# Patient Record
Sex: MALE | Race: OTHER | Employment: UNEMPLOYED
[De-identification: names, ages, dates, MRNs, and addresses within clinical notes are randomized per-mention and may not be internally consistent; named-entity substitution may affect disease eponyms.]

---

## 2017-08-29 ENCOUNTER — TELEPHONE (OUTPATIENT)
Dept: OTHER | Facility: CLINIC | Age: 47
End: 2017-08-29

## 2018-03-13 ENCOUNTER — OFFICE VISIT (OUTPATIENT)
Dept: FAMILY MEDICINE CLINIC | Age: 48
End: 2018-03-13
Payer: COMMERCIAL

## 2018-03-13 VITALS
WEIGHT: 212 LBS | HEIGHT: 74 IN | DIASTOLIC BLOOD PRESSURE: 98 MMHG | BODY MASS INDEX: 27.21 KG/M2 | TEMPERATURE: 97.5 F | SYSTOLIC BLOOD PRESSURE: 150 MMHG | HEART RATE: 60 BPM | OXYGEN SATURATION: 95 %

## 2018-03-13 DIAGNOSIS — E78.2 MIXED HYPERLIPIDEMIA: ICD-10-CM

## 2018-03-13 DIAGNOSIS — I10 ESSENTIAL HYPERTENSION: ICD-10-CM

## 2018-03-13 DIAGNOSIS — Z23 NEED FOR VACCINATION: ICD-10-CM

## 2018-03-13 DIAGNOSIS — Z11.4 ENCOUNTER FOR SCREENING FOR HIV: ICD-10-CM

## 2018-03-13 DIAGNOSIS — E11.8 TYPE 2 DIABETES MELLITUS WITH COMPLICATION, WITHOUT LONG-TERM CURRENT USE OF INSULIN (HCC): Primary | ICD-10-CM

## 2018-03-13 PROCEDURE — 90732 PPSV23 VACC 2 YRS+ SUBQ/IM: CPT | Performed by: FAMILY MEDICINE

## 2018-03-13 PROCEDURE — G8484 FLU IMMUNIZE NO ADMIN: HCPCS | Performed by: FAMILY MEDICINE

## 2018-03-13 PROCEDURE — G8419 CALC BMI OUT NRM PARAM NOF/U: HCPCS | Performed by: FAMILY MEDICINE

## 2018-03-13 PROCEDURE — G8427 DOCREV CUR MEDS BY ELIG CLIN: HCPCS | Performed by: FAMILY MEDICINE

## 2018-03-13 PROCEDURE — 99214 OFFICE O/P EST MOD 30 MIN: CPT | Performed by: FAMILY MEDICINE

## 2018-03-13 PROCEDURE — 1036F TOBACCO NON-USER: CPT | Performed by: FAMILY MEDICINE

## 2018-03-13 PROCEDURE — 3046F HEMOGLOBIN A1C LEVEL >9.0%: CPT | Performed by: FAMILY MEDICINE

## 2018-03-13 PROCEDURE — 90471 IMMUNIZATION ADMIN: CPT | Performed by: FAMILY MEDICINE

## 2018-03-13 RX ORDER — ATORVASTATIN CALCIUM 10 MG/1
TABLET, FILM COATED ORAL
Qty: 30 TABLET | Refills: 5 | Status: SHIPPED | OUTPATIENT
Start: 2018-03-13 | End: 2019-02-22 | Stop reason: SDUPTHER

## 2018-03-13 RX ORDER — LISINOPRIL 5 MG/1
5 TABLET ORAL DAILY
Qty: 30 TABLET | Refills: 2 | Status: SHIPPED | OUTPATIENT
Start: 2018-03-13 | End: 2019-02-22 | Stop reason: SDUPTHER

## 2018-03-13 RX ORDER — ASPIRIN 81 MG/1
81 TABLET ORAL DAILY
Qty: 30 TABLET | Refills: 3 | Status: SHIPPED | OUTPATIENT
Start: 2018-03-13 | End: 2019-03-14 | Stop reason: SDUPTHER

## 2018-03-13 RX ORDER — BENZOCAINE/MENTHOL 6 MG-10 MG
LOZENGE MUCOUS MEMBRANE
Qty: 1 EACH | Refills: 0 | Status: SHIPPED | OUTPATIENT
Start: 2018-03-13

## 2018-03-13 ASSESSMENT — ENCOUNTER SYMPTOMS
BLOOD IN STOOL: 0
NAUSEA: 0
CONSTIPATION: 0
BACK PAIN: 0
COUGH: 0
RHINORRHEA: 0
SORE THROAT: 0
SINUS PRESSURE: 0
WHEEZING: 0
PHOTOPHOBIA: 0
ABDOMINAL PAIN: 0
EYE REDNESS: 0
SHORTNESS OF BREATH: 0

## 2018-03-13 NOTE — PROGRESS NOTES
Chief Complaint   Patient presents with    Diabetes         Pedro Ramirez  here today for follow up on chronic medical problems, go over labs and/or diagnostic studies, and medication refills. Diabetes      HPI:Patient is here for follow-up on diabetes and hypertension. Patient has not been seen in the office since last 1 year. Patient reports he ran out of medications. Patient is taking his metformin and reports he has lost weight. Last A1c was one year before it was 8.6. He does not check his sugars    Hypertension uncontrolled does not take his medications. He denies any chest pain, shortness of breath. He is due for all his blood work    Hyperlipidemia on statins needs repeat blood work. BP (!) 138/91   Pulse 60   Temp 97.5 °F (36.4 °C) (Tympanic)   Ht 6' 2\" (1.88 m)   Wt 212 lb (96.2 kg)   SpO2 95% Comment: rest @ RA  BMI 27.22 kg/m²   Body mass index is 27.22 kg/m². Wt Readings from Last 3 Encounters:   03/13/18 212 lb (96.2 kg)   10/04/16 219 lb (99.3 kg)   08/05/16 220 lb (99.8 kg)        [x]Negative depression screening. PHQ Scores 11/13/2013   PHQ2 Score 4   PHQ9 Score 15      []1-4 = Minimal depression   []5-9 = Mild depression   []10-14 = Moderate depression   []15-19 = Moderately severe depression   []20-27 = Severe depression    Discussed testing with the patient and all questions fully answered.     Office Visit on 10/04/2016   Component Date Value Ref Range Status    Hemoglobin A1C 10/04/2016 8.6  % Final         Most recent labs reviewed:     Lab Results   Component Value Date    WBC 12.4 (H) 11/22/2015    HGB 15.5 11/22/2015    HCT 44.1 11/22/2015    MCV 90.6 11/22/2015     11/22/2015       Lab Results   Component Value Date     11/22/2015    K 3.5 11/22/2015     11/22/2015    CO2 24 11/22/2015    BUN 14 11/22/2015    CREATININE 0.72 11/22/2015    GLUCOSE 149 11/22/2015    CALCIUM 8.9 11/22/2015        Lab Results   Component Value Date    ALT 42 (H)

## 2019-01-15 ENCOUNTER — HOSPITAL ENCOUNTER (INPATIENT)
Age: 49
LOS: 2 days | Discharge: HOME | DRG: 383 | End: 2019-01-18
Attending: EMERGENCY MEDICINE | Admitting: INTERNAL MEDICINE
Payer: COMMERCIAL

## 2019-01-15 DIAGNOSIS — L03.211 CELLULITIS OF FACE: Primary | ICD-10-CM

## 2019-01-15 DIAGNOSIS — R73.9 HYPERGLYCEMIA: ICD-10-CM

## 2019-01-15 PROBLEM — L03.90 CELLULITIS: Status: ACTIVE | Noted: 2019-01-15

## 2019-01-15 LAB
-: NORMAL
ABSOLUTE EOS #: 0.1 K/UL (ref 0–0.4)
ABSOLUTE IMMATURE GRANULOCYTE: ABNORMAL K/UL (ref 0–0.3)
ABSOLUTE LYMPH #: 1.5 K/UL (ref 1–4.8)
ABSOLUTE MONO #: 0.9 K/UL (ref 0.1–1.3)
ALBUMIN SERPL-MCNC: 3.9 G/DL (ref 3.5–5.2)
ALBUMIN/GLOBULIN RATIO: ABNORMAL (ref 1–2.5)
ALLEN TEST: ABNORMAL
ALP BLD-CCNC: 139 U/L (ref 40–129)
ALT SERPL-CCNC: 17 U/L (ref 5–41)
AMORPHOUS: NORMAL
ANION GAP SERPL CALCULATED.3IONS-SCNC: 13 MMOL/L (ref 9–17)
AST SERPL-CCNC: 13 U/L
BACTERIA: NORMAL
BASOPHILS # BLD: 1 % (ref 0–2)
BASOPHILS ABSOLUTE: 0.1 K/UL (ref 0–0.2)
BETA-HYDROXYBUTYRATE: 0.59 MMOL/L (ref 0.02–0.27)
BILIRUB SERPL-MCNC: 0.87 MG/DL (ref 0.3–1.2)
BILIRUBIN URINE: NEGATIVE
BUN BLDV-MCNC: 15 MG/DL (ref 6–20)
BUN/CREAT BLD: ABNORMAL (ref 9–20)
C-REACTIVE PROTEIN: 77.6 MG/L (ref 0–5)
CALCIUM SERPL-MCNC: 9.3 MG/DL (ref 8.6–10.4)
CARBOXYHEMOGLOBIN: 1.6 % (ref 0–5)
CASTS UA: NORMAL /LPF
CHLORIDE BLD-SCNC: 91 MMOL/L (ref 98–107)
CHP ED QC CHECK: YES
CO2: 26 MMOL/L (ref 20–31)
COLOR: YELLOW
COMMENT UA: ABNORMAL
CREAT SERPL-MCNC: 0.8 MG/DL (ref 0.7–1.2)
CRYSTALS, UA: NORMAL /HPF
DIFFERENTIAL TYPE: ABNORMAL
EOSINOPHILS RELATIVE PERCENT: 0 % (ref 0–4)
EPITHELIAL CELLS UA: NORMAL /HPF
FIO2: ABNORMAL
GFR AFRICAN AMERICAN: >60 ML/MIN
GFR NON-AFRICAN AMERICAN: >60 ML/MIN
GFR SERPL CREATININE-BSD FRML MDRD: ABNORMAL ML/MIN/{1.73_M2}
GFR SERPL CREATININE-BSD FRML MDRD: ABNORMAL ML/MIN/{1.73_M2}
GLUCOSE BLD-MCNC: 362 MG/DL
GLUCOSE BLD-MCNC: 362 MG/DL (ref 75–110)
GLUCOSE BLD-MCNC: 530 MG/DL (ref 70–99)
GLUCOSE BLD-MCNC: 542 MG/DL
GLUCOSE BLD-MCNC: 542 MG/DL (ref 75–110)
GLUCOSE URINE: ABNORMAL
HCO3 VENOUS: 27.5 MMOL/L (ref 24–30)
HCT VFR BLD CALC: 44.3 % (ref 41–53)
HEMOGLOBIN: 15.2 G/DL (ref 13.5–17.5)
IMMATURE GRANULOCYTES: ABNORMAL %
KETONES, URINE: ABNORMAL
LEUKOCYTE ESTERASE, URINE: NEGATIVE
LYMPHOCYTES # BLD: 12 % (ref 24–44)
MCH RBC QN AUTO: 32.1 PG (ref 26–34)
MCHC RBC AUTO-ENTMCNC: 34.3 G/DL (ref 31–37)
MCV RBC AUTO: 93.6 FL (ref 80–100)
METHEMOGLOBIN: 0.4 % (ref 0–1.9)
MODE: ABNORMAL
MONOCYTES # BLD: 7 % (ref 1–7)
MUCUS: NORMAL
NEGATIVE BASE EXCESS, VEN: ABNORMAL MMOL/L (ref 0–2)
NITRITE, URINE: NEGATIVE
NOTIFICATION TIME: ABNORMAL
NOTIFICATION: ABNORMAL
NRBC AUTOMATED: ABNORMAL PER 100 WBC
O2 DEVICE/FLOW/%: ABNORMAL
O2 SAT, VEN: 84.3 % (ref 60–85)
OTHER OBSERVATIONS UA: NORMAL
OXYHEMOGLOBIN: ABNORMAL % (ref 95–98)
PATIENT TEMP: 37
PCO2, VEN, TEMP ADJ: ABNORMAL MMHG (ref 39–55)
PCO2, VEN: 45.4 (ref 39–55)
PDW BLD-RTO: 13 % (ref 11.5–14.9)
PEEP/CPAP: ABNORMAL
PH UA: 6 (ref 5–8)
PH VENOUS: 7.39 (ref 7.32–7.42)
PH, VEN, TEMP ADJ: ABNORMAL (ref 7.32–7.42)
PLATELET # BLD: 227 K/UL (ref 150–450)
PLATELET ESTIMATE: ABNORMAL
PMV BLD AUTO: 9 FL (ref 6–12)
PO2, VEN, TEMP ADJ: ABNORMAL MMHG (ref 30–50)
PO2, VEN: 53.2 (ref 30–50)
POSITIVE BASE EXCESS, VEN: 2.6 MMOL/L (ref 0–2)
POTASSIUM SERPL-SCNC: 4.6 MMOL/L (ref 3.7–5.3)
PROTEIN UA: NEGATIVE
PSV: ABNORMAL
PT. POSITION: ABNORMAL
RBC # BLD: 4.74 M/UL (ref 4.5–5.9)
RBC # BLD: ABNORMAL 10*6/UL
RBC UA: NORMAL /HPF
RENAL EPITHELIAL, UA: NORMAL /HPF
RESPIRATORY RATE: ABNORMAL
SAMPLE SITE: ABNORMAL
SEG NEUTROPHILS: 80 % (ref 36–66)
SEGMENTED NEUTROPHILS ABSOLUTE COUNT: 9.8 K/UL (ref 1.3–9.1)
SERUM OSMOLALITY: 312 MOSM/KG (ref 275–295)
SET RATE: ABNORMAL
SODIUM BLD-SCNC: 130 MMOL/L (ref 135–144)
SPECIFIC GRAVITY UA: 1.04 (ref 1–1.03)
TEXT FOR RESPIRATORY: ABNORMAL
TOTAL HB: ABNORMAL G/DL (ref 12–16)
TOTAL PROTEIN: 8.2 G/DL (ref 6.4–8.3)
TOTAL RATE: ABNORMAL
TRICHOMONAS: NORMAL
TURBIDITY: CLEAR
URINE HGB: NEGATIVE
UROBILINOGEN, URINE: ABNORMAL
VT: ABNORMAL
WBC # BLD: 12.3 K/UL (ref 3.5–11)
WBC # BLD: ABNORMAL 10*3/UL
WBC UA: NORMAL /HPF
YEAST: NORMAL

## 2019-01-15 PROCEDURE — 82805 BLOOD GASES W/O2 SATURATION: CPT

## 2019-01-15 PROCEDURE — 36415 COLL VENOUS BLD VENIPUNCTURE: CPT

## 2019-01-15 PROCEDURE — 82010 KETONE BODYS QUAN: CPT

## 2019-01-15 PROCEDURE — 6370000000 HC RX 637 (ALT 250 FOR IP): Performed by: EMERGENCY MEDICINE

## 2019-01-15 PROCEDURE — 99222 1ST HOSP IP/OBS MODERATE 55: CPT | Performed by: INTERNAL MEDICINE

## 2019-01-15 PROCEDURE — 81001 URINALYSIS AUTO W/SCOPE: CPT

## 2019-01-15 PROCEDURE — 80053 COMPREHEN METABOLIC PANEL: CPT

## 2019-01-15 PROCEDURE — 96365 THER/PROPH/DIAG IV INF INIT: CPT

## 2019-01-15 PROCEDURE — 2500000003 HC RX 250 WO HCPCS: Performed by: EMERGENCY MEDICINE

## 2019-01-15 PROCEDURE — 99285 EMERGENCY DEPT VISIT HI MDM: CPT

## 2019-01-15 PROCEDURE — 85025 COMPLETE CBC W/AUTO DIFF WBC: CPT

## 2019-01-15 PROCEDURE — G0378 HOSPITAL OBSERVATION PER HR: HCPCS

## 2019-01-15 PROCEDURE — 82947 ASSAY GLUCOSE BLOOD QUANT: CPT

## 2019-01-15 PROCEDURE — 86140 C-REACTIVE PROTEIN: CPT

## 2019-01-15 PROCEDURE — 82800 BLOOD PH: CPT

## 2019-01-15 PROCEDURE — 96372 THER/PROPH/DIAG INJ SC/IM: CPT

## 2019-01-15 PROCEDURE — 83930 ASSAY OF BLOOD OSMOLALITY: CPT

## 2019-01-15 PROCEDURE — 6370000000 HC RX 637 (ALT 250 FOR IP): Performed by: INTERNAL MEDICINE

## 2019-01-15 PROCEDURE — 2580000003 HC RX 258: Performed by: EMERGENCY MEDICINE

## 2019-01-15 PROCEDURE — 96361 HYDRATE IV INFUSION ADD-ON: CPT

## 2019-01-15 RX ORDER — LACTOBACILLUS RHAMNOSUS GG 10B CELL
2 CAPSULE ORAL
Status: DISCONTINUED | OUTPATIENT
Start: 2019-01-16 | End: 2019-01-18 | Stop reason: HOSPADM

## 2019-01-15 RX ORDER — BISACODYL 10 MG
10 SUPPOSITORY, RECTAL RECTAL DAILY PRN
Status: DISCONTINUED | OUTPATIENT
Start: 2019-01-15 | End: 2019-01-18 | Stop reason: HOSPADM

## 2019-01-15 RX ORDER — LISINOPRIL 5 MG/1
5 TABLET ORAL DAILY
Status: DISCONTINUED | OUTPATIENT
Start: 2019-01-16 | End: 2019-01-18 | Stop reason: HOSPADM

## 2019-01-15 RX ORDER — POTASSIUM CHLORIDE 20MEQ/15ML
40 LIQUID (ML) ORAL PRN
Status: DISCONTINUED | OUTPATIENT
Start: 2019-01-15 | End: 2019-01-18 | Stop reason: HOSPADM

## 2019-01-15 RX ORDER — NICOTINE 21 MG/24HR
1 PATCH, TRANSDERMAL 24 HOURS TRANSDERMAL DAILY PRN
Status: DISCONTINUED | OUTPATIENT
Start: 2019-01-15 | End: 2019-01-18 | Stop reason: HOSPADM

## 2019-01-15 RX ORDER — IBUPROFEN 600 MG/1
600 TABLET ORAL ONCE
Status: COMPLETED | OUTPATIENT
Start: 2019-01-15 | End: 2019-01-15

## 2019-01-15 RX ORDER — ATORVASTATIN CALCIUM 10 MG/1
10 TABLET, FILM COATED ORAL DAILY
Status: DISCONTINUED | OUTPATIENT
Start: 2019-01-16 | End: 2019-01-18 | Stop reason: HOSPADM

## 2019-01-15 RX ORDER — SODIUM CHLORIDE 0.9 % (FLUSH) 0.9 %
10 SYRINGE (ML) INJECTION PRN
Status: DISCONTINUED | OUTPATIENT
Start: 2019-01-15 | End: 2019-01-18 | Stop reason: HOSPADM

## 2019-01-15 RX ORDER — MAGNESIUM SULFATE 1 G/100ML
1 INJECTION INTRAVENOUS PRN
Status: DISCONTINUED | OUTPATIENT
Start: 2019-01-15 | End: 2019-01-18 | Stop reason: HOSPADM

## 2019-01-15 RX ORDER — ASPIRIN 81 MG/1
81 TABLET ORAL DAILY
Status: DISCONTINUED | OUTPATIENT
Start: 2019-01-16 | End: 2019-01-18 | Stop reason: HOSPADM

## 2019-01-15 RX ORDER — NICOTINE POLACRILEX 4 MG
15 LOZENGE BUCCAL PRN
Status: DISCONTINUED | OUTPATIENT
Start: 2019-01-15 | End: 2019-01-18 | Stop reason: HOSPADM

## 2019-01-15 RX ORDER — POTASSIUM CHLORIDE 7.45 MG/ML
10 INJECTION INTRAVENOUS PRN
Status: DISCONTINUED | OUTPATIENT
Start: 2019-01-15 | End: 2019-01-18 | Stop reason: HOSPADM

## 2019-01-15 RX ORDER — SODIUM CHLORIDE 0.9 % (FLUSH) 0.9 %
10 SYRINGE (ML) INJECTION EVERY 12 HOURS SCHEDULED
Status: DISCONTINUED | OUTPATIENT
Start: 2019-01-15 | End: 2019-01-18 | Stop reason: HOSPADM

## 2019-01-15 RX ORDER — SODIUM CHLORIDE 9 MG/ML
INJECTION, SOLUTION INTRAVENOUS CONTINUOUS
Status: DISCONTINUED | OUTPATIENT
Start: 2019-01-15 | End: 2019-01-18 | Stop reason: HOSPADM

## 2019-01-15 RX ORDER — ONDANSETRON 2 MG/ML
4 INJECTION INTRAMUSCULAR; INTRAVENOUS EVERY 8 HOURS PRN
Status: DISCONTINUED | OUTPATIENT
Start: 2019-01-15 | End: 2019-01-18 | Stop reason: HOSPADM

## 2019-01-15 RX ORDER — DEXTROSE MONOHYDRATE 50 MG/ML
100 INJECTION, SOLUTION INTRAVENOUS PRN
Status: DISCONTINUED | OUTPATIENT
Start: 2019-01-15 | End: 2019-01-18 | Stop reason: HOSPADM

## 2019-01-15 RX ORDER — ACETAMINOPHEN 325 MG/1
650 TABLET ORAL EVERY 4 HOURS PRN
Status: DISCONTINUED | OUTPATIENT
Start: 2019-01-15 | End: 2019-01-18 | Stop reason: HOSPADM

## 2019-01-15 RX ORDER — 0.9 % SODIUM CHLORIDE 0.9 %
1000 INTRAVENOUS SOLUTION INTRAVENOUS ONCE
Status: COMPLETED | OUTPATIENT
Start: 2019-01-15 | End: 2019-01-15

## 2019-01-15 RX ORDER — DEXTROSE MONOHYDRATE 25 G/50ML
12.5 INJECTION, SOLUTION INTRAVENOUS PRN
Status: DISCONTINUED | OUTPATIENT
Start: 2019-01-15 | End: 2019-01-18 | Stop reason: HOSPADM

## 2019-01-15 RX ORDER — POTASSIUM CHLORIDE 20 MEQ/1
40 TABLET, EXTENDED RELEASE ORAL PRN
Status: DISCONTINUED | OUTPATIENT
Start: 2019-01-15 | End: 2019-01-18 | Stop reason: HOSPADM

## 2019-01-15 RX ADMIN — IBUPROFEN 600 MG: 600 TABLET ORAL at 18:40

## 2019-01-15 RX ADMIN — INSULIN LISPRO 6 UNITS: 100 INJECTION, SOLUTION INTRAVENOUS; SUBCUTANEOUS at 19:25

## 2019-01-15 RX ADMIN — ACETAMINOPHEN 650 MG: 325 TABLET, FILM COATED ORAL at 22:57

## 2019-01-15 RX ADMIN — DEXTROSE MONOHYDRATE 600 MG: 50 INJECTION, SOLUTION INTRAVENOUS at 20:35

## 2019-01-15 RX ADMIN — SODIUM CHLORIDE 1000 ML: 9 INJECTION, SOLUTION INTRAVENOUS at 18:39

## 2019-01-15 ASSESSMENT — PAIN DESCRIPTION - PROGRESSION: CLINICAL_PROGRESSION: GRADUALLY WORSENING

## 2019-01-15 ASSESSMENT — ENCOUNTER SYMPTOMS
ABDOMINAL PAIN: 0
COUGH: 0
BACK PAIN: 0
COLOR CHANGE: 1
FACIAL SWELLING: 1
NAUSEA: 0
SHORTNESS OF BREATH: 0
VOMITING: 0
SORE THROAT: 0

## 2019-01-15 ASSESSMENT — PAIN SCALES - GENERAL
PAINLEVEL_OUTOF10: 6
PAINLEVEL_OUTOF10: 5
PAINLEVEL_OUTOF10: 7
PAINLEVEL_OUTOF10: 9

## 2019-01-15 ASSESSMENT — PAIN DESCRIPTION - ORIENTATION
ORIENTATION: LEFT

## 2019-01-15 ASSESSMENT — PAIN DESCRIPTION - LOCATION
LOCATION: FACE;MOUTH
LOCATION: FACE

## 2019-01-15 ASSESSMENT — PAIN DESCRIPTION - ONSET: ONSET: ON-GOING

## 2019-01-15 ASSESSMENT — PAIN DESCRIPTION - DESCRIPTORS
DESCRIPTORS: BURNING;THROBBING
DESCRIPTORS: ACHING;SORE;TENDER;THROBBING

## 2019-01-15 ASSESSMENT — PAIN DESCRIPTION - FREQUENCY
FREQUENCY: CONTINUOUS

## 2019-01-15 ASSESSMENT — PAIN DESCRIPTION - PAIN TYPE
TYPE: ACUTE PAIN
TYPE: ACUTE PAIN

## 2019-01-16 PROBLEM — R73.9 HYPERGLYCEMIA: Status: ACTIVE | Noted: 2019-01-16

## 2019-01-16 LAB
ANION GAP SERPL CALCULATED.3IONS-SCNC: 11 MMOL/L (ref 9–17)
BUN BLDV-MCNC: 16 MG/DL (ref 6–20)
BUN/CREAT BLD: ABNORMAL (ref 9–20)
CALCIUM SERPL-MCNC: 8.8 MG/DL (ref 8.6–10.4)
CHLORIDE BLD-SCNC: 99 MMOL/L (ref 98–107)
CO2: 25 MMOL/L (ref 20–31)
CREAT SERPL-MCNC: 0.57 MG/DL (ref 0.7–1.2)
ESTIMATED AVERAGE GLUCOSE: 258 MG/DL
GFR AFRICAN AMERICAN: >60 ML/MIN
GFR NON-AFRICAN AMERICAN: >60 ML/MIN
GFR SERPL CREATININE-BSD FRML MDRD: ABNORMAL ML/MIN/{1.73_M2}
GFR SERPL CREATININE-BSD FRML MDRD: ABNORMAL ML/MIN/{1.73_M2}
GLUCOSE BLD-MCNC: 231 MG/DL (ref 75–110)
GLUCOSE BLD-MCNC: 264 MG/DL (ref 70–99)
GLUCOSE BLD-MCNC: 279 MG/DL (ref 75–110)
GLUCOSE BLD-MCNC: 295 MG/DL (ref 75–110)
GLUCOSE BLD-MCNC: 319 MG/DL (ref 75–110)
HBA1C MFR BLD: 10.6 % (ref 4–6)
HCT VFR BLD CALC: 40.7 % (ref 41–53)
HEMOGLOBIN: 14.3 G/DL (ref 13.5–17.5)
MCH RBC QN AUTO: 33 PG (ref 26–34)
MCHC RBC AUTO-ENTMCNC: 35.2 G/DL (ref 31–37)
MCV RBC AUTO: 93.6 FL (ref 80–100)
NRBC AUTOMATED: ABNORMAL PER 100 WBC
PDW BLD-RTO: 12.7 % (ref 11.5–14.9)
PLATELET # BLD: 212 K/UL (ref 150–450)
PMV BLD AUTO: 8 FL (ref 6–12)
POTASSIUM SERPL-SCNC: 4.5 MMOL/L (ref 3.7–5.3)
RBC # BLD: 4.34 M/UL (ref 4.5–5.9)
SODIUM BLD-SCNC: 135 MMOL/L (ref 135–144)
WBC # BLD: 10.9 K/UL (ref 3.5–11)

## 2019-01-16 PROCEDURE — 96376 TX/PRO/DX INJ SAME DRUG ADON: CPT

## 2019-01-16 PROCEDURE — G0378 HOSPITAL OBSERVATION PER HR: HCPCS

## 2019-01-16 PROCEDURE — 80048 BASIC METABOLIC PNL TOTAL CA: CPT

## 2019-01-16 PROCEDURE — 1200000000 HC SEMI PRIVATE

## 2019-01-16 PROCEDURE — 2580000003 HC RX 258: Performed by: INTERNAL MEDICINE

## 2019-01-16 PROCEDURE — 2500000003 HC RX 250 WO HCPCS: Performed by: NURSE PRACTITIONER

## 2019-01-16 PROCEDURE — 36415 COLL VENOUS BLD VENIPUNCTURE: CPT

## 2019-01-16 PROCEDURE — 2580000003 HC RX 258: Performed by: NURSE PRACTITIONER

## 2019-01-16 PROCEDURE — 6370000000 HC RX 637 (ALT 250 FOR IP): Performed by: NURSE PRACTITIONER

## 2019-01-16 PROCEDURE — 6360000002 HC RX W HCPCS: Performed by: INTERNAL MEDICINE

## 2019-01-16 PROCEDURE — 96366 THER/PROPH/DIAG IV INF ADDON: CPT

## 2019-01-16 PROCEDURE — 6370000000 HC RX 637 (ALT 250 FOR IP): Performed by: INTERNAL MEDICINE

## 2019-01-16 PROCEDURE — 96367 TX/PROPH/DG ADDL SEQ IV INF: CPT

## 2019-01-16 PROCEDURE — 82947 ASSAY GLUCOSE BLOOD QUANT: CPT

## 2019-01-16 PROCEDURE — 85027 COMPLETE CBC AUTOMATED: CPT

## 2019-01-16 PROCEDURE — 83036 HEMOGLOBIN GLYCOSYLATED A1C: CPT

## 2019-01-16 PROCEDURE — 96361 HYDRATE IV INFUSION ADD-ON: CPT

## 2019-01-16 RX ORDER — GLIMEPIRIDE 4 MG/1
4 TABLET ORAL
Status: DISCONTINUED | OUTPATIENT
Start: 2019-01-16 | End: 2019-01-18 | Stop reason: HOSPADM

## 2019-01-16 RX ORDER — HYDROCODONE BITARTRATE AND ACETAMINOPHEN 5; 325 MG/1; MG/1
1 TABLET ORAL ONCE
Status: COMPLETED | OUTPATIENT
Start: 2019-01-16 | End: 2019-01-16

## 2019-01-16 RX ORDER — IBUPROFEN 800 MG/1
800 TABLET ORAL EVERY 6 HOURS PRN
Status: DISCONTINUED | OUTPATIENT
Start: 2019-01-16 | End: 2019-01-18 | Stop reason: HOSPADM

## 2019-01-16 RX ORDER — HYDROCODONE BITARTRATE AND ACETAMINOPHEN 5; 325 MG/1; MG/1
2 TABLET ORAL EVERY 4 HOURS PRN
Status: DISCONTINUED | OUTPATIENT
Start: 2019-01-16 | End: 2019-01-18 | Stop reason: HOSPADM

## 2019-01-16 RX ORDER — HYDROCODONE BITARTRATE AND ACETAMINOPHEN 5; 325 MG/1; MG/1
1 TABLET ORAL EVERY 4 HOURS PRN
Status: DISCONTINUED | OUTPATIENT
Start: 2019-01-16 | End: 2019-01-18 | Stop reason: HOSPADM

## 2019-01-16 RX ADMIN — Medication 2 CAPSULE: at 09:24

## 2019-01-16 RX ADMIN — HYDROCODONE BITARTRATE AND ACETAMINOPHEN 1 TABLET: 5; 325 TABLET ORAL at 04:02

## 2019-01-16 RX ADMIN — GLIMEPIRIDE 4 MG: 4 TABLET ORAL at 17:09

## 2019-01-16 RX ADMIN — HYDROCODONE BITARTRATE AND ACETAMINOPHEN 1 TABLET: 5; 325 TABLET ORAL at 09:19

## 2019-01-16 RX ADMIN — METFORMIN HYDROCHLORIDE 850 MG: 850 TABLET ORAL at 17:09

## 2019-01-16 RX ADMIN — INSULIN LISPRO 6 UNITS: 100 INJECTION, SOLUTION INTRAVENOUS; SUBCUTANEOUS at 08:04

## 2019-01-16 RX ADMIN — DEXTROSE MONOHYDRATE 600 MG: 50 INJECTION, SOLUTION INTRAVENOUS at 02:46

## 2019-01-16 RX ADMIN — DEXTROSE MONOHYDRATE 600 MG: 50 INJECTION, SOLUTION INTRAVENOUS at 14:37

## 2019-01-16 RX ADMIN — IBUPROFEN 800 MG: 800 TABLET ORAL at 17:14

## 2019-01-16 RX ADMIN — INSULIN LISPRO 4 UNITS: 100 INJECTION, SOLUTION INTRAVENOUS; SUBCUTANEOUS at 12:02

## 2019-01-16 RX ADMIN — LISINOPRIL 5 MG: 5 TABLET ORAL at 09:19

## 2019-01-16 RX ADMIN — Medication 2 CAPSULE: at 17:09

## 2019-01-16 RX ADMIN — SODIUM CHLORIDE: 9 INJECTION, SOLUTION INTRAVENOUS at 07:44

## 2019-01-16 RX ADMIN — DEXTROSE MONOHYDRATE 600 MG: 50 INJECTION, SOLUTION INTRAVENOUS at 09:24

## 2019-01-16 RX ADMIN — DEXTROSE MONOHYDRATE 600 MG: 50 INJECTION, SOLUTION INTRAVENOUS at 21:06

## 2019-01-16 RX ADMIN — INSULIN LISPRO 4 UNITS: 100 INJECTION, SOLUTION INTRAVENOUS; SUBCUTANEOUS at 01:07

## 2019-01-16 RX ADMIN — INSULIN LISPRO 3 UNITS: 100 INJECTION, SOLUTION INTRAVENOUS; SUBCUTANEOUS at 21:02

## 2019-01-16 RX ADMIN — METFORMIN HYDROCHLORIDE 850 MG: 850 TABLET ORAL at 12:02

## 2019-01-16 RX ADMIN — SODIUM CHLORIDE: 9 INJECTION, SOLUTION INTRAVENOUS at 17:09

## 2019-01-16 RX ADMIN — SODIUM CHLORIDE: 9 INJECTION, SOLUTION INTRAVENOUS at 01:08

## 2019-01-16 RX ADMIN — HYDROCODONE BITARTRATE AND ACETAMINOPHEN 2 TABLET: 5; 325 TABLET ORAL at 14:37

## 2019-01-16 RX ADMIN — VANCOMYCIN HYDROCHLORIDE 1500 MG: 5 INJECTION, POWDER, LYOPHILIZED, FOR SOLUTION INTRAVENOUS at 17:15

## 2019-01-16 RX ADMIN — ATORVASTATIN CALCIUM 10 MG: 10 TABLET, FILM COATED ORAL at 09:19

## 2019-01-16 RX ADMIN — Medication 2 CAPSULE: at 12:02

## 2019-01-16 RX ADMIN — INSULIN LISPRO 6 UNITS: 100 INJECTION, SOLUTION INTRAVENOUS; SUBCUTANEOUS at 17:09

## 2019-01-16 ASSESSMENT — PAIN DESCRIPTION - DESCRIPTORS: DESCRIPTORS: BURNING;THROBBING

## 2019-01-16 ASSESSMENT — ENCOUNTER SYMPTOMS
SHORTNESS OF BREATH: 0
FACIAL SWELLING: 1
VOMITING: 0
WHEEZING: 0
CONSTIPATION: 0
NAUSEA: 0
BACK PAIN: 0
DIARRHEA: 0
VOICE CHANGE: 0
ABDOMINAL PAIN: 0
TROUBLE SWALLOWING: 0
SORE THROAT: 0
COUGH: 0

## 2019-01-16 ASSESSMENT — PAIN DESCRIPTION - LOCATION: LOCATION: MOUTH;NECK

## 2019-01-16 ASSESSMENT — PAIN SCALES - GENERAL
PAINLEVEL_OUTOF10: 10
PAINLEVEL_OUTOF10: 0
PAINLEVEL_OUTOF10: 9
PAINLEVEL_OUTOF10: 8
PAINLEVEL_OUTOF10: 10

## 2019-01-16 ASSESSMENT — PAIN DESCRIPTION - ORIENTATION: ORIENTATION: LEFT

## 2019-01-16 ASSESSMENT — PAIN DESCRIPTION - PAIN TYPE: TYPE: ACUTE PAIN

## 2019-01-17 LAB
ANION GAP SERPL CALCULATED.3IONS-SCNC: 11 MMOL/L (ref 9–17)
BUN BLDV-MCNC: 15 MG/DL (ref 6–20)
BUN/CREAT BLD: ABNORMAL (ref 9–20)
CALCIUM SERPL-MCNC: 8.9 MG/DL (ref 8.6–10.4)
CHLORIDE BLD-SCNC: 97 MMOL/L (ref 98–107)
CO2: 26 MMOL/L (ref 20–31)
CREAT SERPL-MCNC: 0.56 MG/DL (ref 0.7–1.2)
CULTURE: NORMAL
CULTURE: NORMAL
DIRECT EXAM: NORMAL
GFR AFRICAN AMERICAN: >60 ML/MIN
GFR NON-AFRICAN AMERICAN: >60 ML/MIN
GFR SERPL CREATININE-BSD FRML MDRD: ABNORMAL ML/MIN/{1.73_M2}
GFR SERPL CREATININE-BSD FRML MDRD: ABNORMAL ML/MIN/{1.73_M2}
GLUCOSE BLD-MCNC: 223 MG/DL (ref 75–110)
GLUCOSE BLD-MCNC: 231 MG/DL (ref 75–110)
GLUCOSE BLD-MCNC: 264 MG/DL (ref 75–110)
GLUCOSE BLD-MCNC: 285 MG/DL (ref 75–110)
GLUCOSE BLD-MCNC: 302 MG/DL (ref 70–99)
HCT VFR BLD CALC: 42.6 % (ref 41–53)
HEMOGLOBIN: 14.3 G/DL (ref 13.5–17.5)
Lab: NORMAL
MCH RBC QN AUTO: 31.7 PG (ref 26–34)
MCHC RBC AUTO-ENTMCNC: 33.5 G/DL (ref 31–37)
MCV RBC AUTO: 94.4 FL (ref 80–100)
NRBC AUTOMATED: NORMAL PER 100 WBC
PDW BLD-RTO: 12.9 % (ref 11.5–14.9)
PLATELET # BLD: 239 K/UL (ref 150–450)
PMV BLD AUTO: 8.1 FL (ref 6–12)
POTASSIUM SERPL-SCNC: 3.7 MMOL/L (ref 3.7–5.3)
RBC # BLD: 4.51 M/UL (ref 4.5–5.9)
SODIUM BLD-SCNC: 134 MMOL/L (ref 135–144)
SPECIMEN DESCRIPTION: NORMAL
STATUS: NORMAL
WBC # BLD: 7.7 K/UL (ref 3.5–11)

## 2019-01-17 PROCEDURE — 2580000003 HC RX 258: Performed by: INTERNAL MEDICINE

## 2019-01-17 PROCEDURE — 87075 CULTR BACTERIA EXCEPT BLOOD: CPT

## 2019-01-17 PROCEDURE — 87070 CULTURE OTHR SPECIMN AEROBIC: CPT

## 2019-01-17 PROCEDURE — 6370000000 HC RX 637 (ALT 250 FOR IP): Performed by: INTERNAL MEDICINE

## 2019-01-17 PROCEDURE — 80048 BASIC METABOLIC PNL TOTAL CA: CPT

## 2019-01-17 PROCEDURE — 85027 COMPLETE CBC AUTOMATED: CPT

## 2019-01-17 PROCEDURE — 2500000003 HC RX 250 WO HCPCS: Performed by: NURSE PRACTITIONER

## 2019-01-17 PROCEDURE — 99232 SBSQ HOSP IP/OBS MODERATE 35: CPT | Performed by: INTERNAL MEDICINE

## 2019-01-17 PROCEDURE — G0378 HOSPITAL OBSERVATION PER HR: HCPCS

## 2019-01-17 PROCEDURE — 96361 HYDRATE IV INFUSION ADD-ON: CPT

## 2019-01-17 PROCEDURE — 6360000002 HC RX W HCPCS: Performed by: INTERNAL MEDICINE

## 2019-01-17 PROCEDURE — 1200000000 HC SEMI PRIVATE

## 2019-01-17 PROCEDURE — 86403 PARTICLE AGGLUT ANTBDY SCRN: CPT

## 2019-01-17 PROCEDURE — 36415 COLL VENOUS BLD VENIPUNCTURE: CPT

## 2019-01-17 PROCEDURE — 6370000000 HC RX 637 (ALT 250 FOR IP): Performed by: SURGERY

## 2019-01-17 PROCEDURE — 87186 SC STD MICRODIL/AGAR DIL: CPT

## 2019-01-17 PROCEDURE — 96366 THER/PROPH/DIAG IV INF ADDON: CPT

## 2019-01-17 PROCEDURE — 82947 ASSAY GLUCOSE BLOOD QUANT: CPT

## 2019-01-17 PROCEDURE — 2580000003 HC RX 258: Performed by: NURSE PRACTITIONER

## 2019-01-17 PROCEDURE — 87205 SMEAR GRAM STAIN: CPT

## 2019-01-17 PROCEDURE — 6370000000 HC RX 637 (ALT 250 FOR IP): Performed by: NURSE PRACTITIONER

## 2019-01-17 RX ADMIN — ATORVASTATIN CALCIUM 10 MG: 10 TABLET, FILM COATED ORAL at 07:41

## 2019-01-17 RX ADMIN — DEXTROSE MONOHYDRATE 600 MG: 50 INJECTION, SOLUTION INTRAVENOUS at 08:17

## 2019-01-17 RX ADMIN — HYDROCODONE BITARTRATE AND ACETAMINOPHEN 2 TABLET: 5; 325 TABLET ORAL at 07:41

## 2019-01-17 RX ADMIN — METFORMIN HYDROCHLORIDE 850 MG: 850 TABLET ORAL at 07:41

## 2019-01-17 RX ADMIN — MAGNESIUM SULFATE 1 G: 1 CRYSTAL ORAL; TOPICAL at 21:37

## 2019-01-17 RX ADMIN — INSULIN LISPRO 6 UNITS: 100 INJECTION, SOLUTION INTRAVENOUS; SUBCUTANEOUS at 11:28

## 2019-01-17 RX ADMIN — ASPIRIN 81 MG: 81 TABLET, COATED ORAL at 07:41

## 2019-01-17 RX ADMIN — INSULIN LISPRO 4 UNITS: 100 INJECTION, SOLUTION INTRAVENOUS; SUBCUTANEOUS at 16:52

## 2019-01-17 RX ADMIN — DEXTROSE MONOHYDRATE 600 MG: 50 INJECTION, SOLUTION INTRAVENOUS at 13:39

## 2019-01-17 RX ADMIN — LISINOPRIL 5 MG: 5 TABLET ORAL at 07:41

## 2019-01-17 RX ADMIN — SODIUM CHLORIDE: 9 INJECTION, SOLUTION INTRAVENOUS at 13:38

## 2019-01-17 RX ADMIN — VANCOMYCIN HYDROCHLORIDE 1500 MG: 5 INJECTION, POWDER, LYOPHILIZED, FOR SOLUTION INTRAVENOUS at 16:52

## 2019-01-17 RX ADMIN — MAGNESIUM SULFATE 1 G: 1 CRYSTAL ORAL; TOPICAL at 20:15

## 2019-01-17 RX ADMIN — SODIUM CHLORIDE: 9 INJECTION, SOLUTION INTRAVENOUS at 02:15

## 2019-01-17 RX ADMIN — DEXTROSE MONOHYDRATE 600 MG: 50 INJECTION, SOLUTION INTRAVENOUS at 02:13

## 2019-01-17 RX ADMIN — INSULIN LISPRO 3 UNITS: 100 INJECTION, SOLUTION INTRAVENOUS; SUBCUTANEOUS at 20:31

## 2019-01-17 RX ADMIN — VANCOMYCIN HYDROCHLORIDE 1500 MG: 5 INJECTION, POWDER, LYOPHILIZED, FOR SOLUTION INTRAVENOUS at 04:13

## 2019-01-17 RX ADMIN — GLIMEPIRIDE 4 MG: 4 TABLET ORAL at 07:41

## 2019-01-17 RX ADMIN — HYDROCODONE BITARTRATE AND ACETAMINOPHEN 2 TABLET: 5; 325 TABLET ORAL at 13:39

## 2019-01-17 RX ADMIN — MAGNESIUM SULFATE 1 G: 1 CRYSTAL ORAL; TOPICAL at 19:00

## 2019-01-17 RX ADMIN — METFORMIN HYDROCHLORIDE 1000 MG: 1000 TABLET, FILM COATED ORAL at 16:52

## 2019-01-17 RX ADMIN — DEXTROSE MONOHYDRATE 600 MG: 50 INJECTION, SOLUTION INTRAVENOUS at 20:18

## 2019-01-17 RX ADMIN — INSULIN LISPRO 4 UNITS: 100 INJECTION, SOLUTION INTRAVENOUS; SUBCUTANEOUS at 07:44

## 2019-01-17 ASSESSMENT — PAIN DESCRIPTION - LOCATION: LOCATION: FACE

## 2019-01-17 ASSESSMENT — PAIN SCALES - GENERAL
PAINLEVEL_OUTOF10: 4
PAINLEVEL_OUTOF10: 8
PAINLEVEL_OUTOF10: 8
PAINLEVEL_OUTOF10: 0

## 2019-01-17 ASSESSMENT — PAIN DESCRIPTION - PAIN TYPE: TYPE: ACUTE PAIN

## 2019-01-17 ASSESSMENT — PAIN DESCRIPTION - ORIENTATION: ORIENTATION: LEFT

## 2019-01-17 ASSESSMENT — PAIN DESCRIPTION - FREQUENCY: FREQUENCY: CONTINUOUS

## 2019-01-17 ASSESSMENT — PAIN DESCRIPTION - DESCRIPTORS: DESCRIPTORS: ACHING;SORE

## 2019-01-18 VITALS
RESPIRATION RATE: 16 BRPM | HEIGHT: 74 IN | DIASTOLIC BLOOD PRESSURE: 85 MMHG | HEART RATE: 67 BPM | WEIGHT: 196.65 LBS | TEMPERATURE: 98.8 F | SYSTOLIC BLOOD PRESSURE: 140 MMHG | OXYGEN SATURATION: 97 % | BODY MASS INDEX: 25.24 KG/M2

## 2019-01-18 LAB
ANION GAP SERPL CALCULATED.3IONS-SCNC: 10 MMOL/L (ref 9–17)
BUN BLDV-MCNC: 14 MG/DL (ref 6–20)
BUN/CREAT BLD: ABNORMAL (ref 9–20)
CALCIUM SERPL-MCNC: 8.3 MG/DL (ref 8.6–10.4)
CHLORIDE BLD-SCNC: 99 MMOL/L (ref 98–107)
CO2: 23 MMOL/L (ref 20–31)
CREAT SERPL-MCNC: 0.5 MG/DL (ref 0.7–1.2)
GFR AFRICAN AMERICAN: >60 ML/MIN
GFR NON-AFRICAN AMERICAN: >60 ML/MIN
GFR SERPL CREATININE-BSD FRML MDRD: ABNORMAL ML/MIN/{1.73_M2}
GFR SERPL CREATININE-BSD FRML MDRD: ABNORMAL ML/MIN/{1.73_M2}
GLUCOSE BLD-MCNC: 217 MG/DL (ref 75–110)
GLUCOSE BLD-MCNC: 234 MG/DL (ref 75–110)
GLUCOSE BLD-MCNC: 273 MG/DL (ref 70–99)
HCT VFR BLD CALC: 36.6 % (ref 41–53)
HEMOGLOBIN: 12.6 G/DL (ref 13.5–17.5)
MCH RBC QN AUTO: 32 PG (ref 26–34)
MCHC RBC AUTO-ENTMCNC: 34.4 G/DL (ref 31–37)
MCV RBC AUTO: 93.1 FL (ref 80–100)
NRBC AUTOMATED: ABNORMAL PER 100 WBC
PDW BLD-RTO: 13.2 % (ref 11.5–14.9)
PLATELET # BLD: 233 K/UL (ref 150–450)
PMV BLD AUTO: 8.1 FL (ref 6–12)
POTASSIUM SERPL-SCNC: 3.7 MMOL/L (ref 3.7–5.3)
RBC # BLD: 3.93 M/UL (ref 4.5–5.9)
SODIUM BLD-SCNC: 132 MMOL/L (ref 135–144)
VANCOMYCIN TROUGH DATE LAST DOSE: ABNORMAL
VANCOMYCIN TROUGH DOSE AMOUNT: ABNORMAL
VANCOMYCIN TROUGH TIME LAST DOSE: 1650
VANCOMYCIN TROUGH: 6.9 UG/ML (ref 10–20)
WBC # BLD: 6 K/UL (ref 3.5–11)

## 2019-01-18 PROCEDURE — 99239 HOSP IP/OBS DSCHRG MGMT >30: CPT | Performed by: INTERNAL MEDICINE

## 2019-01-18 PROCEDURE — 80202 ASSAY OF VANCOMYCIN: CPT

## 2019-01-18 PROCEDURE — 2580000003 HC RX 258: Performed by: INTERNAL MEDICINE

## 2019-01-18 PROCEDURE — 6370000000 HC RX 637 (ALT 250 FOR IP): Performed by: SURGERY

## 2019-01-18 PROCEDURE — G0378 HOSPITAL OBSERVATION PER HR: HCPCS

## 2019-01-18 PROCEDURE — 2500000003 HC RX 250 WO HCPCS: Performed by: NURSE PRACTITIONER

## 2019-01-18 PROCEDURE — 80048 BASIC METABOLIC PNL TOTAL CA: CPT

## 2019-01-18 PROCEDURE — 96366 THER/PROPH/DIAG IV INF ADDON: CPT

## 2019-01-18 PROCEDURE — 6370000000 HC RX 637 (ALT 250 FOR IP): Performed by: INTERNAL MEDICINE

## 2019-01-18 PROCEDURE — 6370000000 HC RX 637 (ALT 250 FOR IP): Performed by: NURSE PRACTITIONER

## 2019-01-18 PROCEDURE — 2580000003 HC RX 258: Performed by: NURSE PRACTITIONER

## 2019-01-18 PROCEDURE — 85027 COMPLETE CBC AUTOMATED: CPT

## 2019-01-18 PROCEDURE — 36415 COLL VENOUS BLD VENIPUNCTURE: CPT

## 2019-01-18 PROCEDURE — 6360000002 HC RX W HCPCS: Performed by: INTERNAL MEDICINE

## 2019-01-18 PROCEDURE — 82947 ASSAY GLUCOSE BLOOD QUANT: CPT

## 2019-01-18 RX ORDER — GLUCOSAMINE HCL/CHONDROITIN SU 500-400 MG
CAPSULE ORAL
Qty: 100 STRIP | Refills: 3 | Status: SHIPPED | OUTPATIENT
Start: 2019-01-18 | End: 2020-03-23

## 2019-01-18 RX ORDER — DOXYCYCLINE HYCLATE 100 MG/1
100 CAPSULE ORAL 2 TIMES DAILY
Qty: 20 CAPSULE | Refills: 0 | Status: SHIPPED | OUTPATIENT
Start: 2019-01-18 | End: 2019-01-28

## 2019-01-18 RX ORDER — GLIMEPIRIDE 4 MG/1
4 TABLET ORAL
Qty: 30 TABLET | Refills: 3 | Status: SHIPPED | OUTPATIENT
Start: 2019-01-19 | End: 2019-01-18

## 2019-01-18 RX ORDER — CLINDAMYCIN HYDROCHLORIDE 300 MG/1
300 CAPSULE ORAL 3 TIMES DAILY
Qty: 30 CAPSULE | Refills: 0 | Status: SHIPPED | OUTPATIENT
Start: 2019-01-18 | End: 2019-01-28

## 2019-01-18 RX ORDER — GLUCOSAMINE HCL/CHONDROITIN SU 500-400 MG
CAPSULE ORAL
Qty: 100 STRIP | Refills: 3
Start: 2019-01-18 | End: 2020-03-23

## 2019-01-18 RX ORDER — GLIMEPIRIDE 4 MG/1
4 TABLET ORAL 2 TIMES DAILY WITH MEALS
Qty: 60 TABLET | Refills: 3 | Status: SHIPPED | OUTPATIENT
Start: 2019-01-18 | End: 2020-02-27 | Stop reason: SINTOL

## 2019-01-18 RX ADMIN — ATORVASTATIN CALCIUM 10 MG: 10 TABLET, FILM COATED ORAL at 08:30

## 2019-01-18 RX ADMIN — MAGNESIUM SULFATE 1 G: 1 CRYSTAL ORAL; TOPICAL at 07:34

## 2019-01-18 RX ADMIN — ASPIRIN 81 MG: 81 TABLET, COATED ORAL at 08:30

## 2019-01-18 RX ADMIN — VANCOMYCIN HYDROCHLORIDE 1000 MG: 1 INJECTION, POWDER, LYOPHILIZED, FOR SOLUTION INTRAVENOUS at 04:07

## 2019-01-18 RX ADMIN — INSULIN LISPRO 4 UNITS: 100 INJECTION, SOLUTION INTRAVENOUS; SUBCUTANEOUS at 11:25

## 2019-01-18 RX ADMIN — MAGNESIUM SULFATE 1 G: 1 CRYSTAL ORAL; TOPICAL at 12:12

## 2019-01-18 RX ADMIN — METFORMIN HYDROCHLORIDE 1000 MG: 1000 TABLET, FILM COATED ORAL at 08:30

## 2019-01-18 RX ADMIN — INSULIN LISPRO 4 UNITS: 100 INJECTION, SOLUTION INTRAVENOUS; SUBCUTANEOUS at 08:31

## 2019-01-18 RX ADMIN — SODIUM CHLORIDE: 9 INJECTION, SOLUTION INTRAVENOUS at 00:38

## 2019-01-18 RX ADMIN — VANCOMYCIN HYDROCHLORIDE 1000 MG: 1 INJECTION, POWDER, LYOPHILIZED, FOR SOLUTION INTRAVENOUS at 12:12

## 2019-01-18 RX ADMIN — MAGNESIUM SULFATE 1 G: 1 CRYSTAL ORAL; TOPICAL at 10:00

## 2019-01-18 RX ADMIN — MAGNESIUM SULFATE 1 G: 1 CRYSTAL ORAL; TOPICAL at 13:57

## 2019-01-18 RX ADMIN — MAGNESIUM SULFATE 1 G: 1 CRYSTAL ORAL; TOPICAL at 05:45

## 2019-01-18 RX ADMIN — LISINOPRIL 5 MG: 5 TABLET ORAL at 08:30

## 2019-01-18 RX ADMIN — MAGNESIUM SULFATE 1 G: 1 CRYSTAL ORAL; TOPICAL at 07:10

## 2019-01-18 RX ADMIN — GLIMEPIRIDE 4 MG: 4 TABLET ORAL at 08:30

## 2019-01-18 RX ADMIN — MAGNESIUM SULFATE 1 G: 1 CRYSTAL ORAL; TOPICAL at 10:58

## 2019-01-18 RX ADMIN — DEXTROSE MONOHYDRATE 600 MG: 50 INJECTION, SOLUTION INTRAVENOUS at 08:30

## 2019-01-18 RX ADMIN — DEXTROSE MONOHYDRATE 600 MG: 50 INJECTION, SOLUTION INTRAVENOUS at 02:10

## 2019-01-19 LAB
CULTURE: ABNORMAL
DIRECT EXAM: ABNORMAL
DIRECT EXAM: ABNORMAL
Lab: ABNORMAL
ORGANISM: ABNORMAL
SPECIMEN DESCRIPTION: ABNORMAL
STATUS: ABNORMAL

## 2019-02-22 DIAGNOSIS — I10 ESSENTIAL HYPERTENSION: ICD-10-CM

## 2019-02-22 DIAGNOSIS — E11.8 TYPE 2 DIABETES MELLITUS WITH COMPLICATION, WITHOUT LONG-TERM CURRENT USE OF INSULIN (HCC): ICD-10-CM

## 2019-02-22 RX ORDER — ATORVASTATIN CALCIUM 10 MG/1
TABLET, FILM COATED ORAL
Qty: 30 TABLET | Refills: 1 | Status: SHIPPED | OUTPATIENT
Start: 2019-02-22 | End: 2019-06-21 | Stop reason: SDUPTHER

## 2019-02-22 RX ORDER — LISINOPRIL 5 MG/1
TABLET ORAL
Qty: 30 TABLET | Refills: 1 | Status: SHIPPED | OUTPATIENT
Start: 2019-02-22 | End: 2019-05-10 | Stop reason: SDUPTHER

## 2019-03-14 DIAGNOSIS — I10 ESSENTIAL HYPERTENSION: ICD-10-CM

## 2019-03-14 DIAGNOSIS — E11.8 TYPE 2 DIABETES MELLITUS WITH COMPLICATION, WITHOUT LONG-TERM CURRENT USE OF INSULIN (HCC): ICD-10-CM

## 2019-03-14 RX ORDER — ASPIRIN 81 MG/1
TABLET, DELAYED RELEASE ORAL
Qty: 30 TABLET | Refills: 2 | Status: SHIPPED | OUTPATIENT
Start: 2019-03-14 | End: 2020-02-13

## 2019-05-10 DIAGNOSIS — I10 ESSENTIAL HYPERTENSION: ICD-10-CM

## 2019-05-10 DIAGNOSIS — E11.8 TYPE 2 DIABETES MELLITUS WITH COMPLICATION, WITHOUT LONG-TERM CURRENT USE OF INSULIN (HCC): ICD-10-CM

## 2019-05-10 RX ORDER — LISINOPRIL 5 MG/1
TABLET ORAL
Qty: 30 TABLET | Refills: 0 | Status: SHIPPED | OUTPATIENT
Start: 2019-05-10 | End: 2019-12-11 | Stop reason: SDUPTHER

## 2019-05-10 NOTE — TELEPHONE ENCOUNTER
Please Approve or Refuse.   Send to Pharmacy per Pt's Request:      Next Visit Date:  5/22/2019   Last Visit Date: 3/13/2018    Hemoglobin A1C (%)   Date Value   01/16/2019 10.6 (H)   10/04/2016 8.6   11/15/2015 5.9             ( goal A1C is < 7)   BP Readings from Last 3 Encounters:   01/18/19 (!) 140/85   03/13/18 (!) 150/98   10/04/16 138/88          (goal 120/80)  BUN   Date Value Ref Range Status   01/18/2019 14 6 - 20 mg/dL Final     CREATININE   Date Value Ref Range Status   01/18/2019 0.50 (L) 0.70 - 1.20 mg/dL Final     Potassium   Date Value Ref Range Status   01/18/2019 3.7 3.7 - 5.3 mmol/L Final

## 2019-05-28 ENCOUNTER — HOSPITAL ENCOUNTER (EMERGENCY)
Age: 49
Discharge: HOME | End: 2019-05-28
Attending: EMERGENCY MEDICINE
Payer: COMMERCIAL

## 2019-05-28 VITALS
SYSTOLIC BLOOD PRESSURE: 144 MMHG | RESPIRATION RATE: 14 BRPM | TEMPERATURE: 98 F | DIASTOLIC BLOOD PRESSURE: 93 MMHG | HEART RATE: 62 BPM | BODY MASS INDEX: 27.59 KG/M2 | WEIGHT: 215 LBS | HEIGHT: 74 IN | OXYGEN SATURATION: 98 %

## 2019-05-28 DIAGNOSIS — L03.012 CELLULITIS OF FINGER OF LEFT HAND: Primary | ICD-10-CM

## 2019-05-28 PROCEDURE — 99281 EMR DPT VST MAYX REQ PHY/QHP: CPT

## 2019-05-28 PROCEDURE — 6370000000 HC RX 637 (ALT 250 FOR IP): Performed by: NURSE PRACTITIONER

## 2019-05-28 RX ORDER — CEPHALEXIN 250 MG/1
500 CAPSULE ORAL ONCE
Status: COMPLETED | OUTPATIENT
Start: 2019-05-28 | End: 2019-05-28

## 2019-05-28 RX ORDER — IBUPROFEN 800 MG/1
800 TABLET ORAL EVERY 8 HOURS PRN
Qty: 20 TABLET | Refills: 0 | Status: SHIPPED | OUTPATIENT
Start: 2019-05-28 | End: 2020-05-29 | Stop reason: ALTCHOICE

## 2019-05-28 RX ORDER — CEPHALEXIN 500 MG/1
500 CAPSULE ORAL 4 TIMES DAILY
Qty: 40 CAPSULE | Refills: 0 | Status: SHIPPED | OUTPATIENT
Start: 2019-05-28 | End: 2019-06-07

## 2019-05-28 RX ORDER — SULFAMETHOXAZOLE AND TRIMETHOPRIM 800; 160 MG/1; MG/1
1 TABLET ORAL ONCE
Status: COMPLETED | OUTPATIENT
Start: 2019-05-28 | End: 2019-05-28

## 2019-05-28 RX ORDER — IBUPROFEN 800 MG/1
800 TABLET ORAL ONCE
Status: COMPLETED | OUTPATIENT
Start: 2019-05-28 | End: 2019-05-28

## 2019-05-28 RX ORDER — SULFAMETHOXAZOLE AND TRIMETHOPRIM 800; 160 MG/1; MG/1
1 TABLET ORAL 2 TIMES DAILY
Qty: 20 TABLET | Refills: 0 | Status: SHIPPED | OUTPATIENT
Start: 2019-05-28 | End: 2019-06-07

## 2019-05-28 RX ADMIN — SULFAMETHOXAZOLE AND TRIMETHOPRIM 1 TABLET: 800; 160 TABLET ORAL at 13:10

## 2019-05-28 RX ADMIN — CEPHALEXIN 500 MG: 250 CAPSULE ORAL at 13:10

## 2019-05-28 RX ADMIN — IBUPROFEN 800 MG: 800 TABLET ORAL at 13:11

## 2019-05-28 ASSESSMENT — ENCOUNTER SYMPTOMS
TROUBLE SWALLOWING: 0
COUGH: 0
NAUSEA: 0
COLOR CHANGE: 1
VOMITING: 0
SHORTNESS OF BREATH: 0

## 2019-05-28 ASSESSMENT — PAIN SCALES - GENERAL: PAINLEVEL_OUTOF10: 8

## 2019-05-28 ASSESSMENT — PAIN DESCRIPTION - DESCRIPTORS: DESCRIPTORS: THROBBING

## 2019-05-28 ASSESSMENT — PAIN DESCRIPTION - LOCATION: LOCATION: FINGER (COMMENT WHICH ONE)

## 2019-05-28 ASSESSMENT — PAIN DESCRIPTION - ORIENTATION: ORIENTATION: LEFT

## 2019-05-28 ASSESSMENT — PAIN DESCRIPTION - FREQUENCY: FREQUENCY: CONTINUOUS

## 2019-05-28 ASSESSMENT — PAIN DESCRIPTION - PAIN TYPE: TYPE: ACUTE PAIN

## 2019-05-28 NOTE — ED PROVIDER NOTES
[address]  eMERGENCYdEPARTMENT eNCOUnter      Pt Name: Halina Jackson  MRN: [id number]  [place] 1970  Date of evaluation: 5/28/2019  Provider:NORMA WALKER CNP    CHIEF COMPLAINT       Chief Complaint   Patient presents with   Justin Loja 82     left index finger         HISTORY OF PRESENT ILLNESS  (Location/Symptom, Timing/Onset, Context/Setting, Quality, Duration, Modifying Factors, Severity.)   Halina Jackson is a 47 y.o. male who presents to the emergency department with complaints of left index finger pain and swelling. Patient states that something bit him on dorsal aspect of left index finger a few days ago. Patient states that initially it looked like a \"small pimple\" which she tried to pick. States that symptoms are present for the past 5 days. States that symptoms are not improving so he decided to come in for evaluation. He is right-hand dominant. States that tetanus shot is up-to-date. Nursing Notes were reviewed and I agree. REVIEW OF SYSTEMS    (2-9 systems for level 4,10 or more for level 5)      Review of Systems   Constitutional: Negative for chills and fever. HENT: Negative for trouble swallowing. Respiratory: Negative for cough and shortness of breath. Cardiovascular: Negative for chest pain and palpitations. Gastrointestinal: Negative for nausea and vomiting. Skin: Positive for color change and wound. Except as noted above the remainder of the review of systems was reviewed andnegative. PAST MEDICAL HISTORY         Diagnosis Date    Anxiety and depression     Chronic back pain     HLD (hyperlipidemia) 11/15/2015    Hypertension     Marijuana abuse 11/15/2015    Nephrolithiasis 11/18/2015    Type II or unspecified type diabetes mellitus without mention of complication, not stated as uncontrolled      Reviewed. SURGICAL HISTORY     History reviewed. No pertinent surgical history. Reviewed.   CURRENT MEDICATIONS       Previous Medications ATORVASTATIN (LIPITOR) 10 MG TABLET    TAKE 1 TABLET BY MOUTH ONE TIME A DAY     BLOOD GLUCOSE MONITOR STRIPS    Test 1 times a day & as needed for symptoms of irregular blood glucose. Pt. Needs Accucheck Compact Plus Strips    BLOOD GLUCOSE MONITOR STRIPS    Test 1 times a day & as needed for symptoms of irregular blood glucose. Pt needs Accucheck Compact Plus Test Strips. BLOOD PRESSURE MONITOR MISC    Use daily to check BP    GLIMEPIRIDE (AMARYL) 4 MG TABLET    Take 1 tablet by mouth 2 times daily (with meals)    LISINOPRIL (PRINIVIL;ZESTRIL) 5 MG TABLET    TAKE 1 TABLET BY MOUTH ONE TIME A DAY     METFORMIN (GLUCOPHAGE) 1000 MG TABLET    Take 1 tablet by mouth 2 times daily (with meals)    METFORMIN (GLUCOPHAGE) 850 MG TABLET    TAKE 1 TABLET BY MOUTH TWO TIMES A DAY WITH MEALS    SM ASPIRIN ADULT LOW STRENGTH 81 MG EC TABLET    TAKE 1 TABLET BY MOUTH ONE TIME A DAY        ALLERGIES     Patient has no known allergies. FAMILY HISTORY           Problem Relation Age of Onset    Diabetes Father      No family status information on file. Reviewed and not relevant. SOCIAL HISTORY      reports that he has never smoked. He has never used smokeless tobacco. He reports that he drinks alcohol. He reports that he has current or past drug history. Reviewed. PHYSICAL EXAM    (up to 7 for level 4, 8 or more for level 5)     ED Triage Vitals [05/28/19 1216]   BP Temp Temp src Pulse Resp SpO2 Height Weight   (!) 144/93 98 °F (36.7 °C) -- 62 14 98 % 6' 2\" (1.88 m) 215 lb (97.5 kg)       Physical Exam   Constitutional: He is oriented to person, place, and time. He appears well-developed and well-nourished. No distress. HENT:   Head: Normocephalic and atraumatic. Right Ear: External ear normal.   Left Ear: External ear normal.   Nose: Nose normal.   Eyes: Right eye exhibits no discharge. Left eye exhibits no discharge. No scleral icterus. Neck: Normal range of motion. No tracheal deviation present. Pulmonary/Chest: Effort normal. No stridor. No respiratory distress. Musculoskeletal: Normal range of motion. He exhibits no edema. Hands:  Neurological: He is alert and oriented to person, place, and time. Coordination normal.   Skin: Skin is warm and dry. He is not diaphoretic. Psychiatric: He has a normal mood and affect. His behavior is normal.       DIAGNOSTIC RESULTS     RADIOLOGY:   none      LABS:  Labs Reviewed - No data to display    All other labs were within normal range or not returned asof this dictation. EMERGENCYDEPARTMENT COURSE and DIFFERENTIAL DIAGNOSIS/MDM:   Patient presents to ED with complaints of index finger swelling and redness. Has area of erythema/swelling, consistent with cellulitis. Postural unroofed with 18-gauge needle. We'll start on oral antibiotics and have patient do warm compresses. She and advised to return in 48 hours for recheck or sooner if symptoms  get worse. Okay to discharge home. Follow-upwith family doctor or clinic of choice in 1 or 2 days for a recheck. Return to ED if any worsening or new symptoms. Vitals:    Vitals:    05/28/19 1216   BP: (!) 144/93   Pulse: 62   Resp: 14   Temp: 98 °F (36.7 °C)   SpO2: 98%   Weight: 215 lb (97.5 kg)   Height: 6' 2\" (1.88 m)         Vitals reviewed. PROCEDURES:  Area to left index finger cleansed with alcohol wipe. Small pustule unroofed with 18-gauge needle. Minimal amount of purulence returned. Rinsed with normal saline. FINAL IMPRESSION      1.  Cellulitis of finger of left hand          DISPOSITION/PLAN   DISPOSITION Decision To Discharge 05/28/2019 12:52:23 PM      PATIENT REFERRED TO:  Lisseth Stallings MD  [facility] [address] [facility]  [address] [place] [address]  [phone]    Schedule an appointment as soon as possible for a visit in 1 day  Follow up visit    [facility] ED   [place] [phone] [address] [place] in 2 days  For wound re-check      DISCHARGE MEDICATIONS:  New

## 2019-05-29 ENCOUNTER — TELEPHONE (OUTPATIENT)
Dept: FAMILY MEDICINE CLINIC | Age: 49
End: 2019-05-29

## 2019-06-21 DIAGNOSIS — E11.8 TYPE 2 DIABETES MELLITUS WITH COMPLICATION, WITHOUT LONG-TERM CURRENT USE OF INSULIN (HCC): ICD-10-CM

## 2019-06-21 RX ORDER — ATORVASTATIN CALCIUM 10 MG/1
TABLET, FILM COATED ORAL
Qty: 30 TABLET | Refills: 0 | Status: SHIPPED | OUTPATIENT
Start: 2019-06-21 | End: 2019-12-11 | Stop reason: SDUPTHER

## 2019-06-21 NOTE — TELEPHONE ENCOUNTER
Please Approve or Refuse.   Send to Pharmacy per Pt's Request:      Next Visit Date:  Visit date not found   Last Visit Date: 3/13/2018    Hemoglobin A1C (%)   Date Value   01/16/2019 10.6 (H)   10/04/2016 8.6   11/15/2015 5.9             ( goal A1C is < 7)   BP Readings from Last 3 Encounters:   05/28/19 (!) 144/93   01/18/19 (!) 140/85   03/13/18 (!) 150/98          (goal 120/80)  BUN   Date Value Ref Range Status   01/18/2019 14 6 - 20 mg/dL Final     CREATININE   Date Value Ref Range Status   01/18/2019 0.50 (L) 0.70 - 1.20 mg/dL Final     Potassium   Date Value Ref Range Status   01/18/2019 3.7 3.7 - 5.3 mmol/L Final

## 2019-08-13 DIAGNOSIS — E11.8 TYPE 2 DIABETES MELLITUS WITH COMPLICATION, WITHOUT LONG-TERM CURRENT USE OF INSULIN (HCC): ICD-10-CM

## 2019-08-13 DIAGNOSIS — I10 ESSENTIAL HYPERTENSION: ICD-10-CM

## 2019-08-13 RX ORDER — LISINOPRIL 5 MG/1
TABLET ORAL
Qty: 30 TABLET | Refills: 0 | OUTPATIENT
Start: 2019-08-13

## 2019-09-16 ENCOUNTER — TELEPHONE (OUTPATIENT)
Dept: FAMILY MEDICINE CLINIC | Age: 49
End: 2019-09-16

## 2019-12-11 ENCOUNTER — OFFICE VISIT (OUTPATIENT)
Dept: FAMILY MEDICINE CLINIC | Age: 49
End: 2019-12-11
Payer: COMMERCIAL

## 2019-12-11 VITALS
HEART RATE: 73 BPM | SYSTOLIC BLOOD PRESSURE: 138 MMHG | BODY MASS INDEX: 26.05 KG/M2 | WEIGHT: 203 LBS | HEIGHT: 74 IN | DIASTOLIC BLOOD PRESSURE: 86 MMHG | OXYGEN SATURATION: 98 %

## 2019-12-11 DIAGNOSIS — Z23 NEED FOR SHINGLES VACCINE: ICD-10-CM

## 2019-12-11 DIAGNOSIS — Z12.11 SCREEN FOR COLON CANCER: ICD-10-CM

## 2019-12-11 DIAGNOSIS — I10 ESSENTIAL HYPERTENSION: ICD-10-CM

## 2019-12-11 DIAGNOSIS — E11.8 TYPE 2 DIABETES MELLITUS WITH COMPLICATION, WITHOUT LONG-TERM CURRENT USE OF INSULIN (HCC): Primary | ICD-10-CM

## 2019-12-11 DIAGNOSIS — Z11.59 NEED FOR HEPATITIS B SCREENING TEST: ICD-10-CM

## 2019-12-11 DIAGNOSIS — E11.69 ERECTILE DYSFUNCTION ASSOCIATED WITH TYPE 2 DIABETES MELLITUS (HCC): ICD-10-CM

## 2019-12-11 DIAGNOSIS — N52.1 ERECTILE DYSFUNCTION ASSOCIATED WITH TYPE 2 DIABETES MELLITUS (HCC): ICD-10-CM

## 2019-12-11 DIAGNOSIS — E11.9 TYPE 2 DIABETES MELLITUS WITHOUT COMPLICATION, WITHOUT LONG-TERM CURRENT USE OF INSULIN (HCC): ICD-10-CM

## 2019-12-11 DIAGNOSIS — F32.4 MAJOR DEPRESSIVE DISORDER WITH SINGLE EPISODE, IN PARTIAL REMISSION (HCC): ICD-10-CM

## 2019-12-11 DIAGNOSIS — E78.2 MIXED HYPERLIPIDEMIA: ICD-10-CM

## 2019-12-11 PROBLEM — L03.90 CELLULITIS: Status: RESOLVED | Noted: 2019-01-15 | Resolved: 2019-12-11

## 2019-12-11 PROBLEM — R73.9 HYPERGLYCEMIA: Status: RESOLVED | Noted: 2019-01-16 | Resolved: 2019-12-11

## 2019-12-11 LAB — HBA1C MFR BLD: 9.5 %

## 2019-12-11 PROCEDURE — G8427 DOCREV CUR MEDS BY ELIG CLIN: HCPCS | Performed by: FAMILY MEDICINE

## 2019-12-11 PROCEDURE — G8419 CALC BMI OUT NRM PARAM NOF/U: HCPCS | Performed by: FAMILY MEDICINE

## 2019-12-11 PROCEDURE — 1036F TOBACCO NON-USER: CPT | Performed by: FAMILY MEDICINE

## 2019-12-11 PROCEDURE — 2022F DILAT RTA XM EVC RTNOPTHY: CPT | Performed by: FAMILY MEDICINE

## 2019-12-11 PROCEDURE — 3017F COLORECTAL CA SCREEN DOC REV: CPT | Performed by: FAMILY MEDICINE

## 2019-12-11 PROCEDURE — G8484 FLU IMMUNIZE NO ADMIN: HCPCS | Performed by: FAMILY MEDICINE

## 2019-12-11 PROCEDURE — 3046F HEMOGLOBIN A1C LEVEL >9.0%: CPT | Performed by: FAMILY MEDICINE

## 2019-12-11 PROCEDURE — 99215 OFFICE O/P EST HI 40 MIN: CPT | Performed by: FAMILY MEDICINE

## 2019-12-11 PROCEDURE — 83036 HEMOGLOBIN GLYCOSYLATED A1C: CPT | Performed by: FAMILY MEDICINE

## 2019-12-11 RX ORDER — LISINOPRIL 5 MG/1
TABLET ORAL
Qty: 30 TABLET | Refills: 0 | Status: SHIPPED | OUTPATIENT
Start: 2019-12-11 | End: 2020-02-13

## 2019-12-11 RX ORDER — LANCETS 30 GAUGE
EACH MISCELLANEOUS
Qty: 100 EACH | Refills: 3 | Status: SHIPPED | OUTPATIENT
Start: 2019-12-11

## 2019-12-11 RX ORDER — ATORVASTATIN CALCIUM 10 MG/1
TABLET, FILM COATED ORAL
Qty: 30 TABLET | Refills: 3 | Status: SHIPPED | OUTPATIENT
Start: 2019-12-11 | End: 2020-02-27 | Stop reason: SDUPTHER

## 2019-12-11 RX ORDER — GLYBURIDE 5 MG/1
5 TABLET ORAL
Qty: 30 TABLET | Refills: 3 | Status: SHIPPED | OUTPATIENT
Start: 2019-12-11 | End: 2020-02-27 | Stop reason: SINTOL

## 2019-12-11 RX ORDER — GLUCOSAMINE HCL/CHONDROITIN SU 500-400 MG
CAPSULE ORAL
Qty: 100 STRIP | Refills: 3 | Status: SHIPPED | OUTPATIENT
Start: 2019-12-11

## 2019-12-11 RX ORDER — GLIMEPIRIDE 4 MG/1
4 TABLET ORAL 2 TIMES DAILY WITH MEALS
Qty: 60 TABLET | Refills: 3 | Status: CANCELLED | OUTPATIENT
Start: 2019-12-11

## 2019-12-11 ASSESSMENT — ENCOUNTER SYMPTOMS
SINUS PRESSURE: 0
BACK PAIN: 0
DIARRHEA: 0
CONSTIPATION: 0
COUGH: 0
RHINORRHEA: 0
ABDOMINAL DISTENTION: 0
COLOR CHANGE: 0
PHOTOPHOBIA: 0
WHEEZING: 0
VOMITING: 0
SHORTNESS OF BREATH: 0
ABDOMINAL PAIN: 0

## 2019-12-11 ASSESSMENT — PATIENT HEALTH QUESTIONNAIRE - PHQ9
SUM OF ALL RESPONSES TO PHQ9 QUESTIONS 1 & 2: 2
1. LITTLE INTEREST OR PLEASURE IN DOING THINGS: 1
2. FEELING DOWN, DEPRESSED OR HOPELESS: 1
SUM OF ALL RESPONSES TO PHQ QUESTIONS 1-9: 2
SUM OF ALL RESPONSES TO PHQ QUESTIONS 1-9: 2

## 2020-01-16 ENCOUNTER — CARE COORDINATION (OUTPATIENT)
Dept: CARE COORDINATION | Age: 50
End: 2020-01-16

## 2020-01-16 NOTE — LETTER
1/16/2020    [address] [place] [address]      Dear Mr. Arnaldo Spivey,    My name is Sai Felix. I am a registered nurse who partners with Justin Oglesby MD to improve patients' health. Dr. Tressa Cole believes you would benefit from working with me. As a member of your health care team, I work with Dr. Tressa Cole and other providers involved in your care, offer education for your specific health conditions, and connect you with additional resources as needed. The additional support I provide is no additional cost to you. My primary focus is to support you in following your treatment plan, help you achieve specific goals and improve your health. We are committed to walk with you on this journey and look forward to working with you. Please call me to further discuss your healthcare needs. I am available by phone or for appointments at the office. You can reach me at [phone].     In good health,         Sai Felix RN  Ambulatory Care Manager  [address] [facility] [address]

## 2020-01-24 ENCOUNTER — CARE COORDINATION (OUTPATIENT)
Dept: CARE COORDINATION | Age: 50
End: 2020-01-24

## 2020-01-27 ENCOUNTER — CARE COORDINATION (OUTPATIENT)
Dept: CARE COORDINATION | Age: 50
End: 2020-01-27

## 2020-02-13 RX ORDER — ASPIRIN 81 MG/1
TABLET ORAL
Qty: 90 TABLET | Refills: 2 | Status: SHIPPED | OUTPATIENT
Start: 2020-02-13 | End: 2022-03-29 | Stop reason: SDUPTHER

## 2020-02-13 RX ORDER — LISINOPRIL 5 MG/1
TABLET ORAL
Qty: 90 TABLET | Refills: 0 | Status: SHIPPED | OUTPATIENT
Start: 2020-02-13 | End: 2020-02-27

## 2020-02-13 RX ORDER — ASPIRIN 81 MG/1
TABLET, DELAYED RELEASE ORAL
Qty: 30 TABLET | Refills: 0 | Status: SHIPPED | OUTPATIENT
Start: 2020-02-13 | End: 2020-03-21

## 2020-02-13 RX ORDER — LISINOPRIL 5 MG/1
TABLET ORAL
Qty: 30 TABLET | Refills: 0 | Status: SHIPPED | OUTPATIENT
Start: 2020-02-13 | End: 2020-02-27

## 2020-02-18 ENCOUNTER — HOSPITAL ENCOUNTER (EMERGENCY)
Age: 50
Discharge: HOME | End: 2020-02-18
Attending: EMERGENCY MEDICINE
Payer: COMMERCIAL

## 2020-02-18 ENCOUNTER — TELEPHONE (OUTPATIENT)
Dept: FAMILY MEDICINE CLINIC | Age: 50
End: 2020-02-18

## 2020-02-18 VITALS
BODY MASS INDEX: 25.67 KG/M2 | SYSTOLIC BLOOD PRESSURE: 117 MMHG | HEART RATE: 75 BPM | OXYGEN SATURATION: 97 % | HEIGHT: 74 IN | TEMPERATURE: 97.7 F | DIASTOLIC BLOOD PRESSURE: 80 MMHG | RESPIRATION RATE: 16 BRPM | WEIGHT: 200 LBS

## 2020-02-18 LAB
ABSOLUTE EOS #: 0.2 K/UL (ref 0–0.4)
ABSOLUTE IMMATURE GRANULOCYTE: ABNORMAL K/UL (ref 0–0.3)
ABSOLUTE LYMPH #: 1.2 K/UL (ref 1–4.8)
ABSOLUTE MONO #: 0.4 K/UL (ref 0.1–1.3)
ALBUMIN SERPL-MCNC: 4.4 G/DL (ref 3.5–5.2)
ALBUMIN/GLOBULIN RATIO: ABNORMAL (ref 1–2.5)
ALP BLD-CCNC: 102 U/L (ref 40–129)
ALT SERPL-CCNC: 32 U/L (ref 5–41)
ANION GAP SERPL CALCULATED.3IONS-SCNC: 13 MMOL/L (ref 9–17)
AST SERPL-CCNC: 23 U/L
BASOPHILS # BLD: 1 % (ref 0–2)
BASOPHILS ABSOLUTE: 0.1 K/UL (ref 0–0.2)
BETA-HYDROXYBUTYRATE: 0.28 MMOL/L (ref 0.02–0.27)
BILIRUB SERPL-MCNC: 1.08 MG/DL (ref 0.3–1.2)
BUN BLDV-MCNC: 24 MG/DL (ref 6–20)
BUN/CREAT BLD: ABNORMAL (ref 9–20)
CALCIUM SERPL-MCNC: 9.7 MG/DL (ref 8.6–10.4)
CHLORIDE BLD-SCNC: 91 MMOL/L (ref 98–107)
CO2: 26 MMOL/L (ref 20–31)
CREAT SERPL-MCNC: 0.97 MG/DL (ref 0.7–1.2)
DIFFERENTIAL TYPE: ABNORMAL
EOSINOPHILS RELATIVE PERCENT: 3 % (ref 0–4)
GFR AFRICAN AMERICAN: >60 ML/MIN
GFR NON-AFRICAN AMERICAN: >60 ML/MIN
GFR SERPL CREATININE-BSD FRML MDRD: ABNORMAL ML/MIN/{1.73_M2}
GFR SERPL CREATININE-BSD FRML MDRD: ABNORMAL ML/MIN/{1.73_M2}
GLUCOSE BLD-MCNC: 326 MG/DL (ref 75–110)
GLUCOSE BLD-MCNC: 492 MG/DL (ref 75–110)
GLUCOSE BLD-MCNC: 523 MG/DL (ref 75–110)
GLUCOSE BLD-MCNC: 611 MG/DL (ref 70–99)
HCT VFR BLD CALC: 45.5 % (ref 41–53)
HEMOGLOBIN: 15.6 G/DL (ref 13.5–17.5)
IMMATURE GRANULOCYTES: ABNORMAL %
LYMPHOCYTES # BLD: 18 % (ref 24–44)
MCH RBC QN AUTO: 31.5 PG (ref 26–34)
MCHC RBC AUTO-ENTMCNC: 34.2 G/DL (ref 31–37)
MCV RBC AUTO: 92.2 FL (ref 80–100)
MONOCYTES # BLD: 6 % (ref 1–7)
NRBC AUTOMATED: ABNORMAL PER 100 WBC
PDW BLD-RTO: 12.9 % (ref 11.5–14.9)
PLATELET # BLD: 209 K/UL (ref 150–450)
PLATELET ESTIMATE: ABNORMAL
PMV BLD AUTO: 9 FL (ref 6–12)
POTASSIUM SERPL-SCNC: 5.3 MMOL/L (ref 3.7–5.3)
RBC # BLD: 4.94 M/UL (ref 4.5–5.9)
RBC # BLD: ABNORMAL 10*6/UL
SEG NEUTROPHILS: 72 % (ref 36–66)
SEGMENTED NEUTROPHILS ABSOLUTE COUNT: 5.1 K/UL (ref 1.3–9.1)
SODIUM BLD-SCNC: 130 MMOL/L (ref 135–144)
TOTAL PROTEIN: 7.5 G/DL (ref 6.4–8.3)
WBC # BLD: 7 K/UL (ref 3.5–11)
WBC # BLD: ABNORMAL 10*3/UL

## 2020-02-18 PROCEDURE — 85025 COMPLETE CBC W/AUTO DIFF WBC: CPT

## 2020-02-18 PROCEDURE — 2580000003 HC RX 258: Performed by: EMERGENCY MEDICINE

## 2020-02-18 PROCEDURE — 82947 ASSAY GLUCOSE BLOOD QUANT: CPT

## 2020-02-18 PROCEDURE — 36415 COLL VENOUS BLD VENIPUNCTURE: CPT

## 2020-02-18 PROCEDURE — 80053 COMPREHEN METABOLIC PANEL: CPT

## 2020-02-18 PROCEDURE — 82010 KETONE BODYS QUAN: CPT

## 2020-02-18 PROCEDURE — 99284 EMERGENCY DEPT VISIT MOD MDM: CPT

## 2020-02-18 PROCEDURE — 6370000000 HC RX 637 (ALT 250 FOR IP): Performed by: EMERGENCY MEDICINE

## 2020-02-18 PROCEDURE — 96372 THER/PROPH/DIAG INJ SC/IM: CPT

## 2020-02-18 RX ORDER — 0.9 % SODIUM CHLORIDE 0.9 %
1000 INTRAVENOUS SOLUTION INTRAVENOUS ONCE
Status: COMPLETED | OUTPATIENT
Start: 2020-02-18 | End: 2020-02-18

## 2020-02-18 RX ADMIN — INSULIN HUMAN 10 UNITS: 100 INJECTION, SOLUTION PARENTERAL at 11:48

## 2020-02-18 RX ADMIN — SODIUM CHLORIDE 1000 ML: 9 INJECTION, SOLUTION INTRAVENOUS at 11:48

## 2020-02-18 ASSESSMENT — ENCOUNTER SYMPTOMS
RHINORRHEA: 0
SHORTNESS OF BREATH: 0
TROUBLE SWALLOWING: 0
CONSTIPATION: 0
CHEST TIGHTNESS: 0
SINUS PRESSURE: 0
SORE THROAT: 0
WHEEZING: 0
EYE REDNESS: 0
EYE PAIN: 0
DIARRHEA: 0
COLOR CHANGE: 0
BLOOD IN STOOL: 0
COUGH: 0
NAUSEA: 0
BACK PAIN: 0
VOMITING: 0
EYE DISCHARGE: 0
ABDOMINAL PAIN: 0
FACIAL SWELLING: 0

## 2020-02-18 NOTE — ED NOTES
Fanny pharmacist in to give insulin education      Suzanne Caicedo, [facility]  02/18/20 0971

## 2020-02-19 ENCOUNTER — HOSPITAL ENCOUNTER (EMERGENCY)
Age: 50
Discharge: HOME | End: 2020-02-19
Attending: EMERGENCY MEDICINE
Payer: COMMERCIAL

## 2020-02-19 VITALS
OXYGEN SATURATION: 99 % | DIASTOLIC BLOOD PRESSURE: 83 MMHG | HEART RATE: 67 BPM | TEMPERATURE: 97.6 F | RESPIRATION RATE: 16 BRPM | HEIGHT: 74 IN | SYSTOLIC BLOOD PRESSURE: 127 MMHG | BODY MASS INDEX: 25.67 KG/M2 | WEIGHT: 200 LBS

## 2020-02-19 PROCEDURE — 99283 EMERGENCY DEPT VISIT LOW MDM: CPT

## 2020-02-19 PROCEDURE — 6370000000 HC RX 637 (ALT 250 FOR IP): Performed by: PHYSICIAN ASSISTANT

## 2020-02-19 RX ORDER — PREDNISONE 20 MG/1
20 TABLET ORAL DAILY
Qty: 5 TABLET | Refills: 0 | Status: SHIPPED | OUTPATIENT
Start: 2020-02-19 | End: 2020-02-24

## 2020-02-19 RX ORDER — DIPHENHYDRAMINE HCL 25 MG
25 CAPSULE ORAL EVERY 4 HOURS PRN
Qty: 20 CAPSULE | Refills: 0 | Status: SHIPPED | OUTPATIENT
Start: 2020-02-19 | End: 2020-02-27 | Stop reason: SDUPTHER

## 2020-02-19 RX ORDER — PREDNISONE 20 MG/1
40 TABLET ORAL ONCE
Status: COMPLETED | OUTPATIENT
Start: 2020-02-19 | End: 2020-02-19

## 2020-02-19 RX ORDER — DIPHENHYDRAMINE HCL 25 MG
25 TABLET ORAL ONCE
Status: COMPLETED | OUTPATIENT
Start: 2020-02-19 | End: 2020-02-19

## 2020-02-19 RX ADMIN — DIPHENHYDRAMINE HCL 25 MG: 25 TABLET ORAL at 13:00

## 2020-02-19 RX ADMIN — PREDNISONE 40 MG: 20 TABLET ORAL at 13:00

## 2020-02-19 ASSESSMENT — ENCOUNTER SYMPTOMS
FACIAL SWELLING: 1
TRISMUS: 0

## 2020-02-19 NOTE — ED PROVIDER NOTES
[address] ED  eMERGENCY dEPARTMENT eNCOUnter      Pt Name: Alva Danielson  MRN: [id number]  [place] 1970  Date of evaluation: 2/19/20      CHIEF COMPLAINT       Chief Complaint   Patient presents with    Facial Swelling         HISTORY OF PRESENT ILLNESS    Alva Danielson is a 47 y.o. male who presents complaining of left side facial swelling  The history is provided by the patient. Dental Pain   Toothache location: left upper lip swelling started this am. thinks it may be related to the lisinopril. Severity:  No pain  Onset quality:  Sudden  Duration:  6 hours  Progression:  Partially resolved  Chronicity:  Recurrent  Associated symptoms: facial swelling    Associated symptoms: no congestion, no difficulty swallowing, no drooling, no headaches, no neck swelling and no trismus        REVIEW OF SYSTEMS       Review of Systems   HENT: Positive for facial swelling. Negative for congestion and drooling. Neurological: Negative for headaches. All other systems reviewed and are negative. PAST MEDICAL HISTORY     Past Medical History:   Diagnosis Date    Anxiety and depression     Chronic back pain     HLD (hyperlipidemia) 11/15/2015    Hypertension     Marijuana abuse 11/15/2015    Nephrolithiasis 11/18/2015    Type II or unspecified type diabetes mellitus without mention of complication, not stated as uncontrolled        SURGICAL HISTORY     History reviewed. No pertinent surgical history. CURRENT MEDICATIONS       Previous Medications    ASPIRIN (SM ASPIRIN ADULT LOW STRENGTH) 81 MG EC TABLET    TAKE 1 TABLET BY MOUTH ONE TIME A DAY    ATORVASTATIN (LIPITOR) 10 MG TABLET    TAKE 1 TABLET BY MOUTH ONE TIME A DAY    BLOOD GLUCOSE MONITOR KIT AND SUPPLIES    Test one time a day & as needed for symptoms of irregular blood glucose. BLOOD GLUCOSE MONITOR STRIPS    Test 1 times a day & as needed for symptoms of irregular blood glucose.     Pt. Needs Accucheck Compact Plus Strips    BLOOD GLUCOSE Physical Exam  Vitals signs and nursing note reviewed. Constitutional:       Appearance: He is well-developed. HENT:      Head: Normocephalic and atraumatic. Nose: Nose normal.      Mouth/Throat:      Mouth: Mucous membranes are moist. Angioedema present. Comments: There is some mild swelling noted to the left upper lip there is no swelling of the tongue or oropharynx. Cardiovascular:      Rate and Rhythm: Normal rate and regular rhythm. Pulses: Normal pulses. Heart sounds: Normal heart sounds. Pulmonary:      Effort: Pulmonary effort is normal.      Breath sounds: Normal breath sounds. Skin:     Capillary Refill: Capillary refill takes less than 2 seconds. Neurological:      Mental Status: He is alert and oriented to person, place, and time. MEDICAL DECISION MAKING:     Left lip swelling in the emergency department he was given prednisone and Benadryl. Slight improvement noted. Recommend to hold lisinopril follow-up with primary care medications as directed return if worse or other concerns. DIAGNOSTIC RESULTS     EKG: All EKG's are interpreted by the Emergency Department Physician who either signs or Co-signs this chart in the absence of acardiologist.        RADIOLOGY:Allplain film, CT, MRI, and formal ultrasound images (except ED bedside ultrasound) are read by the radiologist and the images and interpretations are directly viewed by the emergency physician. LABS:All lab results were reviewed by myself, and all abnormals are listed below.   Labs Reviewed - No data to display      EMERGENCY DEPARTMENT COURSE:   Vitals:    Vitals:    02/19/20 1233   BP: 127/83   Pulse: 67   Resp: 16   Temp: 97.6 °F (36.4 °C)   SpO2: 99%   Weight: 200 lb (90.7 kg)   Height: 6' 2\" (1.88 m)       The patient was given the following medications while in the emergency department:  Orders Placed This Encounter   Medications    predniSONE (DELTASONE) tablet 40 mg    diphenhydrAMINE (BENADRYL) tablet 25 mg    predniSONE (DELTASONE) 20 MG tablet     Sig: Take 1 tablet by mouth daily for 5 days     Dispense:  5 tablet     Refill:  0    diphenhydrAMINE (BENADRYL) 25 MG capsule     Sig: Take 1 capsule by mouth every 4 hours as needed for Itching     Dispense:  20 capsule     Refill:  0       -------------------------      CRITICAL CARE:    CONSULTS:  None    PROCEDURES:  Procedures    FINAL IMPRESSION      1.  Left facial swelling          DISPOSITION/PLAN   DISPOSITION Decision To Discharge 02/19/2020 01:34:13 PM      PATIENT REFERREDTO:  Kerwin Whitfield MD  [address] [facility] [address]  [address] [place] [id number] [phone]    Schedule an appointment as soon as possible for a visit in 1 day      [facility] ED  [facility] [address]  [phone]    If symptoms worsen      DISCHARGEMEDICATIONS:  New Prescriptions    DIPHENHYDRAMINE (BENADRYL) 25 MG CAPSULE    Take 1 capsule by mouth every 4 hours as needed for Itching    PREDNISONE (DELTASONE) 20 MG TABLET    Take 1 tablet by mouth daily for 5 days       (Please note that portions of this note were completed with a voice recognition program.  Efforts were made to edit thedictations but occasionally words are mis-transcribed.)    APPLE Lai PA-C  02/19/20 0441

## 2020-02-20 ENCOUNTER — TELEPHONE (OUTPATIENT)
Dept: FAMILY MEDICINE CLINIC | Age: 50
End: 2020-02-20

## 2020-02-20 NOTE — TELEPHONE ENCOUNTER
[facility] Emergency Department ([facility]) ED Follow up Call    Reason for ED visit:  Facial swelling             FU appts/Provider:    Future Appointments   Date Time Provider Zhao Melnedez   2/27/2020 11:00 AM MD aaron Brooks sc [facility]   3/18/2020  4:30 PM [facility]       VOICEMAIL DOCUMENTATION - ERASE IF NOT USED  Hi, this message is for  Samir Lopez  This is Quentin Sellers from [employer] office. Just calling to see how you are doing after your recent visit to the Emergency Room. [employer] wants to make sure you were able to fill any prescriptions and that you understand your discharge instructions. Please return our call if you need to make a follow up appointment with your provider or have any further needs. Our phone number is [phone]. Have a great day.

## 2020-02-27 ENCOUNTER — OFFICE VISIT (OUTPATIENT)
Dept: FAMILY MEDICINE CLINIC | Age: 50
End: 2020-02-27
Payer: COMMERCIAL

## 2020-02-27 ENCOUNTER — CARE COORDINATION (OUTPATIENT)
Dept: CARE COORDINATION | Age: 50
End: 2020-02-27

## 2020-02-27 VITALS
HEART RATE: 61 BPM | SYSTOLIC BLOOD PRESSURE: 120 MMHG | OXYGEN SATURATION: 98 % | HEIGHT: 74 IN | WEIGHT: 199 LBS | BODY MASS INDEX: 25.54 KG/M2 | DIASTOLIC BLOOD PRESSURE: 80 MMHG

## 2020-02-27 PROBLEM — F32.4 MAJOR DEPRESSIVE DISORDER WITH SINGLE EPISODE, IN PARTIAL REMISSION (HCC): Status: ACTIVE | Noted: 2020-02-27

## 2020-02-27 PROCEDURE — 3046F HEMOGLOBIN A1C LEVEL >9.0%: CPT | Performed by: FAMILY MEDICINE

## 2020-02-27 PROCEDURE — G8419 CALC BMI OUT NRM PARAM NOF/U: HCPCS | Performed by: FAMILY MEDICINE

## 2020-02-27 PROCEDURE — G8484 FLU IMMUNIZE NO ADMIN: HCPCS | Performed by: FAMILY MEDICINE

## 2020-02-27 PROCEDURE — 2022F DILAT RTA XM EVC RTNOPTHY: CPT | Performed by: FAMILY MEDICINE

## 2020-02-27 PROCEDURE — 1036F TOBACCO NON-USER: CPT | Performed by: FAMILY MEDICINE

## 2020-02-27 PROCEDURE — 99214 OFFICE O/P EST MOD 30 MIN: CPT | Performed by: FAMILY MEDICINE

## 2020-02-27 PROCEDURE — 3017F COLORECTAL CA SCREEN DOC REV: CPT | Performed by: FAMILY MEDICINE

## 2020-02-27 PROCEDURE — G8427 DOCREV CUR MEDS BY ELIG CLIN: HCPCS | Performed by: FAMILY MEDICINE

## 2020-02-27 RX ORDER — ATORVASTATIN CALCIUM 20 MG/1
TABLET, FILM COATED ORAL
Qty: 90 TABLET | Refills: 1 | Status: SHIPPED | OUTPATIENT
Start: 2020-02-27 | End: 2022-03-29 | Stop reason: SDUPTHER

## 2020-02-27 RX ORDER — DIPHENHYDRAMINE HCL 25 MG
25 CAPSULE ORAL EVERY 4 HOURS PRN
Qty: 20 CAPSULE | Refills: 0 | Status: SHIPPED | OUTPATIENT
Start: 2020-02-27 | End: 2020-03-08

## 2020-02-27 ASSESSMENT — PATIENT HEALTH QUESTIONNAIRE - PHQ9
SUM OF ALL RESPONSES TO PHQ9 QUESTIONS 1 & 2: 0
1. LITTLE INTEREST OR PLEASURE IN DOING THINGS: 0
2. FEELING DOWN, DEPRESSED OR HOPELESS: 0
SUM OF ALL RESPONSES TO PHQ QUESTIONS 1-9: 0
SUM OF ALL RESPONSES TO PHQ QUESTIONS 1-9: 0

## 2020-02-27 ASSESSMENT — ENCOUNTER SYMPTOMS
CONSTIPATION: 0
APNEA: 0
SHORTNESS OF BREATH: 0
PHOTOPHOBIA: 0
BLOOD IN STOOL: 0
COUGH: 0
WHEEZING: 0
ABDOMINAL PAIN: 0
ABDOMINAL DISTENTION: 0
FACIAL SWELLING: 0
VOMITING: 0
BACK PAIN: 0
RHINORRHEA: 0

## 2020-02-27 NOTE — PROGRESS NOTES
depression   []20-27 = Severe depression    Discussed testing with the patient and all questions fully answered.     Admission on 02/18/2020, Discharged on 02/18/2020   Component Date Value Ref Range Status    POC Glucose 02/18/2020 523* 75 - 110 mg/dL Final    Critical Noted    WBC 02/18/2020 7.0  3.5 - 11.0 k/uL Final    RBC 02/18/2020 4.94  4.5 - 5.9 m/uL Final    Hemoglobin 02/18/2020 15.6  13.5 - 17.5 g/dL Final    Hematocrit 02/18/2020 45.5  41 - 53 % Final    MCV 02/18/2020 92.2  80 - 100 fL Final    MCH 02/18/2020 31.5  26 - 34 pg Final    MCHC 02/18/2020 34.2  31 - 37 g/dL Final    RDW 02/18/2020 12.9  11.5 - 14.9 % Final    Platelets 46/59/6070 209  150 - 450 k/uL Final    MPV 02/18/2020 9.0  6.0 - 12.0 fL Final    NRBC Automated 02/18/2020 NOT REPORTED  per 100 WBC Final    Differential Type 02/18/2020 NOT REPORTED   Final    Seg Neutrophils 02/18/2020 72* 36 - 66 % Final    Lymphocytes 02/18/2020 18* 24 - 44 % Final    Monocytes 02/18/2020 6  1 - 7 % Final    Eosinophils % 02/18/2020 3  0 - 4 % Final    Basophils 02/18/2020 1  0 - 2 % Final    Immature Granulocytes 02/18/2020 NOT REPORTED  0 % Final    Segs Absolute 02/18/2020 5.10  1.3 - 9.1 k/uL Final    Absolute Lymph # 02/18/2020 1.20  1.0 - 4.8 k/uL Final    Absolute Mono # 02/18/2020 0.40  0.1 - 1.3 k/uL Final    Absolute Eos # 02/18/2020 0.20  0.0 - 0.4 k/uL Final    Basophils Absolute 02/18/2020 0.10  0.0 - 0.2 k/uL Final    Absolute Immature Granulocyte 02/18/2020 NOT REPORTED  0.00 - 0.30 k/uL Final    WBC Morphology 02/18/2020 NOT REPORTED   Final    RBC Morphology 02/18/2020 NOT REPORTED   Final    Platelet Estimate 74/81/3060 NOT REPORTED   Final    Glucose 02/18/2020 611* 70 - 99 mg/dL Final    BUN 02/18/2020 24* 6 - 20 mg/dL Final    CREATININE 02/18/2020 0.97  0.70 - 1.20 mg/dL Final    Bun/Cre Ratio 02/18/2020 NOT REPORTED  9 - 20 Final    Calcium 02/18/2020 9.7  8.6 - 10.4 mg/dL Final    Sodium MISC Testing once a day. Please dispense according to patients insurance. 100 each 3    blood glucose monitor strips Testing once a day. Please dispense according to patients insurance. 100 strip 3    blood glucose monitor kit and supplies Test one time a day & as needed for symptoms of irregular blood glucose. 1 kit 0    ibuprofen (ADVIL;MOTRIN) 800 MG tablet Take 1 tablet by mouth every 8 hours as needed for Pain 20 tablet 0    blood glucose monitor strips Test 1 times a day & as needed for symptoms of irregular blood glucose. Pt. Needs Accucheck Compact Plus Strips 100 strip 3    blood glucose monitor strips Test 1 times a day & as needed for symptoms of irregular blood glucose. Pt needs Accucheck Compact Plus Test Strips. 100 strip 3    Blood Pressure Monitor MISC Use daily to check BP 1 each 0     No current facility-administered medications for this visit.               Social History     Socioeconomic History    Marital status:      Spouse name: Not on file    Number of children: Not on file    Years of education: Not on file    Highest education level: Not on file   Occupational History    Not on file   Social Needs    Financial resource strain: Not on file    Food insecurity:     Worry: Not on file     Inability: Not on file    Transportation needs:     Medical: Not on file     Non-medical: Not on file   Tobacco Use    Smoking status: Never Smoker    Smokeless tobacco: Never Used   Substance and Sexual Activity    Alcohol use: Yes     Comment: 2 times a week    Drug use: Yes     Types: Marijuana     Comment: marajunia 2 times aweek    Sexual activity: Yes   Lifestyle    Physical activity:     Days per week: Not on file     Minutes per session: Not on file    Stress: Not on file   Relationships    Social connections:     Talks on phone: Not on file     Gets together: Not on file     Attends Faith service: Not on file     Active member of club or organization: Not on this encounter. Return in about 6 weeks (around 4/9/2020) for dm ,htn, hld, A1C,. Patient wasseen with total face to face time of 25 minutes. More than 50% of this visit was counseling and education. Future Appointments   Date Time Provider Zhao Melendez   4/8/2020  4:15 PM Marcos Arzola MD fp [facility] [place]     This note was completed by using the assistance of a speech-recognition program. However, inadvertent computerized transcription errors may be present. Althoughevery effort was made to ensure accuracy, no guarantees can be provided that every mistake has been identified and corrected by editing.   Electronically signed by Marcos Arzola MD on 2/27/2020  1:17 PM

## 2020-02-27 NOTE — CARE COORDINATION
Ambulatory Care Coordination Note  2/27/2020  CM Risk Score: 9  Charlson 10 Year Mortality Risk Score: 10%     ACC: Shena Nunez RN    Summary:  · ED F/U today  · Treated & released for elevated glucose (611 mg/dl)  · No recent reoccurence  · Not regularly checking glucose       CC Plan:    1.) Lantus insulin increased to 25 units beginning 28 February 2020  2.) Patient to increase insulin 2 units every third day until morning fasting is less than 150 mg/dl  3.) Monitor & record blood glucose as instructed and PRN. - record results and bring with you to appointments  4.) Daily feet exam and care as per PCP's instructions  5.) Low carb, low fat diet, increase fruits and vegetables   6.) Try to exercise 30 minutes daily  5.) Take medications as prescribed  6.) Follow up with Diabetes Education  7.) Zone Management, Diabetes - RED Zone  8.) Follow up with PCP as scheduled      Prior to Admission medications    Medication Sig Start Date End Date Taking?  Authorizing Provider   zoster recombinant adjuvanted vaccine [facility]) 50 MCG/0.5ML SUSR injection Inject 0.5 mLs into the muscle See Admin Instructions 1 dose now and repeat in 2-6 months 2/27/20   Jody Mills MD   SITagliptin (JANUVIA) 50 MG tablet Take 1 tablet by mouth daily 2/27/20   Jody Mills MD   insulin glargine (LANTUS) 100 UNIT/ML injection pen Inject 25 Units into the skin every morning (before breakfast) 2/27/20   Jody Mills MD   Insulin Pen Needle 29G X 12.7MM MISC 1 each by Does not apply route daily To use with insulin 2/27/20   Jody Mills MD   diphenhydrAMINE (BENADRYL) 25 MG capsule Take 1 capsule by mouth every 4 hours as needed for Itching 2/27/20 3/8/20  Jody Mills MD   aspirin (SM ASPIRIN ADULT LOW STRENGTH) 81 MG EC tablet TAKE 1 TABLET BY MOUTH ONE TIME A DAY 2/13/20   Jody Mills MD   SM ASPIRIN ADULT LOW STRENGTH 81 MG EC tablet TAKE 1 TABLET BY MOUTH ONE TIME A DAY  2/13/20   Jody Mills MD   zoster

## 2020-03-16 ENCOUNTER — APPOINTMENT (OUTPATIENT)
Dept: GENERAL RADIOLOGY | Age: 50
End: 2020-03-16
Payer: COMMERCIAL

## 2020-03-16 ENCOUNTER — HOSPITAL ENCOUNTER (OUTPATIENT)
Age: 50
Setting detail: OBSERVATION
Discharge: HOME | End: 2020-03-16
Attending: EMERGENCY MEDICINE | Admitting: EMERGENCY MEDICINE
Payer: COMMERCIAL

## 2020-03-16 ENCOUNTER — APPOINTMENT (OUTPATIENT)
Dept: NUCLEAR MEDICINE | Age: 50
End: 2020-03-16
Payer: COMMERCIAL

## 2020-03-16 VITALS
HEART RATE: 62 BPM | WEIGHT: 205 LBS | OXYGEN SATURATION: 97 % | SYSTOLIC BLOOD PRESSURE: 127 MMHG | RESPIRATION RATE: 18 BRPM | DIASTOLIC BLOOD PRESSURE: 79 MMHG | BODY MASS INDEX: 26.31 KG/M2 | TEMPERATURE: 97.3 F | HEIGHT: 74 IN

## 2020-03-16 PROBLEM — R07.9 CHEST PAIN: Status: ACTIVE | Noted: 2020-03-16

## 2020-03-16 LAB
ABSOLUTE EOS #: 0.36 K/UL (ref 0–0.44)
ABSOLUTE IMMATURE GRANULOCYTE: 0.03 K/UL (ref 0–0.3)
ABSOLUTE LYMPH #: 3.45 K/UL (ref 1.1–3.7)
ABSOLUTE MONO #: 0.78 K/UL (ref 0.1–1.2)
ANION GAP SERPL CALCULATED.3IONS-SCNC: 14 MMOL/L (ref 9–17)
BASOPHILS # BLD: 1 % (ref 0–2)
BASOPHILS ABSOLUTE: 0.06 K/UL (ref 0–0.2)
BUN BLDV-MCNC: 24 MG/DL (ref 6–20)
BUN/CREAT BLD: ABNORMAL (ref 9–20)
CALCIUM SERPL-MCNC: 9.4 MG/DL (ref 8.6–10.4)
CHLORIDE BLD-SCNC: 99 MMOL/L (ref 98–107)
CO2: 25 MMOL/L (ref 20–31)
CREAT SERPL-MCNC: 0.72 MG/DL (ref 0.7–1.2)
DIFFERENTIAL TYPE: NORMAL
EKG ATRIAL RATE: 64 BPM
EKG ATRIAL RATE: 77 BPM
EKG P AXIS: 32 DEGREES
EKG P AXIS: 36 DEGREES
EKG P-R INTERVAL: 136 MS
EKG P-R INTERVAL: 136 MS
EKG Q-T INTERVAL: 380 MS
EKG Q-T INTERVAL: 388 MS
EKG QRS DURATION: 92 MS
EKG QRS DURATION: 98 MS
EKG QTC CALCULATION (BAZETT): 400 MS
EKG QTC CALCULATION (BAZETT): 430 MS
EKG R AXIS: 12 DEGREES
EKG R AXIS: 54 DEGREES
EKG T AXIS: 25 DEGREES
EKG T AXIS: 52 DEGREES
EKG VENTRICULAR RATE: 64 BPM
EKG VENTRICULAR RATE: 77 BPM
EOSINOPHILS RELATIVE PERCENT: 4 % (ref 1–4)
GFR AFRICAN AMERICAN: >60 ML/MIN
GFR NON-AFRICAN AMERICAN: >60 ML/MIN
GFR SERPL CREATININE-BSD FRML MDRD: ABNORMAL ML/MIN/{1.73_M2}
GFR SERPL CREATININE-BSD FRML MDRD: ABNORMAL ML/MIN/{1.73_M2}
GLUCOSE BLD-MCNC: 207 MG/DL (ref 75–110)
GLUCOSE BLD-MCNC: 233 MG/DL (ref 70–99)
GLUCOSE BLD-MCNC: 263 MG/DL (ref 75–110)
HCT VFR BLD CALC: 44.5 % (ref 40.7–50.3)
HEMOGLOBIN: 15.2 G/DL (ref 13–17)
IMMATURE GRANULOCYTES: 0 %
LV EF: 51 %
LVEF MODALITY: NORMAL
LYMPHOCYTES # BLD: 36 % (ref 24–43)
MCH RBC QN AUTO: 31.9 PG (ref 25.2–33.5)
MCHC RBC AUTO-ENTMCNC: 34.2 G/DL (ref 28.4–34.8)
MCV RBC AUTO: 93.5 FL (ref 82.6–102.9)
MONOCYTES # BLD: 8 % (ref 3–12)
NRBC AUTOMATED: 0 PER 100 WBC
PDW BLD-RTO: 12.6 % (ref 11.8–14.4)
PLATELET # BLD: 198 K/UL (ref 138–453)
PLATELET ESTIMATE: NORMAL
PMV BLD AUTO: 10.7 FL (ref 8.1–13.5)
POTASSIUM SERPL-SCNC: 3.7 MMOL/L (ref 3.7–5.3)
RBC # BLD: 4.76 M/UL (ref 4.21–5.77)
RBC # BLD: NORMAL 10*6/UL
SEG NEUTROPHILS: 51 % (ref 36–65)
SEGMENTED NEUTROPHILS ABSOLUTE COUNT: 5.01 K/UL (ref 1.5–8.1)
SODIUM BLD-SCNC: 138 MMOL/L (ref 135–144)
TROPONIN INTERP: NORMAL
TROPONIN T: NORMAL NG/ML
TROPONIN, HIGH SENSITIVITY: 7 NG/L (ref 0–22)
TROPONIN, HIGH SENSITIVITY: 7 NG/L (ref 0–22)
TROPONIN, HIGH SENSITIVITY: 8 NG/L (ref 0–22)
WBC # BLD: 9.7 K/UL (ref 3.5–11.3)
WBC # BLD: NORMAL 10*3/UL

## 2020-03-16 PROCEDURE — G0378 HOSPITAL OBSERVATION PER HR: HCPCS

## 2020-03-16 PROCEDURE — 71046 X-RAY EXAM CHEST 2 VIEWS: CPT

## 2020-03-16 PROCEDURE — 78452 HT MUSCLE IMAGE SPECT MULT: CPT

## 2020-03-16 PROCEDURE — 84484 ASSAY OF TROPONIN QUANT: CPT

## 2020-03-16 PROCEDURE — 3430000000 HC RX DIAGNOSTIC RADIOPHARMACEUTICAL: Performed by: INTERNAL MEDICINE

## 2020-03-16 PROCEDURE — 82947 ASSAY GLUCOSE BLOOD QUANT: CPT

## 2020-03-16 PROCEDURE — 99285 EMERGENCY DEPT VISIT HI MDM: CPT

## 2020-03-16 PROCEDURE — 6370000000 HC RX 637 (ALT 250 FOR IP): Performed by: STUDENT IN AN ORGANIZED HEALTH CARE EDUCATION/TRAINING PROGRAM

## 2020-03-16 PROCEDURE — 93005 ELECTROCARDIOGRAM TRACING: CPT | Performed by: STUDENT IN AN ORGANIZED HEALTH CARE EDUCATION/TRAINING PROGRAM

## 2020-03-16 PROCEDURE — 36415 COLL VENOUS BLD VENIPUNCTURE: CPT

## 2020-03-16 PROCEDURE — 85025 COMPLETE CBC W/AUTO DIFF WBC: CPT

## 2020-03-16 PROCEDURE — 2580000003 HC RX 258: Performed by: INTERNAL MEDICINE

## 2020-03-16 PROCEDURE — A9500 TC99M SESTAMIBI: HCPCS | Performed by: INTERNAL MEDICINE

## 2020-03-16 PROCEDURE — 80048 BASIC METABOLIC PNL TOTAL CA: CPT

## 2020-03-16 PROCEDURE — 93010 ELECTROCARDIOGRAM REPORT: CPT | Performed by: INTERNAL MEDICINE

## 2020-03-16 PROCEDURE — 93017 CV STRESS TEST TRACING ONLY: CPT

## 2020-03-16 RX ORDER — DEXTROSE MONOHYDRATE 25 G/50ML
12.5 INJECTION, SOLUTION INTRAVENOUS PRN
Status: DISCONTINUED | OUTPATIENT
Start: 2020-03-16 | End: 2020-03-16 | Stop reason: HOSPADM

## 2020-03-16 RX ORDER — SODIUM CHLORIDE 0.9 % (FLUSH) 0.9 %
10 SYRINGE (ML) INJECTION PRN
Status: DISCONTINUED | OUTPATIENT
Start: 2020-03-16 | End: 2020-03-16 | Stop reason: HOSPADM

## 2020-03-16 RX ORDER — NICOTINE POLACRILEX 4 MG
15 LOZENGE BUCCAL PRN
Status: DISCONTINUED | OUTPATIENT
Start: 2020-03-16 | End: 2020-03-16 | Stop reason: HOSPADM

## 2020-03-16 RX ORDER — ACETAMINOPHEN 325 MG/1
650 TABLET ORAL EVERY 4 HOURS PRN
Status: DISCONTINUED | OUTPATIENT
Start: 2020-03-16 | End: 2020-03-16 | Stop reason: HOSPADM

## 2020-03-16 RX ORDER — SODIUM CHLORIDE 9 MG/ML
500 INJECTION, SOLUTION INTRAVENOUS CONTINUOUS PRN
Status: DISCONTINUED | OUTPATIENT
Start: 2020-03-16 | End: 2020-03-16 | Stop reason: ALTCHOICE

## 2020-03-16 RX ORDER — ALBUTEROL SULFATE 90 UG/1
2 AEROSOL, METERED RESPIRATORY (INHALATION) PRN
Status: DISCONTINUED | OUTPATIENT
Start: 2020-03-16 | End: 2020-03-16 | Stop reason: ALTCHOICE

## 2020-03-16 RX ORDER — ASPIRIN 81 MG/1
324 TABLET, CHEWABLE ORAL ONCE
Status: COMPLETED | OUTPATIENT
Start: 2020-03-16 | End: 2020-03-16

## 2020-03-16 RX ORDER — METOPROLOL TARTRATE 5 MG/5ML
5 INJECTION INTRAVENOUS EVERY 5 MIN PRN
Status: DISCONTINUED | OUTPATIENT
Start: 2020-03-16 | End: 2020-03-16 | Stop reason: ALTCHOICE

## 2020-03-16 RX ORDER — SODIUM CHLORIDE 0.9 % (FLUSH) 0.9 %
10 SYRINGE (ML) INJECTION EVERY 12 HOURS SCHEDULED
Status: DISCONTINUED | OUTPATIENT
Start: 2020-03-16 | End: 2020-03-16 | Stop reason: HOSPADM

## 2020-03-16 RX ORDER — ATROPINE SULFATE 0.1 MG/ML
0.5 INJECTION INTRAVENOUS EVERY 5 MIN PRN
Status: DISCONTINUED | OUTPATIENT
Start: 2020-03-16 | End: 2020-03-16 | Stop reason: ALTCHOICE

## 2020-03-16 RX ORDER — ALOGLIPTIN 12.5 MG/1
12.5 TABLET, FILM COATED ORAL DAILY
Status: DISCONTINUED | OUTPATIENT
Start: 2020-03-16 | End: 2020-03-16 | Stop reason: HOSPADM

## 2020-03-16 RX ORDER — NITROGLYCERIN 0.4 MG/1
0.4 TABLET SUBLINGUAL EVERY 5 MIN PRN
Status: DISCONTINUED | OUTPATIENT
Start: 2020-03-16 | End: 2020-03-16 | Stop reason: ALTCHOICE

## 2020-03-16 RX ORDER — ATORVASTATIN CALCIUM 20 MG/1
20 TABLET, FILM COATED ORAL DAILY
Status: DISCONTINUED | OUTPATIENT
Start: 2020-03-16 | End: 2020-03-16 | Stop reason: HOSPADM

## 2020-03-16 RX ORDER — NITROGLYCERIN 0.4 MG/1
TABLET SUBLINGUAL
Qty: 25 TABLET | Refills: 0 | Status: SHIPPED | OUTPATIENT
Start: 2020-03-16 | End: 2022-03-29 | Stop reason: ALTCHOICE

## 2020-03-16 RX ORDER — INSULIN GLARGINE 100 [IU]/ML
25 INJECTION, SOLUTION SUBCUTANEOUS
Status: DISCONTINUED | OUTPATIENT
Start: 2020-03-16 | End: 2020-03-16 | Stop reason: HOSPADM

## 2020-03-16 RX ORDER — ASPIRIN 81 MG/1
81 TABLET ORAL DAILY
Status: DISCONTINUED | OUTPATIENT
Start: 2020-03-16 | End: 2020-03-16 | Stop reason: HOSPADM

## 2020-03-16 RX ORDER — SODIUM CHLORIDE 0.9 % (FLUSH) 0.9 %
10 SYRINGE (ML) INJECTION PRN
Status: DISCONTINUED | OUTPATIENT
Start: 2020-03-16 | End: 2020-03-16 | Stop reason: ALTCHOICE

## 2020-03-16 RX ORDER — DEXTROSE MONOHYDRATE 50 MG/ML
100 INJECTION, SOLUTION INTRAVENOUS PRN
Status: DISCONTINUED | OUTPATIENT
Start: 2020-03-16 | End: 2020-03-16 | Stop reason: HOSPADM

## 2020-03-16 RX ORDER — ASPIRIN 81 MG/1
81 TABLET ORAL ONCE
Status: DISCONTINUED | OUTPATIENT
Start: 2020-03-16 | End: 2020-03-16 | Stop reason: SDUPTHER

## 2020-03-16 RX ADMIN — Medication 10 ML: at 11:58

## 2020-03-16 RX ADMIN — SODIUM CHLORIDE, PRESERVATIVE FREE 10 ML: 5 INJECTION INTRAVENOUS at 12:09

## 2020-03-16 RX ADMIN — TETRAKIS(2-METHOXYISOBUTYLISOCYANIDE)COPPER(I) TETRAFLUOROBORATE 13.7 MILLICURIE: 1 INJECTION, POWDER, LYOPHILIZED, FOR SOLUTION INTRAVENOUS at 12:09

## 2020-03-16 RX ADMIN — ASPIRIN 324 MG: 81 TABLET ORAL at 05:00

## 2020-03-16 RX ADMIN — TETRAKIS(2-METHOXYISOBUTYLISOCYANIDE)COPPER(I) TETRAFLUOROBORATE 45 MILLICURIE: 1 INJECTION, POWDER, LYOPHILIZED, FOR SOLUTION INTRAVENOUS at 13:53

## 2020-03-16 RX ADMIN — SODIUM CHLORIDE, PRESERVATIVE FREE 10 ML: 5 INJECTION INTRAVENOUS at 13:53

## 2020-03-16 RX ADMIN — SODIUM CHLORIDE 250 ML: 9 INJECTION, SOLUTION INTRAVENOUS at 12:00

## 2020-03-16 ASSESSMENT — PAIN DESCRIPTION - PAIN TYPE: TYPE: ACUTE PAIN

## 2020-03-16 ASSESSMENT — PAIN SCALES - GENERAL: PAINLEVEL_OUTOF10: 6

## 2020-03-16 ASSESSMENT — PAIN DESCRIPTION - LOCATION: LOCATION: CHEST

## 2020-03-16 NOTE — PROGRESS NOTES
[facility]  Documentation Note                Admission Dx: Chest pain [R07.9]    Past Medical History:   has a past medical history of Anxiety and depression, Chronic back pain, HLD (hyperlipidemia), Hypertension, Marijuana abuse, Nephrolithiasis, and Type II or unspecified type diabetes mellitus without mention of complication, not stated as uncontrolled. Previous Testing:     ECHO 11/18/15: EF 55%, mild LVH, DD, mild TR, RVSP is 29 mmHg. Previous office/hospital visit:   Dr. Nimisha Sosa 11/15/15:   1. Bradycardia now improved, likely due to increased vagal stimulation with pain/nausea    2. Nephrolithiasis    3. Diabetes mellitus     Plan   1. Encourage po fluids     2. No indication for pacing at present. Home Medications:      Prior to Admission medications    Medication Sig Start Date End Date Taking?  Authorizing Provider   zoster recombinant adjuvanted vaccine [facility]) 50 MCG/0.5ML SUSR injection Inject 0.5 mLs into the muscle See Admin Instructions 1 dose now and repeat in 2-6 months 2/27/20   Tara Rangel MD   SITagliptin (JANUVIA) 50 MG tablet Take 1 tablet by mouth daily 2/27/20   Tara Rangel MD   insulin glargine (LANTUS) 100 UNIT/ML injection pen Inject 25 Units into the skin every morning (before breakfast) 2/27/20   Tara Rangel MD   Insulin Pen Needle 29G X 12.7MM MISC 1 each by Does not apply route daily To use with insulin 2/27/20   Tara Rangel MD   atorvastatin (LIPITOR) 20 MG tablet TAKE 1 TABLET BY MOUTH ONE TIME A DAY 2/27/20   Tara Rangel MD   aspirin (SM ASPIRIN ADULT LOW STRENGTH) 81 MG EC tablet TAKE 1 TABLET BY MOUTH ONE TIME A DAY 2/13/20   Tara Rangel MD   SM ASPIRIN ADULT LOW STRENGTH 81 MG EC tablet TAKE 1 TABLET BY MOUTH ONE TIME A DAY  2/13/20   Tara Rangel MD   zoster recombinant adjuvanted vaccine [facility]) 50 MCG/0.5ML SUSR injection Inject 0.5 mLs into the muscle See Admin Instructions 1 dose now and repeat in 2-6 months 12/11/19   Kathy Regan MD   Lancets MISC Testing once a day. Please dispense according to patients insurance. 12/11/19   Kathy Regan MD   blood glucose monitor strips Testing once a day. Please dispense according to patients insurance. 12/11/19   Kathy Regan MD   blood glucose monitor kit and supplies Test one time a day & as needed for symptoms of irregular blood glucose. 12/11/19   Kathy Regan MD   ibuprofen (ADVIL;MOTRIN) 800 MG tablet Take 1 tablet by mouth every 8 hours as needed for Pain 5/28/19   NORMA Brice - CNP   blood glucose monitor strips Test 1 times a day & as needed for symptoms of irregular blood glucose. Pt. Needs Accucheck Compact Plus Strips 1/18/19   Jose Restrepo MD   blood glucose monitor strips Test 1 times a day & as needed for symptoms of irregular blood glucose. Pt needs Accucheck Compact Plus Test Strips.  1/18/19   Jose Restrepo MD   Blood Pressure Monitor MISC Use daily to check BP 3/13/18   Kathy Regan MD      Current Facility-Administered Medications: aspirin EC tablet 81 mg, 81 mg, Oral, Daily  atorvastatin (LIPITOR) tablet 20 mg, 20 mg, Oral, Daily  insulin glargine (LANTUS) injection vial 25 Units, 25 Units, Subcutaneous, QAM AC  sodium chloride flush 0.9 % injection 10 mL, 10 mL, Intravenous, 2 times per day  sodium chloride flush 0.9 % injection 10 mL, 10 mL, Intravenous, PRN  acetaminophen (TYLENOL) tablet 650 mg, 650 mg, Oral, Q4H PRN  enoxaparin (LOVENOX) injection 40 mg, 40 mg, Subcutaneous, Daily  alogliptin (NESINA) tablet 12.5 mg, 12.5 mg, Oral, Daily    Labs:     CBC:   Recent Labs     03/16/20 0313   WBC 9.7   HGB 15.2   HCT 44.5        BMP:   Recent Labs     03/16/20 0313      K 3.7   CO2 25   BUN 24*   CREATININE 0.72   LABGLOM >60   GLUCOSE 233*     CARDIAC ENZYMES:  Recent Labs     03/16/20 0313 03/16/20  0457   TROPHS 7 8     FASTING LIPID PANEL:  Lab Results   Component Value Date    HDL 32 11/16/2015    TRIG 87

## 2020-03-16 NOTE — CONSULTS
[facility] [facility]   Consultation Note               Today's Date: 3/16/2020  Patient Name: Leela Woo  Date of admission: 3/16/2020  2:40 AM  Patient's age: 47 y.o., 1970  Admission Dx: Chest pain [R07.9]    Reason for Consult:  Chest pain    Requesting Physician: Sylvia Delatorre MD    CHIEF COMPLAINT:    Chief Complaint   Patient presents with    Tingling     in arms and legs     Chest Pain       History Obtained From:  patient, electronic medical record    HISTORY OF PRESENT ILLNESS:      The patient is a 47 y.o. male who was admitted to the hospital for Chest pain [R07.9]. He has a history of uncontrolled DM-2. He was sitting at home watching TV when he started having 6/10, left-sided chest pain that was associated with lower extremity and hand tingling. He has a history of marijuana abuse and also used cocaine prior to this event. He also drinks alcohol frequently. His symptoms resolved with any further treatment. In the ED, his BP was found to be 178/103 with HR 73. His HS trop was 7-->8 and his EKG was normal sinus rhythm. Normal EKG. Cardiology was consulted for further recommendations. Past Medical History:   has a past medical history of Anxiety and depression, Chronic back pain, HLD (hyperlipidemia), Hypertension, Marijuana abuse, Nephrolithiasis, and Type II or unspecified type diabetes mellitus without mention of complication, not stated as uncontrolled. Past Surgical History:   has no past surgical history on file. Home Medications:    Prior to Admission medications    Medication Sig Start Date End Date Taking?  Authorizing Provider   zoster recombinant adjuvanted vaccine [facility]) 50 MCG/0.5ML SUSR injection Inject 0.5 mLs into the muscle See Admin Instructions 1 dose now and repeat in 2-6 months 2/27/20   Hitesh Olmos MD   SITagliptin (JANUVIA) 50 MG tablet Take 1 tablet by mouth daily 2/27/20   Hitesh Olmos MD   insulin glargine (LANTUS) 100 UNIT/ML injection pen Inject 25 Units into the skin every morning (before breakfast) 2/27/20   Moriah Vega MD   Insulin Pen Needle 29G X 12.7MM MISC 1 each by Does not apply route daily To use with insulin 2/27/20   Moriah Vega MD   atorvastatin (LIPITOR) 20 MG tablet TAKE 1 TABLET BY MOUTH ONE TIME A DAY 2/27/20   Moriah Vega MD   aspirin (SM ASPIRIN ADULT LOW STRENGTH) 81 MG EC tablet TAKE 1 TABLET BY MOUTH ONE TIME A DAY 2/13/20   Moriah Vega MD   SM ASPIRIN ADULT LOW STRENGTH 81 MG EC tablet TAKE 1 TABLET BY MOUTH ONE TIME A DAY  2/13/20   Moriah Vega MD   zoster recombinant adjuvanted vaccine [facility]) 50 MCG/0.5ML SUSR injection Inject 0.5 mLs into the muscle See Admin Instructions 1 dose now and repeat in 2-6 months 12/11/19   Moriah Vega MD   Lancets MISC Testing once a day. Please dispense according to patients insurance. 12/11/19   Moriah Vega MD   blood glucose monitor strips Testing once a day. Please dispense according to patients insurance. 12/11/19   Moriah Vega MD   blood glucose monitor kit and supplies Test one time a day & as needed for symptoms of irregular blood glucose. 12/11/19   Moriah Vega MD   ibuprofen (ADVIL;MOTRIN) 800 MG tablet Take 1 tablet by mouth every 8 hours as needed for Pain 5/28/19   NORMA Brice CNP   blood glucose monitor strips Test 1 times a day & as needed for symptoms of irregular blood glucose. Pt. Needs Accucheck Compact Plus Strips 1/18/19   Rivka Mcmillan MD   blood glucose monitor strips Test 1 times a day & as needed for symptoms of irregular blood glucose. Pt needs Accucheck Compact Plus Test Strips.  1/18/19   Rivka Mcmillan MD   Blood Pressure Monitor MISC Use daily to check BP 3/13/18   Moriah Vega MD      Current Facility-Administered Medications: aspirin EC tablet 81 mg, 81 mg, Oral, Daily  atorvastatin (LIPITOR) tablet 20 mg, 20 mg, Oral, Daily  insulin glargine (LANTUS) injection vial 25 Units, 25 Units, Subcutaneous, QAM AC  sodium chloride flush 0.9 % injection 10 mL, 10 mL, Intravenous, 2 times per day  sodium chloride flush 0.9 % injection 10 mL, 10 mL, Intravenous, PRN  acetaminophen (TYLENOL) tablet 650 mg, 650 mg, Oral, Q4H PRN  enoxaparin (LOVENOX) injection 40 mg, 40 mg, Subcutaneous, Daily  alogliptin (NESINA) tablet 12.5 mg, 12.5 mg, Oral, Daily      Allergies:  Patient has no known allergies. Social History:   reports that he has never smoked. He has never used smokeless tobacco. He reports current alcohol use. He reports current drug use. Drug: Marijuana. Family History: family history includes Diabetes in his father. No h/o sudden cardiac death. No for premature CAD      REVIEW OF SYSTEMS:    · Constitutional: there has been no unanticipated weight loss. There's been No change in energy level, No change in activity level. · Eyes: No visual changes or diplopia. No scleral icterus. · ENT: No Headaches  · Cardiovascular: As noted  · Respiratory: No previous pulmonary problems, No cough  · Gastrointestinal: No abdominal pain. No change in bowel or bladder habits. · Genitourinary: No dysuria, trouble voiding, or hematuria. · Musculoskeletal:  No gait disturbance, No weakness or joint complaints. · Integumentary: No rash or pruritis. · Neurological: No headache, diplopia, change in muscle strength, numbness or tingling. No change in gait, balance, coordination, mood, affect, memory, mentation, behavior. · Psychiatric: No anxiety, or depression. · Endocrine: No temperature intolerance. No excessive thirst, fluid intake, or urination. No tremor. · Hematologic/Lymphatic: No abnormal bruising or bleeding, blood clots or swollen lymph nodes. · Allergic/Immunologic: No nasal congestion or hives.       PHYSICAL EXAM:      /79   Pulse 62   Temp 97.3 °F (36.3 °C)   Resp 18   Ht 6' 2\" (1.88 m)   Wt 205 lb (93 kg)   SpO2 97%   BMI 26.32 kg/m²    Constitutional and General Appearance: Alert, MR #         [id number]      Sonographer                   Karen Urias      Accession #  [id number]   Interpreting Physician        Rachna Hyatt      Fellow                   Referring Nurse Practitioner      Interpreting             Referring Physician           Benson Salazar     Type of Study      TTE procedure:2D Echocardiogram, M-Mode, Doppler, Color Doppler. Procedure Date  Date: 11/18/2015 Start: 11:06 AM    Study Location: [facility]  Technical Quality: Adequate visualization    Indications:Bradycardia. History / Tech. Comments:  DM HLD HTN    Patient Status: Inpatient    Height: 74.02 inches Weight: 222.68 pounds BSA: 2.27 m^2 BMI: 28.58 kg/m^2    Rhythm: Sinus bradycardia HR: 57 bpm BP: 130/74 mmHg    CONCLUSIONS    Summary  Normal left ventricle size and function with an estimated EF > 55%. Mild left ventricular hypertrophy. Evidence of diastolic dysfunction. Mild tricuspid regurgitation. Estimated right ventricular systolic pressure  is 29 mmHg. Signature  ----------------------------------------------------------------------------   Electronically signed by Karen Urias(Sonographer) on 11/18/2015 11:28   AM  ----------------------------------------------------------------------------    ----------------------------------------------------------------------------   Electronically signed by Rachna Hyatt(Interpreting physician) on   11/18/2015 05:41 PM  ----------------------------------------------------------------------------  FINDINGS  Left Atrium  Left atrium is normal in size. Left Ventricle  Normal left ventricle size and function with an estimated EF > 55%. Mild left ventricular hypertrophy. Evidence of diastolic dysfunction. Right Atrium  Right atrium is normal in size. Right Ventricle  Normal right ventricular size and function. Mitral Valve  Normal mitral valve structure and function. Mild mitral regurgitation.   Aortic Valve  Normal aortic valve CARDIAC ENZYMES:  Recent Labs     03/16/20  0313 03/16/20  0457   TROPHS 7 8       Recent Labs     03/16/20  0313 03/16/20  0457   TROPONINT NOT REPORTED NOT REPORTED     FASTING LIPID PANEL:  Lab Results   Component Value Date    HDL 32 11/16/2015    TRIG 87 11/16/2015       IMPRESSION:    1. Atypical chest pain  2. Polysubstance abuse, including cocaine, marijuana, and alcohol  3. Uncontrolled DM-2  4. HTN  5. Hyperlipidemia    Patient Active Problem List   Diagnosis    Diabetes mellitus ([address]    Depression    Erectile dysfunction associated with type 2 diabetes mellitus ([address]    Back pain    Anxiety and depression    Nephrolithiasis    Essential hypertension    Mixed hyperlipidemia    Major depressive disorder with single episode, in partial remission ([address]    Chest pain       RECOMMENDATIONS:  1. Agree with ASA 81, Lipitor 20  2. Check Exercise cardiolite stress test given risk factors  3. If stress test normal, no further cardiac testing and can follow-up as outpatient. Discussed with patient and nurse. Thank you for allowing us to participate in [address] [place] [address]. Electronically signed on 03/16/20 at 8:41 AM by:    Eliseo Villasenor MD   Fellow, [facility]    Attending Physician Statement  I have discussed the case of Chandler Samaniego including pertinent history and exam findings with the resident. I have seen and examined the patient and the key elements of the encounter have been performed by me. I agree with the assessment, plan and orders as documented by the resident With changes made to the note.      Electronically signed by Shannon Matta MD on 3/17/2020 at 1:02 PM.    [facility]      [phone]

## 2020-03-16 NOTE — ED PROVIDER NOTES
Nohematuria  Musculoskeletal ROS - No back pain, No neck pain  Dermatological ROS - No wound, No rash  PHYSICAL EXAM   (up to 7 for level 4, 8 or more forlevel 5)      INITIAL VITALS:   ED Triage Vitals   BP Temp Temp Source Pulse Resp SpO2 Height Weight   03/16/20 0249 03/16/20 0252 03/16/20 0252 03/16/20 0249 03/16/20 0249 03/16/20 0249 -- --   (!) 178/103 97.6 °F (36.4 °C) Oral 73 15 98 %         Physical Exam  HENT:      Head: Normocephalic and atraumatic. Eyes:      Pupils: Pupils are equal, round, and reactive to light. Neck:      Musculoskeletal: Normal range of motion and neck supple. Cardiovascular:      Rate and Rhythm: Normal rate and regular rhythm. Pulmonary:      Effort: Pulmonary effort is normal. No respiratory distress. Breath sounds: Normal breath sounds. No stridor. Abdominal:      General: Bowel sounds are normal. There is no distension. Palpations: Abdomen is soft. Tenderness: There is no abdominal tenderness. Musculoskeletal: Normal range of motion. General: No deformity. Skin:     General: Skin is warm and dry. Capillary Refill: Capillary refill takes less than 2 seconds. Neurological:      Mental Status: He is alert and oriented to person, place, and time.    Psychiatric:         Behavior: Behavior normal.         DIFFERENTIAL  DIAGNOSIS     PLAN (LABS / IMAGING / EKG):  Orders Placed This Encounter   Procedures    XR CHEST STANDARD (2 VW)    CBC Auto Differential    BASIC METABOLIC PANEL    Troponin    Troponin    EKG 12 Lead    PATIENT STATUS (FROM ED OR OR/PROCEDURAL) Observation       MEDICATIONS ORDERED:  Orders Placed This Encounter   Medications    aspirin chewable tablet 324 mg       DDX: ACS, Prinzmetal angina or, coronary vasospasm, pneumonia, pneumothorax, dysrhythmia, cardiomyopathy, PE    Initial MDM/Plan: 47 y.o. male who presents with concern for left-sided chest pain that began tonight when patient was sitting still watching

## 2020-03-16 NOTE — ED NOTES
Pt c/o sudden onset of left sided chest pain with tingling to bilat hands and feet. Pt states he has never felt like this before. Denies history of cardiac problems. States he does use marijuana daily which he states he grows himself. Pt appears anxious. Denies other complaints at this time. Dr. Cota Contes to bedside to eval pt.       Vanesa Leary RN  03/16/20 [id number]

## 2020-03-16 NOTE — ED PROVIDER NOTES
[address] [facility]     Emergency Department     Faculty Attestation    I performed a history and physical examination of the patient and discussed management with the resident. I have reviewed and agree with the residents findings including all diagnostic interpretations, and treatment plans as written. Any areas of disagreement are noted on the chart. I was personally present for the key portions of any procedures. I have documented in the chart those procedures where I was not present during the key portions. I have reviewed the emergency nurses triage note. I agree with the chief complaint, past medical history, past surgical history, allergies, medications, social and family history as documented unless otherwise noted below. Documentation of the HPI, Physical Exam and Medical Decision Making performed by ankit is based on my personal performance of the HPI, PE and MDM. For Physician Assistant/ Nurse Practitioner cases/documentation I have personally evaluated this patient and have completed at least one if not all key elements of the E/M (history, physical exam, and MDM). Additional findings are as noted. Sided chest wall pain, associated with shortness of breath nausea and tingling. Now resolved. Patient does smoke marijuana daily, also states he did a line of cocaine yesterday. Which he typically does not do history of diabetes just started on insulin recently. No history of CAD. Patient says symptoms started at rest.    Patient well-appearing nontoxic speaking in full sentences in no distress no calf tenderness to palpation bilaterally, no edema to the lower extremities bilaterally  Cardiovascular: Regular rate and rhythm no murmurs gallops or rubs  Respiratory lungs are clear to auscultation bilaterally without any rales, wheezes or rhonchi    Chest pain, likely cocaine induced.   We will plan on cardiac work-up, IV fluids, Ativan if pain continues will avoid beta-blockers. Troponin, telemetry EKG.     EKG Interpretation    Interpreted by me    EKG shows normal sinus rhythm with normal axis no ST elevations or depressions noted, ventricular rate of 77 FL interval of 136 QRS of 98 and a QTC of 430        Anamaria Flynn D.O, M.P.H  Attending Emergency Medicine Physician         Anamaria Flynn,   03/16/20 [id number]

## 2020-03-16 NOTE — H&P
[facility]  CDU / [facility] NOTE     Pt Name: Estefania Miguel  MRN: [id number]  [place] 1970  Date of evaluation: 3/16/20  Patient's PCP is :  Johny Finney MD    [address] [place] [address]       Chief Complaint   Patient presents with    Tingling     in arms and legs     Chest Pain         HISTORY OF PRESENT ILLNESS    Estefania Miguel is a 47 y.o. male who presents acute left-sided chest pain with associated tingling of bilateral hands. Patient states that he was sitting at home watching TV when he suddenly began to feel left-sided chest pain and an overwhelming sense of anxiety. Patient reports associated tingling of his bilateral hands. Patient states he has never felt anything like this before and denies any prior cardiac issues. Patient has a history of diabetes but states that it is normally well controlled and he denies any other health problems. Patient admits to using marijuana on a daily basis and states that he also used cocaine yesterday.   Denies any associated headache, vision changes, numbness, weakness, ripping or tearing back pain, leg swelling, recent travel, history of clotting disorders, shortness of breath, cough, fever or chills    Location/Symptom: Left-sided chest pain with associated tingling of bilateral hands  Timing/Onset: Acute  Provocation: Unknown  Quality: Squeezing  Radiation: None  Severity: Severe  Timing/Duration: Waxing and waning, began 1 day ago    Modifying Factors: No specific aggravating or alleviating factors    REVIEW OF SYSTEMS       General ROS - No fevers, No malaise  Psychological ROS - No Headache, No suicidal thoughts  Ophthalmic ROS - No discharge, No changes in vision  ENT ROS -  No sore throat, No rhinorrhea,   Respiratory ROS - no cough, No shortness of breath, or wheezing  Cardiovascular ROS - No chest pain or dyspnea on exertion  Gastrointestinal ROS - No abdominal pain, change in bowel habits, or black or bloody Acuity: Unknown Type of Exam: Unknown FINDINGS: No focal consolidations. No pleural effusion or pneumothorax. Heart size is within normal limits. No acute process. LABS:  Labs Reviewed   BASIC METABOLIC PANEL - Abnormal; Notable for the following components:       Result Value    Glucose 233 (*)     BUN 24 (*)     All other components within normal limits   CBC WITH AUTO DIFFERENTIAL   TROPONIN   TROPONIN   TROPONIN       I have reviewed and interpreted all available lab results. SCREENING TOOLS:    HEART Risk Score for Chest Pain Patients   History and Physical Exam Suspicion Level  (Nausea, Vomiting, Diaphoresis, Radiation, Exertion)   Slightly Suspicious (0 pts)   Moderately Suspicious (1 pt)   Highly Suspicious (2 pts)   EKG Interpretation   Normal (0 pts)   Non-Specific Repolarization Disturbance (1 pt)   Significant ST-Depression (2 pts)   Age of Patient (in years)   = 39 (0 pts)   46-64 (1 pt)   = 65 (2 pts)   Risk Factors   No Risk Factors (0 pts)   1-2 Risk Factors (1 pt)   = 3 Risk Factors (2 pts)   Risk Factors Include:   Hypercholesterolemia   Hypertension   Diabetes Mellitus   Cigarette smoking   Positive family history   Obesity   CAD   (SLE, CKDz, HIV, Cocaine abuse)   Troponin Levels   = Normal Limit (0 pts)   1-3 Times Normal Limit (1 pt)   > 3 Times Normal Limit (2 pts)  TOTAL: 4    Percent Risk for Major Adverse Cardiac Event (MACE)  0-3 pts indicates low risk for MACE   2.5% (DISCHARGE)   4-7 pts indicates moderate risk for MACE  20.3% (OBS)  8-10 pts indicates high risk for MACE  72.7% (EARLY INVASIVE TX)    CDU IMPRESSION / PLAN      Benjamin Sparks is a 47 y.o. male who presents with:    1) acute left-sided chest pain with associated bilateral hand tingling, unclear etiology, initial encounter   1. Evaluated by cardiology this morning. Plan for Lexiscan stress test, results pending   2. Symptoms improved as of my exam this morning. Continue p.o. Advil, p.o.  Lipitor, sublingual nitroglycerin    · IP CONSULT TO CARDIOLOGY  · Further workup and evaluation   · Follow up recommendations     · Continue current home meds, pain control   · Monitor vitals, labs, imaging     DISPO: pending consults and clinical improvement     CONSULTS:    [address]:        PATIENT REFERRED TO:    Renetta Howe MD  [address] [facility] [address] [phone]            --  Mireya Morse D.O. PGY1  Emergency Medicine Resident Physician

## 2020-03-18 ENCOUNTER — TELEPHONE (OUTPATIENT)
Dept: FAMILY MEDICINE CLINIC | Age: 50
End: 2020-03-18

## 2020-03-18 NOTE — TELEPHONE ENCOUNTER
[facility] Initial Follow Up Call    Outreach made within 2 business days of discharge: Yes    Patient: Ximena Daily Patient : 1970   MRN: [id number]  Reason for Admission: There are no discharge diagnoses documented for the most recent discharge. Discharge Date: 3/16/20       Spoke with:     Discharge department/facility: [facility] Interactive Patient Contact:  Was patient able to fill all prescriptions:   Was patient instructed to bring all medications to the follow-up visit:   Is patient taking all medications as directed in the discharge summary?    Does patient understand their discharge instructions:   Does patient have questions or concerns that need addressed prior to 7-14 day follow up office visit:     Scheduled appointment with PCP within 7-14 days    Follow Up  Future Appointments   Date Time Provider Zhao Melendez   2020  4:15 PM MD aaron Lom[address], [place]

## 2020-03-21 NOTE — DISCHARGE SUMMARY
according to patients insurance. Blood Pressure Monitor Misc  Use daily to check BP     ibuprofen 800 MG tablet  Commonly known as:  ADVIL;MOTRIN  Take 1 tablet by mouth every 8 hours as needed for Pain     insulin glargine 100 UNIT/ML injection pen  Commonly known as:  LANTUS;BASAGLAR  Inject 25 Units into the skin every morning (before breakfast)     Insulin Pen Needle 29G X 12.7MM Misc  1 each by Does not apply route daily To use with insulin     Lancets Misc  Testing once a day. Please dispense according to patients insurance. SITagliptin 50 MG tablet  Commonly known as:  JANUVIA  Take 1 tablet by mouth daily     * zoster recombinant adjuvanted vaccine 50 MCG/0.5ML Susr injection  Commonly known as:  SHINGRIX  Inject 0.5 mLs into the muscle See Admin Instructions 1 dose now and repeat in 2-6 months     * zoster recombinant adjuvanted vaccine 50 MCG/0.5ML Susr injection  Commonly known as:  SHINGRIX  Inject 0.5 mLs into the muscle See Admin Instructions 1 dose now and repeat in 2-6 months         * This list has 7 medication(s) that are the same as other medications prescribed for you. Read the directions carefully, and ask your doctor or other care provider to review them with you.                Where to Get Your Medications      You can get these medications from any pharmacy    Bring a paper prescription for each of these medications  · nitroGLYCERIN 0.4 MG SL tablet         Diet:  No diet orders on file, advance as tolerated     Activity:  As tolerated    Consultants: IP CONSULT TO CARDIOLOGY    Procedures:  Not indicated    Diagnostic Test:   Results for orders placed or performed during the hospital encounter of 03/16/20   CBC Auto Differential   Result Value Ref Range    WBC 9.7 3.5 - 11.3 k/uL    RBC 4.76 4.21 - 5.77 m/uL    Hemoglobin 15.2 13.0 - 17.0 g/dL    Hematocrit 44.5 40.7 - 50.3 %    MCV 93.5 82.6 - 102.9 fL    MCH 31.9 25.2 - 33.5 pg    MCHC 34.2 28.4 - 34.8 g/dL    RDW 12.6 11.8 - 14.4 QTc Calculation (Bazett) 400 ms    P Axis 32 degrees    R Axis 12 degrees    T Axis 25 degrees   EKG 12 Lead   Result Value Ref Range    Ventricular Rate 77 BPM    Atrial Rate 77 BPM    P-R Interval 136 ms    QRS Duration 98 ms    Q-T Interval 380 ms    QTc Calculation (Bazett) 430 ms    P Axis 36 degrees    R Axis 54 degrees    T Axis 52 degrees     Xr Chest Standard (2 Vw)    Result Date: 3/16/2020  EXAMINATION: TWO XRAY VIEWS OF THE CHEST 3/16/2020 3:22 am COMPARISON: 08/05/2016 HISTORY: ORDERING SYSTEM PROVIDED HISTORY: chest  wiliam TECHNOLOGIST PROVIDED HISTORY: chest  wiliam Reason for Exam: sob Acuity: Unknown Type of Exam: Unknown FINDINGS: No focal consolidations. No pleural effusion or pneumothorax. Heart size is within normal limits. No acute process. Nm Cardiac Stress Test Nuclear Imaging    Result Date: 3/16/2020  EXAMINATION: MYOCARDIAL PERFUSION IMAGING 3/16/2020 TECHNIQUE: Rest dose:  45 mCi Tc-99m sestamibi intravenously Stress dose:  13.7 mCi Tc-99m sestamibi intravenously Under cardiology supervision, treadmill exercise testing was performed. Peak heart rate of 141 bpm is 82% of the age predicted maximum heart rate. SPECT imaging was acquired following injection of the sestamibi. ECG gating was obtained following the stress acquisition. COMPARISON: None Available. HISTORY: ORDERING SYSTEM PROVIDED HISTORY: Chest pain TECHNOLOGIST PROVIDED HISTORY: Reason for Exam: Chest pain Procedure Type->Exercise chest pain Reason for Exam: chest pain, HTN, DM, marijuana abuse and also used cocaine prior to this event, hand tingling FINDINGS: Perfusion: Image quality is good with no significant attenuation artifact. There are no fixed or reversible perfusion defects.  Summed stress score:  0 Summed rest score:  0 Summed reversibility score:  0 Scores are visually adjusted to account for potential artifact TID score:  0.96 (threshold value of 1.19 is used for exercise stress with Tc-99m) Function: End diastolic volume:  808FI Left ventricular ejection fraction:  51% Wall motion abnormalities:  None. Perfusion:  Normal exam Function:  Normal exam Risk stratification:  Low Note on Risk Stratification: The above risk stratification is based on myocardial perfusion findings. Final risk assessment may be adjusted based on other clinical and noninvasive test findings. Risk stratification criteria are adapted from \"Noninvasive Risk Stratification\" criteria from [facility]. al, ACC/AATS/AHA/ASE/ASNC/SCAI/SCCT/STS 2017 Appropriate Use Criteria For Coronary Revascularization in Patients With Stable Ischemic Heart Disease [facility] Volume 69, Issue 17, May 2017 High risk (>3% annual death or MI) 1. Severe resting LV dysfunction (LVEF >35%) not readily explained by non coronary causes 2. Resting perfusion abnormalities greater than 10% of the myocardium in patients without prior history or evidence of MI 3. Stress-induced perfusion abnormalities encumbering greater than or equal to 10% myocardium or stress segmental scores indicating multiple vascular territories with abnormalities 4. Stress-induced LV dilatation (TID ratio greater than 1.19 for exercise and greater than 1.39 for regadenoson) Intermediate risk (1% to 3% annual death or MI) 1. Mild/moderate resting LV dysfunction (LVEF 35% to 49%) not readily explained by non coronary causes. 2. Resting perfusion abnormalities in 5%-9.9% of the myocardium in patients without a history or prior evidence of MI 3. Stress-induced perfusion abnormality encumbering 5%-9.9% of the myocardium or stress segmental scores indicating 1 vascular territory with abnormalities but without LV dilation 4. Small wall motion abnormality involving 1-2 segments and only 1 coronary bed. Low Risk (Less than 1% annual death or MI) 1. Normal or small myocardial perfusion defect at rest or with stress encumbering less than 5% of the myocardium.            Physical Exam:    General appearance - NAD, AOx 3  Lungs -CTAB, no R/R/R  Heart - RRR, no M/R/G  Abdomen - Soft, NT/ND  Neurological:  MAEx4, No focal motor deficit, sensory loss  Extremities - Cap refil <2 sec in all ext., no edema  Skin -warm, dry      Hospital Course:  Clinical course has improved, labs and imaging reviewed. Floridalma Montejo originally presented to the hospital on 3/16/2020  2:40 AM with left-sided chest pain. At that time it was determined that He required further observation and testing and consultation. Was evaluated by cardiology. Stress test was ordered which was interpreted as low risk. No need for further cardiac work-up or intervention during this admission. labs and imaging were followed daily. Imaging results as above. He is medically stable to be discharged. Disposition: Home    Patient stated that they will not drive themselves home from the hospital if they have gotten pain killers/ narcotics earlier that day and that they will arrange for transportation on their own or work with the  for a ride. Patient counseled NOT to drive while under the influence of narcotics/ pain killers. Condition: Good    Patient stable and ready for discharge home. I have discussed plan of care with patient and they are in understanding. They were instructed to read discharge paperwork. All of their questions and concerns were addressed. Time Spent: 0 day      --  Tessa Grady DO  Emergency Medicine Resident Physician    This dictation was generated by voice recognition computer software. Although all attempts are made to edit the dictation for accuracy, there may be errors in the transcription that are not intended.

## 2020-03-23 ENCOUNTER — OFFICE VISIT (OUTPATIENT)
Dept: FAMILY MEDICINE CLINIC | Age: 50
End: 2020-03-23
Payer: COMMERCIAL

## 2020-03-23 VITALS
WEIGHT: 203.2 LBS | TEMPERATURE: 98.4 F | DIASTOLIC BLOOD PRESSURE: 88 MMHG | SYSTOLIC BLOOD PRESSURE: 130 MMHG | BODY MASS INDEX: 26.08 KG/M2 | OXYGEN SATURATION: 96 % | HEIGHT: 74 IN | HEART RATE: 66 BPM

## 2020-03-23 PROBLEM — L01.00 IMPETIGO: Status: ACTIVE | Noted: 2020-03-23

## 2020-03-23 LAB — HBA1C MFR BLD: 8.6 %

## 2020-03-23 PROCEDURE — 99214 OFFICE O/P EST MOD 30 MIN: CPT | Performed by: FAMILY MEDICINE

## 2020-03-23 PROCEDURE — G8431 POS CLIN DEPRES SCRN F/U DOC: HCPCS | Performed by: FAMILY MEDICINE

## 2020-03-23 PROCEDURE — 3017F COLORECTAL CA SCREEN DOC REV: CPT | Performed by: FAMILY MEDICINE

## 2020-03-23 PROCEDURE — 3052F HG A1C>EQUAL 8.0%<EQUAL 9.0%: CPT | Performed by: FAMILY MEDICINE

## 2020-03-23 PROCEDURE — 96160 PT-FOCUSED HLTH RISK ASSMT: CPT | Performed by: FAMILY MEDICINE

## 2020-03-23 PROCEDURE — G8484 FLU IMMUNIZE NO ADMIN: HCPCS | Performed by: FAMILY MEDICINE

## 2020-03-23 PROCEDURE — 1036F TOBACCO NON-USER: CPT | Performed by: FAMILY MEDICINE

## 2020-03-23 PROCEDURE — 83036 HEMOGLOBIN GLYCOSYLATED A1C: CPT | Performed by: FAMILY MEDICINE

## 2020-03-23 PROCEDURE — 2022F DILAT RTA XM EVC RTNOPTHY: CPT | Performed by: FAMILY MEDICINE

## 2020-03-23 PROCEDURE — G8427 DOCREV CUR MEDS BY ELIG CLIN: HCPCS | Performed by: FAMILY MEDICINE

## 2020-03-23 PROCEDURE — G8419 CALC BMI OUT NRM PARAM NOF/U: HCPCS | Performed by: FAMILY MEDICINE

## 2020-03-23 RX ORDER — SERTRALINE HYDROCHLORIDE 25 MG/1
TABLET, FILM COATED ORAL
Qty: 90 TABLET | Refills: 3 | Status: SHIPPED | OUTPATIENT
Start: 2020-03-23 | End: 2022-03-29 | Stop reason: SDUPTHER

## 2020-03-23 RX ORDER — LANCETS 30 GAUGE
1 EACH MISCELLANEOUS 2 TIMES DAILY
Qty: 300 EACH | Refills: 1 | Status: SHIPPED | OUTPATIENT
Start: 2020-03-23 | End: 2020-09-10 | Stop reason: SDUPTHER

## 2020-03-23 RX ORDER — BUSPIRONE HYDROCHLORIDE 10 MG/1
10 TABLET ORAL 2 TIMES DAILY
Qty: 90 TABLET | Refills: 1 | Status: SHIPPED | OUTPATIENT
Start: 2020-03-23 | End: 2020-04-22

## 2020-03-23 RX ORDER — GLUCOSAMINE HCL/CHONDROITIN SU 500-400 MG
CAPSULE ORAL
Qty: 100 STRIP | Refills: 3 | Status: SHIPPED | OUTPATIENT
Start: 2020-03-23 | End: 2020-09-10

## 2020-03-23 SDOH — ECONOMIC STABILITY: FOOD INSECURITY: WITHIN THE PAST 12 MONTHS, THE FOOD YOU BOUGHT JUST DIDN'T LAST AND YOU DIDN'T HAVE MONEY TO GET MORE.: NEVER TRUE

## 2020-03-23 SDOH — ECONOMIC STABILITY: TRANSPORTATION INSECURITY
IN THE PAST 12 MONTHS, HAS LACK OF TRANSPORTATION KEPT YOU FROM MEETINGS, WORK, OR FROM GETTING THINGS NEEDED FOR DAILY LIVING?: NO

## 2020-03-23 SDOH — ECONOMIC STABILITY: INCOME INSECURITY: HOW HARD IS IT FOR YOU TO PAY FOR THE VERY BASICS LIKE FOOD, HOUSING, MEDICAL CARE, AND HEATING?: SOMEWHAT HARD

## 2020-03-23 SDOH — ECONOMIC STABILITY: TRANSPORTATION INSECURITY
IN THE PAST 12 MONTHS, HAS THE LACK OF TRANSPORTATION KEPT YOU FROM MEDICAL APPOINTMENTS OR FROM GETTING MEDICATIONS?: NO

## 2020-03-23 SDOH — ECONOMIC STABILITY: FOOD INSECURITY: WITHIN THE PAST 12 MONTHS, YOU WORRIED THAT YOUR FOOD WOULD RUN OUT BEFORE YOU GOT MONEY TO BUY MORE.: SOMETIMES TRUE

## 2020-03-23 ASSESSMENT — PATIENT HEALTH QUESTIONNAIRE - PHQ9
8. MOVING OR SPEAKING SO SLOWLY THAT OTHER PEOPLE COULD HAVE NOTICED. OR THE OPPOSITE, BEING SO FIGETY OR RESTLESS THAT YOU HAVE BEEN MOVING AROUND A LOT MORE THAN USUAL: 0
3. TROUBLE FALLING OR STAYING ASLEEP: 1
9. THOUGHTS THAT YOU WOULD BE BETTER OFF DEAD, OR OF HURTING YOURSELF: 0
6. FEELING BAD ABOUT YOURSELF - OR THAT YOU ARE A FAILURE OR HAVE LET YOURSELF OR YOUR FAMILY DOWN: 0
5. POOR APPETITE OR OVEREATING: 0
1. LITTLE INTEREST OR PLEASURE IN DOING THINGS: 3
SUM OF ALL RESPONSES TO PHQ9 QUESTIONS 1 & 2: 6
SUM OF ALL RESPONSES TO PHQ QUESTIONS 1-9: 13
2. FEELING DOWN, DEPRESSED OR HOPELESS: 3
7. TROUBLE CONCENTRATING ON THINGS, SUCH AS READING THE NEWSPAPER OR WATCHING TELEVISION: 3
4. FEELING TIRED OR HAVING LITTLE ENERGY: 3
SUM OF ALL RESPONSES TO PHQ QUESTIONS 1-9: 13
10. IF YOU CHECKED OFF ANY PROBLEMS, HOW DIFFICULT HAVE THESE PROBLEMS MADE IT FOR YOU TO DO YOUR WORK, TAKE CARE OF THINGS AT HOME, OR GET ALONG WITH OTHER PEOPLE: 0

## 2020-03-23 ASSESSMENT — ENCOUNTER SYMPTOMS
NAUSEA: 0
ABDOMINAL DISTENTION: 0
ABDOMINAL PAIN: 0
COLOR CHANGE: 1
PHOTOPHOBIA: 0
CHEST TIGHTNESS: 0
DIARRHEA: 0
RHINORRHEA: 0
SINUS PRESSURE: 0
CONSTIPATION: 0
COUGH: 0
SHORTNESS OF BREATH: 1
WHEEZING: 0
VOMITING: 0
BACK PAIN: 0
BLOOD IN STOOL: 0
EYE REDNESS: 0

## 2020-03-23 NOTE — PROGRESS NOTES
kg)   02/27/20 199 lb (90.3 kg)        []Negative depression screening. PHQ Scores 3/23/2020 2/27/2020 12/11/2019 11/13/2013   PHQ2 Score 6 0 2 4   PHQ9 Score 13 0 2 15      []1-4 = Minimal depression   []5-9 = Milddepression   [x]10-14 = Moderate depression   []15-19 = Moderately severe depression   []20-27 = Severe depression    Discussed testing with the patient and all questions fully answered.     Admission on 03/16/2020, Discharged on 03/16/2020   Component Date Value Ref Range Status    WBC 03/16/2020 9.7  3.5 - 11.3 k/uL Final    RBC 03/16/2020 4.76  4.21 - 5.77 m/uL Final    Hemoglobin 03/16/2020 15.2  13.0 - 17.0 g/dL Final    Hematocrit 03/16/2020 44.5  40.7 - 50.3 % Final    MCV 03/16/2020 93.5  82.6 - 102.9 fL Final    MCH 03/16/2020 31.9  25.2 - 33.5 pg Final    MCHC 03/16/2020 34.2  28.4 - 34.8 g/dL Final    RDW 03/16/2020 12.6  11.8 - 14.4 % Final    Platelets 65/37/2634 198  138 - 453 k/uL Final    MPV 03/16/2020 10.7  8.1 - 13.5 fL Final    NRBC Automated 03/16/2020 0.0  0.0 per 100 WBC Final    Differential Type 03/16/2020 NOT REPORTED   Final    Seg Neutrophils 03/16/2020 51  36 - 65 % Final    Lymphocytes 03/16/2020 36  24 - 43 % Final    Monocytes 03/16/2020 8  3 - 12 % Final    Eosinophils % 03/16/2020 4  1 - 4 % Final    Basophils 03/16/2020 1  0 - 2 % Final    Immature Granulocytes 03/16/2020 0  0 % Final    Segs Absolute 03/16/2020 5.01  1.50 - 8.10 k/uL Final    Absolute Lymph # 03/16/2020 3.45  1.10 - 3.70 k/uL Final    Absolute Mono # 03/16/2020 0.78  0.10 - 1.20 k/uL Final    Absolute Eos # 03/16/2020 0.36  0.00 - 0.44 k/uL Final    Basophils Absolute 03/16/2020 0.06  0.00 - 0.20 k/uL Final    Absolute Immature Granulocyte 03/16/2020 0.03  0.00 - 0.30 k/uL Final    WBC Morphology 03/16/2020 NOT REPORTED   Final    RBC Morphology 03/16/2020 NOT REPORTED   Final    Platelet Estimate 53/37/8068 NOT REPORTED   Final    Glucose 03/16/2020 233* 70 - 99 mg/dL Final  BUN 03/16/2020 24* 6 - 20 mg/dL Final    CREATININE 03/16/2020 0.72  0.70 - 1.20 mg/dL Final    Bun/Cre Ratio 03/16/2020 NOT REPORTED  9 - 20 Final    Calcium 03/16/2020 9.4  8.6 - 10.4 mg/dL Final    Sodium 03/16/2020 138  135 - 144 mmol/L Final    Potassium 03/16/2020 3.7  3.7 - 5.3 mmol/L Final    Chloride 03/16/2020 99  98 - 107 mmol/L Final    CO2 03/16/2020 25  20 - 31 mmol/L Final    Anion Gap 03/16/2020 14  9 - 17 mmol/L Final    GFR Non- 03/16/2020 >60  >60 mL/min Final    GFR  03/16/2020 >60  >60 mL/min Final    GFR Comment 03/16/2020        Final    Comment: Average GFR for 52-63 years old:   80 mL/min/1.73sq m  Chronic Kidney Disease:   <60 mL/min/1.73sq m  Kidney failure:   <15 mL/min/1.73sq m              eGFR calculated using average adult body mass. Additional eGFR calculator available at:        [email or web address]            GFR Staging 03/16/2020 NOT REPORTED   Final    Ventricular Rate 03/16/2020 64  BPM Final    Atrial Rate 03/16/2020 64  BPM Final    P-R Interval 03/16/2020 136  ms Final    QRS Duration 03/16/2020 92  ms Final    Q-T Interval 03/16/2020 388  ms Final    QTc Calculation (Bazett) 03/16/2020 400  ms Final    P Axis 03/16/2020 32  degrees Final    R Axis 03/16/2020 12  degrees Final    T Axis 03/16/2020 25  degrees Final    Troponin, High Sensitivity 03/16/2020 8  0 - 22 ng/L Final    Comment:       High Sensitivity Troponin values cannot be compared with other Troponin methodologies. Patients with high levels of Biotin oral intake (i.e >5mg/day) may have falsely decreased   Troponin levels. Samples collected within 8 hours of biotin intake may require additional   information for diagnosis.       Troponin T 03/16/2020 NOT REPORTED  <0.03 ng/mL Final    Troponin Interp 03/16/2020 NOT REPORTED   Final    Troponin, High Sensitivity 03/16/2020 7  0 - 22 ng/L Final    Comment:       High problems, decreased concentration, dysphoric mood and sleep disturbance. Negative for confusion, self-injury and suicidal ideas. The patient is nervous/anxious. The patient is not hyperactive. Physical Exam  Vitals signs and nursing note reviewed. Constitutional:       Appearance: Normal appearance. HENT:      Head: Normocephalic and atraumatic. Hair is normal.      Jaw: No trismus. Right Ear: Hearing and tympanic membrane normal.      Left Ear: Hearing and tympanic membrane normal.      Nose: Nasal tenderness present. No nasal deformity. Right Turbinates: Not enlarged or swollen. Right Sinus: No maxillary sinus tenderness or frontal sinus tenderness. Comments: There is erythematous lesion with mild swelling seen on upper lip. Mouth/Throat:      Mouth: Mucous membranes are moist.   Eyes:      General: Lids are normal.         Right eye: No foreign body or discharge. Neck:      Musculoskeletal: Full passive range of motion without pain and neck supple. Thyroid: No thyroid mass or thyromegaly. Cardiovascular:      Rate and Rhythm: Normal rate and regular rhythm. Pulses: Normal pulses. Heart sounds: Normal heart sounds, S1 normal and S2 normal.   Pulmonary:      Effort: Pulmonary effort is normal.      Breath sounds: Normal breath sounds and air entry. No decreased breath sounds, wheezing, rhonchi or rales. Abdominal:      General: Abdomen is flat. Bowel sounds are increased. Palpations: Abdomen is soft. Tenderness: There is no abdominal tenderness. Musculoskeletal: Normal range of motion. Lymphadenopathy:      Cervical: No cervical adenopathy. Skin:     General: Skin is warm. Findings: Rash present. No abrasion. Rash is crusting, nodular and vesicular. Rash is not papular, pustular or scaling. Comments: Small erythematous swelling on upper lip with mild oozing .     Neurological:      Mental Status: He is alert and oriented to person, place, and time. Cranial Nerves: Cranial nerves are intact. Psychiatric:         Attention and Perception: Attention and perception normal.         Mood and Affect: Mood is anxious and depressed. Affect is flat. Speech: Speech is rapid and pressured. Behavior: Behavior is slowed. Behavior is cooperative. Thought Content: Thought content normal.         Cognition and Memory: Cognition and memory normal.         Judgment: Judgment normal.             ASSESSMENT AND PLAN      1. Precordial pain  Chest pain has resolved. Stress test is negative. Discussed with patient her symptoms are likely due to anxiety and depression, started on Zoloft    2. Anxiety and depression  Started on Zoloft and BuSpar, patient refused behavioral counseling at this time. 3. Type 2 diabetes mellitus without complication, unspecified whether long term insulin use (HCC)  A1c has improved, continue Januvia and increase insulin to 35 units keep your morning sugars under 150, check sugars 2-3 times per day. - SITagliptin (JANUVIA) 50 MG tablet; Take 1 tablet by mouth daily  Dispense: 120 tablet; Refill: 1  - insulin glargine (LANTUS;BASAGLAR) 100 UNIT/ML injection pen; Inject 35 Units into the skin every morning (before breakfast)  Dispense: 5 pen; Refill: 3  - blood glucose monitor kit and supplies; Test one time a day & as needed for symptoms of irregular blood glucose. Dispense: 1 kit; Refill: 0  - blood glucose monitor strips; Testing once a day. Please dispense according to patients insurance. Dispense: 100 strip; Refill: 3  - Lancets MISC; 1 each by Does not apply route 2 times daily  Dispense: 300 each; Refill: 1  - POCT glycosylated hemoglobin (Hb A1C)    4. Positive depression screening  -Start on Zoloft  - Positive Screen for Clinical Depression with a Documented Follow-up Plan   - sertraline (ZOLOFT) 25 MG tablet;  Take 1 tab daily and increase it to 50mg daily  Dispense: 90 tablet; sertraline (ZOLOFT) 25 MG tablet 90 tablet 3     Sig: Take 1 tab daily and increase it to 50mg daily    busPIRone (BUSPAR) 10 MG tablet 90 tablet 1     Sig: Take 1 tablet by mouth 2 times daily    mupirocin (BACTROBAN) 2 % ointment 1 Tube 1     Sig: Apply 3 times daily.  blood glucose monitor kit and supplies 1 kit 0     Sig: Test one time a day & as needed for symptoms of irregular blood glucose.  blood glucose monitor strips 100 strip 3     Sig: Testing once a day. Please dispense according to patients insurance.  Lancets MISC 300 each 1     Si each by Does not apply route 2 times daily       All patient questions answered. Patient voiced understanding. Quality Measures    Body mass index is 26.09 kg/m². Elevated. Weight control planned discussed Healthy diet and regular exercise. BP: 130/88 Blood pressure is normal. Treatment plan consists of No treatment change needed. Lab Results   Component Value Date    LDLCHOLESTEROL 76 2015    (goal LDL reduction with dx if diabetes is 50% LDL reduction)      PHQ Scores 3/23/2020 2020 2019 2013   PHQ2 Score 6 0 2 4   PHQ9 Score 13 0 2 15     Interpretation of Total Score Depression Severity: 1-4 = Minimal depression, 5-9 = Mild depression, 10-14 = Moderate depression, 15-19 = Moderately severe depression, 20-27 = Severe depression    The patient'spast medical, surgical, social, and family history as well as his   current medications and allergies were reviewed as documented in today's encounter. Medications, labs, diagnostic studies, consultations andfollow-up as documented in this encounter. Return in about 6 years (around 3/23/2026) for depression, anxiety , . Patient wasseen with total face to face time of 25 minutes. More than 50% of this visit was counseling and education.        Future Appointments   Date Time Provider Zhao Melendez   2020  4:00 PM Giacomo Raymundo MD [facility]     This note was completed by using the assistance of a speech-recognition program. However, inadvertent computerized transcription errors may be present. Althoughevery effort was made to ensure accuracy, no guarantees can be provided that every mistake has been identified and corrected by editing. Electronically signed by Lisa Tsang MD on 3/23/2020  12:14 PM            On the basis of positive PHQ-9 screening (PHQ-9 Total Score: 13), the following plan was implemented: medication prescribed: Zoloft- 50- patient will call for any significant medication side effects or worsening symptoms of depression. Patient will follow-up in 2 month(s) with PCP.

## 2020-03-23 NOTE — PROGRESS NOTES
Visit Information    Have you changed or started any medications since your last visit including any over-the-counter medicines, vitamins, or herbal medicines? no   Are you having any side effects from any of your medications? -  no  Have you stopped taking any of your medications? Is so, why? -  no    Have you seen any other physician or provider since your last visit? No  Have you had any other diagnostic tests since your last visit? Yes - Records Obtained  Have you been seen in the emergency room and/or had an admission to a hospital since we last saw you? Yes - Records Obtained  Have you had your routine dental cleaning in the past 6 months? no    Have you activated your [employer] account? If not, what are your barriers?  Yes     Patient Care Team:  Cliff Miller MD as PCP - General (Family Medicine)  Cliff Miller MD as PCP - [facility]  Sandi Blankenship RN as Ambulatory Care Manager    Medical History Review  Past Medical, Family, and Social History reviewed and does contribute to the patient presenting condition    Health Maintenance   Topic Date Due    Diabetic foot exam  04/27/1979    Diabetic retinal exam  04/27/1979    HIV screen  04/27/1984    Diabetic microalbuminuria test  04/27/1987    Hepatitis B vaccine (1 of 3 - Risk 3-dose series) 04/27/1988    Lipid screen  11/16/2016    Shingles Vaccine (1 of 2) 04/27/2019    Colon cancer screen colonoscopy  04/27/2019    A1C test (Diabetic or Prediabetic)  03/11/2020    Flu vaccine (1) 06/30/2020 (Originally 9/1/2019)    DTaP/Tdap/Td vaccine (2 - Td) 03/23/2021 (Originally 3/13/2020)    Potassium monitoring  03/16/2021    Creatinine monitoring  03/16/2021    Pneumococcal 0-64 years Vaccine  Completed    Hepatitis A vaccine  Aged Out    Hib vaccine  Aged Out    Meningococcal (ACWY) vaccine  Aged Out

## 2020-03-31 ENCOUNTER — TELEPHONE (OUTPATIENT)
Dept: FAMILY MEDICINE CLINIC | Age: 50
End: 2020-03-31

## 2020-05-05 ENCOUNTER — TELEPHONE (OUTPATIENT)
Dept: FAMILY MEDICINE CLINIC | Age: 50
End: 2020-05-05

## 2020-05-29 ENCOUNTER — APPOINTMENT (OUTPATIENT)
Dept: GENERAL RADIOLOGY | Age: 50
End: 2020-05-29
Payer: COMMERCIAL

## 2020-05-29 ENCOUNTER — HOSPITAL ENCOUNTER (EMERGENCY)
Age: 50
Discharge: HOME | End: 2020-05-29
Attending: EMERGENCY MEDICINE
Payer: COMMERCIAL

## 2020-05-29 VITALS
SYSTOLIC BLOOD PRESSURE: 146 MMHG | TEMPERATURE: 98.8 F | WEIGHT: 203 LBS | OXYGEN SATURATION: 97 % | RESPIRATION RATE: 22 BRPM | HEIGHT: 74 IN | BODY MASS INDEX: 26.05 KG/M2 | DIASTOLIC BLOOD PRESSURE: 91 MMHG | HEART RATE: 65 BPM

## 2020-05-29 LAB
ABSOLUTE EOS #: 0.2 K/UL (ref 0–0.44)
ABSOLUTE IMMATURE GRANULOCYTE: 0.04 K/UL (ref 0–0.3)
ABSOLUTE LYMPH #: 2.33 K/UL (ref 1.1–3.7)
ABSOLUTE MONO #: 0.45 K/UL (ref 0.1–1.2)
ALLEN TEST: NORMAL
ANION GAP SERPL CALCULATED.3IONS-SCNC: 14 MMOL/L (ref 9–17)
ANION GAP: 10 MMOL/L (ref 7–16)
BASOPHILS # BLD: 1 % (ref 0–2)
BASOPHILS ABSOLUTE: 0.05 K/UL (ref 0–0.2)
BUN BLDV-MCNC: 15 MG/DL (ref 6–20)
BUN/CREAT BLD: ABNORMAL (ref 9–20)
CALCIUM SERPL-MCNC: 9.3 MG/DL (ref 8.6–10.4)
CHLORIDE BLD-SCNC: 98 MMOL/L (ref 98–107)
CO2: 25 MMOL/L (ref 20–31)
CREAT SERPL-MCNC: 0.6 MG/DL (ref 0.7–1.2)
DIFFERENTIAL TYPE: ABNORMAL
EOSINOPHILS RELATIVE PERCENT: 3 % (ref 1–4)
FIO2: NORMAL
GFR AFRICAN AMERICAN: >60 ML/MIN
GFR NON-AFRICAN AMERICAN: >60 ML/MIN
GFR NON-AFRICAN AMERICAN: >60 ML/MIN
GFR SERPL CREATININE-BSD FRML MDRD: >60 ML/MIN
GFR SERPL CREATININE-BSD FRML MDRD: ABNORMAL ML/MIN/{1.73_M2}
GFR SERPL CREATININE-BSD FRML MDRD: ABNORMAL ML/MIN/{1.73_M2}
GFR SERPL CREATININE-BSD FRML MDRD: NORMAL ML/MIN/{1.73_M2}
GLUCOSE BLD-MCNC: 214 MG/DL (ref 70–99)
GLUCOSE BLD-MCNC: 226 MG/DL (ref 74–100)
HCO3 VENOUS: 27.4 MMOL/L (ref 22–29)
HCT VFR BLD CALC: 45.4 % (ref 40.7–50.3)
HEMOGLOBIN: 15.9 G/DL (ref 13–17)
IMMATURE GRANULOCYTES: 1 %
LYMPHOCYTES # BLD: 36 % (ref 24–43)
MAGNESIUM: 1.9 MG/DL (ref 1.6–2.6)
MCH RBC QN AUTO: 31.2 PG (ref 25.2–33.5)
MCHC RBC AUTO-ENTMCNC: 35 G/DL (ref 28.4–34.8)
MCV RBC AUTO: 89 FL (ref 82.6–102.9)
MODE: NORMAL
MONOCYTES # BLD: 7 % (ref 3–12)
NEGATIVE BASE EXCESS, VEN: NORMAL (ref 0–2)
NRBC AUTOMATED: 0 PER 100 WBC
O2 DEVICE/FLOW/%: NORMAL
O2 SAT, VEN: 62 % (ref 60–85)
PATIENT TEMP: NORMAL
PCO2, VEN: 44.1 MM HG (ref 41–51)
PDW BLD-RTO: 11.9 % (ref 11.8–14.4)
PH VENOUS: 7.4 (ref 7.32–7.43)
PLATELET # BLD: 212 K/UL (ref 138–453)
PLATELET ESTIMATE: ABNORMAL
PMV BLD AUTO: 10.4 FL (ref 8.1–13.5)
PO2, VEN: 32.6 MM HG (ref 30–50)
POC CHLORIDE: 103 MMOL/L (ref 98–107)
POC CREATININE: 0.74 MG/DL (ref 0.51–1.19)
POC HEMATOCRIT: 47 % (ref 41–53)
POC HEMOGLOBIN: 16.1 G/DL (ref 13.5–17.5)
POC IONIZED CALCIUM: 1.13 MMOL/L (ref 1.15–1.33)
POC LACTIC ACID: 1.71 MMOL/L (ref 0.56–1.39)
POC PCO2 TEMP: NORMAL MM HG
POC PH TEMP: NORMAL
POC PO2 TEMP: NORMAL MM HG
POC POTASSIUM: 4.2 MMOL/L (ref 3.5–4.5)
POC SODIUM: 140 MMOL/L (ref 138–146)
POSITIVE BASE EXCESS, VEN: 2 (ref 0–3)
POTASSIUM SERPL-SCNC: 4.4 MMOL/L (ref 3.7–5.3)
RBC # BLD: 5.1 M/UL (ref 4.21–5.77)
RBC # BLD: ABNORMAL 10*6/UL
SAMPLE SITE: NORMAL
SEG NEUTROPHILS: 52 % (ref 36–65)
SEGMENTED NEUTROPHILS ABSOLUTE COUNT: 3.5 K/UL (ref 1.5–8.1)
SODIUM BLD-SCNC: 137 MMOL/L (ref 135–144)
TOTAL CO2, VENOUS: 29 MMOL/L (ref 23–30)
TROPONIN INTERP: NORMAL
TROPONIN INTERP: NORMAL
TROPONIN T: NORMAL NG/ML
TROPONIN T: NORMAL NG/ML
TROPONIN, HIGH SENSITIVITY: 8 NG/L (ref 0–22)
TROPONIN, HIGH SENSITIVITY: 8 NG/L (ref 0–22)
TSH SERPL DL<=0.05 MIU/L-ACNC: 1.44 MIU/L (ref 0.3–5)
WBC # BLD: 6.6 K/UL (ref 3.5–11.3)
WBC # BLD: ABNORMAL 10*3/UL

## 2020-05-29 PROCEDURE — 84295 ASSAY OF SERUM SODIUM: CPT

## 2020-05-29 PROCEDURE — 84443 ASSAY THYROID STIM HORMONE: CPT

## 2020-05-29 PROCEDURE — 84484 ASSAY OF TROPONIN QUANT: CPT

## 2020-05-29 PROCEDURE — 6370000000 HC RX 637 (ALT 250 FOR IP): Performed by: STUDENT IN AN ORGANIZED HEALTH CARE EDUCATION/TRAINING PROGRAM

## 2020-05-29 PROCEDURE — 85014 HEMATOCRIT: CPT

## 2020-05-29 PROCEDURE — 82435 ASSAY OF BLOOD CHLORIDE: CPT

## 2020-05-29 PROCEDURE — 85025 COMPLETE CBC W/AUTO DIFF WBC: CPT

## 2020-05-29 PROCEDURE — 82565 ASSAY OF CREATININE: CPT

## 2020-05-29 PROCEDURE — 82947 ASSAY GLUCOSE BLOOD QUANT: CPT

## 2020-05-29 PROCEDURE — 80048 BASIC METABOLIC PNL TOTAL CA: CPT

## 2020-05-29 PROCEDURE — 83605 ASSAY OF LACTIC ACID: CPT

## 2020-05-29 PROCEDURE — 93005 ELECTROCARDIOGRAM TRACING: CPT | Performed by: STUDENT IN AN ORGANIZED HEALTH CARE EDUCATION/TRAINING PROGRAM

## 2020-05-29 PROCEDURE — 71045 X-RAY EXAM CHEST 1 VIEW: CPT

## 2020-05-29 PROCEDURE — 82803 BLOOD GASES ANY COMBINATION: CPT

## 2020-05-29 PROCEDURE — 83735 ASSAY OF MAGNESIUM: CPT

## 2020-05-29 PROCEDURE — 84132 ASSAY OF SERUM POTASSIUM: CPT

## 2020-05-29 PROCEDURE — 82330 ASSAY OF CALCIUM: CPT

## 2020-05-29 PROCEDURE — 99285 EMERGENCY DEPT VISIT HI MDM: CPT

## 2020-05-29 RX ORDER — HYDROXYZINE PAMOATE 25 MG/1
25-50 CAPSULE ORAL 3 TIMES DAILY PRN
Qty: 20 CAPSULE | Refills: 0 | Status: SHIPPED | OUTPATIENT
Start: 2020-05-29 | End: 2020-06-05

## 2020-05-29 RX ORDER — ACETAMINOPHEN 500 MG
1000 TABLET ORAL EVERY 6 HOURS PRN
Qty: 30 TABLET | Refills: 0 | Status: SHIPPED | OUTPATIENT
Start: 2020-05-29 | End: 2021-08-17 | Stop reason: ALTCHOICE

## 2020-05-29 RX ORDER — IBUPROFEN 600 MG/1
600 TABLET ORAL EVERY 6 HOURS PRN
Qty: 15 TABLET | Refills: 0 | Status: SHIPPED | OUTPATIENT
Start: 2020-05-29 | End: 2021-08-17 | Stop reason: ALTCHOICE

## 2020-05-29 RX ORDER — LORAZEPAM 0.5 MG/1
1 TABLET ORAL ONCE
Status: COMPLETED | OUTPATIENT
Start: 2020-05-29 | End: 2020-05-29

## 2020-05-29 RX ADMIN — LORAZEPAM 1 MG: 0.5 TABLET ORAL at 14:55

## 2020-05-29 ASSESSMENT — ENCOUNTER SYMPTOMS
SHORTNESS OF BREATH: 0
CHEST TIGHTNESS: 0
ABDOMINAL PAIN: 0
EYE DISCHARGE: 0
COUGH: 0
WHEEZING: 0
EYE REDNESS: 0

## 2020-05-29 NOTE — ED PROVIDER NOTES
Inability: Never true    Transportation needs     Medical: No     Non-medical: No   Tobacco Use    Smoking status: Never Smoker    Smokeless tobacco: Never Used   Substance and Sexual Activity    Alcohol use: Yes     Comment: 2 times a week    Drug use: Yes     Types: Marijuana     Comment: marajunia 2 times aweek    Sexual activity: Yes   Lifestyle    Physical activity     Days per week: Not on file     Minutes per session: Not on file    Stress: Not on file   Relationships    Social connections     Talks on phone: Not on file     Gets together: Not on file     Attends [employer] service: Not on file     Active member of club or organization: Not on file     Attends meetings of clubs or organizations: Not on file     Relationship status: Not on file    Intimate partner violence     Fear of current or ex partner: Not on file     Emotionally abused: Not on file     Physically abused: Not on file     Forced sexual activity: Not on file   Other Topics Concern    Not on file   Social History Narrative    Not on file       Family History   Problem Relation Age of Onset    Diabetes Father        Allergies:  Patient has no known allergies. Home Medications:  Prior to Admission medications    Medication Sig Start Date End Date Taking? Authorizing Provider   hydrOXYzine (VISTARIL) 25 MG capsule Take 1-2 capsules by mouth 3 times daily as needed for Anxiety 5/29/20 6/5/20 Yes Alirio Degroot, DO   acetaminophen (APAP EXTRA STRENGTH) 500 MG tablet Take 2 tablets by mouth every 6 hours as needed for Pain 5/29/20  Yes Alirio Degroot, DO   ibuprofen (ADVIL;MOTRIN) 600 MG tablet Take 1 tablet by mouth every 6 hours as needed for Pain 5/29/20  Yes Alirio Degroot, DO   Alcohol Swabs PADS Please dispense according to patients insurance/device.  Testing once a day 4/9/20   Melo Aleman MD   insulin glargine [facility]) 100 UNIT/ML injection pen Inject 35 Units into the skin every morning (before Yung Benjamin MD       REVIEW OF SYSTEMS    (2-9 systems for level 4, 10 or more for level 5)      Review of Systems   Constitutional: Negative for chills and fever. Eyes: Negative for discharge and redness. Respiratory: Negative for cough, chest tightness, shortness of breath and wheezing. Cardiovascular: Positive for chest pain. Gastrointestinal: Negative for abdominal pain. Genitourinary: Negative for dysuria and flank pain. Musculoskeletal: Negative for myalgias. Skin: Negative for rash. Allergic/Immunologic: Negative for environmental allergies. Neurological: Positive for numbness. Psychiatric/Behavioral: Negative for agitation and confusion. PHYSICAL EXAM   (up to 7 for level 4, 8 or more for level 5)     INITIAL VITALS:    height is 6' 2\" (1.88 m) and weight is 203 lb (92.1 kg). His temperature is 98.8 °F (37.1 °C). His blood pressure is 146/91 (abnormal) and his pulse is 65. His respiration is 22 and oxygen saturation is 97%. Physical Exam  Vitals signs and nursing note reviewed. Constitutional:       Appearance: He is well-developed. HENT:      Head: Normocephalic and atraumatic. Nose: Nose normal.      Mouth/Throat:      Mouth: Mucous membranes are moist.   Eyes:      General: No scleral icterus. Conjunctiva/sclera: Conjunctivae normal.      Pupils: Pupils are equal, round, and reactive to light. Neck:      Musculoskeletal: Neck supple. Trachea: No tracheal deviation. Cardiovascular:      Rate and Rhythm: Normal rate and regular rhythm. Heart sounds: Normal heart sounds. No murmur. No friction rub. No gallop. Pulmonary:      Effort: Pulmonary effort is normal. No respiratory distress. Breath sounds: Normal breath sounds. No wheezing or rales. Comments: Tachypneic, good air movement bilaterally, normal breath sounds  Abdominal:      General: Bowel sounds are normal. There is no distension. Palpations: Abdomen is soft. There is no mass. appointment as soon as possible for a visit in 1 week  Follow up within 1 week, Return to ED sooner if symptoms worsen,       DISCHARGE MEDICATIONS:  Discharge Medication List as of 5/29/2020  4:01 PM      START taking these medications    Details   hydrOXYzine (VISTARIL) 25 MG capsule Take 1-2 capsules by mouth 3 times daily as needed for Anxiety, Disp-20 capsule, R-0Print      acetaminophen (APAP EXTRA STRENGTH) 500 MG tablet Take 2 tablets by mouth every 6 hours as needed for Pain, Disp-30 tablet, R-0Print      ibuprofen (ADVIL;MOTRIN) 600 MG tablet Take 1 tablet by mouth every 6 hours as needed for Pain, Disp-15 tablet, R-0Print             Ranjana Alvarez DO  EmergencyMedicine Resident    (Please note that portions of this note were completed with a voice recognition program.  Efforts were made to edit the dictations but occasionally words are mis-transcribed.)       Ranjana Alvarez DO  Resident  05/29/20 [id number]

## 2020-05-29 NOTE — DISCHARGE INSTR - COC
ASHLYN IV ACCESS:[id number]}    Nursing Mobility/ADLs:  Walking   {CHP DME RIRE:200588492}  Transfer  {CHP DME LGIC:021563491}  Bathing  {CHP DME WCDB:137544784}  Dressing  {CHP DME MGOH:968675311}  Toileting  {CHP DME XPJ}  Feeding  {CHP DME GYWD:018778458}  Med Admin  {P DME MFVA:087365388}  Med Delivery   { [facility] MED Delivery:886696700}    Wound Care Documentation and Therapy:        Elimination:  Continence:   · Bowel: {YES / GP:84114}  · Bladder: {YES / TY:27256}  Urinary Catheter: {Urinary Catheter:770441661}   Colostomy/Ileostomy/Ileal Conduit: {YES / AO:15280}       Date of Last BM: ***  No intake or output data in the 24 hours ending 20 1612  No intake/output data recorded.     Safety Concerns:     [employer] Safety Concerns:258444239}    Impairments/Disabilities:      [employer] Impairments/Disabilities:469121568}    Nutrition Therapy:  Current Nutrition Therapy:   [employer] Diet List:261968714}    Routes of Feeding: {[facility] DME Other Feedings:845978959}  Liquids: {Slp liquid thickness:97089}  Daily Fluid Restriction: {CHP DME Yes amt example:031841265}  Last Modified Barium Swallow with Video (Video Swallowing Test): {Done Not Done KUF}    Treatments at the Time of Hospital Discharge:   Respiratory Treatments: ***  Oxygen Therapy:  {Therapy; copd oxygen:09636}  Ventilator:    {[facility] Vent KBVX:033356909}    Rehab Therapies: {THERAPEUTIC INTERVENTION:4985504983}  Weight Bearing Status/Restrictions: [employer]}  Other Medical Equipment (for information only, NOT a DME order):  {EQUIPMENT:719200829}  Other Treatments: ***    Patient's personal belongings (please select all that are sent with patient):  {[facility] DME Belongings:002705531}    RN SIGNATURE:  {Esignature:791203512}    CASE MANAGEMENT/SOCIAL WORK SECTION    Inpatient Status Date: ***    Readmission Risk Assessment Score:  Readmission Risk              Risk of Unplanned Readmission:        0           Discharging to

## 2020-05-29 NOTE — ED NOTES
Pt reports to the ED via triage with c/o Chest pain and tingling. Pt states for about x2 days he has been having mid sternal Cp rated 6/10 that feels like a pressure. Pt states he was just sitting there when it started and nothing makes it better. Pt also states he feels tingling throughout his body, pt does have a hx of diabetes and was just put on insulin about x2 months ago, pt does not have any cardiac hx and was seen here for the same thing and they did not find anything. Pt Is A&Ox4, RR even and non labored.       Matilde Andersen, RN  05/29/20 [id number]

## 2020-05-30 LAB
EKG ATRIAL RATE: 70 BPM
EKG P AXIS: 26 DEGREES
EKG P-R INTERVAL: 138 MS
EKG Q-T INTERVAL: 378 MS
EKG QRS DURATION: 102 MS
EKG QTC CALCULATION (BAZETT): 408 MS
EKG R AXIS: 4 DEGREES
EKG T AXIS: 21 DEGREES
EKG VENTRICULAR RATE: 70 BPM

## 2020-05-30 PROCEDURE — 93010 ELECTROCARDIOGRAM REPORT: CPT | Performed by: INTERNAL MEDICINE

## 2020-06-01 ENCOUNTER — TELEPHONE (OUTPATIENT)
Dept: FAMILY MEDICINE CLINIC | Age: 50
End: 2020-06-01

## 2020-09-03 ENCOUNTER — CARE COORDINATION (OUTPATIENT)
Dept: CARE COORDINATION | Age: 50
End: 2020-09-03

## 2020-09-03 NOTE — CARE COORDINATION
EXTRA STRENGTH) 500 MG tablet Take 2 tablets by mouth every 6 hours as needed for Pain 5/29/20   Congress Iha, DO   ibuprofen (ADVIL;MOTRIN) 600 MG tablet Take 1 tablet by mouth every 6 hours as needed for Pain 5/29/20   Bony Iha, DO   Alcohol Swabs PADS Please dispense according to patients insurance/device. Testing once a day 4/9/20   Roddie Gottron, MD   insulin glargine [facility]) 100 UNIT/ML injection pen Inject 35 Units into the skin every morning (before breakfast)  Patient not taking: Reported on 9/3/2020 4/9/20   Roddie Gottron, MD   SITagliptin (JANUVIA) 50 MG tablet Take 1 tablet by mouth daily  Patient not taking: Reported on 9/3/2020 3/23/20   Roddie Gottron, MD   insulin glargine (LANTUS;BASAGLAR) 100 UNIT/ML injection pen Inject 35 Units into the skin every morning (before breakfast) 3/23/20   Roddie Gottron, MD   sertraline (ZOLOFT) 25 MG tablet Take 1 tab daily and increase it to 50mg daily 3/23/20   Roddie Gottron, MD   blood glucose monitor kit and supplies Test one time a day & as needed for symptoms of irregular blood glucose. 3/23/20   Roddie Gottron, MD   blood glucose monitor strips Testing once a day. Please dispense according to patients insurance. 3/23/20   Roddie Gottron, MD   Lancets MISC 1 each by Does not apply route 2 times daily 3/23/20   Roddie Gottron, MD   nitroGLYCERIN (NITROSTAT) 0.4 MG SL tablet up to max of 3 total doses.  If no relief after 1 dose, call 911. 3/16/20   Abdirahman Pérez, DO   Insulin Pen Needle 29G X 12.7MM MISC 1 each by Does not apply route daily To use with insulin 2/27/20   Roddie Gottron, MD   atorvastatin (LIPITOR) 20 MG tablet TAKE 1 TABLET BY MOUTH ONE TIME A DAY 2/27/20   Roddie Gottron, MD   aspirin (SM ASPIRIN ADULT LOW STRENGTH) 81 MG EC tablet TAKE 1 TABLET BY MOUTH ONE TIME A DAY 2/13/20   Roddie Gottron, MD   zoster recombinant adjuvanted vaccine [facility]) 50 MCG/0.5ML SUSR injection Inject 0.5 mLs into the muscle See Admin Instructions 1 dose now and repeat in 2-6 months  Patient not taking: Reported on 3/23/2020 12/11/19   Nelida Bansal MD   Lancets MISC Testing once a day. Please dispense according to patients insurance. 12/11/19   Nelida Bansal MD   blood glucose monitor strips Testing once a day. Please dispense according to patients insurance. 12/11/19   Nelida Bansal MD   blood glucose monitor kit and supplies Test one time a day & as needed for symptoms of irregular blood glucose.  12/11/19   Nelida Bansal MD   Blood Pressure Monitor MISC Use daily to check BP 3/13/18   Nelida Bansal MD       Future Appointments   Date Time Provider Zhao Melendez   9/10/2020 10:30 AM Nelida Bansal MD [facility]

## 2020-09-08 ENCOUNTER — CARE COORDINATION (OUTPATIENT)
Dept: CARE COORDINATION | Age: 50
End: 2020-09-08

## 2020-09-10 ENCOUNTER — OFFICE VISIT (OUTPATIENT)
Dept: FAMILY MEDICINE CLINIC | Age: 50
End: 2020-09-10
Payer: COMMERCIAL

## 2020-09-10 VITALS
DIASTOLIC BLOOD PRESSURE: 88 MMHG | TEMPERATURE: 96.8 F | WEIGHT: 210.2 LBS | HEART RATE: 71 BPM | BODY MASS INDEX: 26.98 KG/M2 | HEIGHT: 74 IN | SYSTOLIC BLOOD PRESSURE: 132 MMHG | OXYGEN SATURATION: 98 %

## 2020-09-10 PROBLEM — R07.9 CHEST PAIN: Status: RESOLVED | Noted: 2020-03-16 | Resolved: 2020-09-10

## 2020-09-10 LAB — HBA1C MFR BLD: 8.9 %

## 2020-09-10 PROCEDURE — 1036F TOBACCO NON-USER: CPT | Performed by: FAMILY MEDICINE

## 2020-09-10 PROCEDURE — 3017F COLORECTAL CA SCREEN DOC REV: CPT | Performed by: FAMILY MEDICINE

## 2020-09-10 PROCEDURE — 99214 OFFICE O/P EST MOD 30 MIN: CPT | Performed by: FAMILY MEDICINE

## 2020-09-10 PROCEDURE — 3052F HG A1C>EQUAL 8.0%<EQUAL 9.0%: CPT | Performed by: FAMILY MEDICINE

## 2020-09-10 PROCEDURE — 2022F DILAT RTA XM EVC RTNOPTHY: CPT | Performed by: FAMILY MEDICINE

## 2020-09-10 PROCEDURE — 83036 HEMOGLOBIN GLYCOSYLATED A1C: CPT | Performed by: FAMILY MEDICINE

## 2020-09-10 PROCEDURE — G8427 DOCREV CUR MEDS BY ELIG CLIN: HCPCS | Performed by: FAMILY MEDICINE

## 2020-09-10 PROCEDURE — G8419 CALC BMI OUT NRM PARAM NOF/U: HCPCS | Performed by: FAMILY MEDICINE

## 2020-09-10 ASSESSMENT — ENCOUNTER SYMPTOMS
VOMITING: 0
SORE THROAT: 0
SINUS PRESSURE: 0
CHEST TIGHTNESS: 0
RHINORRHEA: 0
ABDOMINAL DISTENTION: 0
COUGH: 0
BACK PAIN: 0
DIARRHEA: 0
CONSTIPATION: 0
WHEEZING: 0
SHORTNESS OF BREATH: 0
BLOOD IN STOOL: 0

## 2020-09-10 NOTE — PROGRESS NOTES
Chief Complaint   Patient presents with    Depression    Anxiety    Diabetes    Hypertension    Hyperlipidemia         Antonina Santacruz  here today for follow up on chronic medical problems, go over labs and/or diagnostic studies, and medication refills. Depression; Anxiety; Diabetes; Hypertension; and Hyperlipidemia      HPI: Patient is here for follow-up on diabetes A1c has increased to 8.9 from 8.6. Patient is not taking all his medications reports he gets shaky. He was supposed to be on Lantus 3 5 units, Januvia and metformin. Patient takes only 10 units of insulin occasionally and has stopped taking Januvia. He takes only metformin. Blood sugars are running more than 200 on average. Patient used to take all his medications in the morning. Hypertension controlled, patient has not done all his blood work. Hyperlipidemia on statins and aspirin. No recent lipid panel. Anxiety and depression on Zoloft stable. Patient has history of erectile dysfunction likely due to diabetes, reports he does not get much ejaculation. /88   Pulse 71   Temp 96.8 °F (36 °C) (Temporal)   Ht 6' 2\" (1.88 m)   Wt 210 lb 3.2 oz (95.3 kg)   SpO2 98%   BMI 26.99 kg/m²    Body mass index is 26.99 kg/m². Wt Readings from Last 3 Encounters:   09/10/20 210 lb 3.2 oz (95.3 kg)   05/29/20 203 lb (92.1 kg)   03/23/20 203 lb 3.2 oz (92.2 kg)        []Negative depression screening. PHQ Scores 3/23/2020 2/27/2020 12/11/2019 11/13/2013   PHQ2 Score 6 0 2 4   PHQ9 Score 13 0 2 15      []1-4 = Minimal depression   []5-9 = Milddepression   [x]10-14 = Moderate depression   []15-19 = Moderately severe depression   []20-27 = Severe depression    Discussed testing with the patient and all questions fully answered.     Admission on 05/29/2020, Discharged on 05/29/2020   Component Date Value Ref Range Status    Glucose 05/29/2020 214* 70 - 99 mg/dL Final    BUN 05/29/2020 15  6 - 20 mg/dL Final    CREATININE 05/29/2020 0.60* 0.70 - 1.20 mg/dL Final    Bun/Cre Ratio 05/29/2020 NOT REPORTED  9 - 20 Final    Calcium 05/29/2020 9.3  8.6 - 10.4 mg/dL Final    Sodium 05/29/2020 137  135 - 144 mmol/L Final    Potassium 05/29/2020 4.4  3.7 - 5.3 mmol/L Final    Chloride 05/29/2020 98  98 - 107 mmol/L Final    CO2 05/29/2020 25  20 - 31 mmol/L Final    Anion Gap 05/29/2020 14  9 - 17 mmol/L Final    GFR Non- 05/29/2020 >60  >60 mL/min Final    GFR  05/29/2020 >60  >60 mL/min Final    GFR Comment 05/29/2020        Final    Comment: Average GFR for 52-63 years old:   80 mL/min/1.73sq m  Chronic Kidney Disease:   <60 mL/min/1.73sq m  Kidney failure:   <15 mL/min/1.73sq m              eGFR calculated using average adult body mass.  Additional eGFR calculator available at:        [email or web address]            GFR Staging 05/29/2020 NOT REPORTED   Final    WBC 05/29/2020 6.6  3.5 - 11.3 k/uL Final    RBC 05/29/2020 5.10  4.21 - 5.77 m/uL Final    Hemoglobin 05/29/2020 15.9  13.0 - 17.0 g/dL Final    Hematocrit 05/29/2020 45.4  40.7 - 50.3 % Final    MCV 05/29/2020 89.0  82.6 - 102.9 fL Final    MCH 05/29/2020 31.2  25.2 - 33.5 pg Final    MCHC 05/29/2020 35.0* 28.4 - 34.8 g/dL Final    RDW 05/29/2020 11.9  11.8 - 14.4 % Final    Platelets 91/78/8650 212  138 - 453 k/uL Final    MPV 05/29/2020 10.4  8.1 - 13.5 fL Final    NRBC Automated 05/29/2020 0.0  0.0 per 100 WBC Final    Differential Type 05/29/2020 NOT REPORTED   Final    Seg Neutrophils 05/29/2020 52  36 - 65 % Final    Lymphocytes 05/29/2020 36  24 - 43 % Final    Monocytes 05/29/2020 7  3 - 12 % Final    Eosinophils % 05/29/2020 3  1 - 4 % Final    Basophils 05/29/2020 1  0 - 2 % Final    Immature Granulocytes 05/29/2020 1* 0 % Final    Segs Absolute 05/29/2020 3.50  1.50 - 8.10 k/uL Final    Absolute Lymph # 05/29/2020 2.33  1.10 - 3.70 k/uL Final    Absolute Mono # 05/29/2020 0.45 0.10 - 1.20 k/uL Final    Absolute Eos # 05/29/2020 0.20  0.00 - 0.44 k/uL Final    Basophils Absolute 05/29/2020 0.05  0.00 - 0.20 k/uL Final    Absolute Immature Granulocyte 05/29/2020 0.04  0.00 - 0.30 k/uL Final    WBC Morphology 05/29/2020 NOT REPORTED   Final    RBC Morphology 05/29/2020 NOT REPORTED   Final    Platelet Estimate 01/81/6299 NOT REPORTED   Final    Ventricular Rate 05/29/2020 70  BPM Final    Atrial Rate 05/29/2020 70  BPM Final    P-R Interval 05/29/2020 138  ms Final    QRS Duration 05/29/2020 102  ms Final    Q-T Interval 05/29/2020 378  ms Final    QTc Calculation (Bazett) 05/29/2020 408  ms Final    P Axis 05/29/2020 26  degrees Final    R Axis 05/29/2020 4  degrees Final    T Axis 05/29/2020 21  degrees Final    Magnesium 05/29/2020 1.9  1.6 - 2.6 mg/dL Final    Troponin, High Sensitivity 05/29/2020 8  0 - 22 ng/L Final    Comment:       High Sensitivity Troponin values cannot be compared with other Troponin methodologies. Patients with high levels of Biotin oral intake (i.e >5mg/day) may have falsely decreased   Troponin levels. Samples collected within 8 hours of biotin intake may require additional   information for diagnosis.  Troponin T 05/29/2020 NOT REPORTED  <0.03 ng/mL Final    Troponin Interp 05/29/2020 NOT REPORTED   Final    Troponin, High Sensitivity 05/29/2020 8  0 - 22 ng/L Final    Comment:       High Sensitivity Troponin values cannot be compared with other Troponin methodologies. Patients with high levels of Biotin oral intake (i.e >5mg/day) may have falsely decreased   Troponin levels. Samples collected within 8 hours of biotin intake may require additional   information for diagnosis.       Troponin T 05/29/2020 NOT REPORTED  <0.03 ng/mL Final    Troponin Interp 05/29/2020 NOT REPORTED   Final    TSH 05/29/2020 1.44  0.30 - 5.00 mIU/L Final    pH, Andrea 05/29/2020 7.401  7.320 - 7.430 Final    pCO2, Andrea 05/29/2020 44.1  41.0 - 51.0 mm Hg Final    pO2, Andrea 05/29/2020 32.6  30.0 - 50.0 mm Hg Final    HCO3, Venous 05/29/2020 27.4  22.0 - 29.0 mmol/L Final    Total CO2, Venous 05/29/2020 29  23.0 - 30.0 mmol/L Final    Negative Base Excess, Andrea 05/29/2020 NOT REPORTED  0.0 - 2.0 Final    Positive Base Excess, Andrea 05/29/2020 2  0.0 - 3.0 Final    O2 Sat, Andrea 05/29/2020 62  60.0 - 85.0 % Final    O2 Device/Flow/% 05/29/2020 NOT REPORTED   Final    Mitch Test 05/29/2020 NOT REPORTED   Final    Sample Site 05/29/2020 NOT REPORTED   Final    Mode 05/29/2020 NOT REPORTED   Final    FIO2 05/29/2020 NOT REPORTED   Final    Pt Temp 05/29/2020 NOT REPORTED   Final    POC pH Temp 05/29/2020 NOT REPORTED   Final    POC pCO2 Temp 05/29/2020 NOT REPORTED  mm Hg Final    POC pO2 Temp 05/29/2020 NOT REPORTED  mm Hg Final    POC Hemoglobin 05/29/2020 16.1  13.5 - 17.5 g/dL Final    POC Hematocrit 05/29/2020 47  41 - 53 % Final    POC Creatinine 05/29/2020 0.74  0.51 - 1.19 mg/dL Final    GFR Comment 05/29/2020 >60  >60 mL/min Final    GFR Non- 05/29/2020 >60  >60 mL/min Final    GFR Comment 05/29/2020        Final    Comment: Average GFR for 52-63 years old:   80 mL/min/1.73sq m  Chronic Kidney Disease:   <60 mL/min/1.73sq m  Kidney failure:   <15 mL/min/1.73sq m              eGFR calculated using average adult body mass.  Additional eGFR calculator available at:        [email or web address]            POC Sodium 05/29/2020 140  138 - 146 mmol/L Final    POC Potassium 05/29/2020 4.2  3.5 - 4.5 mmol/L Final    POC Chloride 05/29/2020 103  98 - 107 mmol/L Final    POC Ionized Calcium 05/29/2020 1.13* 1.15 - 1.33 mmol/L Final    POC Lactic Acid 05/29/2020 1.71* 0.56 - 1.39 mmol/L Final    POC Glucose 05/29/2020 226* 74 - 100 mg/dL Final    Anion Gap 05/29/2020 10  7 - 16 mmol/L Final         Most recent labs reviewed:     Lab Results   Component Value Date    WBC 6.6 05/29/2020    HGB 15.9 05/29/2020    HCT 45.4 05/29/2020    MCV 89.0 05/29/2020     05/29/2020       @BRIEFLAB(NA,K,CL,CO2,BUN,CREATININE,GLUCOSE,CALCIUM)@     Lab Results   Component Value Date    ALT 32 02/18/2020    AST 23 02/18/2020    ALKPHOS 102 02/18/2020    BILITOT 1.08 02/18/2020       Lab Results   Component Value Date    TSH 1.44 05/29/2020       Lab Results   Component Value Date    CHOL 125 11/16/2015    CHOL 212 (H) 09/24/2013    CHOL 165 02/03/2013     Lab Results   Component Value Date    TRIG 87 11/16/2015    TRIG 63 09/24/2013    TRIG 70 02/03/2013     Lab Results   Component Value Date    HDL 32 (L) 11/16/2015    HDL 54 09/24/2013    HDL 44 02/03/2013     Lab Results   Component Value Date    LDLCHOLESTEROL 76 11/16/2015    LDLCHOLESTEROL 145 (H) 09/24/2013    LDLCHOLESTEROL 107 (H) 02/03/2013     Lab Results   Component Value Date    VLDL NOT REPORTED 11/16/2015    VLDL NOT REPORTED 09/24/2013    VLDL NOT REPORTED 02/03/2013     Lab Results   Component Value Date    CHOLHDLRATIO 3.9 11/16/2015    CHOLHDLRATIO 3.9 09/24/2013    CHOLHDLRATIO 3.8 02/03/2013       Lab Results   Component Value Date    LABA1C 8.9 09/10/2020       No results found for: [id number]    No results found for: FOLATE    No results found for: IRON, TIBC, FERRITIN    No results found for: VITD25          Current Outpatient Medications   Medication Sig Dispense Refill    SITagliptin (JANUVIA) 50 MG tablet Take 1 tablet by mouth daily 120 tablet 1    metFORMIN (GLUCOPHAGE) 850 MG tablet Take 1 tablet by mouth 2 times daily (with meals) Take 850 mg by mouth 2 times daily (with meals) 60 tablet 2    acetaminophen (APAP EXTRA STRENGTH) 500 MG tablet Take 2 tablets by mouth every 6 hours as needed for Pain 30 tablet 0    ibuprofen (ADVIL;MOTRIN) 600 MG tablet Take 1 tablet by mouth every 6 hours as needed for Pain 15 tablet 0    Alcohol Swabs PADS Please dispense according to patients insurance/device.  Testing once a day 100 each 3    insulin glargine (BASAGLAR KWIKPEN) 100 UNIT/ML injection pen Inject 35 Units into the skin every morning (before breakfast) 20 pen 3    sertraline (ZOLOFT) 25 MG tablet Take 1 tab daily and increase it to 50mg daily 90 tablet 3    blood glucose monitor kit and supplies Test one time a day & as needed for symptoms of irregular blood glucose. 1 kit 0    nitroGLYCERIN (NITROSTAT) 0.4 MG SL tablet up to max of 3 total doses. If no relief after 1 dose, call 911. 25 tablet 0    Insulin Pen Needle 29G X 12.7MM MISC 1 each by Does not apply route daily To use with insulin 120 each 1    atorvastatin (LIPITOR) 20 MG tablet TAKE 1 TABLET BY MOUTH ONE TIME A DAY 90 tablet 1    aspirin (SM ASPIRIN ADULT LOW STRENGTH) 81 MG EC tablet TAKE 1 TABLET BY MOUTH ONE TIME A DAY 90 tablet 2    Lancets MISC Testing once a day. Please dispense according to patients insurance. 100 each 3    blood glucose monitor strips Testing once a day. Please dispense according to patients insurance. 100 strip 3    Blood Pressure Monitor MISC Use daily to check BP 1 each 0    zoster recombinant adjuvanted vaccine [facility]) 50 MCG/0.5ML SUSR injection Inject 0.5 mLs into the muscle See Admin Instructions 1 dose now and repeat in 2-6 months (Patient not taking: Reported on 3/23/2020) 0.5 mL 0     No current facility-administered medications for this visit.               Social History     Socioeconomic History    Marital status:      Spouse name: Not on file    Number of children: Not on file    Years of education: Not on file    Highest education level: Not on file   Occupational History    Not on file   Social Needs    Financial resource strain: Somewhat hard    Food insecurity     Worry: Sometimes true     Inability: Never true   [employer] needs     Medical: No     Non-medical: No   Tobacco Use    Smoking status: Never Smoker    Smokeless tobacco: Never Used   Substance and Sexual Activity    Alcohol use: Yes     Comment: 2 state.   Neurological: Positive for numbness. Negative for dizziness, speech difficulty, weakness and headaches. Psychiatric/Behavioral: Positive for dysphoric mood. Negative for agitation, behavioral problems, confusion, decreased concentration, sleep disturbance and suicidal ideas. The patient is nervous/anxious. Physical Exam  Vitals signs and nursing note reviewed. Cardiovascular:      Pulses:           Dorsalis pedis pulses are 3+ on the right side and 3+ on the left side. Musculoskeletal:      Right foot: Normal range of motion. No deformity. Left foot: Normal range of motion. No deformity. Feet:      Right foot:      Protective Sensation: 5 sites tested. 5 sites sensed. Skin integrity: No ulcer, blister, skin breakdown, erythema or callus. Toenail Condition: Right toenails are normal.      Left foot:      Protective Sensation: 5 sites tested. 5 sites sensed. Skin integrity: No ulcer, blister, skin breakdown, erythema or callus. Toenail Condition: Left toenails are normal.         PHYSICAL EXAM:   VITALS:   Vitals:    09/10/20 1041   BP: 132/88   Pulse: 71   Temp: 96.8 °F (36 °C)   SpO2: 98%     GENERAL:  Patient is a well-developed, well-nourished male  in no acute distress, alert and oriented x3, appropriate and pleasant conversation. HEAD: Normocephalic, atraumatic. EYES: Pupils equal, round and reactive to light and accommodation, extraocular   movements intact. ENT: Moist mucous membranes. No erythema is noted. NECK: Supple. No masses. No lymphadenopathy. CARDIOVASCULAR: Regular rate and rhythm. PULMONARY: Lungs are clear to auscultation bilaterally. ABDOMEN: Soft, nontender, nondistended. Positive bowel. sounds. ASSESSMENT AND PLAN      1.  Type 2 diabetes mellitus with other circulatory complication, unspecified whether long term insulin use (HCC)  Worsening of A1c, increased to 8.9 discussed with patient you need to be compliant with medications, separate your medications, start with 10 units of insulin daily and take Januvia in the evening. Continue metformin twice daily keep checking her blood sugars, make log, follow-up in 6 weeks. -  DIABETES FOOT EXAM  - Microalbumin, Ur; Future  - POCT glycosylated hemoglobin (Hb A1C)  - SITagliptin (JANUVIA) 50 MG tablet; Take 1 tablet by mouth daily  Dispense: 120 tablet; Refill: 1  - metFORMIN (GLUCOPHAGE) 850 MG tablet; Take 1 tablet by mouth 2 times daily (with meals) Take 850 mg by mouth 2 times daily (with meals)  Dispense: 60 tablet; Refill: 2    2. Essential hypertension  Controlled continue same medications labs reprinted. 3. Mixed hyperlipidemia  -Labs reprinted and encouraged to do that. 4. Erectile dysfunction associated with type 2 diabetes mellitus ([facility] [address]  Check testosterone levels  - Testosterone; Future    5. Anxiety and depression  Continue Zoloft        Orders Placed This Encounter   Procedures    Microalbumin, Ur     Standing Status:   Future     Standing Expiration Date:   9/10/2021    Testosterone     Standing Status:   Future     Standing Expiration Date:   9/10/2021    POCT glycosylated hemoglobin (Hb A1C)     DIABETES FOOT EXAM         Medications Discontinued During This Encounter   Medication Reason    Lancets MISC DUPLICATE    blood glucose monitor strips     blood glucose monitor kit and supplies DUPLICATE    insulin glargine (LANTUS;BASAGLAR) 100 UNIT/ML injection pen DUPLICATE    SITagliptin (JANUVIA) 50 MG tablet REORDER    metFORMIN (GLUCOPHAGE) 850 MG tablet REORDER       Pierre received counseling on the following healthy behaviors: nutrition, exercise and medication adherence  Reviewed prior labs and health maintenance  Continue current medications, diet and exercise. Discussed use, benefit, and side effects of prescribed medications. Barriers to medication compliance addressed.    Patient given educational materials - see patient instructions  Was a mistake has been identified and corrected by editing.   Electronically signed by Avel Card MD on 9/10/2020  11:21 AM

## 2020-09-10 NOTE — PROGRESS NOTES
Visit Information    Have you changed or started any medications since your last visit including any over-the-counter medicines, vitamins, or herbal medicines? no   Are you having any side effects from any of your medications? -  no  Have you stopped taking any of your medications? Is so, why? -  no    Have you seen any other physician or provider since your last visit? No  Have you had any other diagnostic tests since your last visit? No  Have you been seen in the emergency room and/or had an admission to a hospital since we last saw you? No  Have you had your routine dental cleaning in the past 6 months? no    Have you activated your [employer] account? If not, what are your barriers?  Yes     Patient Care Team:  Zenaida Soto MD as PCP - General (Family Medicine)  Zenaida Soto MD as PCP - [facility] Provider  Prem Ortiz RN as Ambulatory Care Manager    Medical History Review  Past Medical, Family, and Social History reviewed and does contribute to the patient presenting condition    Health Maintenance   Topic Date Due    Diabetic foot exam  04/27/1979    Diabetic retinal exam  04/27/1979    HIV screen  04/27/1984    Diabetic microalbuminuria test  04/27/1987    Hepatitis B vaccine (1 of 3 - Risk 3-dose series) 04/27/1988    Lipid screen  11/16/2016    Shingles Vaccine (1 of 2) 04/27/2019    Colon cancer screen colonoscopy  04/27/2019    Flu vaccine (1) 09/01/2020    DTaP/Tdap/Td vaccine (2 - Td) 03/23/2021 (Originally 3/13/2020)    A1C test (Diabetic or Prediabetic)  03/23/2021    Potassium monitoring  05/29/2021    Creatinine monitoring  05/29/2021    Pneumococcal 0-64 years Vaccine  Completed    Hepatitis A vaccine  Aged Out    Hib vaccine  Aged Out    Meningococcal (ACWY) vaccine  Aged Out

## 2020-09-17 ENCOUNTER — CARE COORDINATION (OUTPATIENT)
Dept: CARE COORDINATION | Age: 50
End: 2020-09-17

## 2020-09-17 NOTE — CARE COORDINATION
I agree with the Care Coordinator's Plan of Care. Yes pt did discuss, he is not taking all medications, I advised to start with 10 U and increase after 3 day by 2 U , until you get BS readings below 150. Inform him, if BS are still high , you can increase insulin slowly until you reach below 150. Otherwise A1c will not change .

## 2020-09-17 NOTE — CARE COORDINATION
Ambulatory Care Coordination Note  9/17/2020  CM Risk Score: 9  Charlson 10 Year Mortality Risk Score: 10%     ACC: Roxana Shi RN    Summary:     Date Care Coordination Episode Started: 16 January 2020    Reason for Call Today:   1.) Follow up on office appointment  2.) Diabetes and HTN    Reason patient is in Care Coordination:     Diabetes    Topics Discussed Today:     1.) Medications reconciled   ·   Patient only takes 10 units of Basaglar in AM.  35 units ordered  ·   Takes metformin and Januvia as ordered  2.) Glucose levels  ·   Glucose this AM - 202 mg/dl  ·   Has only been checking glucose in AM this week  ·   Most recent PM reading 260 mg/dl (~2 hours after supper)  3.)  Dietary needs/issues  4.)  BP WNL at office visit. Not checking at home. Interventions completed today:    · Offered referral to dietician for education. Patient declined    Assessments completed today:     · Diabetes  · Med reconciliation    Care Coordinator plan of care:    1.)  Discuss insulin dosage with PCP   2.)  Follow up with patient in one week   3.)  Patient will resume checking and recording glucose levels twice daily   4.)  Will follow up after 4 PM.  Patient is always rushed when called during lunch break    Diabetes Assessment    Medic Alert ID:  No  Meal Planning:  None   Do you have barriers with adherence to non-pharmacologic self-management interventions?  (Nutrition/Exercise/Self-Monitoring):  No   Have you ever had to go to the ED for symptoms of low blood sugar?:  No       Do you have hyperglycemia symptoms?:  No   Do you have hypoglycemia symptoms?:  No   Last Blood Sugar Value:  202   Blood Sugar Monitoring Regimen:  Morning Fasting, Once a Day   Blood Sugar Trends:  Fluctuating        Lab Results   Component Value Date    LABA1C 8.9 09/10/2020    LABA1C 8.6 03/23/2020    LABA1C 9.5 12/11/2019     Lab Results   Component Value Date     01/16/2019     11/15/2015     02/03/2013       Care Coordination Interventions    Program Enrollment:  Complex Care  Referral from Primary Care Provider:  No  Suggested Interventions and Community Resources  Diabetes Education: In Process (Comment: [facility]  Zone Management Tools:  Completed (Comment: Diabetes)         Goals Addressed                 This Visit's Progress     Conditions and Symptoms   On track     I will schedule office visits, as directed by my provider. I will keep my appointment or reschedule if I have to cancel. I will notify my provider of any barriers to my plan of care. I will follow my Zone Management tool to seek urgent or emergent care. I will notify my provider of any symptoms that indicate a worsening of my condition. Barriers: lack of motivation and medication side effects  Plan for overcoming my barriers: Care coordination  Confidence: 7/10  Anticipated Goal Completion Date: 01 November 2020              Prior to Admission medications    Medication Sig Start Date End Date Taking? Authorizing Provider   SITagliptin (JANUVIA) 50 MG tablet Take 1 tablet by mouth daily 9/10/20  Yes Sally Hinton MD   metFORMIN (GLUCOPHAGE) 850 MG tablet Take 1 tablet by mouth 2 times daily (with meals) Take 850 mg by mouth 2 times daily (with meals) 9/10/20  Yes Sally Hinton MD   acetaminophen (APAP EXTRA STRENGTH) 500 MG tablet Take 2 tablets by mouth every 6 hours as needed for Pain 5/29/20  Yes Alexa Senior DO   ibuprofen (ADVIL;MOTRIN) 600 MG tablet Take 1 tablet by mouth every 6 hours as needed for Pain 5/29/20  Yes Alexa Senior DO   Alcohol Swabs PADS Please dispense according to patients insurance/device. Testing once a day 4/9/20  Yes Sally Hinton MD   sertraline (ZOLOFT) 25 MG tablet Take 1 tab daily and increase it to 50mg daily 3/23/20  Yes Sally Hinton MD   blood glucose monitor kit and supplies Test one time a day & as needed for symptoms of irregular blood glucose.  3/23/20  Yes Sally Hinton MD   nitroGLYCERIN (NITROSTAT) 0.4 MG SL tablet up to max of 3 total doses. If no relief after 1 dose, call 911. 3/16/20  Yes Yajaira Art, DO   Insulin Pen Needle 29G X 12.7MM MISC 1 each by Does not apply route daily To use with insulin 2/27/20  Yes Luca Marshall MD   atorvastatin (LIPITOR) 20 MG tablet TAKE 1 TABLET BY MOUTH ONE TIME A DAY 2/27/20  Yes Luca Marshall MD   aspirin (SM ASPIRIN ADULT LOW STRENGTH) 81 MG EC tablet TAKE 1 TABLET BY MOUTH ONE TIME A DAY 2/13/20  Yes Luca Marshall MD   zoster recombinant adjuvanted vaccine [facility]) 50 MCG/0.5ML SUSR injection Inject 0.5 mLs into the muscle See Admin Instructions 1 dose now and repeat in 2-6 months 12/11/19  Yes Luca Marshall MD   Lancets MISC Testing once a day. Please dispense according to patients insurance. 12/11/19  Yes Luca Marshall MD   blood glucose monitor strips Testing once a day. Please dispense according to patients insurance.  12/11/19  Yes Luca Marshall MD   Blood Pressure Monitor MISC Use daily to check BP 3/13/18  Yes Luca Marshall MD   insulin glargine [facility]) 100 UNIT/ML injection pen Inject 35 Units into the skin every morning (before breakfast) 4/9/20   Luca Marshall MD       Future Appointments   Date Time Provider Zhao Melendez   10/26/2020  2:45 PM Luca Marshall MD [facility]

## 2020-09-22 ENCOUNTER — CARE COORDINATION (OUTPATIENT)
Dept: CARE COORDINATION | Age: 50
End: 2020-09-22

## 2020-09-22 NOTE — CARE COORDINATION
Follow up CC call attempted. Patient was not available. Automated message received stating patient's voicemail box has not yet been set up. Unable to leave message   Will attempt follow up with patient midday tomorrow if no response received.

## 2020-09-23 ENCOUNTER — CARE COORDINATION (OUTPATIENT)
Dept: CARE COORDINATION | Age: 50
End: 2020-09-23

## 2020-09-23 NOTE — CARE COORDINATION
Follow up call attempted as requested by patient. ACM attempting to contact patient to review Basaglar orders. Call defaulted to automated message stating patient's voicemail box has not yet been set up.   Unable to leave message

## 2020-09-23 NOTE — CARE COORDINATION
Follow up CC call made to patient. Patient states this is not a good time for him to talk and asks that ACM callback in approximately thirty minutes.   Will call back approximately 1245 PM.

## 2020-09-30 ENCOUNTER — CARE COORDINATION (OUTPATIENT)
Dept: CARE COORDINATION | Age: 50
End: 2020-09-30

## 2020-09-30 NOTE — CARE COORDINATION
Follow up CC call attempted. Patient was not available. Unable to leave message due to no voicemail.

## 2020-10-14 ENCOUNTER — CARE COORDINATION (OUTPATIENT)
Dept: CARE COORDINATION | Age: 50
End: 2020-10-14

## 2020-10-14 NOTE — LETTER
10/14/2020    Comfort Bai  [address] [address]    Mr. Franc Maharaj,     I have enjoyed working with you to improve your health. I would like to continue to provide you support. However, I have been unable to reach you at, [phone] (home) or [phone] (mobile)  Please contact me so I can follow up on your care or answer any questions. I will be out of the office starting Friday, October 16th and returning on Monday, October 26th. Dr. Alyson Baltazar is interested in your health and will continue as your primary care provider.    I will wait to hear from you after my return to the office      In good health,     Mono Wilson, [address] [facility]  [address] [facility])  [phone]

## 2020-10-14 NOTE — CARE COORDINATION
Follow up CC call attempted. Patient was not available. Unable to leave message due to  voicemail box not being set up. Will send patient letter requesting he contact ACM.

## 2020-10-15 ENCOUNTER — CARE COORDINATION (OUTPATIENT)
Dept: CARE COORDINATION | Age: 50
End: 2020-10-15

## 2020-12-07 RX ORDER — PEN NEEDLE, DIABETIC 32GX 5/32"
NEEDLE, DISPOSABLE MISCELLANEOUS
Qty: 100 EACH | Refills: 0 | Status: SHIPPED | OUTPATIENT
Start: 2020-12-07 | End: 2021-08-05 | Stop reason: SDUPTHER

## 2020-12-07 NOTE — TELEPHONE ENCOUNTER
Please Approve or Refuse.   Send to Pharmacy per Pt's Request:      Next Visit Date:  Visit date not found   Last Visit Date: 10/26/2020    Hemoglobin A1C (%)   Date Value   09/10/2020 8.9   03/23/2020 8.6   12/11/2019 9.5             ( goal A1C is < 7)   BP Readings from Last 3 Encounters:   09/10/20 132/88   05/29/20 (!) 146/91   03/23/20 130/88          (goal 120/80)  BUN   Date Value Ref Range Status   05/29/2020 15 6 - 20 mg/dL Final     CREATININE   Date Value Ref Range Status   05/29/2020 0.60 (L) 0.70 - 1.20 mg/dL Final     Potassium   Date Value Ref Range Status   05/29/2020 4.4 3.7 - 5.3 mmol/L Final

## 2020-12-15 ENCOUNTER — CARE COORDINATION (OUTPATIENT)
Dept: CARE COORDINATION | Age: 50
End: 2020-12-15

## 2020-12-15 NOTE — CARE COORDINATION
Ambulatory Care Coordination Note  12/15/2020  CM Risk Score: 9  Charlson 10 Year Mortality Risk Score: 10%     ACC: Toyin Burrows RN    Summary:      Date Care Coordination Episode Started: 16 January 2020     Reason for Call Today:   1.) Follow up on office appointment  2.) Diabetes and HTN     Reason patient is in Care Coordination:      Diabetes     Topics Discussed Today:      1.) Medications reconciled   ·   Patient only takes 12-14 units of glargine in AM.  35 units ordered  ·   Takes metformin as ordered. States he is not taking Januvia  2.) Glucose levels  ·   Glucose this AM - 178 mg/dl  ·   Has only been checking glucose in AM this week  3.)  Dietary needs/issues  4.)   Not checking BP at home.     Interventions completed today:     · Contacted patient's pharmacy     Assessments completed today:      · Diabetes  · Med reconciliation     Care Coordinator plan of care:    1.)  Update PCP   2.)  Recheck chart in one week   3.)  Patient will resume checking and recording glucose levels twice daily     Diabetes Assessment    Medic Alert ID: No  Meal Planning: None   How often do you test your blood sugar?: Other   Do you have barriers with adherence to non-pharmacologic self-management interventions? (Nutrition/Exercise/Self-Monitoring): No   Have you ever had to go to the ED for symptoms of low blood sugar?: No       Do you have hyperglycemia symptoms?: No   Do you have hypoglycemia symptoms?: No   Last Blood Sugar Value: 178   Blood Sugar Monitoring Regimen: Morning Fasting        Lab Results   Component Value Date    LABA1C 8.9 09/10/2020    LABA1C 8.6 03/23/2020    LABA1C 9.5 12/11/2019     Lab Results   Component Value Date     01/16/2019     11/15/2015     02/03/2013       Care Coordination Interventions    Program Enrollment: Complex Care  Referral from Primary Care Provider: No  Suggested Interventions and Community Resources  Diabetes Education:  In Process (Comment: [facility] MISC Testing once a day. Please dispense according to patients insurance. 12/11/19  Yes Paris Wilson MD   blood glucose monitor strips Testing once a day. Please dispense according to patients insurance. 12/11/19  Yes Paris Wilson MD   Blood Pressure Monitor MISC Use daily to check BP 3/13/18  Yes Paris Wilsno MD   SITagliptin (JANUVIA) 50 MG tablet Take 1 tablet by mouth daily  Patient not taking: Reported on 12/15/2020 9/10/20   Paris Wilson MD       No future appointments.

## 2020-12-16 NOTE — CARE COORDINATION
I agree with the Care Coordinator's Plan of Care     Pt should be on januvia , his blood sugars are still high and I want then below 150mg. As long as he is not having any hypoglycemic episodes , he can take Matias Brow along with other medications.

## 2020-12-17 ENCOUNTER — CARE COORDINATION (OUTPATIENT)
Dept: CARE COORDINATION | Age: 50
End: 2020-12-17

## 2020-12-17 NOTE — CARE COORDINATION
-writer spoke pt to pt . He was notified that his PCP recommends he continues Januvia, as long as the Januvia is not causing any low blood sugars. Pt can take this with his other medications as well. - pt was advised to check bs and reach out to RN ACM with any low bs readings/questions or concerns regarding his medications or health related issues. -pt is asking for a script of Januvia be sent to his pharmacy. Pt states that he threw his old medication out.

## 2020-12-23 RX ORDER — PIOGLITAZONEHYDROCHLORIDE 15 MG/1
15 TABLET ORAL DAILY
Qty: 30 TABLET | Refills: 3 | Status: SHIPPED | OUTPATIENT
Start: 2020-12-23 | End: 2022-03-29 | Stop reason: SDUPTHER

## 2021-01-13 ENCOUNTER — CARE COORDINATION (OUTPATIENT)
Dept: CARE COORDINATION | Age: 51
End: 2021-01-13

## 2021-01-13 NOTE — LETTER
1/13/2021    Rj Traylor  [address] [place] [address]    Mr. Reilly Copeland,    I tried to contact you today. I would like to continue to provide you support. However, I have been unable to reach you at, [phone] (home)  and [phone] (mobile). You have not been in the office since September 10, 2020. Please let me know if I can help schedule an office visit for you or answer any questions. Dr. Gilbert Giron is interested in your health and hopes to hear from you soon. Please call me at [phone] so that we can follow up with you.     In good health,     [facility]

## 2021-01-13 NOTE — CARE COORDINATION
Attempted to follow up on  patient's use of Actos. Call placed to patient's mobile phone. No answer received. Unable to leave message as voicemail has not been established. Attempted to call home number. Call defaulted to automated message stating party is temporarily unavailable. Again, unable to leave message. Will send letter to patient requesting he contact ACM.

## 2021-01-14 ENCOUNTER — CARE COORDINATION (OUTPATIENT)
Dept: CARE COORDINATION | Age: 51
End: 2021-01-14

## 2021-03-12 ENCOUNTER — CARE COORDINATION (OUTPATIENT)
Dept: CARE COORDINATION | Age: 51
End: 2021-03-12

## 2021-03-12 DIAGNOSIS — E11.59 TYPE 2 DIABETES MELLITUS WITH OTHER CIRCULATORY COMPLICATION, UNSPECIFIED WHETHER LONG TERM INSULIN USE (HCC): Primary | ICD-10-CM

## 2021-05-06 RX ORDER — PEN NEEDLE, DIABETIC 32GX 5/32"
NEEDLE, DISPOSABLE MISCELLANEOUS
Qty: 100 EACH | Refills: 0 | OUTPATIENT
Start: 2021-05-06

## 2021-08-17 ENCOUNTER — HOSPITAL ENCOUNTER (EMERGENCY)
Age: 51
Discharge: HOME | End: 2021-08-17
Attending: EMERGENCY MEDICINE
Payer: COMMERCIAL

## 2021-08-17 VITALS
DIASTOLIC BLOOD PRESSURE: 86 MMHG | OXYGEN SATURATION: 99 % | RESPIRATION RATE: 16 BRPM | HEART RATE: 70 BPM | WEIGHT: 200 LBS | TEMPERATURE: 98.8 F | BODY MASS INDEX: 25.67 KG/M2 | SYSTOLIC BLOOD PRESSURE: 140 MMHG | HEIGHT: 74 IN

## 2021-08-17 DIAGNOSIS — L08.9 SKIN INFECTION: Primary | ICD-10-CM

## 2021-08-17 PROCEDURE — 99284 EMERGENCY DEPT VISIT MOD MDM: CPT

## 2021-08-17 PROCEDURE — 10060 I&D ABSCESS SIMPLE/SINGLE: CPT

## 2021-08-17 PROCEDURE — 6370000000 HC RX 637 (ALT 250 FOR IP): Performed by: STUDENT IN AN ORGANIZED HEALTH CARE EDUCATION/TRAINING PROGRAM

## 2021-08-17 RX ORDER — CEPHALEXIN 500 MG/1
500 CAPSULE ORAL 4 TIMES DAILY
Qty: 28 CAPSULE | Refills: 0 | Status: SHIPPED | OUTPATIENT
Start: 2021-08-17 | End: 2021-08-24

## 2021-08-17 RX ORDER — SULFAMETHOXAZOLE AND TRIMETHOPRIM 800; 160 MG/1; MG/1
1 TABLET ORAL ONCE
Status: COMPLETED | OUTPATIENT
Start: 2021-08-17 | End: 2021-08-17

## 2021-08-17 RX ORDER — SULFAMETHOXAZOLE AND TRIMETHOPRIM 800; 160 MG/1; MG/1
1 TABLET ORAL 2 TIMES DAILY
Qty: 14 TABLET | Refills: 0 | Status: SHIPPED | OUTPATIENT
Start: 2021-08-17 | End: 2021-08-24

## 2021-08-17 RX ORDER — IBUPROFEN 400 MG/1
400 TABLET ORAL EVERY 6 HOURS PRN
Qty: 25 TABLET | Refills: 0 | Status: SHIPPED | OUTPATIENT
Start: 2021-08-17 | End: 2022-03-29 | Stop reason: ALTCHOICE

## 2021-08-17 RX ORDER — CEPHALEXIN 250 MG/1
500 CAPSULE ORAL ONCE
Status: COMPLETED | OUTPATIENT
Start: 2021-08-17 | End: 2021-08-17

## 2021-08-17 RX ORDER — ACETAMINOPHEN 500 MG
500 TABLET ORAL 4 TIMES DAILY PRN
Qty: 25 TABLET | Refills: 1 | Status: SHIPPED | OUTPATIENT
Start: 2021-08-17

## 2021-08-17 RX ADMIN — CEPHALEXIN 500 MG: 250 CAPSULE ORAL at 16:41

## 2021-08-17 RX ADMIN — SULFAMETHOXAZOLE AND TRIMETHOPRIM 1 TABLET: 800; 160 TABLET ORAL at 16:41

## 2021-08-17 ASSESSMENT — PAIN DESCRIPTION - LOCATION: LOCATION: NECK

## 2021-08-17 ASSESSMENT — PAIN DESCRIPTION - ORIENTATION: ORIENTATION: LEFT

## 2021-08-17 ASSESSMENT — PAIN SCALES - GENERAL: PAINLEVEL_OUTOF10: 8

## 2021-08-17 ASSESSMENT — PAIN DESCRIPTION - PAIN TYPE: TYPE: ACUTE PAIN

## 2021-08-17 NOTE — ED NOTES
Pt states pain in neck has been going on for 2 days. Pt thought a mosquito bit him, he states pain and swellinging on the neck after the bite. Pt states pain generalized in body from bite. Pt states he used period, eson salt, and heat at home. Pt states he was sweating all night and all morning. Pt states he was unable to sleep last night. Bite appears raised and surrounded with pink. The sore is located on the L side of neck and above other sore on upper back.     Eliza Leblanc RN  08/17/21 Lala [id number]  08/17/21 [id number]

## 2021-08-17 NOTE — ED PROVIDER NOTES
[facility]     Emergency Department     Faculty Attestation    I performed a history and physical examination of the patient and discussed management with the resident. I have reviewed and agree with the residents findings including all diagnostic interpretations, and treatment plans as written. Any areas of disagreement are noted on the chart. I was personally present for the key portions of any procedures. I have documented in the chart those procedures where I was not present during the key portions. I have reviewed the emergency nurses triage note. I agree with the chief complaint, past medical history, past surgical history, allergies, medications, social and family history as documented unless otherwise noted below. Documentation of the HPI, Physical Exam and Medical Decision Making performed by ankit is based on my personal performance of the HPI, PE and MDM. For Physician Assistant/ Nurse Practitioner cases/documentation I have personally evaluated this patient and have completed at least one if not all key elements of the E/M (history, physical exam, and MDM). Additional findings are as noted. Patient with redness and swelling to the left posterior neck, increasing in pain and size over the past 2 days, no drainage, but very painful, patient is a diabetic. Patient with quarter size area of redness with central scab noted, no fluctuance palpated, surrounding indurations that extends to the base of the skull, approximately 2 cm around the erythema, very tender to palpation. Abscess, scab was unroofed, and no purlence noted. Plan for abx coverage, and wound check in 24-48 hours.     Mamie Huggins D.O, M.P.H  Attending Emergency Medicine Physician         Mamie Huggins,   08/17/21 2018

## 2021-08-17 NOTE — ED PROVIDER NOTES
500 MG tablet Take 1 tablet by mouth 4 times daily as needed for Pain 8/17/21  Yes Patricia Prater MD   ibuprofen (IBU) 400 MG tablet Take 1 tablet by mouth every 6 hours as needed for Pain 8/17/21  Yes Patricia Prater MD   LANTUS SOLOSTAR 100 UNIT/ML injection pen INJECT 35 UNITS SUBCUTANEOUSLY EVERY MORNING BEFORE BREAKFAST 8/6/21   Norene Schirmer, MD   Insulin Pen Needle (TRUEPLUS PEN NEEDLES) 32G X 4 MM MISC USE WITH INSULIN DAILY 8/6/21   Norene Schirmer, MD   metFORMIN (GLUCOPHAGE) 850 MG tablet Take 1 tablet by mouth 2 times daily (with meals) Take 850 mg by mouth 2 times daily (with meals) 8/6/21   Norene Schirmer, MD   pioglitazone (ACTOS) 15 MG tablet Take 1 tablet by mouth daily 12/23/20   Norene Schirmer, MD   SITagliptin (JANUVIA) 50 MG tablet Take 1 tablet by mouth daily 12/17/20   Norene Schirmer, MD   Alcohol Swabs PADS Please dispense according to patients insurance/device. Testing once a day 4/9/20   Norene Schirmer, MD   sertraline (ZOLOFT) 25 MG tablet Take 1 tab daily and increase it to 50mg daily 3/23/20   Norene Schirmer, MD   blood glucose monitor kit and supplies Test one time a day & as needed for symptoms of irregular blood glucose. 3/23/20   Norene Schirmer, MD   nitroGLYCERIN (NITROSTAT) 0.4 MG SL tablet up to max of 3 total doses. If no relief after 1 dose, call 911. 3/16/20   Devin Gregory, DO   Insulin Pen Needle 29G X 12.7MM MISC 1 each by Does not apply route daily To use with insulin 2/27/20   Norene Schirmer, MD   atorvastatin (LIPITOR) 20 MG tablet TAKE 1 TABLET BY MOUTH ONE TIME A DAY 2/27/20   Norene Schirmer, MD   aspirin (SM ASPIRIN ADULT LOW STRENGTH) 81 MG EC tablet TAKE 1 TABLET BY MOUTH ONE TIME A DAY 2/13/20   Norene Schirmer, MD   zoster recombinant adjuvanted vaccine [facility]) 50 MCG/0.5ML SUSR injection Inject 0.5 mLs into the muscle See Admin Instructions 1 dose now and repeat in 2-6 months 12/11/19   Norene Schirmer, MD   Lancets MISC Testing once a day.   Please dispense according to patients insurance. 12/11/19   Hector Romero MD   blood glucose monitor strips Testing once a day. Please dispense according to patients insurance. 12/11/19   Hector Romero MD   Blood Pressure Monitor MISC Use daily to check BP 3/13/18   Hector Romero MD       REVIEW OFSYSTEMS    (2-9 systems for level 4, 10 or more for level 5)      Review of Systems   Constitutional: Negative for appetite change, chills, fatigue and fever. HENT: Negative for congestion, rhinorrhea, sneezing and sore throat. Eyes: Negative for visual disturbance. Respiratory: Negative for cough and shortness of breath. Cardiovascular: Negative for chest pain and leg swelling. Gastrointestinal: Negative for abdominal pain, diarrhea, nausea and vomiting. Genitourinary: Negative for dysuria. Musculoskeletal: Negative for myalgias, neck pain and neck stiffness. Skin: Positive for wound. Negative for rash. Neurological: Negative for dizziness, syncope, light-headedness and headaches. Psychiatric/Behavioral: Negative for dysphoric mood and suicidal ideas. PHYSICAL EXAM   (up to 7 for level 4, 8 or more forlevel 5)      INITIAL VITALS:   Vitals:    08/17/21 1509   BP: (!) 140/86   Pulse: 70   Resp: 16   Temp: 98.8 °F (37.1 °C)   SpO2: 99%        Physical Exam  Vitals and nursing note reviewed. Constitutional:       Appearance: Normal appearance. HENT:      Head: Normocephalic and atraumatic. Nose: Nose normal.      Mouth/Throat:      Mouth: Mucous membranes are moist.      Pharynx: Oropharynx is clear. Eyes:      Extraocular Movements: Extraocular movements intact. Conjunctiva/sclera: Conjunctivae normal.      Pupils: Pupils are equal, round, and reactive to light. Cardiovascular:      Rate and Rhythm: Normal rate and regular rhythm. Pulses: Normal pulses. Heart sounds: Normal heart sounds. Pulmonary:      Effort: Pulmonary effort is normal.      Breath sounds: Normal breath sounds. Abdominal:      General: There is no distension. Palpations: Abdomen is soft. Tenderness: There is no abdominal tenderness. There is no guarding or rebound. Musculoskeletal:         General: Normal range of motion. Cervical back: Normal range of motion. Skin:     General: Skin is warm. Capillary Refill: Capillary refill takes less than 2 seconds. Comments: 2 cm scab to the posterior neck with possible underlying purulence. Surrounding erythema, tender to palpation   Neurological:      General: No focal deficit present. Mental Status: He is alert and oriented to person, place, and time. Psychiatric:         Mood and Affect: Mood normal.         Behavior: Behavior normal.         DIFFERENTIAL  DIAGNOSIS       Initial MDM/Plan: 45 y.o. male who presents with likely abscess to the posterior neck. No known drainage, but on examination, there is a scab overlying the region with possible underlying purulence. At bedside, the scab was unroofed but no purulent drainage was appreciated. He is afebrile, normotensive, not tachycardic, saturating 99% on room air. Because he is diabetic, there is concerned that this infection could worsen and he could become hyperglycemic. Discussed this with the patient and recommended that he follow-up in the next 2 days for wound check whether it is here with his primary care physician. He is provided with a prescription for Keflex and Bactrim. He indicated understanding. DIAGNOSTIC RESULTS / EMERGENCYDEPARTMENT COURSE / MDM     LABS:  Labs Reviewed - No data to display      RADIOLOGY:  No results found. PROCEDURES:  None    CONSULTS:  None      FINAL IMPRESSION      1.  Skin infection          DISPOSITION / PLAN     DISPOSITION Decision To Discharge 08/17/2021 04:40:23 PM      PATIENT REFERRED TO:  Kiley Lechuga MD  [address] [facility]  [address] [facility] [id number] [phone]    Schedule an appointment as soon as possible for a visit

## 2021-08-18 ENCOUNTER — TELEPHONE (OUTPATIENT)
Dept: FAMILY MEDICINE CLINIC | Age: 51
End: 2021-08-18

## 2021-08-18 NOTE — TELEPHONE ENCOUNTER
[facility]) ED Follow up Call    Reason for ED visit:           Tomi Cavazos , this is Vishal Redmond from Dr. Ashely Flores office, just calling to see how you are doing after your recent ED visit. Did you receive discharge instructions? Yes  Do you understand the discharge instructions? Yes  Did the ED give you any new prescriptions? Yes  Were you able to fill your prescriptions? Yes      Do you have one of our red, yellow and green  Zone sheets that help you to determine when you should go to the ED? Yes      Do you need or want to make a follow up appt with your PCP? No    Do you have any further needs in the home i.e. Equipment?   Not Applicable        FU appts/Provider:    Future Appointments   Date Time Provider Zhao Melendez   9/30/2021  2:45 PM MD aaron Hart

## 2021-08-20 ENCOUNTER — TELEPHONE (OUTPATIENT)
Dept: FAMILY MEDICINE CLINIC | Age: 51
End: 2021-08-20

## 2021-08-20 ENCOUNTER — HOSPITAL ENCOUNTER (EMERGENCY)
Age: 51
Discharge: HOME | End: 2021-08-20
Attending: EMERGENCY MEDICINE
Payer: COMMERCIAL

## 2021-08-20 ENCOUNTER — APPOINTMENT (OUTPATIENT)
Dept: CT IMAGING | Age: 51
End: 2021-08-20
Payer: COMMERCIAL

## 2021-08-20 VITALS
OXYGEN SATURATION: 98 % | TEMPERATURE: 97.3 F | BODY MASS INDEX: 25.67 KG/M2 | WEIGHT: 200 LBS | RESPIRATION RATE: 16 BRPM | SYSTOLIC BLOOD PRESSURE: 161 MMHG | DIASTOLIC BLOOD PRESSURE: 93 MMHG | HEIGHT: 74 IN | HEART RATE: 79 BPM

## 2021-08-20 DIAGNOSIS — L02.11 CELLULITIS AND ABSCESS OF NECK: Primary | ICD-10-CM

## 2021-08-20 DIAGNOSIS — L03.221 CELLULITIS AND ABSCESS OF NECK: Primary | ICD-10-CM

## 2021-08-20 LAB
ABSOLUTE EOS #: 0.2 K/UL (ref 0–0.4)
ABSOLUTE IMMATURE GRANULOCYTE: ABNORMAL K/UL (ref 0–0.3)
ABSOLUTE LYMPH #: 0.8 K/UL (ref 1–4.8)
ABSOLUTE MONO #: 0.9 K/UL (ref 0.1–1.3)
ANION GAP SERPL CALCULATED.3IONS-SCNC: 9 MMOL/L (ref 9–17)
BASOPHILS # BLD: 0 % (ref 0–2)
BASOPHILS ABSOLUTE: 0 K/UL (ref 0–0.2)
BUN BLDV-MCNC: 22 MG/DL (ref 6–20)
BUN/CREAT BLD: ABNORMAL (ref 9–20)
CALCIUM SERPL-MCNC: 9.2 MG/DL (ref 8.6–10.4)
CHLORIDE BLD-SCNC: 97 MMOL/L (ref 98–107)
CO2: 26 MMOL/L (ref 20–31)
CREAT SERPL-MCNC: 1.08 MG/DL (ref 0.7–1.2)
DIFFERENTIAL TYPE: ABNORMAL
EOSINOPHILS RELATIVE PERCENT: 3 % (ref 0–4)
GFR AFRICAN AMERICAN: >60 ML/MIN
GFR NON-AFRICAN AMERICAN: >60 ML/MIN
GFR SERPL CREATININE-BSD FRML MDRD: ABNORMAL ML/MIN/{1.73_M2}
GFR SERPL CREATININE-BSD FRML MDRD: ABNORMAL ML/MIN/{1.73_M2}
GLUCOSE BLD-MCNC: 309 MG/DL (ref 70–99)
HCT VFR BLD CALC: 42.2 % (ref 41–53)
HEMOGLOBIN: 14.7 G/DL (ref 13.5–17.5)
IMMATURE GRANULOCYTES: ABNORMAL %
LYMPHOCYTES # BLD: 12 % (ref 24–44)
MCH RBC QN AUTO: 32.3 PG (ref 26–34)
MCHC RBC AUTO-ENTMCNC: 34.8 G/DL (ref 31–37)
MCV RBC AUTO: 92.9 FL (ref 80–100)
MONOCYTES # BLD: 12 % (ref 1–7)
NRBC AUTOMATED: ABNORMAL PER 100 WBC
PDW BLD-RTO: 12.8 % (ref 11.5–14.9)
PLATELET # BLD: 207 K/UL (ref 150–450)
PLATELET ESTIMATE: ABNORMAL
PMV BLD AUTO: 7.8 FL (ref 6–12)
POTASSIUM SERPL-SCNC: 4.9 MMOL/L (ref 3.7–5.3)
RBC # BLD: 4.54 M/UL (ref 4.5–5.9)
RBC # BLD: ABNORMAL 10*6/UL
SEG NEUTROPHILS: 73 % (ref 36–66)
SEGMENTED NEUTROPHILS ABSOLUTE COUNT: 5.1 K/UL (ref 1.3–9.1)
SODIUM BLD-SCNC: 132 MMOL/L (ref 135–144)
WBC # BLD: 7.1 K/UL (ref 3.5–11)
WBC # BLD: ABNORMAL 10*3/UL

## 2021-08-20 PROCEDURE — 80048 BASIC METABOLIC PNL TOTAL CA: CPT

## 2021-08-20 PROCEDURE — 6360000004 HC RX CONTRAST MEDICATION: Performed by: EMERGENCY MEDICINE

## 2021-08-20 PROCEDURE — 10060 I&D ABSCESS SIMPLE/SINGLE: CPT

## 2021-08-20 PROCEDURE — 96374 THER/PROPH/DIAG INJ IV PUSH: CPT

## 2021-08-20 PROCEDURE — 99283 EMERGENCY DEPT VISIT LOW MDM: CPT

## 2021-08-20 PROCEDURE — 85025 COMPLETE CBC W/AUTO DIFF WBC: CPT

## 2021-08-20 PROCEDURE — 70491 CT SOFT TISSUE NECK W/DYE: CPT

## 2021-08-20 PROCEDURE — 2580000003 HC RX 258: Performed by: EMERGENCY MEDICINE

## 2021-08-20 PROCEDURE — 36415 COLL VENOUS BLD VENIPUNCTURE: CPT

## 2021-08-20 PROCEDURE — 6360000002 HC RX W HCPCS: Performed by: EMERGENCY MEDICINE

## 2021-08-20 RX ORDER — SODIUM CHLORIDE 0.9 % (FLUSH) 0.9 %
10 SYRINGE (ML) INJECTION PRN
Status: DISCONTINUED | OUTPATIENT
Start: 2021-08-20 | End: 2021-08-20 | Stop reason: HOSPADM

## 2021-08-20 RX ORDER — LIDOCAINE HYDROCHLORIDE 10 MG/ML
20 INJECTION, SOLUTION INFILTRATION; PERINEURAL ONCE
Status: DISCONTINUED | OUTPATIENT
Start: 2021-08-20 | End: 2021-08-20 | Stop reason: HOSPADM

## 2021-08-20 RX ORDER — KETOROLAC TROMETHAMINE 30 MG/ML
30 INJECTION, SOLUTION INTRAMUSCULAR; INTRAVENOUS ONCE
Status: COMPLETED | OUTPATIENT
Start: 2021-08-20 | End: 2021-08-20

## 2021-08-20 RX ORDER — 0.9 % SODIUM CHLORIDE 0.9 %
80 INTRAVENOUS SOLUTION INTRAVENOUS ONCE
Status: COMPLETED | OUTPATIENT
Start: 2021-08-20 | End: 2021-08-20

## 2021-08-20 RX ADMIN — SODIUM CHLORIDE, PRESERVATIVE FREE 10 ML: 5 INJECTION INTRAVENOUS at 03:27

## 2021-08-20 RX ADMIN — SODIUM CHLORIDE 80 ML: 9 INJECTION, SOLUTION INTRAVENOUS at 03:27

## 2021-08-20 RX ADMIN — IOPAMIDOL 75 ML: 755 INJECTION, SOLUTION INTRAVENOUS at 03:27

## 2021-08-20 RX ADMIN — KETOROLAC TROMETHAMINE 30 MG: 30 INJECTION, SOLUTION INTRAMUSCULAR; INTRAVENOUS at 04:38

## 2021-08-20 ASSESSMENT — ENCOUNTER SYMPTOMS
DIARRHEA: 0
ABDOMINAL PAIN: 0
SORE THROAT: 0
TROUBLE SWALLOWING: 0
COUGH: 0
CONSTIPATION: 0
BLOOD IN STOOL: 0
SHORTNESS OF BREATH: 0
COLOR CHANGE: 1
BACK PAIN: 0
NAUSEA: 0
VOMITING: 0

## 2021-08-20 ASSESSMENT — PAIN SCALES - GENERAL
PAINLEVEL_OUTOF10: 8
PAINLEVEL_OUTOF10: 8

## 2021-08-20 ASSESSMENT — PAIN DESCRIPTION - LOCATION: LOCATION: HEAD

## 2021-08-20 NOTE — TELEPHONE ENCOUNTER
[facility] ED Follow up Call            FU appts/Provider:    Future Appointments   Date Time Provider Zhao Nora   9/30/2021  2:45 PM MD aaron Romero sc [address] IF NOT USED  Hi, this message is for  Kiko Knapp  This is Brianna Pascual from [employer] office. Just calling to see how you are doing after your recent visit to the Emergency Room. [employer] wants to make sure you were able to fill any prescriptions and that you understand your discharge instructions. Please return our call if you need to make a follow up appointment with your provider or have any further needs. Our phone number is [phone]. Have a great day.

## 2021-08-20 NOTE — ED NOTES
Mode of arrival (squad #, walk in, police, etc) : walk in         Chief complaint(s): abscess         Arrival Note (brief scenario, treatment PTA, etc). : Pt arrives with c/o painful abscess on the back of his neck. Pt states she was seen and treated at [facility] 2 days ago and was put on bactrim and keflex. Pt reports he has been taking the medications as prescribed but feels like it is getting worse. Pt reports \"I'm not here for pain medicine. My daughter graduates as a nurse tomorrow and I just want to make sure I can see it\". Pt is a diabetic and reports his sugars have been elevated recently. GCS 15        C= \"Have you ever felt that you should Cut down on your drinking? \"  No  A= \"Have people Annoyed you by criticizing your drinking? \"  No  G= \"Have you ever felt bad or Guilty about your drinking? \"  No  E= \"Have you ever had a drink as an Eye-opener first thing in the morning to steady your nerves or to help a hangover? \"  No      Deferred []      Reason for deferring: N/A    *If yes to two or more: probable alcohol abuse. Alfonso Escalona RN  08/20/21 0365

## 2021-08-20 NOTE — ED PROVIDER NOTES
[address]  EMERGENCY DEPARTMENT ENCOUNTER    Pt Name: Manuel Jung  MRN: [id number]  YOB: 1970  Date of evaluation:8/20/21  PCP: El Wharton MD    [address] [place] [address]       Chief Complaint   Patient presents with    Abscess       HISTORY OF PRESENT ILLNESS    Manuel Jung is a 45 y.o. male who presents with a chief complaint of an abscess. Patient has had a area of swelling to his posterior neck for about the past week. He was seen several days ago at [facility]. [facility]. He states that he was placed on Bactrim and Keflex. Also told RN that they tried to drain it but no purulent drainage was able to be expelled. He states is gotten worse since then. He has been taking Bactrim and Keflex as prescribed. No fevers at home. Symptoms are acute. Symptoms are moderate. Nothing make symptoms better or worse. Patient has no other complaints at this time. REVIEW OF SYSTEMS       Review of Systems   Constitutional: Negative for chills, fatigue and fever. HENT: Negative for congestion, ear pain, sore throat and trouble swallowing. Eyes: Negative for visual disturbance. Respiratory: Negative for cough and shortness of breath. Cardiovascular: Negative for chest pain, palpitations and leg swelling. Gastrointestinal: Negative for abdominal pain, blood in stool, constipation, diarrhea, nausea and vomiting. Genitourinary: Negative for dysuria and flank pain. Musculoskeletal: Negative for arthralgias, back pain, myalgias and neck pain. Skin: Positive for color change and wound. Negative for rash. Neurological: Negative for dizziness, weakness, light-headedness, numbness and headaches. Psychiatric/Behavioral: Negative for confusion. All other systems reviewed and are negative. Negative in 10 essential Systems except as mentioned above and in the HPI.         PAST MEDICAL HISTORY     Past Medical History:   Diagnosis Date    Anxiety and depression     Chronic back pain     HLD (hyperlipidemia) 11/15/2015    Hypertension     Marijuana abuse 11/15/2015    Nephrolithiasis 11/18/2015    Type II or unspecified type diabetes mellitus without mention of complication, not stated as uncontrolled          SURGICAL HISTORY      has no past surgical history on file. CURRENT MEDICATIONS       Current Discharge Medication List      CONTINUE these medications which have NOT CHANGED    Details   cephALEXin (KEFLEX) 500 MG capsule Take 1 capsule by mouth 4 times daily for 7 days  Qty: 28 capsule, Refills: 0      sulfamethoxazole-trimethoprim (BACTRIM DS;SEPTRA DS) 800-160 MG per tablet Take 1 tablet by mouth 2 times daily for 7 days  Qty: 14 tablet, Refills: 0      acetaminophen (TYLENOL) 500 MG tablet Take 1 tablet by mouth 4 times daily as needed for Pain  Qty: 25 tablet, Refills: 1      ibuprofen (IBU) 400 MG tablet Take 1 tablet by mouth every 6 hours as needed for Pain  Qty: 25 tablet, Refills: 0      LANTUS SOLOSTAR 100 UNIT/ML injection pen INJECT 35 UNITS SUBCUTANEOUSLY EVERY MORNING BEFORE BREAKFAST  Qty: 60 mL, Refills: 0    Associated Diagnoses: Type 2 diabetes mellitus without complication, unspecified whether long term insulin use ([facility]      ! !  Insulin Pen Needle (TRUEPLUS PEN NEEDLES) 32G X 4 MM MISC USE WITH INSULIN DAILY  Qty: 100 each, Refills: 0      metFORMIN (GLUCOPHAGE) 850 MG tablet Take 1 tablet by mouth 2 times daily (with meals) Take 850 mg by mouth 2 times daily (with meals)  Qty: 60 tablet, Refills: 2    Associated Diagnoses: Type 2 diabetes mellitus with other circulatory complication, unspecified whether long term insulin use (HCC)      pioglitazone (ACTOS) 15 MG tablet Take 1 tablet by mouth daily  Qty: 30 tablet, Refills: 3    Associated Diagnoses: Type 2 diabetes mellitus with other circulatory complication, unspecified whether long term insulin use ([facility])      SITagliptin (JANUVIA) 50 MG tablet Take 1 tablet by mouth daily  Qty: 120 tablet, Refills: 1    Associated Testing once a day. Please dispense according to patients insurance. Qty: 100 each, Refills: 3    Associated Diagnoses: Type 2 diabetes mellitus with complication, without long-term current use of insulin ([facility])      blood glucose monitor strips Testing once a day. Please dispense according to patients insurance. Qty: 100 strip, Refills: 3    Associated Diagnoses: Type 2 diabetes mellitus with complication, without long-term current use of insulin ([facility])      Blood Pressure Monitor MISC Use daily to check BP  Qty: 1 each, Refills: 0    Associated Diagnoses: Essential hypertension       !! - Potential duplicate medications found. Please discuss with provider. ALLERGIES     has No Known Allergies. FAMILY HISTORY     He indicated that the status of his father is unknown.     family history includes Diabetes in his father. SOCIAL HISTORY      reports that he has never smoked. He has never used smokeless tobacco. He reports current alcohol use. He reports current drug use. Drug: Marijuana. PHYSICAL EXAM     INITIAL VITALS:  height is 6' 2\" (1.88 m) and weight is 200 lb (90.7 kg). His temporal temperature is 97.3 °F (36.3 °C). His blood pressure is 161/93 (abnormal) and his pulse is 79. His respiration is 16 and oxygen saturation is 98%. Physical Exam  Vitals and nursing note reviewed. Constitutional:       General: He is not in acute distress. HENT:      Head: Normocephalic and atraumatic. Eyes:      Conjunctiva/sclera: Conjunctivae normal.      Pupils: Pupils are equal, round, and reactive to light. Cardiovascular:      Rate and Rhythm: Normal rate and regular rhythm. Heart sounds: Normal heart sounds. No murmur heard. Pulmonary:      Effort: Pulmonary effort is normal. No respiratory distress. Breath sounds: Normal breath sounds. Abdominal:      General: Bowel sounds are normal. There is no distension. Palpations: Abdomen is soft. Tenderness:  There is no abdominal tenderness. Musculoskeletal:         General: No tenderness. Cervical back: Neck supple. Lymphadenopathy:      Cervical: No cervical adenopathy. Skin:     General: Skin is warm and dry. Findings: Erythema (Posterior neck) and lesion (Moderate induration with mild fluctuance posterior neck) present. No rash. Neurological:      Mental Status: He is alert and oriented to person, place, and time. Psychiatric:         Judgment: Judgment normal.           DIFFERENTIAL DIAGNOSIS/MDM:   58-year-old male presents for second visit in the past 3 days. He is afebrile, nontoxic, normal vital signs. No acute distress. Has an area of induration with very mild fluctuance with some overlying erythematous posterior neck concerning for an abscess versus a cyst.    DIAGNOSTIC RESULTS     EKG: All EKG's are interpreted by the Emergency Department Physician who either signs or Co-signs this chart in the absence of a cardiologist.        RADIOLOGY:   I directly visualized the following  images and reviewed the radiologist interpretations:  CT SOFT TISSUE NECK W CONTRAST   Final Result   1. Suspected small dermal 5 mm diameter abscess within the left posterior   neck soft tissues at the C3 level. 2. Underlying adjacent subcutaneous multifocal minimal soft tissue air   suggesting recent instrumentation versus air related to the abscess. 3. Surrounding soft tissue cellulitis.                  ED BEDSIDE ULTRASOUND:      LABS:  Labs Reviewed   CBC WITH AUTO DIFFERENTIAL - Abnormal; Notable for the following components:       Result Value    Seg Neutrophils 73 (*)     Lymphocytes 12 (*)     Monocytes 12 (*)     Absolute Lymph # 0.80 (*)     All other components within normal limits   BASIC METABOLIC PANEL - Abnormal; Notable for the following components:    Glucose 309 (*)     BUN 22 (*)     Sodium 132 (*)     Chloride 97 (*)     All other components within normal limits         EMERGENCY DEPARTMENT COURSE:   Vitals:    Vitals:    08/20/21 0155   BP: (!) 161/93   Pulse: 79   Resp: 16   Temp: 97.3 °F (36.3 °C)   TempSrc: Temporal   SpO2: 98%   Weight: 200 lb (90.7 kg)   Height: 6' 2\" (1.88 m)     2:25 AM EDT  I anesthetized the lesion and made a stab incision at the area of the apex and what I thought was the greatest fluctuance. Very small amount of bloody discharge. No purulence. Was not able to break up any loculations. I am concerned this could be a cyst versus a mass. With how much it is progressing over the past several days I am going to get a CT scan of his neck, blood work. 4:50 AM EDT  CT scan shows a very small 5 mm abscess with surrounding cellulitis. No evidence of mass. With how small this is I do not think it would be beneficial to make any more incisions. He does have an open tract now where this abscess can drain. The gas that was seen on the CT scan likely from recent stab and physician that I just performed. Encourage patient to finish out his antibiotics. Will refer patient to [facility] surgery clinic if this does not get better when he finishes his course of antibiotics. Advised him to return here at any time if his symptoms worsens or he develops a fever. Patient agreeable to plan will be discharged home today. CRITICALCARE:      CONSULTS:  None      PROCEDURES:  Incision/Drainage    Date/Time: 8/20/2021 4:51 AM  Performed by: Steven Alford DO  Authorized by: Steven Alford DO     Consent:     Consent obtained:  Verbal    Consent given by:  Patient    Risks discussed:  Bleeding, damage to other organs, incomplete drainage, infection and pain    Alternatives discussed:  No treatment and delayed treatment  Location:     Type:  Abscess    Location:  Neck    Neck location:  L posterior  Pre-procedure details:     Skin preparation:  Chloraprep  Anesthesia (see MAR for exact dosages):      Anesthesia method:  Local infiltration    Local anesthetic:  Lidocaine 1% w/o epi  Procedure type:     Complexity:  Simple  Procedure details:     Needle aspiration: no      Incision types:  Stab incision    Incision depth:  Dermal    Scalpel blade:  11    Drainage:  Bloody    Drainage amount:  Scant    Wound treatment:  Wound left open    Packing materials:  None          FINAL IMPRESSION      1.  Cellulitis and abscess of neck            DISPOSITION/PLAN   DISPOSITION Decision To Discharge 08/20/2021 04:47:13 AM          PATIENT REFERRED TO:  Taye Almeida MD  [address] [facility] [address]  [address] [facility] [phone]    Schedule an appointment as soon as possible for a visit       [facility] ED  [facility] [address]  [address] [facility] [address]  [phone]    As needed, If symptoms worsen    [facility]  [address] [facility] [address] [phone]  Schedule an appointment as soon as possible for a visit         DISCHARGE MEDICATIONS:  Current Discharge Medication List          (Please note that portions ofthis note were completed with a voice recognition program.  Efforts were made to edit the dictations but occasionally words are mis-transcribed.)    Winthrop Medico, DO  Attending Emergency Physician          Winthrop Medico, DO  08/20/21 8844

## 2021-08-21 ASSESSMENT — ENCOUNTER SYMPTOMS
ABDOMINAL PAIN: 0
COUGH: 0
RHINORRHEA: 0
NAUSEA: 0
VOMITING: 0
SHORTNESS OF BREATH: 0
SORE THROAT: 0
DIARRHEA: 0

## 2021-10-24 ENCOUNTER — APPOINTMENT (OUTPATIENT)
Dept: GENERAL RADIOLOGY | Age: 51
DRG: 420 | End: 2021-10-24
Payer: COMMERCIAL

## 2021-10-24 ENCOUNTER — HOSPITAL ENCOUNTER (INPATIENT)
Age: 51
LOS: 2 days | Discharge: HOME | DRG: 420 | End: 2021-10-26
Attending: EMERGENCY MEDICINE | Admitting: INTERNAL MEDICINE
Payer: COMMERCIAL

## 2021-10-24 DIAGNOSIS — L02.511 ABSCESS OF FINGER OF RIGHT HAND: ICD-10-CM

## 2021-10-24 DIAGNOSIS — L03.113 CELLULITIS OF RIGHT UPPER EXTREMITY: Primary | ICD-10-CM

## 2021-10-24 PROBLEM — L03.90 CELLULITIS: Status: ACTIVE | Noted: 2021-10-24

## 2021-10-24 LAB
ABSOLUTE EOS #: 0.1 K/UL (ref 0–0.4)
ABSOLUTE IMMATURE GRANULOCYTE: ABNORMAL K/UL (ref 0–0.3)
ABSOLUTE LYMPH #: 1.7 K/UL (ref 1–4.8)
ABSOLUTE MONO #: 0.7 K/UL (ref 0.1–1.3)
ANION GAP SERPL CALCULATED.3IONS-SCNC: 10 MMOL/L (ref 9–17)
BASOPHILS # BLD: 1 % (ref 0–2)
BASOPHILS ABSOLUTE: 0 K/UL (ref 0–0.2)
BUN BLDV-MCNC: 15 MG/DL (ref 6–20)
BUN/CREAT BLD: ABNORMAL (ref 9–20)
C-REACTIVE PROTEIN: 35.5 MG/L (ref 0–5)
CALCIUM SERPL-MCNC: 9.3 MG/DL (ref 8.6–10.4)
CHLORIDE BLD-SCNC: 99 MMOL/L (ref 98–107)
CO2: 28 MMOL/L (ref 20–31)
CREAT SERPL-MCNC: 0.59 MG/DL (ref 0.7–1.2)
DIFFERENTIAL TYPE: ABNORMAL
EOSINOPHILS RELATIVE PERCENT: 2 % (ref 0–4)
GFR AFRICAN AMERICAN: >60 ML/MIN
GFR NON-AFRICAN AMERICAN: >60 ML/MIN
GFR SERPL CREATININE-BSD FRML MDRD: ABNORMAL ML/MIN/{1.73_M2}
GFR SERPL CREATININE-BSD FRML MDRD: ABNORMAL ML/MIN/{1.73_M2}
GLUCOSE BLD-MCNC: 268 MG/DL (ref 70–99)
GLUCOSE BLD-MCNC: 289 MG/DL (ref 75–110)
HCT VFR BLD CALC: 44 % (ref 41–53)
HEMOGLOBIN: 15 G/DL (ref 13.5–17.5)
IMMATURE GRANULOCYTES: ABNORMAL %
LYMPHOCYTES # BLD: 19 % (ref 24–44)
MCH RBC QN AUTO: 31.7 PG (ref 26–34)
MCHC RBC AUTO-ENTMCNC: 34.2 G/DL (ref 31–37)
MCV RBC AUTO: 92.6 FL (ref 80–100)
MONOCYTES # BLD: 8 % (ref 1–7)
NRBC AUTOMATED: ABNORMAL PER 100 WBC
PDW BLD-RTO: 13.4 % (ref 11.5–14.9)
PLATELET # BLD: 197 K/UL (ref 150–450)
PLATELET ESTIMATE: ABNORMAL
PMV BLD AUTO: 8 FL (ref 6–12)
POTASSIUM SERPL-SCNC: 4.3 MMOL/L (ref 3.7–5.3)
RBC # BLD: 4.75 M/UL (ref 4.5–5.9)
RBC # BLD: ABNORMAL 10*6/UL
SEDIMENTATION RATE, ERYTHROCYTE: 16 MM (ref 0–20)
SEG NEUTROPHILS: 70 % (ref 36–66)
SEGMENTED NEUTROPHILS ABSOLUTE COUNT: 6.6 K/UL (ref 1.3–9.1)
SODIUM BLD-SCNC: 137 MMOL/L (ref 135–144)
WBC # BLD: 9.3 K/UL (ref 3.5–11)
WBC # BLD: ABNORMAL 10*3/UL

## 2021-10-24 PROCEDURE — 85652 RBC SED RATE AUTOMATED: CPT

## 2021-10-24 PROCEDURE — 85025 COMPLETE CBC W/AUTO DIFF WBC: CPT

## 2021-10-24 PROCEDURE — 82947 ASSAY GLUCOSE BLOOD QUANT: CPT

## 2021-10-24 PROCEDURE — 6370000000 HC RX 637 (ALT 250 FOR IP): Performed by: STUDENT IN AN ORGANIZED HEALTH CARE EDUCATION/TRAINING PROGRAM

## 2021-10-24 PROCEDURE — 86140 C-REACTIVE PROTEIN: CPT

## 2021-10-24 PROCEDURE — 36415 COLL VENOUS BLD VENIPUNCTURE: CPT

## 2021-10-24 PROCEDURE — 96365 THER/PROPH/DIAG IV INF INIT: CPT

## 2021-10-24 PROCEDURE — 80048 BASIC METABOLIC PNL TOTAL CA: CPT

## 2021-10-24 PROCEDURE — G0378 HOSPITAL OBSERVATION PER HR: HCPCS

## 2021-10-24 PROCEDURE — 73130 X-RAY EXAM OF HAND: CPT

## 2021-10-24 PROCEDURE — 96374 THER/PROPH/DIAG INJ IV PUSH: CPT

## 2021-10-24 PROCEDURE — 96375 TX/PRO/DX INJ NEW DRUG ADDON: CPT

## 2021-10-24 PROCEDURE — 2580000003 HC RX 258: Performed by: STUDENT IN AN ORGANIZED HEALTH CARE EDUCATION/TRAINING PROGRAM

## 2021-10-24 PROCEDURE — 6360000002 HC RX W HCPCS: Performed by: STUDENT IN AN ORGANIZED HEALTH CARE EDUCATION/TRAINING PROGRAM

## 2021-10-24 PROCEDURE — 2580000003 HC RX 258: Performed by: NURSE PRACTITIONER

## 2021-10-24 PROCEDURE — 2060000000 HC ICU INTERMEDIATE R&B

## 2021-10-24 PROCEDURE — 83036 HEMOGLOBIN GLYCOSYLATED A1C: CPT

## 2021-10-24 PROCEDURE — 99283 EMERGENCY DEPT VISIT LOW MDM: CPT

## 2021-10-24 RX ORDER — ACETAMINOPHEN 650 MG/1
650 SUPPOSITORY RECTAL EVERY 6 HOURS PRN
Status: DISCONTINUED | OUTPATIENT
Start: 2021-10-24 | End: 2021-10-26 | Stop reason: HOSPADM

## 2021-10-24 RX ORDER — SODIUM CHLORIDE 0.9 % (FLUSH) 0.9 %
5-40 SYRINGE (ML) INJECTION EVERY 12 HOURS SCHEDULED
Status: DISCONTINUED | OUTPATIENT
Start: 2021-10-24 | End: 2021-10-26 | Stop reason: HOSPADM

## 2021-10-24 RX ORDER — SODIUM CHLORIDE 9 MG/ML
25 INJECTION, SOLUTION INTRAVENOUS PRN
Status: DISCONTINUED | OUTPATIENT
Start: 2021-10-24 | End: 2021-10-26 | Stop reason: HOSPADM

## 2021-10-24 RX ORDER — ONDANSETRON 2 MG/ML
4 INJECTION INTRAMUSCULAR; INTRAVENOUS EVERY 6 HOURS PRN
Status: DISCONTINUED | OUTPATIENT
Start: 2021-10-24 | End: 2021-10-26 | Stop reason: HOSPADM

## 2021-10-24 RX ORDER — HYDROCODONE BITARTRATE AND ACETAMINOPHEN 5; 325 MG/1; MG/1
1 TABLET ORAL EVERY 6 HOURS
Status: DISCONTINUED | OUTPATIENT
Start: 2021-10-24 | End: 2021-10-26 | Stop reason: HOSPADM

## 2021-10-24 RX ORDER — ACETAMINOPHEN 325 MG/1
650 TABLET ORAL EVERY 6 HOURS PRN
Status: DISCONTINUED | OUTPATIENT
Start: 2021-10-24 | End: 2021-10-26 | Stop reason: HOSPADM

## 2021-10-24 RX ORDER — ASPIRIN 81 MG/1
81 TABLET ORAL DAILY
Status: CANCELLED | OUTPATIENT
Start: 2021-10-25

## 2021-10-24 RX ORDER — DEXTROSE MONOHYDRATE 50 MG/ML
100 INJECTION, SOLUTION INTRAVENOUS PRN
Status: DISCONTINUED | OUTPATIENT
Start: 2021-10-24 | End: 2021-10-26 | Stop reason: HOSPADM

## 2021-10-24 RX ORDER — NICOTINE POLACRILEX 4 MG
15 LOZENGE BUCCAL PRN
Status: DISCONTINUED | OUTPATIENT
Start: 2021-10-24 | End: 2021-10-26 | Stop reason: HOSPADM

## 2021-10-24 RX ORDER — DEXTROSE MONOHYDRATE 25 G/50ML
12.5 INJECTION, SOLUTION INTRAVENOUS PRN
Status: DISCONTINUED | OUTPATIENT
Start: 2021-10-24 | End: 2021-10-26 | Stop reason: HOSPADM

## 2021-10-24 RX ORDER — KETOROLAC TROMETHAMINE 30 MG/ML
15 INJECTION, SOLUTION INTRAMUSCULAR; INTRAVENOUS ONCE
Status: COMPLETED | OUTPATIENT
Start: 2021-10-24 | End: 2021-10-24

## 2021-10-24 RX ORDER — SODIUM CHLORIDE 0.9 % (FLUSH) 0.9 %
10 SYRINGE (ML) INJECTION PRN
Status: DISCONTINUED | OUTPATIENT
Start: 2021-10-24 | End: 2021-10-26 | Stop reason: HOSPADM

## 2021-10-24 RX ORDER — ONDANSETRON 4 MG/1
4 TABLET, ORALLY DISINTEGRATING ORAL EVERY 8 HOURS PRN
Status: DISCONTINUED | OUTPATIENT
Start: 2021-10-24 | End: 2021-10-26 | Stop reason: HOSPADM

## 2021-10-24 RX ORDER — NITROGLYCERIN 0.4 MG/1
0.4 TABLET SUBLINGUAL EVERY 5 MIN PRN
Status: DISCONTINUED | OUTPATIENT
Start: 2021-10-24 | End: 2021-10-26 | Stop reason: HOSPADM

## 2021-10-24 RX ORDER — ATORVASTATIN CALCIUM 20 MG/1
20 TABLET, FILM COATED ORAL DAILY
Status: CANCELLED | OUTPATIENT
Start: 2021-10-25

## 2021-10-24 RX ORDER — KETOROLAC TROMETHAMINE 30 MG/ML
15 INJECTION, SOLUTION INTRAMUSCULAR; INTRAVENOUS EVERY 6 HOURS PRN
Status: DISCONTINUED | OUTPATIENT
Start: 2021-10-24 | End: 2021-10-26 | Stop reason: HOSPADM

## 2021-10-24 RX ORDER — INSULIN GLARGINE 100 [IU]/ML
25 INJECTION, SOLUTION SUBCUTANEOUS EVERY MORNING
Status: DISCONTINUED | OUTPATIENT
Start: 2021-10-25 | End: 2021-10-25

## 2021-10-24 RX ADMIN — INSULIN LISPRO 3 UNITS: 100 INJECTION, SOLUTION INTRAVENOUS; SUBCUTANEOUS at 21:36

## 2021-10-24 RX ADMIN — Medication 10 ML: at 21:37

## 2021-10-24 RX ADMIN — PIPERACILLIN AND TAZOBACTAM 4500 MG: 4; .5 INJECTION, POWDER, LYOPHILIZED, FOR SOLUTION INTRAVENOUS; PARENTERAL at 17:58

## 2021-10-24 RX ADMIN — HYDROCODONE BITARTRATE AND ACETAMINOPHEN 1 TABLET: 5; 325 TABLET ORAL at 21:37

## 2021-10-24 RX ADMIN — KETOROLAC TROMETHAMINE 15 MG: 30 INJECTION, SOLUTION INTRAMUSCULAR; INTRAVENOUS at 16:20

## 2021-10-24 ASSESSMENT — ENCOUNTER SYMPTOMS
CONSTIPATION: 0
ABDOMINAL DISTENTION: 0
RHINORRHEA: 0
WHEEZING: 0
DIARRHEA: 0
EYE PAIN: 0
ABDOMINAL PAIN: 0
VOMITING: 0
NAUSEA: 0
SHORTNESS OF BREATH: 0
COUGH: 0
EYE DISCHARGE: 0
COLOR CHANGE: 1
CHEST TIGHTNESS: 0
SORE THROAT: 0

## 2021-10-24 ASSESSMENT — PAIN DESCRIPTION - ORIENTATION: ORIENTATION: RIGHT

## 2021-10-24 ASSESSMENT — PAIN SCALES - GENERAL
PAINLEVEL_OUTOF10: 8
PAINLEVEL_OUTOF10: 6
PAINLEVEL_OUTOF10: 10

## 2021-10-24 ASSESSMENT — PAIN DESCRIPTION - PROGRESSION: CLINICAL_PROGRESSION: NOT CHANGED

## 2021-10-24 ASSESSMENT — PAIN DESCRIPTION - FREQUENCY: FREQUENCY: CONTINUOUS

## 2021-10-24 ASSESSMENT — PAIN DESCRIPTION - DESCRIPTORS: DESCRIPTORS: POUNDING;THROBBING

## 2021-10-24 ASSESSMENT — PAIN DESCRIPTION - LOCATION: LOCATION: HAND

## 2021-10-24 ASSESSMENT — PAIN DESCRIPTION - PAIN TYPE: TYPE: ACUTE PAIN

## 2021-10-24 ASSESSMENT — PAIN DESCRIPTION - ONSET: ONSET: ON-GOING

## 2021-10-24 NOTE — H&P
[address] [facility].      [address] [facility]     HISTORY AND PHYSICAL EXAMINATION            Date:   10/24/2021  Patient name:  Brennon Vasquez  Date of admission:  10/24/2021  3:42 PM  MRN:   [id number]  Account:  [de-identified]  YOB: 1970  PCP:    Evelyn Milton MD  Room:   12/12  Code Status:    Prior    Chief Complaint:     Chief Complaint   Patient presents with    Cellulitis       History Obtained From:     patient, electronic medical record    History of Present Illness: The patient is a 45 y.o.  /  male who presents with right hand swelling and he is admitted to the hospital for the management of celluliltis. Patient with a medical history of type 2 diabetes mellitus, hyperlipidemia, hypertension presents acute onset right hand pain. Patient reports development of the small white pustule on the dorsal aspect of his right middle finger that started yesterday while he was traveling in [place]. He reports pain and swelling increasing over the past day with increasing redness and throbbing pain beginning from the middle finger, spreading throughout the hand towards the wrist.  Patient reports pain with flexion and extension of the hand. He denies IV drug use, fevers, chills, chest pain, or shortness of breath. Patient reports occasional alcohol and marijuana use. In the ED, patient was not found to be in acute distress. Vitals were within normal limits with the exception of an elevated blood pressure of 152/87. Relevant labs included blood glucose of 268, CRP of 35.5 WBC of 9.3. Other relevant studies include an x-ray of the right hand which showed no acute bony abnormalities. Patient was treated with Zosyn for possible cellulitis and was given Toradol for relief of pain.   Decision was made to admit the patient to [facility] with orthopedic consultation for further evaluation of his right hip pain. Past Medical History:     Past Medical History:   Diagnosis Date    Anxiety and depression     Chronic back pain     HLD (hyperlipidemia) 11/15/2015    Hypertension     Marijuana abuse 11/15/2015    Nephrolithiasis 11/18/2015    Type II or unspecified type diabetes mellitus without mention of complication, not stated as uncontrolled         Past SurgicalHistory:     History reviewed. No pertinent surgical history. Medications Prior to Admission:     Prior to Admission medications    Medication Sig Start Date End Date Taking? Authorizing Provider   LANTUS SOLOSTAR 100 UNIT/ML injection pen INJECT 35 UNITS SUBCUTANEOUSLY EVERY MORNING BEFORE BREAKFAST 8/6/21  Yes Sary Hathaway MD   metFORMIN (GLUCOPHAGE) 850 MG tablet Take 1 tablet by mouth 2 times daily (with meals) Take 850 mg by mouth 2 times daily (with meals) 8/6/21  Yes Sary Hathaway MD   TRUEPLUS PEN NEEDLES 32G X 4 MM MISC USE WITH INSULIN DAILY 10/22/21   Sary Hathaway MD   acetaminophen (TYLENOL) 500 MG tablet Take 1 tablet by mouth 4 times daily as needed for Pain 8/17/21   Joss Ayon MD   ibuprofen (IBU) 400 MG tablet Take 1 tablet by mouth every 6 hours as needed for Pain 8/17/21   Joss Ayon MD   pioglitazone (ACTOS) 15 MG tablet Take 1 tablet by mouth daily 12/23/20   Sary Hathaway MD   SITagliptin (JANUVIA) 50 MG tablet Take 1 tablet by mouth daily 12/17/20   Sary Hathaway MD   Alcohol Swabs PADS Please dispense according to patients insurance/device. Testing once a day 4/9/20   Sary Hathaway MD   sertraline (ZOLOFT) 25 MG tablet Take 1 tab daily and increase it to 50mg daily 3/23/20   Sary Hathaway MD   blood glucose monitor kit and supplies Test one time a day & as needed for symptoms of irregular blood glucose. 3/23/20   Sary Hathaway MD   nitroGLYCERIN (NITROSTAT) 0.4 MG SL tablet up to max of 3 total doses.  If no relief after 1 dose, call 911. 3/16/20 Ciaran Carlson,    Insulin Pen Needle 29G X 12.7MM MISC 1 each by Does not apply route daily To use with insulin 2/27/20   Carey Weldon MD   atorvastatin (LIPITOR) 20 MG tablet TAKE 1 TABLET BY MOUTH ONE TIME A DAY 2/27/20   Carey Weldon MD   aspirin (SM ASPIRIN ADULT LOW STRENGTH) 81 MG EC tablet TAKE 1 TABLET BY MOUTH ONE TIME A DAY 2/13/20   Carey Weldon MD   zoster recombinant adjuvanted vaccine [facility]) 50 MCG/0.5ML SUSR injection Inject 0.5 mLs into the muscle See Admin Instructions 1 dose now and repeat in 2-6 months 12/11/19   Carey Weldon MD   Lancets MISC Testing once a day. Please dispense according to patients insurance. 12/11/19   Carey Weldon MD   blood glucose monitor strips Testing once a day. Please dispense according to patients insurance. 12/11/19   Carey Weldon MD   Blood Pressure Monitor MISC Use daily to check BP 3/13/18   Carey Weldon MD        Allergies:     Patient has no known allergies. Social History:       Tobacco:   Patient  reports that he has never smoked. He has never used smokeless tobacco.  Alcohol:     Patient  reports current alcohol use. Drug Use: Patient  reports current drug use. Drug: Marijuana. Family History:     Family History   Problem Relation Age of Onset    Diabetes Father        Review of Systems:     Positive and Negative as described in HPI. Review of Systems   Constitutional: Negative for chills, fatigue and fever. HENT: Negative for rhinorrhea and sore throat. Respiratory: Negative for cough, chest tightness, shortness of breath and wheezing. Cardiovascular: Negative for chest pain, palpitations and leg swelling. Gastrointestinal: Negative for abdominal distention, abdominal pain, constipation, diarrhea, nausea and vomiting. Genitourinary: Negative for difficulty urinating, dysuria and hematuria. Musculoskeletal: Positive for joint swelling. Right hand swelling, pain, erythema   Skin: Positive for rash. Neurological: Negative for dizziness, weakness, light-headedness and headaches. Physical Exam:   BP (!) 152/87   Pulse 68   Temp 97.1 °F (36.2 °C) (Temporal)   Resp 16   Ht 6' 2\" (1.88 m)   Wt 200 lb (90.7 kg)   SpO2 100%   BMI 25.68 kg/m²   Temp (24hrs), Av.1 °F (36.2 °C), Min:97.1 °F (36.2 °C), Max:97.1 °F (36.2 °C)    No results for input(s): POCGLU in the last 72 hours. No intake or output data in the 24 hours ending 10/24/21 1835    Physical Exam  Constitutional:       General: He is not in acute distress. Appearance: Normal appearance. He is normal weight. HENT:      Head: Normocephalic and atraumatic. Right Ear: External ear normal.      Left Ear: External ear normal.      Nose: Nose normal.   Eyes:      Extraocular Movements: Extraocular movements intact. Cardiovascular:      Rate and Rhythm: Normal rate and regular rhythm. Pulses: Normal pulses. Heart sounds: Normal heart sounds. No murmur heard. Pulmonary:      Effort: Pulmonary effort is normal. No respiratory distress. Breath sounds: Normal breath sounds. No wheezing. Abdominal:      General: Bowel sounds are normal. There is no distension. Palpations: Abdomen is soft. Tenderness: There is no abdominal tenderness. There is no guarding or rebound. Musculoskeletal:         General: Swelling present. Right hand: Swelling present. Right lower leg: No edema. Left lower leg: No edema. Skin:     General: Skin is warm. Findings: Erythema (right hand) and rash (right hand) present. Comments: Dark puncture-like lesion on dorsum of right middle finger with extreme redness radiating throughout the hand and towards wrist   Neurological:      General: No focal deficit present. Mental Status: He is alert and oriented to person, place, and time. Mental status is at baseline.    Psychiatric:         Mood and Affect: Mood normal.         Behavior: Behavior normal. Thought Content:  Thought content normal.                 Investigations:     Laboratory Testing:  Recent Results (from the past 24 hour(s))   CBC Auto Differential    Collection Time: 10/24/21  4:18 PM   Result Value Ref Range    WBC 9.3 3.5 - 11.0 k/uL    RBC 4.75 4.5 - 5.9 m/uL    Hemoglobin 15.0 13.5 - 17.5 g/dL    Hematocrit 44.0 41 - 53 %    MCV 92.6 80 - 100 fL    MCH 31.7 26 - 34 pg    MCHC 34.2 31 - 37 g/dL    RDW 13.4 11.5 - 14.9 %    Platelets 251 055 - 263 k/uL    MPV 8.0 6.0 - 12.0 fL    NRBC Automated NOT REPORTED per 100 WBC    Differential Type NOT REPORTED     Seg Neutrophils 70 (H) 36 - 66 %    Lymphocytes 19 (L) 24 - 44 %    Monocytes 8 (H) 1 - 7 %    Eosinophils % 2 0 - 4 %    Basophils 1 0 - 2 %    Immature Granulocytes NOT REPORTED 0 %    Segs Absolute 6.60 1.3 - 9.1 k/uL    Absolute Lymph # 1.70 1.0 - 4.8 k/uL    Absolute Mono # 0.70 0.1 - 1.3 k/uL    Absolute Eos # 0.10 0.0 - 0.4 k/uL    Basophils Absolute 0.00 0.0 - 0.2 k/uL    Absolute Immature Granulocyte NOT REPORTED 0.00 - 0.30 k/uL    WBC Morphology NOT REPORTED     RBC Morphology NOT REPORTED     Platelet Estimate NOT REPORTED    Sedimentation Rate    Collection Time: 10/24/21  4:18 PM   Result Value Ref Range    Sed Rate 16 0 - 20 mm   C-Reactive Protein    Collection Time: 10/24/21  4:18 PM   Result Value Ref Range    CRP 35.5 (H) 0.0 - 5.0 mg/L   Basic Metabolic Panel w/ Reflex to MG    Collection Time: 10/24/21  4:18 PM   Result Value Ref Range    Glucose 268 (H) 70 - 99 mg/dL    BUN 15 6 - 20 mg/dL    CREATININE 0.59 (L) 0.70 - 1.20 mg/dL    Bun/Cre Ratio NOT REPORTED 9 - 20    Calcium 9.3 8.6 - 10.4 mg/dL    Sodium 137 135 - 144 mmol/L    Potassium 4.3 3.7 - 5.3 mmol/L    Chloride 99 98 - 107 mmol/L    CO2 28 20 - 31 mmol/L    Anion Gap 10 9 - 17 mmol/L    GFR Non-African American >60 >60 mL/min    GFR African American >60 >60 mL/min    GFR Comment          GFR Staging NOT REPORTED        Imaging/Diagnostics:  XR HAND RIGHT (MIN 3 VIEWS)    Result Date: 10/24/2021  EXAMINATION: THREE XRAY VIEWS OF THE RIGHT HAND 10/24/2021 12:57 pm COMPARISON: None. HISTORY: ORDERING SYSTEM PROVIDED HISTORY: puncture lesion on lateral aspect of right middle finger, r/o foreign body TECHNOLOGIST PROVIDED HISTORY: puncture lesion on lateral aspect of right middle finger, r/o foreign body Reason for Exam: Possible bug bite on lateral aspect of right middle finger, r/o foreign body. Swelling and redness x1 day. Acuity: Acute Type of Exam: Initial FINDINGS: The visualized bones are normal.  There is no evidence of fracture or dislocation. . The  joint spaces appear well maintained. Soft tissue swelling. No radiopaque foreign bodies identified. Vascular calcifications are seen compatible with atherosclerotic disease. .     No acute bony abnormalities are noted       Assessment :      Primary Problem  <principal problem not specified>    Active Hospital Problems    Diagnosis Date Noted    Cellulitis [L03.90] 10/24/2021    Mixed hyperlipidemia [E78.2] 03/13/2018    Diabetes mellitus ([facility] [address] [E11.9] 02/15/2013    Depression [N17. A] 02/15/2013       Plan:     Patient status Admit as inpatient in the  Med/Surge    · Right hand pain 2/2 cellulitis versus tenosynovitis  · Toradol for pain relief  · Zosyn 3.375 mg IV every 8 hours for possible cellulitis  · Orthopedic consultation  · Diabetes mellitus: Lantus 25 units subcutaneous every morning, medium dose insulin sliding scale, hypoglycemic protocols, POCT glucose  · Hyperlipidemia: Reports not taking atorvastatin, will obtain lipid panel    Code: Prior   DVT prophylaxis: None  GI prophylaxis: None  Diet: No diet orders on file   Consults: [address] [place] TO INTERNAL MEDICINE   Disposition: Home    Silvino Blanco MD  10/24/2021  6:35 PM     Copy sent to Dr. Evelyn Milton MD

## 2021-10-24 NOTE — ED PROVIDER NOTES
[facility] ED  Emergency Department Encounter  Emergency Medicine Resident     Pt Name: Clarice Moran  MRN: [id number]  [place] 1970  Date of evaluation: 10/24/21  PCP:  Lang Tanner MD    [address] [place] [address]       Chief Complaint   Patient presents with    Cellulitis       HISTORY Valerie  (Location/Symptom, Timing/Onset, Context/Setting, Quality, Duration, Modifying Anali Glimpse.)      Clarice Moran is a 45 y.o. male with past medical history significant for Diabetes mellitus, insulin-dependent with cute onset of right hand pain after patient had recent stay in a hotel in [place]. Patient noticed a whitish appearing lesion on the dorsal aspect of the hand which continued to worsen and spread over the past 1 day. Patient did not take anything prior to arrival.  Patient denies any IV drug use. Believes that he was bit by a bug, is unsure if there was any puncture lesions, unsure if there is a foreign body. Denies any fevers, chest pain, shortness of breath, abdominal pain, nausea, vomiting. Does note that the pain and swelling has increased over the past 1 day with increasing redness, throbbing pain in nature, spreading from middle finger throughout hand and towards the wrist.  Pain with flexion and extension of the hand, does have pain worsening with movement. Did not try anything prior to arrival including any Tylenol or Motrin. PAST MEDICAL / SURGICAL / SOCIAL / FAMILY HISTORY      has a past medical history of Anxiety and depression, Chronic back pain, HLD (hyperlipidemia), Hypertension, Marijuana abuse, Nephrolithiasis, and Type II or unspecified type diabetes mellitus without mention of complication, not stated as uncontrolled. has no past surgical history on file. Social:  reports that he has never smoked. He has never used smokeless tobacco. He reports current alcohol use. He reports current drug use. Drug: Marijuana.     Family Hx:   Family History Problem Relation Age of Onset    Diabetes Father         Allergies:  Patient has no known allergies. Home Medications:  Prior to Admission medications    Medication Sig Start Date End Date Taking? Authorizing Provider   TRUEPLUS PEN NEEDLES 32G X 4 MM MISC USE WITH INSULIN DAILY 10/22/21   Stephanie Nielson MD   acetaminophen (TYLENOL) 500 MG tablet Take 1 tablet by mouth 4 times daily as needed for Pain 8/17/21   Lissette Landeros MD   ibuprofen (IBU) 400 MG tablet Take 1 tablet by mouth every 6 hours as needed for Pain 8/17/21   Lissette Landeros MD   LANTUS SOLOSTAR 100 UNIT/ML injection pen INJECT 35 UNITS SUBCUTANEOUSLY EVERY MORNING BEFORE BREAKFAST 8/6/21   Stephanie Nielson MD   metFORMIN (GLUCOPHAGE) 850 MG tablet Take 1 tablet by mouth 2 times daily (with meals) Take 850 mg by mouth 2 times daily (with meals) 8/6/21   Stephanie Nielson MD   pioglitazone (ACTOS) 15 MG tablet Take 1 tablet by mouth daily 12/23/20   Stephanie Nielson MD   SITagliptin (JANUVIA) 50 MG tablet Take 1 tablet by mouth daily 12/17/20   Stephanie Nielson MD   Alcohol Swabs PADS Please dispense according to patients insurance/device. Testing once a day 4/9/20   Stephanie Nielson MD   sertraline (ZOLOFT) 25 MG tablet Take 1 tab daily and increase it to 50mg daily 3/23/20   Stephanie Nielson MD   blood glucose monitor kit and supplies Test one time a day & as needed for symptoms of irregular blood glucose. 3/23/20   Stephanie Nielson MD   nitroGLYCERIN (NITROSTAT) 0.4 MG SL tablet up to max of 3 total doses.  If no relief after 1 dose, call 911. 3/16/20   Florencio Orozco, DO   Insulin Pen Needle 29G X 12.7MM MISC 1 each by Does not apply route daily To use with insulin 2/27/20   Stephanie Nielson MD   atorvastatin (LIPITOR) 20 MG tablet TAKE 1 TABLET BY MOUTH ONE TIME A DAY 2/27/20   Stephanie Nielson MD   aspirin (SM ASPIRIN ADULT LOW STRENGTH) 81 MG EC tablet TAKE 1 TABLET BY MOUTH ONE TIME A DAY 2/13/20   Stephanie Nielson MD   zoster recombinant adjuvanted vaccine [facility]) 50 MCG/0.5ML SUSR injection Inject 0.5 mLs into the muscle See Admin Instructions 1 dose now and repeat in 2-6 months 12/11/19   Rosilyn Mortimer, MD   Lancets MISC Testing once a day. Please dispense according to patients insurance. 12/11/19   Rosilyn Mortimer, MD   blood glucose monitor strips Testing once a day. Please dispense according to patients insurance. 12/11/19   Rosilyn Mortimer, MD   Blood Pressure Monitor MISC Use daily to check BP 3/13/18   Rosilyn Mortimer, MD       REVIEW OFSYSTEMS    (2-9 systems for level 4, 10 or more for level 5)      Review of Systems   Constitutional: Negative for chills and fever. HENT: Negative for congestion, ear pain and rhinorrhea. Eyes: Negative for pain and discharge. Respiratory: Negative for cough and shortness of breath. Cardiovascular: Negative for chest pain and palpitations. Gastrointestinal: Negative for abdominal pain, constipation, diarrhea, nausea and vomiting. Genitourinary: Negative for difficulty urinating and hematuria. Musculoskeletal: Negative for arthralgias and myalgias. Skin: Positive for color change, rash and wound. Right hand swelling, erythema, pain   Neurological: Negative for light-headedness and headaches. PHYSICAL EXAM   (up to 7 for level 4, 8 or more forlevel 5)      INITIAL VITALS:   Vitals:    10/24/21 1542   BP: (!) 152/87   Pulse:    Resp:    Temp:    SpO2:         Physical Exam  Vitals and nursing note reviewed. Constitutional:       General: He is not in acute distress. Appearance: Normal appearance. He is not ill-appearing, toxic-appearing or diaphoretic. HENT:      Head: Normocephalic and atraumatic. Nose: Nose normal.      Mouth/Throat:      Mouth: Mucous membranes are moist.      Pharynx: Oropharynx is clear. Eyes:      General:         Right eye: No discharge. Left eye: No discharge. Extraocular Movements: Extraocular movements intact.       Pupils: Pupils are equal, round, and reactive to light. Cardiovascular:      Rate and Rhythm: Normal rate and regular rhythm. Pulses: Normal pulses. Heart sounds: No murmur heard. No friction rub. No gallop. Pulmonary:      Effort: Pulmonary effort is normal. No respiratory distress. Breath sounds: Normal breath sounds. No wheezing, rhonchi or rales. Abdominal:      General: Abdomen is flat. There is no distension. Palpations: Abdomen is soft. Tenderness: There is no abdominal tenderness. There is no guarding or rebound. Musculoskeletal:         General: Swelling (right hand) present. Cervical back: Normal range of motion. Right lower leg: No edema. Left lower leg: No edema. Comments: Right hand swelling, erythema, what appears to be a puncture type lesion of the lateral aspect of the right middle finger with significant erythema, swelling throughout dorsal aspect of hand most significantly although palmar aspect does have erythema and tenderness to palpation at the base of right middle finger and through flexor tendon sheath, significant swelling and erythema throughout right hand with pain through passive active motion. Good capillary refill in all 5 fingers. Area is warm to the touch. Skin:     General: Skin is warm and dry. Capillary Refill: Capillary refill takes less than 2 seconds. Findings: Erythema (right hand) and rash (right hand) present. Neurological:      General: No focal deficit present. Mental Status: He is alert and oriented to person, place, and time. Psychiatric:         Mood and Affect: Mood normal.         Behavior: Behavior normal.       Wound:               DIFFERENTIAL  DIAGNOSIS       DDX: Cellulitis versus flexor tenosynovitis versus other musculoskeletal injury    Initial MDM/Plan: 45 y.o. male with cute onset of right hand pain and swelling worsening over the past 1 day.   Upon my initial examination patient is resting comfortably cot, no acute distress, no respiratory distress, speaking full sentences, vital signs upon arrival are within normal limits including patient being afebrile. Sickle exam concerning for flexor tenosynovitis this versus cellulitis. Will obtain x-ray imaging to rule out foreign body versus bony destruction. Will obtain baseline labs including CBC to evaluate white blood cell count, BMP, inflammatory markers CRP and sed rate. Will obtain IV access and Toradol initially for pain control. Pending imaging and lab work-up will decide on disposition. EMERGENCY DEPARTMENT COURSE:  ED Course as of Oct 24 1736   Sun Oct 24, 2021   1658 Will discuss with orthopedic surgeon, Dr. Peace Found     [MA]   1945 Basic Metabolic Panel w/ Reflex to MG(!):    Glucose 268(!)   BUN 15   Creatinine 0.59(!)   Bun/Cre Ratio NOT REPORTED   Calcium 9.3   Sodium 137   Potassium 4.3   Chloride 99   CO2 28   Anion Gap 10   GFR Non- >60   GFR  >60   GFR Comment        GFR Staging NOT REPORTED [MA]   1728 Discussed with Dr. Dante Hernandez, recommending zosyn and no vancomycin or cefepime. Will discuss with residents. [MA]   [id number] Patient accepted by internal medicine team with Dr. Hebert Estevez as consult. Patient will be admitted to internal medicine team for IV antibiotics and monitoring of the cellulitis. Patient admitted and boarding in the emergency department awaiting bed placement.     [MA]      ED Course User Index  [MA] Blanche Prater DO        DIAGNOSTIC RESULTS / EMERGENCYDEPARTMENT COURSE / MDM     LABS:  Labs Reviewed   CBC WITH AUTO DIFFERENTIAL - Abnormal; Notable for the following components:       Result Value    Seg Neutrophils 70 (*)     Lymphocytes 19 (*)     Monocytes 8 (*)     All other components within normal limits   C-REACTIVE PROTEIN - Abnormal; Notable for the following components:    CRP 35.5 (*)     All other components within normal limits   BASIC METABOLIC PANEL W/ REFLEX TO MG FOR LOW K - Abnormal; Notable for the following components:    Glucose 268 (*)     CREATININE 0.59 (*)     All other components within normal limits   SEDIMENTATION RATE   VANCOMYCIN, TROUGH         RADIOLOGY:  XR HAND RIGHT (MIN 3 VIEWS)    Result Date: 10/24/2021  EXAMINATION: THREE XRAY VIEWS OF THE RIGHT HAND 10/24/2021 12:57 pm COMPARISON: None. HISTORY: ORDERING SYSTEM PROVIDED HISTORY: puncture lesion on lateral aspect of right middle finger, r/o foreign body TECHNOLOGIST PROVIDED HISTORY: puncture lesion on lateral aspect of right middle finger, r/o foreign body Reason for Exam: Possible bug bite on lateral aspect of right middle finger, r/o foreign body. Swelling and redness x1 day. Acuity: Acute Type of Exam: Initial FINDINGS: The visualized bones are normal.  There is no evidence of fracture or dislocation. . The  joint spaces appear well maintained. Soft tissue swelling. No radiopaque foreign bodies identified. Vascular calcifications are seen compatible with atherosclerotic disease. .     No acute bony abnormalities are noted           PROCEDURES:  None    CONSULTS:  IP CONSULT TO ORTHOPEDIC SURGERY  IP CONSULT TO INTERNAL MEDICINE  PHARMACY TO DOSE VANCOMYCIN      FINAL IMPRESSION      1. Cellulitis of right upper extremity          DISPOSITION / PLAN     DISPOSITION Admitted 10/24/2021 05:35:05 PM      PATIENT REFERRED TO:  No follow-up provider specified.     DISCHARGE MEDICATIONS:  New Prescriptions    No medications on file       Sharon Pitts DO  Emergency Medicine Resident    (Please note that portions of this note were completed with a voice recognition program.Efforts were made to edit the dictations but occasionally words are mis-transcribed.)       Sharon Pitts DO  Resident  10/24/21 6347

## 2021-10-24 NOTE — ED PROVIDER NOTES
[address] [facility] ED     Emergency Department     Faculty Attestation        I performed a history and physical examination of the patient and discussed management with the resident. I reviewed the residents note and agree with the documented findings and plan of care. Any areas of disagreement are noted on the chart. I was personally present for the key portions of any procedures. I have documented in the chart those procedures where I was not present during the key portions. I have reviewed the emergency nurses triage note. I agree with the chief complaint, past medical history, past surgical history, allergies, medications, social and family history as documented unless otherwise noted below. Documentation of the HPI, Physical Exam and Medical Decision Making performed by medical students or scribes is based on my personal performance of the HPI, PE and MDM. For Physician Assistant/ Nurse Practitioner cases/documentation I have have had a face to face evaluation with this patient and have completed at least one if not all key elements of the E/M (history, physical exam, and MDM). Additional findings are as noted. Pertinent Comments     Right hand pain and swelling, erythema for the past several days. Thinks he may have been bit by a spider or another bug. On exam the dorsal aspect of his right hand and will significantly the middle finger are erythematous, swollen. He does have difficulty making a full fist.  Has very mild erythema on the palmar side of his hand. Clinically considering an flexor tenosynovitis, cellulitis, low suspicion for necrotizing infection. Does not use IV drugs. Will get x-ray, lab work, speak with orthopedic surgery. 5:04 PM EDT  Spoke with orthopedic surgery Dr. Peace Found he recommended medicine admission and he will see the patient in the morning as a consult.       The care is provided during an unprecedented national emergency due to the novel coronavirus, COVID-19.     (Please note that portions of this note were completed with a voice recognition program.  Efforts were made to edit the dictations but occasionally words are mis-transcribed.)    Mayra Esteves,   Attending Emergency Physician         Mayra Esteves, DO  10/24/21 [id number] Sebewaing Babar, DO  10/24/21 2259

## 2021-10-24 NOTE — ED NOTES
Mode of arrival (squad #, walk in, police, etc) : walk in        Chief complaint(s): cellulitis        Arrival Note (brief scenario, treatment PTA, etc). : Pt with redness, warmth, and pain to right hand sitarting yesterday. Pt thinks a bug may have bitten him. Pt denies fevers. Pt is reluctant to move his hand. C= \"Have you ever felt that you should Cut down on your drinking? \"  No  A= \"Have people Annoyed you by criticizing your drinking? \"  No  G= \"Have you ever felt bad or Guilty about your drinking? \"  No  E= \"Have you ever had a drink as an Eye-opener first thing in the morning to steady your nerves or to help a hangover? \"  No      Deferred []      Reason for deferring: N/A    *If yes to two or more: probable alcohol abuse. Oriana Naidu RN  10/24/21 9963

## 2021-10-25 LAB
ANION GAP SERPL CALCULATED.3IONS-SCNC: 8 MMOL/L (ref 9–17)
BUN BLDV-MCNC: 20 MG/DL (ref 6–20)
BUN/CREAT BLD: ABNORMAL (ref 9–20)
CALCIUM SERPL-MCNC: 8.8 MG/DL (ref 8.6–10.4)
CHLORIDE BLD-SCNC: 106 MMOL/L (ref 98–107)
CHOLESTEROL/HDL RATIO: 2.5
CHOLESTEROL: 152 MG/DL
CO2: 27 MMOL/L (ref 20–31)
CREAT SERPL-MCNC: 0.82 MG/DL (ref 0.7–1.2)
ESTIMATED AVERAGE GLUCOSE: 229 MG/DL
GFR AFRICAN AMERICAN: >60 ML/MIN
GFR NON-AFRICAN AMERICAN: >60 ML/MIN
GFR SERPL CREATININE-BSD FRML MDRD: ABNORMAL ML/MIN/{1.73_M2}
GFR SERPL CREATININE-BSD FRML MDRD: ABNORMAL ML/MIN/{1.73_M2}
GLUCOSE BLD-MCNC: 201 MG/DL (ref 75–110)
GLUCOSE BLD-MCNC: 206 MG/DL (ref 75–110)
GLUCOSE BLD-MCNC: 225 MG/DL (ref 75–110)
GLUCOSE BLD-MCNC: 248 MG/DL (ref 70–99)
GLUCOSE BLD-MCNC: 293 MG/DL (ref 75–110)
HBA1C MFR BLD: 9.6 % (ref 4–6)
HCT VFR BLD CALC: 39.3 % (ref 41–53)
HDLC SERPL-MCNC: 62 MG/DL
HEMOGLOBIN: 13.7 G/DL (ref 13.5–17.5)
LDL CHOLESTEROL: 76 MG/DL (ref 0–130)
MCH RBC QN AUTO: 32.1 PG (ref 26–34)
MCHC RBC AUTO-ENTMCNC: 34.8 G/DL (ref 31–37)
MCV RBC AUTO: 92.2 FL (ref 80–100)
NRBC AUTOMATED: ABNORMAL PER 100 WBC
PDW BLD-RTO: 13.3 % (ref 11.5–14.9)
PLATELET # BLD: 187 K/UL (ref 150–450)
PMV BLD AUTO: 8.1 FL (ref 6–12)
POTASSIUM SERPL-SCNC: 3.9 MMOL/L (ref 3.7–5.3)
RBC # BLD: 4.26 M/UL (ref 4.5–5.9)
SARS-COV-2, RAPID: NOT DETECTED
SODIUM BLD-SCNC: 141 MMOL/L (ref 135–144)
SPECIMEN DESCRIPTION: NORMAL
TRIGL SERPL-MCNC: 68 MG/DL
VLDLC SERPL CALC-MCNC: NORMAL MG/DL (ref 1–30)
WBC # BLD: 8.8 K/UL (ref 3.5–11)

## 2021-10-25 PROCEDURE — 87070 CULTURE OTHR SPECIMN AEROBIC: CPT

## 2021-10-25 PROCEDURE — 86403 PARTICLE AGGLUT ANTBDY SCRN: CPT

## 2021-10-25 PROCEDURE — 36415 COLL VENOUS BLD VENIPUNCTURE: CPT

## 2021-10-25 PROCEDURE — 80048 BASIC METABOLIC PNL TOTAL CA: CPT

## 2021-10-25 PROCEDURE — 2060000000 HC ICU INTERMEDIATE R&B

## 2021-10-25 PROCEDURE — 0J9J3ZZ DRAINAGE OF RIGHT HAND SUBCUTANEOUS TISSUE AND FASCIA, PERCUTANEOUS APPROACH: ICD-10-PCS | Performed by: INTERNAL MEDICINE

## 2021-10-25 PROCEDURE — 80061 LIPID PANEL: CPT

## 2021-10-25 PROCEDURE — 87635 SARS-COV-2 COVID-19 AMP PRB: CPT

## 2021-10-25 PROCEDURE — 87205 SMEAR GRAM STAIN: CPT

## 2021-10-25 PROCEDURE — G0378 HOSPITAL OBSERVATION PER HR: HCPCS

## 2021-10-25 PROCEDURE — 6360000002 HC RX W HCPCS: Performed by: STUDENT IN AN ORGANIZED HEALTH CARE EDUCATION/TRAINING PROGRAM

## 2021-10-25 PROCEDURE — 82947 ASSAY GLUCOSE BLOOD QUANT: CPT

## 2021-10-25 PROCEDURE — 2580000003 HC RX 258: Performed by: NURSE PRACTITIONER

## 2021-10-25 PROCEDURE — 6370000000 HC RX 637 (ALT 250 FOR IP): Performed by: ORTHOPAEDIC SURGERY

## 2021-10-25 PROCEDURE — 2580000003 HC RX 258: Performed by: STUDENT IN AN ORGANIZED HEALTH CARE EDUCATION/TRAINING PROGRAM

## 2021-10-25 PROCEDURE — 87186 SC STD MICRODIL/AGAR DIL: CPT

## 2021-10-25 PROCEDURE — 85027 COMPLETE CBC AUTOMATED: CPT

## 2021-10-25 PROCEDURE — 96376 TX/PRO/DX INJ SAME DRUG ADON: CPT

## 2021-10-25 PROCEDURE — 2709999900 HC NON-CHARGEABLE SUPPLY: Performed by: ORTHOPAEDIC SURGERY

## 2021-10-25 PROCEDURE — 3600000012 HC SURGERY LEVEL 2 ADDTL 15MIN: Performed by: ORTHOPAEDIC SURGERY

## 2021-10-25 PROCEDURE — 6370000000 HC RX 637 (ALT 250 FOR IP): Performed by: STUDENT IN AN ORGANIZED HEALTH CARE EDUCATION/TRAINING PROGRAM

## 2021-10-25 PROCEDURE — 2500000003 HC RX 250 WO HCPCS: Performed by: ORTHOPAEDIC SURGERY

## 2021-10-25 PROCEDURE — 99253 IP/OBS CNSLTJ NEW/EST LOW 45: CPT | Performed by: ORTHOPAEDIC SURGERY

## 2021-10-25 PROCEDURE — 3600000002 HC SURGERY LEVEL 2 BASE: Performed by: ORTHOPAEDIC SURGERY

## 2021-10-25 PROCEDURE — 87075 CULTR BACTERIA EXCEPT BLOOD: CPT

## 2021-10-25 PROCEDURE — 99223 1ST HOSP IP/OBS HIGH 75: CPT | Performed by: INTERNAL MEDICINE

## 2021-10-25 RX ORDER — INSULIN GLARGINE 100 [IU]/ML
30 INJECTION, SOLUTION SUBCUTANEOUS EVERY MORNING
Status: DISCONTINUED | OUTPATIENT
Start: 2021-10-26 | End: 2021-10-26 | Stop reason: HOSPADM

## 2021-10-25 RX ORDER — LIDOCAINE HYDROCHLORIDE 10 MG/ML
INJECTION, SOLUTION INFILTRATION; PERINEURAL PRN
Status: DISCONTINUED | OUTPATIENT
Start: 2021-10-25 | End: 2021-10-25 | Stop reason: ALTCHOICE

## 2021-10-25 RX ADMIN — PIPERACILLIN SODIUM AND TAZOBACTAM SODIUM 3375 MG: 3; .375 INJECTION, POWDER, LYOPHILIZED, FOR SOLUTION INTRAVENOUS at 17:27

## 2021-10-25 RX ADMIN — KETOROLAC TROMETHAMINE 15 MG: 30 INJECTION, SOLUTION INTRAMUSCULAR; INTRAVENOUS at 17:27

## 2021-10-25 RX ADMIN — SODIUM CHLORIDE 25 ML: 9 INJECTION, SOLUTION INTRAVENOUS at 00:26

## 2021-10-25 RX ADMIN — HYDROCODONE BITARTRATE AND ACETAMINOPHEN 1 TABLET: 5; 325 TABLET ORAL at 03:15

## 2021-10-25 RX ADMIN — KETOROLAC TROMETHAMINE 15 MG: 30 INJECTION, SOLUTION INTRAMUSCULAR; INTRAVENOUS at 07:53

## 2021-10-25 RX ADMIN — HYDROCODONE BITARTRATE AND ACETAMINOPHEN 1 TABLET: 5; 325 TABLET ORAL at 14:22

## 2021-10-25 RX ADMIN — HYDROCODONE BITARTRATE AND ACETAMINOPHEN 1 TABLET: 5; 325 TABLET ORAL at 08:38

## 2021-10-25 RX ADMIN — HYDROCODONE BITARTRATE AND ACETAMINOPHEN 1 TABLET: 5; 325 TABLET ORAL at 20:17

## 2021-10-25 RX ADMIN — INSULIN LISPRO 3 UNITS: 100 INJECTION, SOLUTION INTRAVENOUS; SUBCUTANEOUS at 22:08

## 2021-10-25 RX ADMIN — PIPERACILLIN SODIUM AND TAZOBACTAM SODIUM 3375 MG: 3; .375 INJECTION, POWDER, LYOPHILIZED, FOR SOLUTION INTRAVENOUS at 07:53

## 2021-10-25 RX ADMIN — PIPERACILLIN SODIUM AND TAZOBACTAM SODIUM 3375 MG: 3; .375 INJECTION, POWDER, LYOPHILIZED, FOR SOLUTION INTRAVENOUS at 00:26

## 2021-10-25 ASSESSMENT — ENCOUNTER SYMPTOMS
ABDOMINAL PAIN: 0
COUGH: 0
CHEST TIGHTNESS: 0
VOMITING: 0
WHEEZING: 0
CONSTIPATION: 0
RHINORRHEA: 0
NAUSEA: 0
ABDOMINAL DISTENTION: 0
DIARRHEA: 0
SORE THROAT: 0
SHORTNESS OF BREATH: 0

## 2021-10-25 ASSESSMENT — PAIN SCALES - GENERAL
PAINLEVEL_OUTOF10: 7
PAINLEVEL_OUTOF10: 7
PAINLEVEL_OUTOF10: 6
PAINLEVEL_OUTOF10: 7
PAINLEVEL_OUTOF10: 8
PAINLEVEL_OUTOF10: 7
PAINLEVEL_OUTOF10: 6
PAINLEVEL_OUTOF10: 6

## 2021-10-25 ASSESSMENT — PAIN DESCRIPTION - ORIENTATION
ORIENTATION: RIGHT
ORIENTATION: RIGHT

## 2021-10-25 ASSESSMENT — PAIN DESCRIPTION - PAIN TYPE
TYPE: ACUTE PAIN
TYPE: SURGICAL PAIN

## 2021-10-25 ASSESSMENT — PAIN DESCRIPTION - LOCATION
LOCATION: HAND
LOCATION: HAND

## 2021-10-25 NOTE — PLAN OF CARE
Problem: Pain:  Goal: Pain level will decrease  Description: Pain level will decrease  Outcome: Ongoing  Goal: Control of acute pain  Description: Control of acute pain  Outcome: Ongoing  Patient verbalizes adequate pain control during this shift. Both scheduled and PRN medication available and given appropriately; scheduled norco given every 6 hours. Patient resting comfortably without guarding or grimacing this shift. Problem: Physical Regulation:  Goal: Will remain free from infection  Description: Will remain free from infection  Outcome: Ongoing     Problem: Skin Integrity:  Goal: Demonstration of wound healing without infection will improve  Description: Demonstration of wound healing without infection will improve  Outcome: Ongoing  Patient admitted with cellulitis of the right hand; surgical marker outlines current infection and no further growth of reddened area noted during this shift.

## 2021-10-25 NOTE — BRIEF OP NOTE
Brief Postoperative Note      Patient: Ansley Ratliff  YOB: 1970  MRN: [id number]    Date of Procedure: 10/25/2021    Pre-Op Diagnosis: RIGHT HAND CELLULITIS abscess    Post-Op Diagnosis: Same       Procedure(s):  HAND INCISION AND DRAINAGE  Middle finger  Surgeon(s):  Tammi Ovalle MD    Assistant:  * No surgical staff found *    Anesthesia: Local    Estimated Blood Loss (mL): Minimal    Complications: None    Specimens:   ID Type Source Tests Collected by Time Destination   1 : RIGHT MIDDLE FINGER Swab Finger CULTURE, WOUND, CULTURE, ANAEROBIC AND AEROBIC Tammi Ovalle MD 10/25/2021 1818        Implants:  * No implants in log *      Drains: * No LDAs found *    Findings: see dictation    Electronically signed by Tammi Ovalle MD on 10/25/2021 at 6:33 PM

## 2021-10-25 NOTE — CARE COORDINATION
CASE MANAGEMENT NOTE:    Admission Date:  10/24/2021 Lazarus Redmond is a 45 y.o.  male    Admitted for : Cellulitis [L03.90]  Cellulitis of right upper extremity [L03.113]    Met with:  Patient    PCP:  Dr. Zoya Morse MD                                Insurance:  [facility]      Is patient alert and oriented at time of discussion:  Yes    Current Residence/ Living Arrangements:  independently at home             Current Services PTA:  No    Does patient go to outpatient dialysis: No  If yes, location and chair time: N/A    Is patient agreeable to VNS: No    Freedom of choice provided:  Yes    List of [address] provided: No    VNS chosen:  No    DME: Glucometer - States it is old and wants a new one with all new supplies    Home Oxygen: No    Nebulizer: No    CPAP/BIPAP: No    Supplier: N/A    Potential Assistance Needed: Yes    SNF needed: No    Freedom of choice and list provided: NA    Pharmacy:  [facility]       Does Patient want to use MEDS to BEDS? No    Is patient currently receiving oral anticoagulation therapy? No    Is the Patient an [facility] with Readmission Risk Score greater than 14%? No  If yes, pt needs a follow up appointment made within 7 days. Family Members/Caregivers that pt would like involved in their care:    Yes    If yes, list name here: Mother Cristo    Transportation Provider:  Patient             Discharge Plan:  10/25: [facility] - From 1-story home with his mother. Patient is independent and drives. DME - old glucometer - Would like a new glucometer with all new supplies. Declines VNS - States he can do any dressing changes himself or his GF can. Going for I&D right hand today. On IV Zosyn. ORANGE HEADER - 10%.  //BHAVNA                 Electronically signed by: Christy Fragoso RN on 10/25/2021 at 11:56 AM

## 2021-10-25 NOTE — PLAN OF CARE
Safety maintained, call light is within reach, no s/s or c/o distress, bed is low/locked, side rails are up x2, pt medicated as ordered and PRN  Problem: Pain:  Goal: Pain level will decrease  Description: Pain level will decrease  10/25/2021 1610 by Clarice Quinteros RN  Outcome: Ongoing  10/25/2021 0459 by Jarad Alfredo RN  Outcome: Ongoing  Goal: Control of acute pain  Description: Control of acute pain  10/25/2021 1610 by Clarice Quinteros RN  Outcome: Ongoing  10/25/2021 0459 by Jarad Alfredo RN  Outcome: Ongoing  Goal: Control of chronic pain  Description: Control of chronic pain  Outcome: Ongoing     Problem: Physical Regulation:  Goal: Diagnostic test results will improve  Description: Diagnostic test results will improve  Outcome: Ongoing  Goal: Will remain free from infection  Description: Will remain free from infection  10/25/2021 1610 by Clarice Quinteros RN  Outcome: Ongoing  10/25/2021 0459 by Jarad Alfredo RN  Outcome: Ongoing  Goal: Ability to maintain vital signs within normal range will improve  Description: Ability to maintain vital signs within normal range will improve  Outcome: Ongoing     Problem: Skin Integrity:  Goal: Demonstration of wound healing without infection will improve  Description: Demonstration of wound healing without infection will improve  10/25/2021 1610 by Clarice Quinteros RN  Outcome: Ongoing  10/25/2021 0459 by Jarad Alfredo RN  Outcome: Ongoing  Goal: Complications related to intravenous access or infusion will be avoided or minimized  Description: Complications related to intravenous access or infusion will be avoided or minimized  Outcome: Ongoing     Problem: Skin Integrity:  Goal: Demonstration of wound healing without infection will improve  Description: Demonstration of wound healing without infection will improve  10/25/2021 1610 by Clarice Quinteros RN  Outcome: Ongoing  10/25/2021 0459 by Jarad Alfredo RN  Outcome: Ongoing  Goal: Complications related to intravenous access or infusion will be avoided or minimized  Description: Complications related to intravenous access or infusion will be avoided or minimized  Outcome: Ongoing

## 2021-10-25 NOTE — PROGRESS NOTES
[address]    PROGRESS NOTE             10/25/2021    2:57 PM    Name:   Bronwyn De Leon  MRN:     [id number]     Acct:      [de-identified]   Room:   2111/2111-01   Day:  1  Admit Date:  10/24/2021  3:42 PM    PCP:  Heide Proctor MD  Code Status:  Full Code    Subjective:     C/C:   Chief Complaint   Patient presents with    Cellulitis     Interval History Status: improved. No acute events overnight. Patient seen at bedside this morning. Patient reports improved pain, range of motion, and reduced erythema of right hand. He denies chest pain, shortness of breath, fevers, chills, nausea, vomiting. Brief History:     The patient is a 45 y.o.  /  male who presents with right hand swelling and he is admitted to the hospital for the management of celluliltis. Patient with a medical history of type 2 diabetes mellitus, hyperlipidemia, hypertension presents acute onset right hand pain. Patient reports development of the small white pustule on the dorsal aspect of his right middle finger that started yesterday while he was traveling in [place]. He reports pain and swelling increasing over the past day with increasing redness and throbbing pain beginning from the middle finger, spreading throughout the hand towards the wrist.  Patient reports pain with flexion and extension of the hand. He denies IV drug use, fevers, chills, chest pain, or shortness of breath. Patient reports occasional alcohol and marijuana use.      In the ED, patient was not found to be in acute distress. Vitals were within normal limits with the exception of an elevated blood pressure of 152/87. Relevant labs included blood glucose of 268, CRP of 35.5 WBC of 9.3. Other relevant studies include an x-ray of the right hand which showed no acute bony abnormalities.   Patient was treated with Zosyn for possible cellulitis and was given Toradol for relief of pain.  [facility] for further evaluation of his right hip pain. Review of Systems:     Review of Systems   Constitutional: Negative for chills, fatigue and fever. HENT: Negative for rhinorrhea and sore throat. Respiratory: Negative for cough, chest tightness, shortness of breath and wheezing. Cardiovascular: Negative for chest pain, palpitations and leg swelling. Gastrointestinal: Negative for abdominal distention, abdominal pain, constipation, diarrhea, nausea and vomiting. Genitourinary: Negative for difficulty urinating, dysuria and hematuria. Musculoskeletal: Positive for joint swelling. Right hand swelling, pain, erythema   Skin: Positive for rash. Neurological: Negative for dizziness, weakness, light-headedness and headaches. Medications: Allergies:  Patient has no known allergies.     Current Meds:   Scheduled Meds:    [START ON 10/26/2021] insulin glargine  30 Units SubCUTAneous QAM    piperacillin-tazobactam  3,375 mg IntraVENous Q8H    insulin lispro  0-12 Units SubCUTAneous TID WC    insulin lispro  0-6 Units SubCUTAneous Nightly    HYDROcodone 5 mg - acetaminophen  1 tablet Oral Q6H    sodium chloride flush  5-40 mL IntraVENous 2 times per day    enoxaparin  40 mg SubCUTAneous Daily     Continuous Infusions:    dextrose      sodium chloride 25 mL (10/25/21 0026)     PRN Meds: nitroGLYCERIN, glucose, dextrose, glucagon (rDNA), dextrose, ketorolac, sodium chloride flush, sodium chloride, ondansetron **OR** ondansetron, magnesium hydroxide, acetaminophen **OR** acetaminophen    Data:     Past Medical History:    Past Medical History:   Diagnosis Date    Anxiety and depression     Chronic back pain     HLD (hyperlipidemia) 11/15/2015    Hypertension     Marijuana abuse 11/15/2015    Nephrolithiasis 11/18/2015    Type II or unspecified type diabetes mellitus without mention of complication, not stated as uncontrolled        Social History:    Tobacco:   Patient  reports that he has never smoked. He has never used smokeless tobacco.  Alcohol:     Patient  reports current alcohol use. Drug Use: Patient  reports current drug use. Drug: Marijuana. Family History:   Family History   Problem Relation Age of Onset    Diabetes Father        Vitals:  /85   Pulse 58   Temp 97.7 °F (36.5 °C) (Oral)   Resp 16   Ht 6' 2\" (1.88 m)   Wt 200 lb (90.7 kg)   SpO2 97%   BMI 25.68 kg/m²   Temp (24hrs), Av.8 °F (36.6 °C), Min:97.1 °F (36.2 °C), Max:98.6 °F (37 °C)    Vitals:    10/24/21 2019 10/25/21 0030 10/25/21 0715 10/25/21 1230   BP:  129/82 139/87 138/85   Pulse: 73 66 60 58   Resp:  16 16 16   Temp:  98.1 °F (36.7 °C) 97.6 °F (36.4 °C) 97.7 °F (36.5 °C)   TempSrc:  Oral Oral Oral   SpO2:  96% 99% 97%   Weight:       Height:           O2 Requirements: Saturating 99% on room air    Lines/Drains/Access: Peripheral IV left forearm    I/O(24Hr):     Intake/Output Summary (Last 24 hours) at 10/25/2021 1457  Last data filed at 10/25/2021 0803  Gross per 24 hour   Intake 1050 ml   Output    Net 1050 ml       Labs:  Recent Results (from the past 24 hour(s))   CBC Auto Differential    Collection Time: 10/24/21  4:18 PM   Result Value Ref Range    WBC 9.3 3.5 - 11.0 k/uL    RBC 4.75 4.5 - 5.9 m/uL    Hemoglobin 15.0 13.5 - 17.5 g/dL    Hematocrit 44.0 41 - 53 %    MCV 92.6 80 - 100 fL    MCH 31.7 26 - 34 pg    MCHC 34.2 31 - 37 g/dL    RDW 13.4 11.5 - 14.9 %    Platelets 888 008 - 262 k/uL    MPV 8.0 6.0 - 12.0 fL    NRBC Automated NOT REPORTED per 100 WBC    Differential Type NOT REPORTED     Seg Neutrophils 70 (H) 36 - 66 %    Lymphocytes 19 (L) 24 - 44 %    Monocytes 8 (H) 1 - 7 %    Eosinophils % 2 0 - 4 %    Basophils 1 0 - 2 %    Immature Granulocytes NOT REPORTED 0 %    Segs Absolute 6.60 1.3 - 9.1 k/uL    Absolute Lymph # 1.70 1.0 - 4.8 k/uL    Absolute Mono # 0.70 0.1 - 1.3 k/uL    Absolute Eos # 0. 10 0.0 - 0.4 k/uL    Basophils Absolute 0.00 0.0 - 0.2 k/uL    Absolute Immature Granulocyte NOT REPORTED 0.00 - 0.30 k/uL    WBC Morphology NOT REPORTED     RBC Morphology NOT REPORTED     Platelet Estimate NOT REPORTED    Sedimentation Rate    Collection Time: 10/24/21  4:18 PM   Result Value Ref Range    Sed Rate 16 0 - 20 mm   C-Reactive Protein    Collection Time: 10/24/21  4:18 PM   Result Value Ref Range    CRP 35.5 (H) 0.0 - 5.0 mg/L   Basic Metabolic Panel w/ Reflex to MG    Collection Time: 10/24/21  4:18 PM   Result Value Ref Range    Glucose 268 (H) 70 - 99 mg/dL    BUN 15 6 - 20 mg/dL    CREATININE 0.59 (L) 0.70 - 1.20 mg/dL    Bun/Cre Ratio NOT REPORTED 9 - 20    Calcium 9.3 8.6 - 10.4 mg/dL    Sodium 137 135 - 144 mmol/L    Potassium 4.3 3.7 - 5.3 mmol/L    Chloride 99 98 - 107 mmol/L    CO2 28 20 - 31 mmol/L    Anion Gap 10 9 - 17 mmol/L    GFR Non-African American >60 >60 mL/min    GFR African American >60 >60 mL/min    GFR Comment          GFR Staging NOT REPORTED    Hemoglobin A1C    Collection Time: 10/24/21  4:18 PM   Result Value Ref Range    Hemoglobin A1C 9.6 (H) 4.0 - 6.0 %    Estimated Avg Glucose 229 mg/dL   POC Glucose Fingerstick    Collection Time: 10/24/21  9:35 PM   Result Value Ref Range    POC Glucose 289 (H) 75 - 110 mg/dL   Lipid Profile    Collection Time: 10/25/21  5:59 AM   Result Value Ref Range    Cholesterol 152 <200 mg/dL    HDL 62 >40 mg/dL    LDL Cholesterol 76 0 - 130 mg/dL    Chol/HDL Ratio 2.5 <5    Triglycerides 68 <150 mg/dL    VLDL NOT REPORTED 1 - 30 mg/dL   Basic Metabolic Panel w/ Reflex to MG    Collection Time: 10/25/21  5:59 AM   Result Value Ref Range    Glucose 248 (H) 70 - 99 mg/dL    BUN 20 6 - 20 mg/dL    CREATININE 0.82 0.70 - 1.20 mg/dL    Bun/Cre Ratio NOT REPORTED 9 - 20    Calcium 8.8 8.6 - 10.4 mg/dL    Sodium 141 135 - 144 mmol/L    Potassium 3.9 3.7 - 5.3 mmol/L    Chloride 106 98 - 107 mmol/L    CO2 27 20 - 31 mmol/L    Anion Gap 8 (L) 9 - 17 mmol/L    GFR Non-African American >60 >60 mL/min    GFR African American >60 >60 mL/min    GFR Comment          GFR Staging NOT REPORTED    CBC    Collection Time: 10/25/21  5:59 AM   Result Value Ref Range    WBC 8.8 3.5 - 11.0 k/uL    RBC 4.26 (L) 4.5 - 5.9 m/uL    Hemoglobin 13.7 13.5 - 17.5 g/dL    Hematocrit 39.3 (L) 41 - 53 %    MCV 92.2 80 - 100 fL    MCH 32.1 26 - 34 pg    MCHC 34.8 31 - 37 g/dL    RDW 13.3 11.5 - 14.9 %    Platelets 410 200 - 130 k/uL    MPV 8.1 6.0 - 12.0 fL    NRBC Automated NOT REPORTED per 100 WBC   POC Glucose Fingerstick    Collection Time: 10/25/21  7:19 AM   Result Value Ref Range    POC Glucose 225 (H) 75 - 110 mg/dL   COVID-19, Rapid    Collection Time: 10/25/21 10:00 AM    Specimen: Nasopharyngeal Swab   Result Value Ref Range    Specimen Description . NASOPHARYNGEAL SWAB     SARS-CoV-2, Rapid Not Detected Not Detected   POC Glucose Fingerstick    Collection Time: 10/25/21 11:24 AM   Result Value Ref Range    POC Glucose 206 (H) 75 - 110 mg/dL       Lab Results   Component Value Date/Time    SPECIAL NOT REPORTED 01/17/2019 06:02 PM     Lab Results   Component Value Date/Time    CULTURE  01/17/2019 06:02 PM     DUE TO THE SPECIMEN TYPE, THE ORDER WAS CANCELED AND REORDERED. PLEASE REFER TO:    CULTURE MISCELLANEOUS CULTURE, NO ANAEROBIC SWAB SENT 01/17/2019 06:02 PM       Recent Labs     10/24/21  2135 10/25/21  0719 10/25/21  1124   POCGLU 289* 225* 206*       Radiology:    XR HAND RIGHT (MIN 3 VIEWS)    Result Date: 10/24/2021  No acute bony abnormalities are noted        Physical Examination:        Physical Exam  Constitutional:       General: He is not in acute distress. Appearance: Normal appearance. He is normal weight. HENT:      Head: Normocephalic and atraumatic. Right Ear: External ear normal.      Left Ear: External ear normal.      Nose: Nose normal.   Eyes:      Extraocular Movements: Extraocular movements intact.    Cardiovascular:      Rate and Rhythm: Normal rate and regular rhythm. Pulses: Normal pulses. Heart sounds: Normal heart sounds. No murmur heard. Pulmonary:      Effort: Pulmonary effort is normal. No respiratory distress. Breath sounds: Normal breath sounds. No wheezing. Abdominal:      General: Bowel sounds are normal. There is no distension. Palpations: Abdomen is soft. Tenderness: There is no abdominal tenderness. There is no guarding or rebound. Musculoskeletal:      Right lower leg: No edema. Left lower leg: No edema. Skin:     General: Skin is warm. Neurological:      General: No focal deficit present. Mental Status: He is alert and oriented to person, place, and time. Mental status is at baseline. Psychiatric:         Mood and Affect: Mood normal.         Behavior: Behavior normal.         Thought Content: Thought content normal.           Assessment:        Primary Problem  Cellulitis    Active Hospital Problems    Diagnosis Date Noted    Cellulitis [L03.90] 10/24/2021    Mixed hyperlipidemia [E78.2] 03/13/2018    Diabetes mellitus ([facility] [address] [E11.9] 02/15/2013    Depression [O36. A] 02/15/2013       Plan:        · Right hand pain 2/2 cellulitis  ? Norco every 6 hours for pain relief  ? Zosyn 3.375 mg IV every 8 hours for cellulitis  ? Orthopedic consultation: Plans for I&D today. Patient n.p.o.  · Diabetes mellitus: Lantus 25 units subcutaneous every morning, medium dose insulin sliding scale, hypoglycemic protocols, POCT glucose  · Hyperlipidemia: Reports not taking atorvastatin, will obtain lipid panel    Code: Full Code   DVT prophylaxis: Lovenox 40 mg subcutaneous daily  GI prophylaxis: None  Diet: Diet NPO   Consults: IP CONSULT TO ORTHOPEDIC SURGERY  IP CONSULT TO INTERNAL MEDICINE   Disposition: Padmini Reich MD  10/25/2021  2:57 PM     Attending Physician Statement    I have discussed the case of Jose Medina, including pertinent history and exam findings with the resident.  I have seen and examined the patient and the key elements of the encounter have been performed by me. I agree with the assessment, plan, and orders as documented by the resident. The patient is poorly controlled diabetic with right hand cellulitis.   An entry point which could be a puncture wound but the patient denies any injury  Electronically signed by Clemencia Alaniz MD on 10/25/2021 at 2:57 PM

## 2021-10-25 NOTE — PROGRESS NOTES
Patient arrived to floor via wheelchair. Heart monitor with patches applied, vital signs taken, orientation to room given, bed in lowest position with side rails up x2, call light and personal belongings within reach and current plan of care discussed with patient. All questions addressed and answered with patient; patient resting comfortably at this time. Electronically signed by Mady Javier RN.

## 2021-10-25 NOTE — FLOWSHEET NOTE
10/25/21 1713   Encounter Summary   Services provided to: Patient   Referral/Consult From: [facility]   Continue Visiting   (10-25-21)   Complexity of Encounter Low   Length of Encounter 15 minutes   Spiritual/Confucianism   Type Ritual   Intervention Anointing   Sacraments   Sacrament of Sick-Anointing Anointed  (Fr Grimm 10-25-21)

## 2021-10-26 VITALS
OXYGEN SATURATION: 95 % | HEIGHT: 74 IN | HEART RATE: 64 BPM | WEIGHT: 213.41 LBS | DIASTOLIC BLOOD PRESSURE: 100 MMHG | SYSTOLIC BLOOD PRESSURE: 137 MMHG | BODY MASS INDEX: 27.39 KG/M2 | RESPIRATION RATE: 18 BRPM | TEMPERATURE: 97.7 F

## 2021-10-26 PROBLEM — L02.511 ABSCESS OF FINGER OF RIGHT HAND: Status: ACTIVE | Noted: 2021-10-26

## 2021-10-26 LAB
ANION GAP SERPL CALCULATED.3IONS-SCNC: 10 MMOL/L (ref 9–17)
BUN BLDV-MCNC: 23 MG/DL (ref 6–20)
BUN/CREAT BLD: ABNORMAL (ref 9–20)
CALCIUM SERPL-MCNC: 9 MG/DL (ref 8.6–10.4)
CHLORIDE BLD-SCNC: 104 MMOL/L (ref 98–107)
CO2: 27 MMOL/L (ref 20–31)
CREAT SERPL-MCNC: 0.71 MG/DL (ref 0.7–1.2)
GFR AFRICAN AMERICAN: >60 ML/MIN
GFR NON-AFRICAN AMERICAN: >60 ML/MIN
GFR SERPL CREATININE-BSD FRML MDRD: ABNORMAL ML/MIN/{1.73_M2}
GFR SERPL CREATININE-BSD FRML MDRD: ABNORMAL ML/MIN/{1.73_M2}
GLUCOSE BLD-MCNC: 220 MG/DL (ref 75–110)
GLUCOSE BLD-MCNC: 226 MG/DL (ref 75–110)
GLUCOSE BLD-MCNC: 242 MG/DL (ref 70–99)
HCT VFR BLD CALC: 39.3 % (ref 41–53)
HEMOGLOBIN: 13.8 G/DL (ref 13.5–17.5)
MCH RBC QN AUTO: 32.5 PG (ref 26–34)
MCHC RBC AUTO-ENTMCNC: 35.1 G/DL (ref 31–37)
MCV RBC AUTO: 92.7 FL (ref 80–100)
NRBC AUTOMATED: ABNORMAL PER 100 WBC
PDW BLD-RTO: 13.4 % (ref 11.5–14.9)
PLATELET # BLD: 213 K/UL (ref 150–450)
PMV BLD AUTO: 8.6 FL (ref 6–12)
POTASSIUM SERPL-SCNC: 4 MMOL/L (ref 3.7–5.3)
RBC # BLD: 4.24 M/UL (ref 4.5–5.9)
SODIUM BLD-SCNC: 141 MMOL/L (ref 135–144)
WBC # BLD: 6.7 K/UL (ref 3.5–11)

## 2021-10-26 PROCEDURE — 2580000003 HC RX 258: Performed by: ORTHOPAEDIC SURGERY

## 2021-10-26 PROCEDURE — 82947 ASSAY GLUCOSE BLOOD QUANT: CPT

## 2021-10-26 PROCEDURE — 85027 COMPLETE CBC AUTOMATED: CPT

## 2021-10-26 PROCEDURE — 99239 HOSP IP/OBS DSCHRG MGMT >30: CPT | Performed by: INTERNAL MEDICINE

## 2021-10-26 PROCEDURE — 6370000000 HC RX 637 (ALT 250 FOR IP): Performed by: ORTHOPAEDIC SURGERY

## 2021-10-26 PROCEDURE — 80048 BASIC METABOLIC PNL TOTAL CA: CPT

## 2021-10-26 PROCEDURE — 6360000002 HC RX W HCPCS: Performed by: ORTHOPAEDIC SURGERY

## 2021-10-26 PROCEDURE — 36415 COLL VENOUS BLD VENIPUNCTURE: CPT

## 2021-10-26 RX ORDER — HYDROCODONE BITARTRATE AND ACETAMINOPHEN 5; 325 MG/1; MG/1
1 TABLET ORAL EVERY 4 HOURS PRN
Qty: 18 TABLET | Refills: 0 | Status: SHIPPED | OUTPATIENT
Start: 2021-10-26 | End: 2021-10-29

## 2021-10-26 RX ORDER — SULFAMETHOXAZOLE AND TRIMETHOPRIM 800; 160 MG/1; MG/1
1 TABLET ORAL 2 TIMES DAILY
Qty: 28 TABLET | Refills: 1 | Status: SHIPPED | OUTPATIENT
Start: 2021-10-26 | End: 2021-11-09

## 2021-10-26 RX ORDER — CEPHALEXIN 500 MG/1
500 CAPSULE ORAL 4 TIMES DAILY
Qty: 56 CAPSULE | Refills: 1 | Status: SHIPPED | OUTPATIENT
Start: 2021-10-26 | End: 2022-03-29 | Stop reason: ALTCHOICE

## 2021-10-26 RX ADMIN — INSULIN LISPRO 4 UNITS: 100 INJECTION, SOLUTION INTRAVENOUS; SUBCUTANEOUS at 09:05

## 2021-10-26 RX ADMIN — HYDROCODONE BITARTRATE AND ACETAMINOPHEN 1 TABLET: 5; 325 TABLET ORAL at 09:09

## 2021-10-26 RX ADMIN — PIPERACILLIN SODIUM AND TAZOBACTAM SODIUM 3375 MG: 3; .375 INJECTION, POWDER, LYOPHILIZED, FOR SOLUTION INTRAVENOUS at 00:22

## 2021-10-26 RX ADMIN — HYDROCODONE BITARTRATE AND ACETAMINOPHEN 1 TABLET: 5; 325 TABLET ORAL at 02:06

## 2021-10-26 RX ADMIN — INSULIN GLARGINE 30 UNITS: 100 INJECTION, SOLUTION SUBCUTANEOUS at 09:06

## 2021-10-26 RX ADMIN — PIPERACILLIN SODIUM AND TAZOBACTAM SODIUM 3375 MG: 3; .375 INJECTION, POWDER, LYOPHILIZED, FOR SOLUTION INTRAVENOUS at 09:12

## 2021-10-26 RX ADMIN — INSULIN LISPRO 4 UNITS: 100 INJECTION, SOLUTION INTRAVENOUS; SUBCUTANEOUS at 13:17

## 2021-10-26 RX ADMIN — HYDROCODONE BITARTRATE AND ACETAMINOPHEN 1 TABLET: 5; 325 TABLET ORAL at 13:18

## 2021-10-26 ASSESSMENT — ENCOUNTER SYMPTOMS
CONSTIPATION: 0
COUGH: 0
NAUSEA: 0
RHINORRHEA: 0
CHEST TIGHTNESS: 0
ABDOMINAL DISTENTION: 0
SORE THROAT: 0
ABDOMINAL PAIN: 0
VOMITING: 0
WHEEZING: 0
SHORTNESS OF BREATH: 0
DIARRHEA: 0

## 2021-10-26 ASSESSMENT — PAIN SCALES - GENERAL
PAINLEVEL_OUTOF10: 6
PAINLEVEL_OUTOF10: 5
PAINLEVEL_OUTOF10: 6
PAINLEVEL_OUTOF10: 6

## 2021-10-26 NOTE — PROGRESS NOTES
Writer was looking into previous progress notes. Writer discovered a note from 10/25/2021 about admitting patient to [facility] for an ortho consult due to right hip pain. Patient stated he told them that he did not have right hip pain. Patient denies any hip pain today. Patient would like to be discharged today.      Electronically signed by Rachael Lott RN on 10/26/21 at 12:01 PM EDT

## 2021-10-26 NOTE — OP NOTE
[address] [facility]                 [address] [facility], [address]                                OPERATIVE REPORT    PATIENT NAME: Asa Pineda                       :        1970  MED REC NO:   [id number]                              ROOM:       2111  ACCOUNT NO:   [de-identified]                           ADMIT DATE: 10/24/2021  PROVIDER:     Sanford Bruce    DATE OF PROCEDURE:  10/25/2021    PREOPERATIVE DIAGNOSES:  Right-hand cellulitis with abscess, right  middle finger dorsally. POSTOPERATIVE DIAGNOSES:  Right-hand cellulitis with abscess, right  middle finger dorsally. PROCEDURE PERFORMED:  Irrigation and debridement skin, subcutaneous fat,  dorsum, right hand. SURGEON:  Sanford Bruce MD    ASSISTANT:  None. ANESTHESIA  Local.    BLOOD LOSS:  Minimal.    COMPLICATIONS:  None. SPECIMEN:  Cultures were sent. IMPLANTS:  None. DRAINS:  None. FINDINGS:  1. The patient with a minimal amount of purulence. 2.  The patient with some surrounding predominantly fatty necrosis, some  fibrinous necrosis as well, that was sharply debrided. PROCEDURE IN DETAIL:  The patient was taken to the operating room, given  a ring block of the right middle finger. Right upper extremity was  prepped and draped in the usual sterile fashion. The abscess was just proximal to the PIP joint of the long finger. A 1-cm incision was placed, some purulence was noted. This was then  cultured and largely the culture swabs absorbed all the purulence. At this juncture, the patient did appear to have some fatty necrosis and  a little bit of fibrous tissue as well, although superficial to the  extensor mechanism, this was sharply debrided with a scalpel to a  healthier tissue. A sterile dressing was applied. The patient was taken to recovery room  in stable condition. ESTIMATED BLOOD LOSS:  Less than 5 mL.         FROYLAN De Paz    D: 10/25/2021 18:46:54       T: 10/25/2021 18:50:50     [email or web address]  Job#: [id number]     Doc#: [id number]    CC:

## 2021-10-26 NOTE — PROGRESS NOTES
[email or web address]    PROGRESS NOTE             10/26/2021    1:57 PM    Name:   Josselyn Nixon  MRN:     [id number]     Acct:      [de-identified]   Room:   2065/2065-01   Day:  2  Admit Date:  10/24/2021  3:42 PM    PCP:  Noelle Dillon MD  Code Status:  Full Code    Subjective:     C/C:   Chief Complaint   Patient presents with    Cellulitis     Interval History Status: improved. No acute events overnight. Patient seen at bedside this morning. Patient is status post incision and drainage of dorsum of right middle finger. He reports improved pain and range of motion of the right hand. He denies any chills, fevers, chest pain, shortness of breath. Brief History:     The patient is a 45 y.o.  /  male who presents with right hand swelling and he is admitted to the hospital for the management of celluliltis. Patient with a medical history of type 2 diabetes mellitus, hyperlipidemia, hypertension presents acute onset right hand pain. Patient reports development of the small white pustule on the dorsal aspect of his right middle finger that started yesterday while he was traveling in [place]. He reports pain and swelling increasing over the past day with increasing redness and throbbing pain beginning from the middle finger, spreading throughout the hand towards the wrist.  Patient reports pain with flexion and extension of the hand. He denies IV drug use, fevers, chills, chest pain, or shortness of breath. Patient reports occasional alcohol and marijuana use.      In the ED, patient was not found to be in acute distress. Vitals were within normal limits with the exception of an elevated blood pressure of 152/87. Relevant labs included blood glucose of 268, CRP of 35.5 WBC of 9.3. Other relevant studies include an x-ray of the right hand which showed no acute bony abnormalities.   Patient was treated with Zosyn for possible cellulitis and was given Toradol for relief of pain. Decision was made to admit the patient to [facility] with [facility]opedic consultation for further evaluation of his right hip pain. Review of Systems:     Review of Systems   Constitutional: Negative for chills, fatigue and fever. HENT: Negative for rhinorrhea and sore throat. Respiratory: Negative for cough, chest tightness, shortness of breath and wheezing. Cardiovascular: Negative for chest pain, palpitations and leg swelling. Gastrointestinal: Negative for abdominal distention, abdominal pain, constipation, diarrhea, nausea and vomiting. Genitourinary: Negative for difficulty urinating, dysuria and hematuria. Musculoskeletal: Positive for joint swelling. Skin: Positive for rash. Reduced erythema and swelling of the right hand   Neurological: Negative for dizziness, weakness, light-headedness and headaches. Medications: Allergies:  Patient has no known allergies.     Current Meds:   Scheduled Meds:    insulin glargine  30 Units SubCUTAneous QAM    piperacillin-tazobactam  3,375 mg IntraVENous Q8H    insulin lispro  0-12 Units SubCUTAneous TID WC    insulin lispro  0-6 Units SubCUTAneous Nightly    HYDROcodone 5 mg - acetaminophen  1 tablet Oral Q6H    sodium chloride flush  5-40 mL IntraVENous 2 times per day     Continuous Infusions:    dextrose      sodium chloride 25 mL (10/25/21 0026)     PRN Meds: nitroGLYCERIN, glucose, dextrose, glucagon (rDNA), dextrose, ketorolac, sodium chloride flush, sodium chloride, ondansetron **OR** ondansetron, magnesium hydroxide, acetaminophen **OR** acetaminophen    Data:     Past Medical History:    Past Medical History:   Diagnosis Date    Anxiety and depression     Chronic back pain     HLD (hyperlipidemia) 11/15/2015    Hypertension     Marijuana abuse 11/15/2015    Nephrolithiasis 11/18/2015    Type II or unspecified type diabetes mellitus without mention of complication, not stated as uncontrolled        Social History:    Tobacco:   Patient  reports that he has never smoked. He has never used smokeless tobacco.  Alcohol:     Patient  reports current alcohol use. Drug Use: Patient  reports current drug use. Drug: Marijuana. Family History:   Family History   Problem Relation Age of Onset    Diabetes Father        Vitals:  BP (!) 137/100   Pulse 64   Temp 97.7 °F (36.5 °C)   Resp 18   Ht 6' 2\" (1.88 m)   Wt 213 lb 6.5 oz (96.8 kg)   SpO2 95%   BMI 27.40 kg/m²   Temp (24hrs), Av.8 °F (36.6 °C), Min:97.7 °F (36.5 °C), Max:98 °F (36.7 °C)    Vitals:    10/26/21 0034 10/26/21 0644 10/26/21 0722 10/26/21 1318   BP: 130/81  (!) 134/96 (!) 137/100   Pulse: 61  58 64   Resp: 16  18 18   Temp: 97.9 °F (36.6 °C)  97.7 °F (36.5 °C) 97.7 °F (36.5 °C)   TempSrc: Oral      SpO2: 98%  98% 95%   Weight:  213 lb 6.5 oz (96.8 kg)     Height:           O2 Requirements: Saturating 99% on room air    Lines/Drains/Access: Peripheral IV left forearm    I/O(24Hr): No intake or output data in the 24 hours ending 10/26/21 1357    Labs:  Recent Results (from the past 24 hour(s))   POC Glucose Fingerstick    Collection Time: 10/25/21  4:06 PM   Result Value Ref Range    POC Glucose 201 (H) 75 - 110 mg/dL   Culture, Anaerobic and Aerobic    Collection Time: 10/25/21  6:18 PM    Specimen: Finger; Swab   Result Value Ref Range    Specimen Description . FINGER     Special Requests NOT REPORTED     Direct Exam NO NEUTROPHILS SEEN     Direct Exam FEW GRAM POSITIVE COCCI IN CLUSTERS (A)     Culture CULTURE IN PROGRESS    POC Glucose Fingerstick    Collection Time: 10/25/21 10:04 PM   Result Value Ref Range    POC Glucose 293 (H) 75 - 110 mg/dL   Basic Metabolic Panel w/ Reflex to MG    Collection Time: 10/26/21  7:13 AM   Result Value Ref Range    Glucose 242 (H) 70 - 99 mg/dL    BUN 23 (H) 6 - 20 mg/dL    CREATININE 0.71 0.70 - 1.20 mg/dL    Bun/Cre Ratio NOT REPORTED 9 - 20    Calcium 9.0 8.6 - 10.4 mg/dL    Sodium 141 135 - 144 mmol/L    Potassium 4.0 3.7 - 5.3 mmol/L    Chloride 104 98 - 107 mmol/L    CO2 27 20 - 31 mmol/L    Anion Gap 10 9 - 17 mmol/L    GFR Non-African American >60 >60 mL/min    GFR African American >60 >60 mL/min    GFR Comment          GFR Staging NOT REPORTED    CBC    Collection Time: 10/26/21  7:13 AM   Result Value Ref Range    WBC 6.7 3.5 - 11.0 k/uL    RBC 4.24 (L) 4.5 - 5.9 m/uL    Hemoglobin 13.8 13.5 - 17.5 g/dL    Hematocrit 39.3 (L) 41 - 53 %    MCV 92.7 80 - 100 fL    MCH 32.5 26 - 34 pg    MCHC 35.1 31 - 37 g/dL    RDW 13.4 11.5 - 14.9 %    Platelets 602 265 - 250 k/uL    MPV 8.6 6.0 - 12.0 fL    NRBC Automated NOT REPORTED per 100 WBC   POC Glucose Fingerstick    Collection Time: 10/26/21  7:26 AM   Result Value Ref Range    POC Glucose 226 (H) 75 - 110 mg/dL   POC Glucose Fingerstick    Collection Time: 10/26/21 11:24 AM   Result Value Ref Range    POC Glucose 220 (H) 75 - 110 mg/dL       Lab Results   Component Value Date/Time    SPECIAL NOT REPORTED 10/25/2021 06:18 PM     Lab Results   Component Value Date/Time    CULTURE CULTURE IN PROGRESS 10/25/2021 06:18 PM       Recent Labs     10/25/21  1606 10/25/21  2204 10/26/21  0726 10/26/21  1124   POCGLU 201* 293* 226* 220*       Radiology:    XR HAND RIGHT (MIN 3 VIEWS)    Result Date: 10/24/2021  No acute bony abnormalities are noted        Physical Examination:        Physical Exam  Constitutional:       General: He is not in acute distress. Appearance: Normal appearance. He is normal weight. HENT:      Head: Normocephalic and atraumatic. Right Ear: External ear normal.      Left Ear: External ear normal.      Nose: Nose normal.   Eyes:      Extraocular Movements: Extraocular movements intact. Cardiovascular:      Rate and Rhythm: Normal rate and regular rhythm. Pulses: Normal pulses. Heart sounds: Normal heart sounds. No murmur heard.      Pulmonary:      Effort: Pulmonary effort is normal. No Physician Statement    I have discussed the case of Taras Dancer, including pertinent history and exam findings with the resident. I have seen and examined the patient and the key elements of the encounter have been performed by me. I agree with the assessment, plan, and orders as documented by the resident.   He has improved and can be discharged today  Electronically signed by Gely Bell MD on 10/26/2021 at 1:57 PM

## 2021-10-26 NOTE — CARE COORDINATION
ONGOING DISCHARGE PLAN:    Patient is alert and oriented x4. Spoke with patient regarding discharge plan and patient confirms that plan is still home without needs. Declined VNS. POD#1 right hand I&D. Will be discharged home today. Will continue to follow for additional discharge needs.     Electronically signed by David Read RN on 10/26/2021 at 1:36 PM

## 2021-10-26 NOTE — DISCHARGE INSTR - DIET

## 2021-10-26 NOTE — PLAN OF CARE
Problem: Pain:  Goal: Pain level will decrease  Description: Pain level will decrease  10/26/2021 0218 by Meredith Henderson RN  Outcome: Ongoing  Note: Surgical hand pain. Medicated as needed/scheduled. Patient tolerating will continue to monitor      Problem: Pain:  Goal: Control of acute pain  Description: Control of acute pain  10/26/2021 0218 by Meredith Henderson RN  Outcome: Ongoing  Note: Surgical hand pain. Medicated as needed/scheduled. Patient tolerating will continue to monitor      Problem: Pain:  Goal: Control of chronic pain  Description: Control of chronic pain  10/26/2021 0218 by Meredith Henderson RN  Outcome: Ongoing  Note: Surgical hand pain. Medicated as needed/scheduled. Patient tolerating will continue to monitor      Problem: Physical Regulation:  Goal: Diagnostic test results will improve  Description: Diagnostic test results will improve  10/26/2021 0218 by Meredith Henderson RN  Outcome: Ongoing  Note: Ongoing improvement      Problem: Physical Regulation:  Goal: Will remain free from infection  Description: Will remain free from infection  10/26/2021 0218 by Meredith Henderson RN  Outcome: Ongoing  Note: No new s/s of infection.  Will continue to monitor        Problem: Physical Regulation:  Goal: Ability to maintain vital signs within normal range will improve  Description: Ability to maintain vital signs within normal range will improve  10/26/2021 0218 by Meredith Henderson RN  Outcome: Ongoing  Note: Vitals stable      Problem: Skin Integrity:  Goal: Demonstration of wound healing without infection will improve  Description: Demonstration of wound healing without infection will improve  10/26/2021 0218 by Meredith Henderson RN  Outcome: Ongoing  Note: Ongoing improvement      Problem: Skin Integrity:  Goal: Complications related to intravenous access or infusion will be avoided or minimized  Description: Complications related to intravenous access or infusion will be avoided or minimized  10/26/2021 0218 by Santiago Cerrato RN  Outcome: Ongoing  Note: Complications minimized      Problem: Discharge Planning:  Goal: Discharged to appropriate level of care  Description: Discharged to appropriate level of care  10/26/2021 0218 by Santiago Cerrato RN  Outcome: Ongoing  Note: Ongoing discharge plan      Problem: Serum Glucose Level - Abnormal:  Goal: Ability to maintain appropriate glucose levels will improve  Description: Ability to maintain appropriate glucose levels will improve  10/26/2021 0218 by Santiago Cerrato RN  Outcome: Ongoing  Note: Ongoing improvement

## 2021-10-26 NOTE — PROGRESS NOTES
CLINICAL PHARMACY NOTE: MEDS TO BEDS    Total # of Prescriptions Filled: 3   The following medications were delivered to the patient:  · Sulfamethoxazole 800-160  · Cephalexin 500mg  · Norco 5/325    Additional Documentation:    Delivered medications to patients room

## 2021-10-26 NOTE — DISCHARGE INSTR - COC
Continuity of Care Form    Patient Name: J Luis Aldrich   :  1970  MRN:  [id number]    Admit date:  10/24/2021  Discharge date:  ***    Code Status Order: Full Code   Advance Directives:     Admitting Physician:  Marquita Oliva MD  PCP: Juan Francisco Becker MD    Discharging Nurse: [facility] Unit/Room#: 2065/2065-01  Discharging Unit Phone Number: ***    Emergency Contact:   Extended Emergency Contact Information  Primary Emergency Contact: Dominique Moralez  Address: [address]           [address], [address]  Home Phone: [phone]  Relation: Parent  Secondary Emergency Contact: Rj Gordon  Address: [address], [address]  Home Phone: [phone]  Relation: Other    Past Surgical History:  Past Surgical History:   Procedure Laterality Date    HAND SURGERY Right 10/25/2021    HAND INCISION AND DRAINAGE performed by Henrietta Dubois MD at [address] [address] [facility]       Immunization History:   Immunization History   Administered Date(s) Administered    Pneumococcal Polysaccharide (Poytfcong33) 2018    Tdap (Boostrix, Adacel) 2010       Active Problems:  Patient Active Problem List   Diagnosis Code    Diabetes mellitus ([facility] [address] E11.9    Depression F32. A    Erectile dysfunction associated with type 2 diabetes mellitus (HCC) E11.69, N52.1    Back pain M54.9    Anxiety and depression F41.9, F32. A    Nephrolithiasis N20.0    Essential hypertension I10    Mixed hyperlipidemia E78.2    Major depressive disorder with single episode, in partial remission (HCC) F32.4    Impetigo L01.00    Cellulitis L03.90    Abscess of finger of right hand L02.511       Isolation/Infection:   Isolation          No Isolation        Patient Infection Status     None to display          Nurse Assessment:  Last Vital Signs: BP (!) 137/100   Pulse 64   Temp 97.7 °F (36.5 °C)   Resp 18   Ht 6' 2\" (1.88 m)   Wt 213 lb 6.5 oz (96.8 kg)   SpO2 95%   BMI 27.40 kg/m²     Last documented pain score (0-10 WORK SECTION    Inpatient Status Date: ***    Readmission Risk Assessment Score:  Readmission Risk              Risk of Unplanned Readmission:  8           Discharging to Facility/ Agency   · Name:   · Address:  · Phone:  · Fax:    Dialysis Facility (if applicable)   · Name:  · Address:  · Dialysis Schedule:  · Phone:  · Fax:    / signature: {Esignature:[id number]}    PHYSICIAN SECTION    Prognosis: {Prognosis:2039264011}    Condition at Discharge: [address] [place] [address] Patient Condition:237046977}    Rehab Potential (if transferring to Rehab): {Prognosis:2098149307}    Recommended Labs or Other Treatments After Discharge: ***    Physician Certification: I certify the above information and transfer of Pamela Babcock  is necessary for the continuing treatment of the diagnosis listed and that he requires {Admit to Appropriate Level of Care:66161} for {GREATER/LESS:514123172} 30 days.      Update Admission H&P: {CHP DME Changes in QFMMS:912253560}    PHYSICIAN SIGNATURE:  {Esignature:892821785}

## 2021-10-26 NOTE — DISCHARGE SUMMARY
Discharge Summary    Attending Physician: Lisa Fregoso MD  Admit Date: 10/24/2021  Discharge Date:    Primary Care Physician: Selina Sanford MD    Admitting Diagnosis:  Principal Problem:    Cellulitis  Active Problems:    Diabetes mellitus ([facility] [address]    Depression    Mixed hyperlipidemia  Resolved Problems:    * No resolved hospital problems. *        Discharge Diagnoses:  Principal Problem:    Cellulitis  Active Problems:    Diabetes mellitus ([facility] [address]    Depression    Mixed hyperlipidemia  Resolved Problems:    * No resolved hospital problems. *         Past Medical History:   Diagnosis Date    Anxiety and depression     Chronic back pain     HLD (hyperlipidemia) 11/15/2015    Hypertension     Marijuana abuse 11/15/2015    Nephrolithiasis 11/18/2015    Type II or unspecified type diabetes mellitus without mention of complication, not stated as uncontrolled        Procedures Performed and Findings  Procedure(s) with comments:  HAND INCISION AND DRAINAGE - INPT ROOM 2111     Consultations Obtained  IP CONSULT TO ORTHOPEDIC SURGERY  IP CONSULT TO [address]  Uncomplicated  Cellulitis improved with abx  Abscess I&D    Discharge Medications       Medication List      ASK your doctor about these medications    acetaminophen 500 MG tablet  Commonly known as: TYLENOL  Take 1 tablet by mouth 4 times daily as needed for Pain     Alcohol Swabs Pads  Please dispense according to patients insurance/device. Testing once a day     aspirin 81 MG EC tablet  Commonly known as: SM Aspirin Adult Low Strength  TAKE 1 TABLET BY MOUTH ONE TIME A DAY     atorvastatin 20 MG tablet  Commonly known as: LIPITOR  TAKE 1 TABLET BY MOUTH ONE TIME A DAY     blood glucose monitor kit and supplies  Test one time a day & as needed for symptoms of irregular blood glucose. blood glucose test strips  Testing once a day. Please dispense according to patients insurance.      Blood Pressure Monitor Misc  Use daily to check BP     ibuprofen 400 MG tablet  Commonly known as: IBU  Take 1 tablet by mouth every 6 hours as needed for Pain     * Insulin Pen Needle 29G X 12.7MM Misc  1 each by Does not apply route daily To use with insulin     * TRUEplus Pen Needles 32G X 4 MM Misc  Generic drug: Insulin Pen Needle  USE WITH INSULIN DAILY     Lancets Misc  Testing once a day. Please dispense according to patients insurance. Lantus SoloStar 100 UNIT/ML injection pen  Generic drug: insulin glargine  INJECT 35 UNITS SUBCUTANEOUSLY EVERY MORNING BEFORE BREAKFAST     metFORMIN 850 MG tablet  Commonly known as: GLUCOPHAGE  Take 1 tablet by mouth 2 times daily (with meals) Take 850 mg by mouth 2 times daily (with meals)     nitroGLYCERIN 0.4 MG SL tablet  Commonly known as: NITROSTAT  up to max of 3 total doses. If no relief after 1 dose, call 911. pioglitazone 15 MG tablet  Commonly known as: Actos  Take 1 tablet by mouth daily     sertraline 25 MG tablet  Commonly known as: Zoloft  Take 1 tab daily and increase it to 50mg daily     SITagliptin 50 MG tablet  Commonly known as: JANUVIA  Take 1 tablet by mouth daily     zoster recombinant adjuvanted vaccine 50 MCG/0.5ML Susr injection  Commonly known as: SHINGRIX  Inject 0.5 mLs into the muscle See Admin Instructions 1 dose now and repeat in 2-6 months         * This list has 2 medication(s) that are the same as other medications prescribed for you. Read the directions carefully, and ask your doctor or other care provider to review them with you. Discharge Condition  Stable       Activity on Discharge  As tolerated       Discharge Disposition:  Home    Discharge Instructions  See Orders  Follow up thursday    Follow-Up Scheduled    No follow-up provider specified.     Electronically signed by Tiara Perkins MD on 10/26/2021 at 7:45 AM

## 2021-10-27 ENCOUNTER — TELEPHONE (OUTPATIENT)
Dept: FAMILY MEDICINE CLINIC | Age: 51
End: 2021-10-27

## 2021-10-27 NOTE — TELEPHONE ENCOUNTER
[facility] Transitions Initial Follow Up Call    Outreach made within 2 business days of discharge: Yes    Patient: Hebert Jackson Patient : 1970   MRN: [id number]  Reason for Admission:Cellulitis   Discharge Date: 10/26/21       Spoke with: N/A     Discharge department/facility: [facility]: ROSEANNE JANG      TCM Interactive Patient Contact:  Was patient able to fill all prescriptions:  instructed to bring all medications to the follow-up visit:   Is patient taking all medications as directed in the discharge summary? Does patient understand their discharge instructions:   Does patient have questions or concerns that need addressed prior to 7-14 day follow up office visit:     NUMBER THAT IS LISTED FOR PT IS NOT CORRECT IT DOES NOT BELONG TO THIS PATIENT, NEED AN UP DATED NUMBER. UNABLE TO FOLLOW UP WITH PATIENT.   Follow Up  Future Appointments   Date Time Provider Zhao Melendez   2021  3:30 PM MD aaron Salamanca sc Ludivina Mcmullen MA

## 2021-10-28 NOTE — PROGRESS NOTES
Physician Progress Note      Joanne Wilkerson  [facility] #:                  [id number]  :                       1970  ADMIT DATE:       10/24/2021 3:42 PM  [address] [place] DATE:        10/26/2021 3:29 PM  RESPONDING  PROVIDER #:        Wally Weeks MD          QUERY TEXT:    Patient admitted with Right hand cellulitis with abscess. Per Op note dated   10/24/21 documentation of I&D. To accurately reflect the procedure performed please further specify the depth   of tissue incised and drained: The medical record reflects the following:  Risk Factors: poss. bug bite, cellulitis to  Right-hand  Clinical Indicators: incision documented as \"1-cm incision was placed, some   purulence was noted. At this juncture, the patient did appear to have some   fatty necrosis and  a little bit of fibrous tissue as well. \" Debridement documented as \" this was   sharply debrided with a scalpel to a healthier tissue. \"  Treatment: Cultures, sterile dressing  Options provided:  -- Skin only  -- Subcutaneous tissue  -- Fascia  -- Muscle  -- Other - I will add my own diagnosis  -- Disagree - Not applicable / Not valid  -- Disagree - Clinically unable to determine / Unknown  -- Refer to Clinical Documentation Reviewer    PROVIDER RESPONSE TEXT:    Addendum to 10/24 procedure note The depth of the drainage Right-hand abscess   was down to and including subcutaneous tissue.     Query created by: Crow Arana on 10/26/2021 1:45 PM      Electronically signed by:  Wally Weeks MD 10/28/2021 7:40 AM Warm

## 2021-10-29 LAB
CULTURE: ABNORMAL
CULTURE: ABNORMAL
DIRECT EXAM: ABNORMAL
DIRECT EXAM: ABNORMAL
Lab: ABNORMAL
SPECIMEN DESCRIPTION: ABNORMAL

## 2021-11-02 NOTE — PROGRESS NOTES
Physician Progress Note      PATIENT:               Ivan Pierre  CSN #:                  [id number]  :                       1970  ADMIT DATE:       10/24/2021 3:42 PM  [address] [place] DATE:        10/26/2021 3:29 PM  RESPONDING  PROVIDER #:        Dariusz HADLEY          QUERY TEXT:    Pt admitted with right hand cellulitis w/ abscess formation. Pt noted to have   DM2 w/ a recent A1C of 9.6. If possible, please document in progress notes and   discharge summary the relationship, if any, between cellulitis and DM. The medical record reflects the following:  Risk Factors: DM2 w/ hyperglycemia, recent travel out of state and possible   insect bite ( per patient)  Clinical Indicators: pt presented w/ an acute onset of right hand   redness/swelling w/new development of a pustule on the dorsal aspect of middle   finger. Documentation of decreased ROM and joint swelling. 10/25: the patient   underwent an I&D of dorsum of Rt middle finger and pt reported improvement in   ROM & decreased pain s/p procedure. Ortho's OP NOTE states findings of   purulence and fatty necrosis w/ fibrous tissue. 10/30 Culture results from the   I&D indicated +MRSA. Relevant lab values: Glucose levels between 201-293, CRP   35.5, HBA1c 9.6% (229mg/dL), XRAY of Rt Finger: Soft  swelling  Treatment: Toradol, Zosyn, consult to Dr Melissa Robin, I & D of abscess. Options provided:  -- right hand cellulitis associated with Diabetes  -- right hand cellulitis unrelated to Diabetes  -- Other - I will add my own diagnosis  -- Disagree - Not applicable / Not valid  -- Disagree - Clinically unable to determine / Unknown  -- Refer to Clinical Documentation Reviewer    PROVIDER RESPONSE TEXT:    right hand cellulitis associated with Diabetes.     Query created by: Maria Esther Beltran on 2021 2:14 PM      Electronically signed by:  Dariusz HADLEY 2021 2:19 PM

## 2021-11-18 NOTE — CONSULTS
Consults  Patient: Ansley Ratliff  Unit/Bed: 5458/2780-33  YOB: 1970  MRN: [id number]  Acct: [de-identified]   Admitting Diagnosis: Cellulitis [L03.90]  Cellulitis of right upper extremity [C13.385]  Admit Date:  10/24/2021  Hospital Day: 2    Subjective:    Patient is having problems with right hand middle finger abscess  HPI  Patient Seen, Chart, Labs, Radiologystudies, and Consults reviewed. Please refer to h&P    Objective:   BP (!) 137/100   Pulse 64   Temp 97.7 °F (36.5 °C)   Resp 18   Ht 6' 2\" (1.88 m)   Wt 213 lb 6.5 oz (96.8 kg)   SpO2 95%   BMI 27.40 kg/m² No intake or output data in the 24 hours ending 11/18/21 0724  Review of Systems  Physical Exam  Vitals and nursing note reviewed. Constitutional:       Appearance: He is well-developed. HENT:      Head: Normocephalic and atraumatic. Nose: Nose normal.   Eyes:      Conjunctiva/sclera: Conjunctivae normal.   Pulmonary:      Effort: Pulmonary effort is normal. No respiratory distress. Musculoskeletal:      Cervical back: Normal range of motion and neck supple. Comments: Normal gait  Right hand middle finger dorsum abscess with cellulits   Skin:     General: Skin is warm and dry. Neurological:      Mental Status: He is alert and oriented to person, place, and time. Sensory: No sensory deficit. Psychiatric:         Behavior: Behavior normal.         Thought Content: Thought content normal.           Drains:No  Imaging:No      Medications:     PRN Meds:      Data:  CBC: No results for input(s): WBC, RBC, HGB, HCT, MCV, RDW, PLT in the last 72 hours. BNP: No results for input(s): BNP in the last 72 hours. PT/INR: No results for input(s): PROTIME, INR in the last 72 hours. Assessment/Plan:  Principal Problem:    Cellulitis  Active Problems:    Diabetes mellitus ([facility] [address]    Depression    Mixed hyperlipidemia    Abscess of finger of right hand  Resolved Problems:    * No resolved hospital problems.  *    To or for I&D    Electronically signed by Colt Jones MD on 11/18/2021 at 7:24 AM    Rounding Hospitalist

## 2022-01-19 NOTE — CONSULTS
[address]                 [address] [facility], [address]                                  CONSULTATION    PATIENT NAME: Chip Lau                       :        1970  MED REC NO:   [id number]                              ROOM:       2065  ACCOUNT NO:   [de-identified]                           ADMIT DATE: 10/24/2021  PROVIDER:     Teo Carias    ADDENDUM    CONSULT DATE:  10/25/2021    Consult was performed on 10/25, but not recorded until 10/26. Therefore, the note generated was for the preceding day prior to his  surgical intervention.         FROYLAN Mcneal    D: 2022 10:08:27       T: 2022 11:28:00     [email or web address]  Job#: [id number]     Doc#: [id number]    CC:

## 2022-03-29 ENCOUNTER — TELEPHONE (OUTPATIENT)
Dept: FAMILY MEDICINE CLINIC | Age: 52
End: 2022-03-29

## 2022-03-29 ENCOUNTER — OFFICE VISIT (OUTPATIENT)
Dept: FAMILY MEDICINE CLINIC | Age: 52
End: 2022-03-29
Payer: COMMERCIAL

## 2022-03-29 VITALS
DIASTOLIC BLOOD PRESSURE: 140 MMHG | HEIGHT: 74 IN | TEMPERATURE: 97.8 F | OXYGEN SATURATION: 98 % | SYSTOLIC BLOOD PRESSURE: 160 MMHG | HEART RATE: 66 BPM | BODY MASS INDEX: 29.13 KG/M2 | WEIGHT: 227 LBS

## 2022-03-29 DIAGNOSIS — F41.9 ANXIETY AND DEPRESSION: ICD-10-CM

## 2022-03-29 DIAGNOSIS — E11.65 TYPE 2 DIABETES MELLITUS WITH HYPERGLYCEMIA, WITH LONG-TERM CURRENT USE OF INSULIN (HCC): Primary | ICD-10-CM

## 2022-03-29 DIAGNOSIS — Z12.11 COLON CANCER SCREENING: ICD-10-CM

## 2022-03-29 DIAGNOSIS — F32.4 MAJOR DEPRESSIVE DISORDER WITH SINGLE EPISODE, IN PARTIAL REMISSION (HCC): ICD-10-CM

## 2022-03-29 DIAGNOSIS — N52.1 ERECTILE DYSFUNCTION ASSOCIATED WITH TYPE 2 DIABETES MELLITUS (HCC): ICD-10-CM

## 2022-03-29 DIAGNOSIS — Z79.4 TYPE 2 DIABETES MELLITUS WITH HYPERGLYCEMIA, WITH LONG-TERM CURRENT USE OF INSULIN (HCC): Primary | ICD-10-CM

## 2022-03-29 DIAGNOSIS — Z00.00 PREVENTATIVE HEALTH CARE: ICD-10-CM

## 2022-03-29 DIAGNOSIS — E78.2 MIXED HYPERLIPIDEMIA: ICD-10-CM

## 2022-03-29 DIAGNOSIS — Z23 NEED FOR VACCINATION: ICD-10-CM

## 2022-03-29 DIAGNOSIS — F32.A ANXIETY AND DEPRESSION: ICD-10-CM

## 2022-03-29 DIAGNOSIS — E11.69 ERECTILE DYSFUNCTION ASSOCIATED WITH TYPE 2 DIABETES MELLITUS (HCC): ICD-10-CM

## 2022-03-29 DIAGNOSIS — I10 PRIMARY HYPERTENSION: ICD-10-CM

## 2022-03-29 PROBLEM — L02.511 ABSCESS OF FINGER OF RIGHT HAND: Status: RESOLVED | Noted: 2021-10-26 | Resolved: 2022-03-29

## 2022-03-29 PROBLEM — L03.90 CELLULITIS: Status: RESOLVED | Noted: 2021-10-24 | Resolved: 2022-03-29

## 2022-03-29 LAB — HBA1C MFR BLD: 6.3 %

## 2022-03-29 PROCEDURE — 83036 HEMOGLOBIN GLYCOSYLATED A1C: CPT | Performed by: FAMILY MEDICINE

## 2022-03-29 PROCEDURE — 3017F COLORECTAL CA SCREEN DOC REV: CPT | Performed by: FAMILY MEDICINE

## 2022-03-29 PROCEDURE — 3044F HG A1C LEVEL LT 7.0%: CPT | Performed by: FAMILY MEDICINE

## 2022-03-29 PROCEDURE — 99214 OFFICE O/P EST MOD 30 MIN: CPT | Performed by: FAMILY MEDICINE

## 2022-03-29 PROCEDURE — G8427 DOCREV CUR MEDS BY ELIG CLIN: HCPCS | Performed by: FAMILY MEDICINE

## 2022-03-29 PROCEDURE — 1036F TOBACCO NON-USER: CPT | Performed by: FAMILY MEDICINE

## 2022-03-29 PROCEDURE — G8419 CALC BMI OUT NRM PARAM NOF/U: HCPCS | Performed by: FAMILY MEDICINE

## 2022-03-29 PROCEDURE — G8484 FLU IMMUNIZE NO ADMIN: HCPCS | Performed by: FAMILY MEDICINE

## 2022-03-29 PROCEDURE — 2022F DILAT RTA XM EVC RTNOPTHY: CPT | Performed by: FAMILY MEDICINE

## 2022-03-29 RX ORDER — ASPIRIN 81 MG/1
TABLET ORAL
Qty: 90 TABLET | Refills: 2 | Status: SHIPPED | OUTPATIENT
Start: 2022-03-29

## 2022-03-29 RX ORDER — ISOPROPYL ALCOHOL 0.7 ML/1
SWAB TOPICAL
Qty: 100 EACH | Refills: 0 | Status: SHIPPED | OUTPATIENT
Start: 2022-03-29

## 2022-03-29 RX ORDER — LISINOPRIL 10 MG/1
10 TABLET ORAL DAILY
Qty: 90 TABLET | Refills: 1 | Status: SHIPPED | OUTPATIENT
Start: 2022-03-29

## 2022-03-29 RX ORDER — PEN NEEDLE, DIABETIC 32GX 5/32"
NEEDLE, DISPOSABLE MISCELLANEOUS
Qty: 100 EACH | Refills: 1 | Status: SHIPPED | OUTPATIENT
Start: 2022-03-29 | End: 2022-05-26

## 2022-03-29 RX ORDER — ATORVASTATIN CALCIUM 20 MG/1
TABLET, FILM COATED ORAL
Qty: 90 TABLET | Refills: 1 | Status: SHIPPED | OUTPATIENT
Start: 2022-03-29 | End: 2022-09-07

## 2022-03-29 RX ORDER — SERTRALINE HYDROCHLORIDE 25 MG/1
TABLET, FILM COATED ORAL
Qty: 90 TABLET | Refills: 3 | Status: SHIPPED | OUTPATIENT
Start: 2022-03-29

## 2022-03-29 RX ORDER — PIOGLITAZONEHYDROCHLORIDE 15 MG/1
15 TABLET ORAL DAILY
Qty: 30 TABLET | Refills: 3 | Status: SHIPPED | OUTPATIENT
Start: 2022-03-29 | End: 2022-03-29 | Stop reason: ALTCHOICE

## 2022-03-29 RX ORDER — INSULIN GLARGINE 100 [IU]/ML
INJECTION, SOLUTION SUBCUTANEOUS
Qty: 60 ML | Refills: 0 | Status: SHIPPED | OUTPATIENT
Start: 2022-03-29 | End: 2022-10-24

## 2022-03-29 SDOH — ECONOMIC STABILITY: FOOD INSECURITY: WITHIN THE PAST 12 MONTHS, THE FOOD YOU BOUGHT JUST DIDN'T LAST AND YOU DIDN'T HAVE MONEY TO GET MORE.: NEVER TRUE

## 2022-03-29 SDOH — ECONOMIC STABILITY: INCOME INSECURITY: IN THE LAST 12 MONTHS, WAS THERE A TIME WHEN YOU WERE NOT ABLE TO PAY THE MORTGAGE OR RENT ON TIME?: NO

## 2022-03-29 SDOH — ECONOMIC STABILITY: FOOD INSECURITY: WITHIN THE PAST 12 MONTHS, YOU WORRIED THAT YOUR FOOD WOULD RUN OUT BEFORE YOU GOT MONEY TO BUY MORE.: NEVER TRUE

## 2022-03-29 SDOH — ECONOMIC STABILITY: HOUSING INSECURITY
IN THE LAST 12 MONTHS, WAS THERE A TIME WHEN YOU DID NOT HAVE A STEADY PLACE TO SLEEP OR SLEPT IN A SHELTER (INCLUDING NOW)?: NO

## 2022-03-29 ASSESSMENT — PATIENT HEALTH QUESTIONNAIRE - PHQ9
SUM OF ALL RESPONSES TO PHQ QUESTIONS 1-9: 0
1. LITTLE INTEREST OR PLEASURE IN DOING THINGS: 0
SUM OF ALL RESPONSES TO PHQ9 QUESTIONS 1 & 2: 0
2. FEELING DOWN, DEPRESSED OR HOPELESS: 0

## 2022-03-29 ASSESSMENT — ENCOUNTER SYMPTOMS
TROUBLE SWALLOWING: 0
STRIDOR: 0
SHORTNESS OF BREATH: 0
RECTAL PAIN: 0
CONSTIPATION: 0
DIARRHEA: 0
CHEST TIGHTNESS: 0
RHINORRHEA: 0
EYE REDNESS: 0
NAUSEA: 0
ABDOMINAL DISTENTION: 0
SORE THROAT: 0
COUGH: 0
COLOR CHANGE: 0
BLOOD IN STOOL: 0
VOMITING: 0
WHEEZING: 0
BACK PAIN: 0
SINUS PRESSURE: 0
ABDOMINAL PAIN: 0

## 2022-03-29 ASSESSMENT — SOCIAL DETERMINANTS OF HEALTH (SDOH): HOW HARD IS IT FOR YOU TO PAY FOR THE VERY BASICS LIKE FOOD, HOUSING, MEDICAL CARE, AND HEATING?: NOT HARD AT ALL

## 2022-03-29 NOTE — TELEPHONE ENCOUNTER
PHARMACY STATES NEEDS TO KNOW HOW LONG PATIENT IS TO CONTINUE TAKING sertraline (ZOLOFT) 25 MG tablet   Sig: Take 1 tab daily and increase it to 50mg daily

## 2022-03-29 NOTE — PROGRESS NOTES
Visit Information    Have you changed or started any medications since your last visit including any over-the-counter medicines, vitamins, or herbal medicines? no   Are you having any side effects from any of your medications? -  no  Have you stopped taking any of your medications? Is so, why? -  no    Have you seen any other physician or provider since your last visit? No  Have you had any other diagnostic tests since your last visit? No  Have you been seen in the emergency room and/or had an admission to a hospital since we last saw you? No  Have you had your routine dental cleaning in the past 6 months? yes     Have you activated your [employer] account? If not, what are your barriers?  Yes     Patient Care Team:  Meli Santiago MD as PCP - General (Family Medicine)  Meli Santiago MD as PCP - [facility]    Medical History Review  Past Medical, Family, and Social History reviewed and does contribute to the patient presenting condition    Health Maintenance   Topic Date Due    Hepatitis C screen  Never done    COVID-19 Vaccine (1) Never done    Depression Monitoring  Never done    HIV screen  Never done    Diabetic microalbuminuria test  Never done    Diabetic retinal exam  Never done    Hepatitis B vaccine (1 of 3 - Risk 3-dose series) Never done    Colorectal Cancer Screen  Never done    Shingles Vaccine (1 of 2) Never done    DTaP/Tdap/Td vaccine (2 - Td or Tdap) 03/13/2020    Flu vaccine (1) Never done    Diabetic foot exam  09/10/2021    A1C test (Diabetic or Prediabetic)  01/24/2022    Lipid screen  10/25/2022    Potassium monitoring  10/26/2022    Creatinine monitoring  10/26/2022    Pneumococcal 0-64 years Vaccine (2 of 2 - PPSV23) 04/27/2034    Hepatitis A vaccine  Aged Out    Hib vaccine  Aged Out    Meningococcal (ACWY) vaccine  Aged Out

## 2022-03-29 NOTE — PROGRESS NOTES
Chief Complaint   Patient presents with    Diabetes     blood sugars have been running high at home     Other     I will need to re-check his blood pressure before leaving          Lucina Sparks  here today for follow up on chronic medical problems, go over labs and/or diagnostic studies, and medication refills. Diabetes (blood sugars have been running high at home ) and Other (I will need to re-check his blood pressure before leaving )      HPI: Patient is scheduled for follow-up on chronic medical conditions not seen since more than a year. Patient reports he was going through stress and was not able to take care of himself. Diabetes A1c has improved to 6.3, patient reports he was taking his insulin and also started Januvia which he was not taking before. He is also on multiple other medication including Actos and Metformin, does not take Actos. Patient reports his sugars are running more than 200, now he started Januvia and that has improved his sugars. His previous A1c was 9.6    He is currently taking 20 units of insulin and he was supposed to take 35 units. Patient ran out of the Metformin, reports he was not eating much due to depression and stress. He has not seen ophthalmologist.    Hypertension uncontrolled not on any medications, patient has refused medications in the past, does not monitor his blood pressure at home. He denies any chest pain shortness of breath dyspnea on exertion. Hyperlipidemia no recent blood work is on statins. Patient has history of depression anxiety was taking Zoloft, has stopped taking that, patient is asking for refill. Patient reports his life is fairly stable and will be taking care of himself. He will be compliant with his appointments and medications. Erectile dysfunction multifactorial was not able to afford Viagra.       BP (!) 160/140   Pulse 66   Temp 97.8 °F (36.6 °C)   Ht 6' 2\" (1.88 m)   Wt 227 lb (103 kg)   SpO2 98%   BMI 29.15 kg/m² 10/24/2021 NOT REPORTED   Final    Platelet Estimate 34/60/7731 NOT REPORTED   Final    Sed Rate 10/24/2021 16  0 - 20 mm Final    CRP 10/24/2021 35.5* 0.0 - 5.0 mg/L Final    Glucose 10/24/2021 268* 70 - 99 mg/dL Final    BUN 10/24/2021 15  6 - 20 mg/dL Final    CREATININE 10/24/2021 0.59* 0.70 - 1.20 mg/dL Final    Bun/Cre Ratio 10/24/2021 NOT REPORTED  9 - 20 Final    Calcium 10/24/2021 9.3  8.6 - 10.4 mg/dL Final    Sodium 10/24/2021 137  135 - 144 mmol/L Final    Potassium 10/24/2021 4.3  3.7 - 5.3 mmol/L Final    Chloride 10/24/2021 99  98 - 107 mmol/L Final    CO2 10/24/2021 28  20 - 31 mmol/L Final    Anion Gap 10/24/2021 10  9 - 17 mmol/L Final    GFR Non- 10/24/2021 >60  >60 mL/min Final    GFR  10/24/2021 >60  >60 mL/min Final    GFR Comment 10/24/2021        Final    Comment: Average GFR for 52-63 years old:   80 mL/min/1.73sq m  Chronic Kidney Disease:   <60 mL/min/1.73sq m  Kidney failure:   <15 mL/min/1.73sq m              eGFR calculated using average adult body mass. Additional eGFR calculator available at:        [email or web address]            GFR Staging 10/24/2021 NOT REPORTED   Final    Cholesterol 10/25/2021 152  <200 mg/dL Final    Comment:    Cholesterol Guidelines:      <200  Desirable   200-240  Borderline      >240  Undesirable         HDL 10/25/2021 62  >40 mg/dL Final    Comment:    HDL Guidelines:    <40     Undesirable   40-59    Borderline    >59     Desirable         LDL Cholesterol 10/25/2021 76  0 - 130 mg/dL Final    Comment:    LDL Guidelines:     <100    Desirable   100-129   Near to/above Desirable   130-159   Borderline      >159   Undesirable     Direct (measured) LDL and calculated LDL are not interchangeable tests.       Chol/HDL Ratio 10/25/2021 2.5  <5 Final            Triglycerides 10/25/2021 68  <150 mg/dL Final    Comment:    Triglyceride Guidelines:     <150   Desirable   150-199 Borderline   200-499  High     >499   Very high   Based on AHA Guidelines for fasting triglyceride, October 2012.  VLDL 10/25/2021 NOT REPORTED  1 - 30 mg/dL Final    Hemoglobin A1C 10/24/2021 9.6* 4.0 - 6.0 % Final    Estimated Avg Glucose 10/24/2021 229  mg/dL Final    Comment: The ADA and AACC recommend providing the estimated average glucose result to permit better   patient understanding of their HBA1c result.  Glucose 10/25/2021 248* 70 - 99 mg/dL Final    BUN 10/25/2021 20  6 - 20 mg/dL Final    CREATININE 10/25/2021 0.82  0.70 - 1.20 mg/dL Final    Bun/Cre Ratio 10/25/2021 NOT REPORTED  9 - 20 Final    Calcium 10/25/2021 8.8  8.6 - 10.4 mg/dL Final    Sodium 10/25/2021 141  135 - 144 mmol/L Final    Potassium 10/25/2021 3.9  3.7 - 5.3 mmol/L Final    Chloride 10/25/2021 106  98 - 107 mmol/L Final    CO2 10/25/2021 27  20 - 31 mmol/L Final    Anion Gap 10/25/2021 8* 9 - 17 mmol/L Final    GFR Non- 10/25/2021 >60  >60 mL/min Final    GFR  10/25/2021 >60  >60 mL/min Final    GFR Comment 10/25/2021        Final    Comment: Average GFR for 52-63 years old:   80 mL/min/1.73sq m  Chronic Kidney Disease:   <60 mL/min/1.73sq m  Kidney failure:   <15 mL/min/1.73sq m              eGFR calculated using average adult body mass.  Additional eGFR calculator available at:        [email or web address]            GFR Staging 10/25/2021 NOT REPORTED   Final    WBC 10/25/2021 8.8  3.5 - 11.0 k/uL Final    RBC 10/25/2021 4.26* 4.5 - 5.9 m/uL Final    Hemoglobin 10/25/2021 13.7  13.5 - 17.5 g/dL Final    Hematocrit 10/25/2021 39.3* 41 - 53 % Final    MCV 10/25/2021 92.2  80 - 100 fL Final    MCH 10/25/2021 32.1  26 - 34 pg Final    MCHC 10/25/2021 34.8  31 - 37 g/dL Final    RDW 10/25/2021 13.3  11.5 - 14.9 % Final    Platelets 02/06/6694 187  150 - 450 k/uL Final    MPV 10/25/2021 8.1  6.0 - 12.0 fL Final    NRBC Automated 10/25/2021 NOT REPORTED  per 100 WBC Final    POC Glucose 10/24/2021 289* 75 - 110 mg/dL Final    POC Glucose 10/25/2021 225* 75 - 110 mg/dL Final    Specimen Description 10/25/2021 . NASOPHARYNGEAL SWAB   Final    SARS-CoV-2, Rapid 10/25/2021 Not Detected  Not Detected Final    Comment:       Rapid NAAT:  The specimen is NEGATIVE for SARS-CoV-2, the novel coronavirus associated with   COVID-19. The ID NOW COVID-19 assay is designed to detect the virus that causes COVID-19 in patients   with signs and symptoms of infection who are suspected of COVID-19. An individual without symptoms of COVID-19 and who is not shedding SARS-CoV-2 virus would   expect to have a negative (not detected) result in this assay. Negative results should be treated as presumptive and, if inconsistent with clinical signs   and symptoms or necessary for patient management,  should be tested with an alternative molecular assay. Negative results do not preclude   SARS-CoV-2 infection and   should not be used as the sole basis for patient management decisions.          Fact sheet for Healthcare Providers: Rambo  Fact sheet for Patients: Rambo          Methodology: Isothermal Nucleic Acid Amplification      POC Glucose 10/25/2021 206* 75 - 110 mg/dL Final    POC Glucose 10/25/2021 201* 75 - 110 mg/dL Final    Glucose 10/26/2021 242* 70 - 99 mg/dL Final    BUN 10/26/2021 23* 6 - 20 mg/dL Final    CREATININE 10/26/2021 0.71  0.70 - 1.20 mg/dL Final    Bun/Cre Ratio 10/26/2021 NOT REPORTED  9 - 20 Final    Calcium 10/26/2021 9.0  8.6 - 10.4 mg/dL Final    Sodium 10/26/2021 141  135 - 144 mmol/L Final    Potassium 10/26/2021 4.0  3.7 - 5.3 mmol/L Final    Chloride 10/26/2021 104  98 - 107 mmol/L Final    CO2 10/26/2021 27  20 - 31 mmol/L Final    Anion Gap 10/26/2021 10  9 - 17 mmol/L Final    GFR Non- 10/26/2021 >60  >60 mL/min Final    GFR African American 10/26/2021 >60  >60 mL/min Final    GFR Comment 10/26/2021        Final    Comment: Average GFR for 52-63 years old:   80 mL/min/1.73sq m  Chronic Kidney Disease:   <60 mL/min/1.73sq m  Kidney failure:   <15 mL/min/1.73sq m              eGFR calculated using average adult body mass. Additional eGFR calculator available at:        [email or web address]            GFR Staging 10/26/2021 NOT REPORTED   Final    WBC 10/26/2021 6.7  3.5 - 11.0 k/uL Final    RBC 10/26/2021 4.24* 4.5 - 5.9 m/uL Final    Hemoglobin 10/26/2021 13.8  13.5 - 17.5 g/dL Final    Hematocrit 10/26/2021 39.3* 41 - 53 % Final    MCV 10/26/2021 92.7  80 - 100 fL Final    MCH 10/26/2021 32.5  26 - 34 pg Final    MCHC 10/26/2021 35.1  31 - 37 g/dL Final    RDW 10/26/2021 13.4  11.5 - 14.9 % Final    Platelets 86/78/4899 213  150 - 450 k/uL Final    MPV 10/26/2021 8.6  6.0 - 12.0 fL Final    NRBC Automated 10/26/2021 NOT REPORTED  per 100 WBC Final    Specimen Description 10/25/2021 . FINGER   Final    Special Requests 10/25/2021 NOT REPORTED   Final    Direct Exam 10/25/2021 NO NEUTROPHILS SEEN   Final    Direct Exam 10/25/2021 FEW GRAM POSITIVE COCCI IN CLUSTERS*  Final    Culture 10/25/2021 METHICILLIN RESISTANT STAPHYLOCOCCUS AUREUS HEAVY GROWTH*  Final    Culture 10/25/2021 NO ANAEROBIC ORGANISMS ISOLATED AT 5 DAYS*  Final    POC Glucose 10/25/2021 293* 75 - 110 mg/dL Final    POC Glucose 10/26/2021 226* 75 - 110 mg/dL Final    POC Glucose 10/26/2021 220* 75 - 110 mg/dL Final         Most recent labs reviewed:     Lab Results   Component Value Date    WBC 6.7 10/26/2021    HGB 13.8 10/26/2021    HCT 39.3 (L) 10/26/2021    MCV 92.7 10/26/2021     10/26/2021       @BRIEFLAB(NA,K,CL,CO2,BUN,CREATININE,GLUCOSE,CALCIUM)@     Lab Results   Component Value Date    ALT 32 02/18/2020    AST 23 02/18/2020    ALKPHOS 102 02/18/2020    BILITOT 1.08 02/18/2020       Lab Results   Component Value Date    TSH 1.44 05/29/2020       Lab Results   Component Value Date    CHOL 152 10/25/2021    CHOL 125 11/16/2015    CHOL 212 (H) 09/24/2013     Lab Results   Component Value Date    TRIG 68 10/25/2021    TRIG 87 11/16/2015    TRIG 63 09/24/2013     Lab Results   Component Value Date    HDL 62 10/25/2021    HDL 32 (L) 11/16/2015    HDL 54 09/24/2013     Lab Results   Component Value Date    LDLCHOLESTEROL 76 10/25/2021    LDLCHOLESTEROL 76 11/16/2015    LDLCHOLESTEROL 145 (H) 09/24/2013     Lab Results   Component Value Date    VLDL NOT REPORTED 10/25/2021    VLDL NOT REPORTED 11/16/2015    VLDL NOT REPORTED 09/24/2013     Lab Results   Component Value Date    CHOLHDLRATIO 2.5 10/25/2021    CHOLHDLRATIO 3.9 11/16/2015    CHOLHDLRATIO 3.9 09/24/2013       Lab Results   Component Value Date    LABA1C 6.3 03/29/2022       No results found for: GYAABQYO54    No results found for: FOLATE    No results found for: IRON, TIBC, FERRITIN    No results found for: VITD25          Current Outpatient Medications   Medication Sig Dispense Refill    zoster recombinant adjuvanted vaccine (SHINGRIX) 50 MCG/0.5ML SUSR injection Inject 0.5 mLs into the muscle See Admin Instructions 1 dose now and repeat in 2-6 months 0.5 mL 0    Tdap (ADACEL) 5-2-15.5 LF-MCG/0.5 injection Inject 0.5 mLs into the muscle once for 1 dose 0.5 mL 0    insulin glargine (LANTUS SOLOSTAR) 100 UNIT/ML injection pen INJECT 35 UNITS SUBCUTANEOUSLY EVERY MORNING BEFORE BREAKFAST 60 mL 0    metFORMIN (GLUCOPHAGE) 850 MG tablet Take 1 tablet by mouth 2 times daily (with meals) Take 850 mg by mouth 2 times daily (with meals) 60 tablet 2    SITagliptin (JANUVIA) 50 MG tablet Take 1 tablet by mouth daily 120 tablet 1    sertraline (ZOLOFT) 25 MG tablet Take 1 tab daily and increase it to 50mg daily 90 tablet 3    atorvastatin (LIPITOR) 20 MG tablet TAKE 1 TABLET BY MOUTH ONE TIME A DAY 90 tablet 1    aspirin (SM ASPIRIN ADULT LOW STRENGTH) 81 MG EC tablet TAKE 1 TABLET BY MOUTH ONE TIME A DAY 90 tablet 2    Alcohol Swabs (SM ALCOHOL PREP) 70 % PADS USE TO TEST ONCE DAILY 100 each 0    Insulin Pen Needle 29G X 12.7MM MISC 1 each by Does not apply route daily To use with insulin 120 each 1    Insulin Pen Needle (TRUEPLUS PEN NEEDLES) 32G X 4 MM MISC USE WITH INSULIN DAILY 100 each 1    lisinopril (PRINIVIL;ZESTRIL) 10 MG tablet Take 1 tablet by mouth daily 90 tablet 1    acetaminophen (TYLENOL) 500 MG tablet Take 1 tablet by mouth 4 times daily as needed for Pain 25 tablet 1    blood glucose monitor kit and supplies Test one time a day & as needed for symptoms of irregular blood glucose. 1 kit 0    Lancets MISC Testing once a day. Please dispense according to patients insurance. 100 each 3    blood glucose monitor strips Testing once a day. Please dispense according to patients insurance. 100 strip 3    Blood Pressure Monitor MISC Use daily to check BP 1 each 0    zoster recombinant adjuvanted vaccine [facility]) 50 MCG/0.5ML SUSR injection Inject 0.5 mLs into the muscle See Admin Instructions 1 dose now and repeat in 2-6 months (Patient not taking: Reported on 3/29/2022) 0.5 mL 0     No current facility-administered medications for this visit.              Social History     Socioeconomic History    Marital status:      Spouse name: Not on file    Number of children: Not on file    Years of education: Not on file    Highest education level: Not on file   Occupational History    Not on file   Tobacco Use    Smoking status: Never Smoker    Smokeless tobacco: Never Used   Substance and Sexual Activity    Alcohol use: Yes     Comment: 2 times a week    Drug use: Yes     Types: Marijuana Rosita Coho)     Comment: marajunia 2 times aweek    Sexual activity: Yes   Other Topics Concern    Not on file   Social History Narrative    Not on file     Social Determinants of Health     Financial Resource Strain: Low Risk     Difficulty of Paying Living Expenses: Not hard at all   Food Insecurity: No Food Insecurity    Worried About [facility] in the Last Year: Never true    Ran Out of Food in the Last Year: Never true   Transportation Needs: No Transportation Needs    Lack of Transportation (Medical): No    Lack of Transportation (Non-Medical): No   Physical Activity:     Days of Exercise per Week: Not on file    Minutes of Exercise per Session: Not on file   Stress:     Feeling of Stress : Not on file   Social Connections:     Frequency of Communication with Friends and Family: Not on file    Frequency of Social Gatherings with Friends and Family: Not on file    Attends Taoist Services: Not on file    Active Member of [address] [place] [address] or Organizations: Not on file    Attends Club or Organization Meetings: Not on file    Marital Status: Not on file   Intimate Partner Violence:     Fear of Current or Ex-Partner: Not on file    Emotionally Abused: Not on file    Physically Abused: Not on file    Sexually Abused: Not on file   Housing Stability: Unknown    Unable to Pay for Housing in the Last Year: No    Number of [place] in the Last Year: Not on file    Unstable Housing in the Last Year: No     Counseling given: Not Answered        Family History   Problem Relation Age of Onset    Diabetes Father              -rest of complaints with corresponding details per ROS    The patient's past medical, surgical, social, and family history as well as his current medications and allergies were reviewed as documented intoday's encounter. Review of Systems   Constitutional: Positive for activity change. Negative for appetite change, fatigue, fever and unexpected weight change. HENT: Negative for congestion, ear pain, postnasal drip, rhinorrhea, sinus pressure, sore throat and trouble swallowing. Eyes: Negative for redness and visual disturbance. Respiratory: Negative for cough, chest tightness, shortness of breath, wheezing and stridor. Cardiovascular: Negative for chest pain, palpitations and leg swelling. Gastrointestinal: Negative for abdominal distention, abdominal pain, blood in stool, constipation, diarrhea, nausea, rectal pain and vomiting. Endocrine: Positive for polyphagia and polyuria. Negative for polydipsia. Genitourinary: Negative for difficulty urinating, flank pain, frequency and urgency. Musculoskeletal: Positive for arthralgias. Negative for back pain, gait problem, myalgias and neck pain. Skin: Negative for color change, rash and wound. Allergic/Immunologic: Negative for food allergies and immunocompromised state. Neurological: Positive for numbness. Negative for dizziness, speech difficulty, weakness, light-headedness and headaches. Psychiatric/Behavioral: Positive for decreased concentration and sleep disturbance. Negative for agitation, behavioral problems, dysphoric mood, hallucinations and suicidal ideas. The patient is nervous/anxious. Physical Exam  Vitals and nursing note reviewed. Constitutional:       General: He is not in acute distress. Appearance: Normal appearance. He is well-developed. He is obese. He is not diaphoretic. HENT:      Head: Normocephalic and atraumatic. Right Ear: Tympanic membrane normal.      Left Ear: Tympanic membrane normal.      Nose: Nose normal.   Eyes:      General:         Right eye: No discharge. Left eye: No discharge. Extraocular Movements: Extraocular movements intact. Conjunctiva/sclera: Conjunctivae normal.      Pupils: Pupils are equal, round, and reactive to light. Neck:      Thyroid: No thyromegaly. Cardiovascular:      Rate and Rhythm: Normal rate and regular rhythm. Heart sounds: Normal heart sounds. No murmur heard. Pulmonary:      Effort: Pulmonary effort is normal. No respiratory distress. Breath sounds: Normal breath sounds. No wheezing or rhonchi.    Abdominal:      General: Bowel sounds are normal. There is no distension. Palpations: Abdomen is soft. There is no mass. Tenderness: There is no abdominal tenderness. There is no right CVA tenderness, left CVA tenderness, guarding or rebound. Musculoskeletal:         General: No tenderness. Cervical back: Normal range of motion and neck supple. Spasms present. No rigidity. Thoracic back: No spasms. Normal range of motion. Lumbar back: Spasms present. Decreased range of motion. Lymphadenopathy:      Cervical: No cervical adenopathy. Skin:     Coloration: Skin is not jaundiced or pale. Findings: No bruising, erythema or rash. Neurological:      General: No focal deficit present. Mental Status: He is alert and oriented to person, place, and time. Cranial Nerves: No cranial nerve deficit. Sensory: No sensory deficit. Motor: No weakness or tremor. Coordination: Coordination normal.      Gait: Gait and tandem walk normal.      Deep Tendon Reflexes: Reflexes are normal and symmetric. Psychiatric:         Attention and Perception: Attention and perception normal. He is attentive. Mood and Affect: Mood is anxious. Mood is not depressed. Affect is not tearful. Speech: He is communicative. Speech is not rapid and pressured, delayed or slurred. Behavior: Behavior is slowed. Behavior is not agitated. Thought Content: Thought content normal.         Judgment: Judgment normal.             ASSESSMENT AND PLAN      1. Type 2 diabetes mellitus with hyperglycemia, with long-term current use of insulin ([facility])  Controlled A1c has improved to 6.3, discussed with patient we can stop Actos continue Januvia and Lantus and Metformin monitor blood sugars. Follow-up in 3 months do blood work as ordered  - Microalbumin, Ur  - POCT glycosylated hemoglobin (Hb A1C)  - CBC; Future  - TSH; Future  - Vitamin D 25 Hydroxy; Future  - Vitamin B12 & Folate;  Future  - insulin glargine (LANTUS SOLOSTAR) 100 UNIT/ML injection pen; INJECT 35 UNITS SUBCUTANEOUSLY EVERY MORNING BEFORE BREAKFAST  Dispense: 60 mL; Refill: 0  - metFORMIN (GLUCOPHAGE) 850 MG tablet; Take 1 tablet by mouth 2 times daily (with meals) Take 850 mg by mouth 2 times daily (with meals)  Dispense: 60 tablet; Refill: 2  - SITagliptin (JANUVIA) 50 MG tablet; Take 1 tablet by mouth daily  Dispense: 120 tablet; Refill: 1  - Insulin Pen Needle (TRUEPLUS PEN NEEDLES) 32G X 4 MM MISC; USE WITH INSULIN DAILY  Dispense: 100 each; Refill: 1    2. Primary hypertension  Uncontrolled start on lisinopril 10 mg nurse visit in 1 week for BP check. - aspirin (SM ASPIRIN ADULT LOW STRENGTH) 81 MG EC tablet; TAKE 1 TABLET BY MOUTH ONE TIME A DAY  Dispense: 90 tablet; Refill: 2  - lisinopril (PRINIVIL;ZESTRIL) 10 MG tablet; Take 1 tablet by mouth daily  Dispense: 90 tablet; Refill: 1    3. Mixed hyperlipidemia  Continue statins recheck lipid panel  - Lipid Panel; Future  - atorvastatin (LIPITOR) 20 MG tablet; TAKE 1 TABLET BY MOUTH ONE TIME A DAY  Dispense: 90 tablet; Refill: 1    4. Major depressive disorder with single episode, [facility]  Restart on Zoloft can increase to 50 mg after 1 week  - sertraline (ZOLOFT) 25 MG tablet; Take 1 tab daily and increase it to 50mg daily  Dispense: 90 tablet; Refill: 3    5. Anxiety and depression  Restart Zoloft  - sertraline (ZOLOFT) 25 MG tablet; Take 1 tab daily and increase it to 50mg daily  Dispense: 90 tablet; Refill:  6. Erectile dysfunction associated with type 2 diabetes mellitus ([facility]  Discussed with patient you need to control depression diabetes and hypertension which can lead erectile dysfunction. 7. Preventative health care    - Hepatitis C Antibody; Future  - HIV Screen; Future  - Hepatitis B Surface Antibody; Future  - Varicella Zoster Antibody, IgG; Future    8. Need for vaccination    - zoster recombinant adjuvanted vaccine [facility]) 50 MCG/0.5ML SUSR injection;  Inject 0.5 mLs into the muscle See Admin Instructions 1 dose now and repeat in 2-6 months  Dispense: 0.5 mL; Refill: 0  - Tdap (ADACEL) 5-2-15.5 LF-MCG/0.5 injection; Inject 0.5 mLs into the muscle once for 1 dose  Dispense: 0.5 mL; Refill: 0    9.  Colon cancer screening    - Cologuard      Orders Placed This Encounter   Procedures    Cologuard    Hepatitis C Antibody     Standing Status:   Future     Standing Expiration Date:   3/29/2023    HIV Screen     Standing Status:   Future     Standing Expiration Date:   3/29/2023    Microalbumin, Ur    Hepatitis B Surface Antibody     Standing Status:   Future     Standing Expiration Date:   3/29/2023    CBC     Standing Status:   Future     Standing Expiration Date:   3/29/2023    Lipid Panel     Standing Status:   Future     Standing Expiration Date:   3/29/2023     Order Specific Question:   Is Patient Fasting?/# of Hours     Answer:   yes, 8-10 hours    TSH     Standing Status:   Future     Standing Expiration Date:   3/29/2023    Vitamin D 25 Hydroxy     Standing Status:   Future     Standing Expiration Date:   3/29/2023    Vitamin B12 & Folate     Standing Status:   Future     Standing Expiration Date:   3/29/2023    Varicella Zoster Antibody, IgG     Standing Status:   Future     Standing Expiration Date:   3/29/2023    POCT glycosylated hemoglobin (Hb A1C)         Medications Discontinued During This Encounter   Medication Reason    cephALEXin (KEFLEX) 500 MG capsule Therapy completed    ibuprofen (IBU) 400 MG tablet Therapy completed    nitroGLYCERIN (NITROSTAT) 0.4 MG SL tablet Therapy completed    aspirin (SM ASPIRIN ADULT LOW STRENGTH) 81 MG EC tablet REORDER    Insulin Pen Needle 29G X 12.7MM MISC REORDER    atorvastatin (LIPITOR) 20 MG tablet REORDER    sertraline (ZOLOFT) 25 MG tablet REORDER    SITagliptin (JANUVIA) 50 MG tablet REORDER    pioglitazone (ACTOS) 15 MG tablet REORDER    LANTUS SOLOSTAR 100 UNIT/ML injection pen REORDER    metFORMIN (GLUCOPHAGE) 850 MG Insulin Pen Needle (TRUEPLUS PEN NEEDLES) 32G X 4 MM MISC 100 each 1     Sig: USE WITH INSULIN DAILY    lisinopril (PRINIVIL;ZESTRIL) 10 MG tablet 90 tablet 1     Sig: Take 1 tablet by mouth daily       All patient questions answered. Patient voiced understanding. Quality Measures    Body mass index is 29.15 kg/m². Elevated. Weight control planned discussed daily exercise regimen and Healthy diet and regular exercise. BP: (!) 160/140 Blood pressure is high. Treatment plan consists of Weight Reduction, DASH Eating Plan, Dietary Sodium Restriction, Increased Physical Activity and No treatment change needed. Lab Results   Component Value Date    LDLCHOLESTEROL 76 10/25/2021    (goal LDL reduction with dx if diabetes is 50% LDL reduction)      PHQ Scores 3/29/2022 3/23/2020 2/27/2020 12/11/2019 11/13/2013   PHQ2 Score 0 6 0 2 4   PHQ9 Score 0 13 0 2 15     Interpretation of Total Score Depression Severity: 1-4 = Minimal depression, 5-9 = Mild depression, 10-14 = Moderate depression, 15-19 = Moderately severe depression, 20-27 = Severe depression    The patient'spast medical, surgical, social, and family history as well as his   current medications and allergies were reviewed as documented in today's encounter. Medications, labs, diagnostic studies, consultations andfollow-up as documented in this encounter. Return in about 3 months (around 6/29/2022). Patient wasseen with total face to face time of 35 minutes. More than 50% of this visit was counseling and education. Future Appointments   Date Time Provider Zhao Melendez   4/6/2022  3:45 PM SCHEDULE, [facility]   7/7/2022  3:00 PM Cherry Mcnally MD [facility]     This note was completed by using the assistance of a speech-recognition program. However, inadvertent computerized transcription errors may be present.  Althoughevery effort was made to ensure accuracy, no guarantees can be provided that every mistake has been identified and corrected by editing.   Electronically signed by Jaci wAan MD on 3/29/2022  3:23 PM

## 2022-05-26 DIAGNOSIS — E11.65 TYPE 2 DIABETES MELLITUS WITH HYPERGLYCEMIA, WITH LONG-TERM CURRENT USE OF INSULIN (HCC): ICD-10-CM

## 2022-05-26 DIAGNOSIS — Z79.4 TYPE 2 DIABETES MELLITUS WITH HYPERGLYCEMIA, WITH LONG-TERM CURRENT USE OF INSULIN (HCC): ICD-10-CM

## 2022-05-26 RX ORDER — PEN NEEDLE, DIABETIC 32GX 5/32"
NEEDLE, DISPOSABLE MISCELLANEOUS
Qty: 100 EACH | Refills: 1 | Status: SHIPPED | OUTPATIENT
Start: 2022-05-26

## 2022-09-06 DIAGNOSIS — E78.2 MIXED HYPERLIPIDEMIA: ICD-10-CM

## 2022-09-06 DIAGNOSIS — Z79.4 TYPE 2 DIABETES MELLITUS WITH HYPERGLYCEMIA, WITH LONG-TERM CURRENT USE OF INSULIN (HCC): ICD-10-CM

## 2022-09-06 DIAGNOSIS — E11.65 TYPE 2 DIABETES MELLITUS WITH HYPERGLYCEMIA, WITH LONG-TERM CURRENT USE OF INSULIN (HCC): ICD-10-CM

## 2022-09-07 RX ORDER — ATORVASTATIN CALCIUM 20 MG/1
TABLET, FILM COATED ORAL
Qty: 90 TABLET | Refills: 0 | Status: SHIPPED | OUTPATIENT
Start: 2022-09-07

## 2022-10-23 DIAGNOSIS — E11.65 TYPE 2 DIABETES MELLITUS WITH HYPERGLYCEMIA, WITH LONG-TERM CURRENT USE OF INSULIN (HCC): ICD-10-CM

## 2022-10-23 DIAGNOSIS — Z79.4 TYPE 2 DIABETES MELLITUS WITH HYPERGLYCEMIA, WITH LONG-TERM CURRENT USE OF INSULIN (HCC): ICD-10-CM

## 2022-10-24 RX ORDER — INSULIN GLARGINE 100 [IU]/ML
INJECTION, SOLUTION SUBCUTANEOUS
Qty: 60 ML | Refills: 0 | Status: SHIPPED | OUTPATIENT
Start: 2022-10-24

## 2022-10-24 NOTE — TELEPHONE ENCOUNTER
Please Approve or Refuse.   Send to Pharmacy per Pt's Request: [facility]      Next Visit Date:  Visit date not found   Last Visit Date: 3/29/2022    Hemoglobin A1C (%)   Date Value   03/29/2022 6.3   10/24/2021 9.6 (H)   09/10/2020 8.9             ( goal A1C is < 7)   BP Readings from Last 3 Encounters:   03/29/22 (!) 160/140   10/26/21 (!) 137/100   08/20/21 (!) 161/93          (goal 120/80)  BUN   Date Value Ref Range Status   10/26/2021 23 (H) 6 - 20 mg/dL Final     Creatinine   Date Value Ref Range Status   10/26/2021 0.71 0.70 - 1.20 mg/dL Final     Potassium   Date Value Ref Range Status   10/26/2021 4.0 3.7 - 5.3 mmol/L Final

## 2022-10-25 DIAGNOSIS — Z79.4 TYPE 2 DIABETES MELLITUS WITH HYPERGLYCEMIA, WITH LONG-TERM CURRENT USE OF INSULIN (HCC): ICD-10-CM

## 2022-10-25 DIAGNOSIS — E11.65 TYPE 2 DIABETES MELLITUS WITH HYPERGLYCEMIA, WITH LONG-TERM CURRENT USE OF INSULIN (HCC): ICD-10-CM

## 2022-10-25 NOTE — TELEPHONE ENCOUNTER
Please Approve or Refuse.   Send to Pharmacy per Pt's Request:      Next Visit Date:  11/22/2022   Last Visit Date: 3/29/2022    Hemoglobin A1C (%)   Date Value   03/29/2022 6.3   10/24/2021 9.6 (H)   09/10/2020 8.9             ( goal A1C is < 7)   BP Readings from Last 3 Encounters:   03/29/22 (!) 160/140   10/26/21 (!) 137/100   08/20/21 (!) 161/93          (goal 120/80)  BUN   Date Value Ref Range Status   10/26/2021 23 (H) 6 - 20 mg/dL Final     Creatinine   Date Value Ref Range Status   10/26/2021 0.71 0.70 - 1.20 mg/dL Final     Potassium   Date Value Ref Range Status   10/26/2021 4.0 3.7 - 5.3 mmol/L Final

## 2023-01-02 ENCOUNTER — HOSPITAL ENCOUNTER (EMERGENCY)
Age: 53
Discharge: HOME | End: 2023-01-02
Attending: EMERGENCY MEDICINE
Payer: COMMERCIAL

## 2023-01-02 ENCOUNTER — APPOINTMENT (OUTPATIENT)
Dept: GENERAL RADIOLOGY | Age: 53
End: 2023-01-02
Payer: COMMERCIAL

## 2023-01-02 VITALS
HEART RATE: 76 BPM | RESPIRATION RATE: 20 BRPM | HEIGHT: 74 IN | DIASTOLIC BLOOD PRESSURE: 89 MMHG | WEIGHT: 215 LBS | SYSTOLIC BLOOD PRESSURE: 157 MMHG | TEMPERATURE: 98.1 F | OXYGEN SATURATION: 99 % | BODY MASS INDEX: 27.59 KG/M2

## 2023-01-02 DIAGNOSIS — L89.899 PRESSURE INJURY OF SKIN OF LEFT FOOT, UNSPECIFIED INJURY STAGE: ICD-10-CM

## 2023-01-02 DIAGNOSIS — L03.116 CELLULITIS OF LEFT LOWER EXTREMITY: Primary | ICD-10-CM

## 2023-01-02 PROCEDURE — 73630 X-RAY EXAM OF FOOT: CPT

## 2023-01-02 PROCEDURE — 6370000000 HC RX 637 (ALT 250 FOR IP): Performed by: STUDENT IN AN ORGANIZED HEALTH CARE EDUCATION/TRAINING PROGRAM

## 2023-01-02 PROCEDURE — 99283 EMERGENCY DEPT VISIT LOW MDM: CPT

## 2023-01-02 RX ORDER — CEPHALEXIN 500 MG/1
500 CAPSULE ORAL 4 TIMES DAILY
Qty: 24 CAPSULE | Refills: 0 | Status: SHIPPED | OUTPATIENT
Start: 2023-01-02 | End: 2023-01-02 | Stop reason: SDUPTHER

## 2023-01-02 RX ORDER — CEPHALEXIN 500 MG/1
500 CAPSULE ORAL ONCE
Status: COMPLETED | OUTPATIENT
Start: 2023-01-02 | End: 2023-01-02

## 2023-01-02 RX ORDER — CEPHALEXIN 500 MG/1
500 CAPSULE ORAL 4 TIMES DAILY
Qty: 24 CAPSULE | Refills: 0 | Status: SHIPPED | OUTPATIENT
Start: 2023-01-02 | End: 2023-01-08

## 2023-01-02 RX ADMIN — CEPHALEXIN 500 MG: 500 CAPSULE ORAL at 19:09

## 2023-01-02 ASSESSMENT — PAIN DESCRIPTION - ORIENTATION: ORIENTATION: LEFT

## 2023-01-02 ASSESSMENT — PAIN SCALES - GENERAL: PAINLEVEL_OUTOF10: 7

## 2023-01-02 ASSESSMENT — PAIN DESCRIPTION - FREQUENCY: FREQUENCY: CONTINUOUS

## 2023-01-02 ASSESSMENT — PAIN - FUNCTIONAL ASSESSMENT: PAIN_FUNCTIONAL_ASSESSMENT: 0-10

## 2023-01-02 ASSESSMENT — PAIN DESCRIPTION - LOCATION: LOCATION: FOOT

## 2023-01-02 ASSESSMENT — PAIN DESCRIPTION - DESCRIPTORS: DESCRIPTORS: ACHING

## 2023-01-02 NOTE — ED TRIAGE NOTES
Patient presented to the ED today with complaints of a laceration on the left foot and foot swelling. Patient states that he has diabetes and noticed that he had a cut on his foot 2 days ago. Patient is unsure what he cut his foot on.

## 2023-01-02 NOTE — DISCHARGE INSTRUCTIONS
You were seen in the emergency department today for a laceration on the foot and swelling of the foot. The swelling is likely related to cellulitis which is an infection of the skin and underlying tissue. Please complete the entire course of antibiotics and follow-up with your PCP as soon as possible. I also provided a referral to podiatry, the foot doctor for further evaluation. Keep the foot clean and dry. Use moleskin or other padded covering on the laceration of the foot. You may also want to consider new insoles. If you have any new or worsening symptoms, please return to the ED for reevaluation. Thank you for visiting [facility] Emergency Department. You need to call Cranford Riedel, MD to make an appointment as directed for follow up. Should you have any questions regarding your care or further treatment, please call [facility] Emergency Department at [phone]. Take any medications as prescribed, if given any, otherwise for pain Use ibuprofen or Tylenol (unless prescribed medications that have Tylenol in it). You can take over the counter Ibuprofen (advil) tablets (4 tablets every 8 hours or 3 tablets every 6 hours or 2 tablets every 4 hours)    If given narcotics during this ED visit, please do not drive or operate heavy machinery for at least 4-6 hours. PLEASE RETURN TO THE ED IMMEDIATELY for worsening symptoms, or if you develop any concerning symptoms such as: high fever not relieved by tylenol and/or motrin, chills, shortness of breath, chest pain, persistent nausea and/or vomiting, numbness, weakness or tingling in the arms or legs or change in color of the extremities, changes in mental status, persistent headache, blurry vision, inability to urinate, unable to follow up with your physician, or other any other  Care or concern.

## 2023-01-02 NOTE — ED NOTES
This patient was assessed by the doctor only. Nurse processed and completed the orders from this doctor ie labs, meds, and/or EKG.         Rosa Simon LPN  12/90/86 [id number]

## 2023-01-02 NOTE — ED PROVIDER NOTES
[facility] ED  Emergency Department Encounter  Emergency Medicine Resident     Pt Name:Pierre Doyle  MRN: [id number]  [place] 1970  Date of evaluation: 1/2/23  PCP:  Carey Weldon MD      [address] [place] [address]       Chief Complaint   Patient presents with    Laceration    Other     Swollen LT foot        HISTORY OF PRESENT ILLNESS  (Location/Symptom, Timing/Onset, Context/Setting, Quality, Duration, Modifying Factors, Severity.)      Alfred Martinez is a 47 y.o. male who presents with swelling of the left foot. Patient states he noticed a laceration on his foot today. He has a history of diabetes and neuropathy and does not feel much on his feet. He is not sure if he stepped on something but does not recall doing so. He has noticed increasing swelling and some redness on the top of the left foot. Mild discomfort associated with this. No fevers or chills. No chest pain or shortness of breath. No recent travel. No other complaints this time. PAST MEDICAL / SURGICAL / SOCIAL / FAMILY HISTORY      has a past medical history of Anxiety and depression, Chronic back pain, HLD (hyperlipidemia), Hypertension, Marijuana abuse, Nephrolithiasis, and Type II or unspecified type diabetes mellitus without mention of complication, not stated as uncontrolled. has a past surgical history that includes Hand surgery (Right, 10/25/2021).       Social History     Socioeconomic History    Marital status:      Spouse name: Not on file    Number of children: Not on file    Years of education: Not on file    Highest education level: Not on file   Occupational History    Not on file   Tobacco Use    Smoking status: Never    Smokeless tobacco: Never   Substance and Sexual Activity    Alcohol use: Yes     Comment: 2 times a week    Drug use: Yes     Types: Marijuana [place])     Comment: marajunia 2 times aweek    Sexual activity: Yes   Other Topics Concern    Not on file   Social History Narrative    Not on file     Social Determinants of Health     Financial Resource Strain: Low Risk     Difficulty of Paying Living Expenses: Not hard at all   Food Insecurity: No Food Insecurity    Worried About [address] in the Last Year: Never true    [facility] in the Last Year: Never true   Transportation Needs: No Transportation Needs    Lack of Transportation (Medical): No    Lack of Transportation (Non-Medical): No   Physical Activity: Not on file   Stress: Not on file   Social Connections: Not on file   Intimate Partner Violence: Not on file   Housing Stability: Unknown    Unable to Pay for Housing in the Last Year: No    Number of Places Lived in the Last Year: Not on file    Unstable Housing in the Last Year: No       Family History   Problem Relation Age of Onset    Diabetes Father        Allergies:  Patient has no known allergies. Home Medications:  Prior to Admission medications    Medication Sig Start Date End Date Taking?  Authorizing Provider   cephALEXin (KEFLEX) 500 MG capsule Take 1 capsule by mouth 4 times daily for 6 days 1/2/23 1/8/23 Yes Diana Ro,    Insulin Pen Needle 29G X 12.7MM MISC 1 each by Does not apply route daily To use with insulin 10/25/22   Alexander Kerns MD   SITagliptin (JANUVIA) 50 MG tablet Take 1 tablet by mouth daily 10/25/22   Alexander Kerns MD   LANTUS SOLOSTAR 100 UNIT/ML injection pen INJECT 35 UNITS SUBCUTANEOUSLY EVERY MORNING BEFORE BREAKFAST 10/24/22   Alexander Kerns MD   metFORMIN (GLUCOPHAGE) 850 MG tablet TAKE 1 TABLET BY MOUTH TWO TIMES A DAY WITH MEALS 9/7/22   Alexander Kerns MD   atorvastatin (LIPITOR) 20 MG tablet TAKE 1 TABLET BY MOUTH ONE TIME A DAY 9/7/22   Alexander Kerns MD   TRUEPLUS PEN NEEDLES 32G X 4 MM MISC USE WITH INSULIN DAILY 5/26/22   Alexander Kerns MD   zoster recombinant adjuvanted vaccine [facility]) 50 MCG/0.5ML SUSR injection Inject 0.5 mLs into the muscle See Admin Instructions 1 dose now and repeat in 2-6 months 3/29/22   Oleta Orin Chidi Capone MD   sertraline (ZOLOFT) 25 MG tablet Take 1 tab daily and increase it to 50mg daily 3/29/22   Chris Osborne MD   aspirin ( ASPIRIN ADULT LOW STRENGTH) 81 MG EC tablet TAKE 1 TABLET BY MOUTH ONE TIME A DAY 3/29/22   Chris Osborne MD   Alcohol Swabs ( ALCOHOL PREP) 70 % PADS USE TO TEST ONCE DAILY 3/29/22   Chris Osborne MD   lisinopril (PRINIVIL;ZESTRIL) 10 MG tablet Take 1 tablet by mouth daily 3/29/22   Chris Osborne MD   acetaminophen (TYLENOL) 500 MG tablet Take 1 tablet by mouth 4 times daily as needed for Pain 8/17/21   Terese Bravo MD   blood glucose monitor kit and supplies Test one time a day & as needed for symptoms of irregular blood glucose. 3/23/20   Chris Osborne MD   zoster recombinant adjuvanted vaccine [facility]) 50 MCG/0.5ML SUSR injection Inject 0.5 mLs into the muscle See Admin Instructions 1 dose now and repeat in 2-6 months  Patient not taking: Reported on 3/29/2022 12/11/19   Chris Osborne MD   Lancets MISC Testing once a day. Please dispense according to patients insurance. 12/11/19   Chris Osborne MD   blood glucose monitor strips Testing once a day. Please dispense according to patients insurance. 12/11/19   Chris Osborne MD   Blood Pressure Monitor MISC Use daily to check BP 3/13/18   Chris Osborne MD         REVIEW OF SYSTEMS       Review of Systems   Constitutional:  Negative for chills and fever. Respiratory:  Negative for cough and shortness of breath. Cardiovascular:  Negative for chest pain. Gastrointestinal:  Negative for abdominal pain, nausea and vomiting. Musculoskeletal:  Positive for joint swelling. Negative for back pain and neck pain. Skin:  Positive for color change. Negative for rash. Neurological:  Positive for numbness (feet, baseline). Negative for weakness and headaches.      PHYSICAL EXAM      INITIAL VITALS:   BP (!) 157/89   Pulse 76   Temp 98.1 °F (36.7 °C) (Oral)   Resp 20   Ht 6' 2\" (1.88 m)   Wt 215 lb (97.5 kg)   SpO2 99%   BMI 27.60 kg/m²     Physical Exam  Constitutional:       General: He is not in acute distress. Appearance: Normal appearance. He is not ill-appearing, toxic-appearing or diaphoretic. HENT:      Head: Normocephalic and atraumatic. Mouth/Throat:      Mouth: Mucous membranes are moist.      Pharynx: Oropharynx is clear. Eyes:      Extraocular Movements: Extraocular movements intact. Cardiovascular:      Rate and Rhythm: Normal rate and regular rhythm. Heart sounds: Normal heart sounds. No murmur heard. Pulmonary:      Effort: Pulmonary effort is normal.      Breath sounds: Normal breath sounds. Musculoskeletal:         General: Normal range of motion. Cervical back: Normal range of motion and neck supple. Skin:     General: Skin is warm and dry. Comments: 2 cm laceration to the plantar surface and forefoot of the left foot. No active bleeding. Well demarcated edges. May be pressure sore  Dorsum of the left foot is erythematous, warm. Left foot is swollen to just above the ankle   Neurological:      General: No focal deficit present. Mental Status: He is alert and oriented to person, place, and time. DDX/DIAGNOSTIC RESULTS / EMERGENCY DEPARTMENT COURSE / MDM     Medical Decision Making  79-year-old male with history of diabetes and neuropathy presenting with laceration on the plantar surface of the left foot in addition to swelling and redness of the left foot. Patient appears well on exam, vital stable. No systemic symptoms. History and exam are most consistent with cellulitis of the left foot in addition to likely pressure ulcer as a result of diabetic neuropathy. Will obtain x-ray to rule out foreign body or other cause of laceration on the plantar surface of the foot. Anticipate discharge with follow-up with podiatry. We will also give antibiotics for cellulitis treatment. Amount and/or Complexity of Data Reviewed  Radiology: ordered. Decision-making details documented in ED Course. Risk  Prescription drug management. EKG      All EKG's are interpreted by the Emergency Department Physician who either signs or Co-signs this chart in the absence of a cardiologist.    EMERGENCY DEPARTMENT COURSE:    X-ray of the left foot negative for any acute changes. Some mild soft tissue swelling. No obvious foreign body. Patient updated on x-ray findings. We discussed supportive care of laceration/pressure ulcer with padding, moleskin, or other ways to pad the foot. Keflex given for cellulitis treatment. We discussed the importance of following up with podiatry and referral was given. Patient expressed understanding. Discharged in stable condition. PROCEDURES:  none    CONSULTS:  None    CRITICAL CARE:  There was significant risk of life threatening deterioration of patient's condition requiring my direct management. Critical care time <30 minutes, excluding any documented procedures. FINAL IMPRESSION      1. Cellulitis of left lower extremity    2.  Pressure injury of skin of left foot, unspecified injury stage          DISPOSITION / PLAN     DISPOSITION Decision To Discharge 01/02/2023 06:58:19 PM      PATIENT REFERRED TO:  Attila Ruano MD  [address]  [phone] [facility] [phone]    Schedule an appointment as soon as possible for a visit in 1 day      [facility] ED  [address] [address]  [phone]    If symptoms worsen    Roxanne Torres DPM  [place] [address] [facility] 72    Schedule an appointment as soon as possible for a visit in 3 days      DISCHARGE MEDICATIONS:  Discharge Medication List as of 1/2/2023  7:01 PM        START taking these medications    Details   cephALEXin (KEFLEX) 500 MG capsule Take 1 capsule by mouth 4 times daily for 6 days, Disp-24 capsule, R-0Print             Edie Tyler DO  Emergency Medicine Resident    (Please note that portions of thisnote were completed with a voice recognition program.  Efforts were made to edit the dictations but occasionally words are mis-transcribed.)       Viviane Taylor DO  Resident  01/03/23 0001

## 2023-01-02 NOTE — ED PROVIDER NOTES
[address]     Emergency Department     Faculty Attestation    I performed a history and physical examination of the patient and discussed management with the resident. I reviewed the residents note and agree with the documented findings including all diagnostic interpretations and plan of care. Any areas of disagreement are noted on the chart. I was personally present for the key portions of any procedures. I have documented in the chart those procedures where I was not present during the key portions. I have reviewed the emergency nurses triage note. I agree with the chief complaint, past medical history, past surgical history, allergies, medications, social and family history as documented unless otherwise noted below. Documentation of the HPI, Physical Exam and Medical Decision Making performed by scribes is based on my personal performance of the HPI, PE and MDM. For Physician Assistant/ Nurse Practitioner cases/documentation I have personally evaluated this patient and have completed at least one if not all key elements of the E/M (history, physical exam, and MDM). Additional findings are as noted. Primary Care Physician: Donavon Hook MD    History: This is a 47 y.o. male who presents to the Emergency Department with complaint of foot wound. Diabetic. Denies any trauma he can recall. No bleeding or drainage. Physical:     height is 6' 2\" (1.88 m) and weight is 215 lb (97.5 kg). His oral temperature is 98.1 °F (36.7 °C). His blood pressure is 157/89 (abnormal) and his pulse is 76. His respiration is 20 and oxygen saturation is 99%.    47 y.o. male NAD, Left foot plantar surface shows narrow ulceration with well demarcated skin edges. Mild surrounding edema but no erythema or warmth.     Impression: Foot ulcer - likely pressure ulcer    Plan: wound care, XR, f/u with podiatry      Ashia Zhang MD, Mehdi Olson  Attending Emergency Physician        Meliza Jara MD  01/02/23 [id number]

## 2023-01-03 ASSESSMENT — ENCOUNTER SYMPTOMS
COLOR CHANGE: 1
BACK PAIN: 0
ABDOMINAL PAIN: 0
NAUSEA: 0
SHORTNESS OF BREATH: 0
VOMITING: 0
COUGH: 0

## 2023-01-10 ENCOUNTER — HOSPITAL ENCOUNTER (OUTPATIENT)
Age: 53
Setting detail: OBSERVATION
Discharge: HOME | End: 2023-01-11
Attending: EMERGENCY MEDICINE | Admitting: EMERGENCY MEDICINE
Payer: COMMERCIAL

## 2023-01-10 ENCOUNTER — APPOINTMENT (OUTPATIENT)
Dept: GENERAL RADIOLOGY | Age: 53
End: 2023-01-10
Payer: COMMERCIAL

## 2023-01-10 DIAGNOSIS — Z78.9 FAILURE OF OUTPATIENT TREATMENT: ICD-10-CM

## 2023-01-10 DIAGNOSIS — L03.116 CELLULITIS OF LEFT LOWER EXTREMITY: Primary | ICD-10-CM

## 2023-01-10 PROBLEM — L03.90 CELLULITIS: Status: ACTIVE | Noted: 2023-01-10

## 2023-01-10 LAB
ABSOLUTE EOS #: 0.23 K/UL (ref 0–0.44)
ABSOLUTE IMMATURE GRANULOCYTE: 0.04 K/UL (ref 0–0.3)
ABSOLUTE LYMPH #: 1.81 K/UL (ref 1.1–3.7)
ABSOLUTE MONO #: 0.73 K/UL (ref 0.1–1.2)
ANION GAP SERPL CALCULATED.3IONS-SCNC: 10 MMOL/L (ref 9–17)
BASOPHILS # BLD: 1 % (ref 0–2)
BASOPHILS ABSOLUTE: 0.07 K/UL (ref 0–0.2)
BUN BLDV-MCNC: 17 MG/DL (ref 6–20)
C-REACTIVE PROTEIN: <3 MG/L (ref 0–5)
CALCIUM SERPL-MCNC: 9 MG/DL (ref 8.6–10.4)
CHLORIDE BLD-SCNC: 100 MMOL/L (ref 98–107)
CO2: 27 MMOL/L (ref 20–31)
CREAT SERPL-MCNC: 0.67 MG/DL (ref 0.7–1.2)
EOSINOPHILS RELATIVE PERCENT: 3 % (ref 1–4)
GFR SERPL CREATININE-BSD FRML MDRD: >60 ML/MIN/1.73M2
GLUCOSE BLD-MCNC: 119 MG/DL (ref 70–99)
GLUCOSE BLD-MCNC: 152 MG/DL (ref 75–110)
GLUCOSE BLD-MCNC: 172 MG/DL (ref 75–110)
GLUCOSE BLD-MCNC: 88 MG/DL (ref 75–110)
HCT VFR BLD CALC: 42.9 % (ref 40.7–50.3)
HEMOGLOBIN: 14.9 G/DL (ref 13–17)
IMMATURE GRANULOCYTES: 1 %
LYMPHOCYTES # BLD: 22 % (ref 24–43)
MCH RBC QN AUTO: 33.3 PG (ref 25.2–33.5)
MCHC RBC AUTO-ENTMCNC: 34.7 G/DL (ref 28.4–34.8)
MCV RBC AUTO: 95.8 FL (ref 82.6–102.9)
MONOCYTES # BLD: 9 % (ref 3–12)
NRBC AUTOMATED: 0 PER 100 WBC
PDW BLD-RTO: 12.4 % (ref 11.8–14.4)
PLATELET # BLD: 237 K/UL (ref 138–453)
PMV BLD AUTO: 10.3 FL (ref 8.1–13.5)
POTASSIUM SERPL-SCNC: 3.9 MMOL/L (ref 3.7–5.3)
RBC # BLD: 4.48 M/UL (ref 4.21–5.77)
SEDIMENTATION RATE, ERYTHROCYTE: 22 MM/HR (ref 0–20)
SEG NEUTROPHILS: 64 % (ref 36–65)
SEGMENTED NEUTROPHILS ABSOLUTE COUNT: 5.34 K/UL (ref 1.5–8.1)
SODIUM BLD-SCNC: 137 MMOL/L (ref 135–144)
WBC # BLD: 8.2 K/UL (ref 3.5–11.3)

## 2023-01-10 PROCEDURE — 73630 X-RAY EXAM OF FOOT: CPT

## 2023-01-10 PROCEDURE — 90471 IMMUNIZATION ADMIN: CPT | Performed by: STUDENT IN AN ORGANIZED HEALTH CARE EDUCATION/TRAINING PROGRAM

## 2023-01-10 PROCEDURE — 2580000003 HC RX 258: Performed by: EMERGENCY MEDICINE

## 2023-01-10 PROCEDURE — G0378 HOSPITAL OBSERVATION PER HR: HCPCS

## 2023-01-10 PROCEDURE — 96366 THER/PROPH/DIAG IV INF ADDON: CPT

## 2023-01-10 PROCEDURE — 85025 COMPLETE CBC W/AUTO DIFF WBC: CPT

## 2023-01-10 PROCEDURE — 82947 ASSAY GLUCOSE BLOOD QUANT: CPT

## 2023-01-10 PROCEDURE — 80048 BASIC METABOLIC PNL TOTAL CA: CPT

## 2023-01-10 PROCEDURE — 6370000000 HC RX 637 (ALT 250 FOR IP): Performed by: EMERGENCY MEDICINE

## 2023-01-10 PROCEDURE — 96368 THER/DIAG CONCURRENT INF: CPT

## 2023-01-10 PROCEDURE — 96375 TX/PRO/DX INJ NEW DRUG ADDON: CPT

## 2023-01-10 PROCEDURE — 96365 THER/PROPH/DIAG IV INF INIT: CPT

## 2023-01-10 PROCEDURE — 86140 C-REACTIVE PROTEIN: CPT

## 2023-01-10 PROCEDURE — 90715 TDAP VACCINE 7 YRS/> IM: CPT | Performed by: STUDENT IN AN ORGANIZED HEALTH CARE EDUCATION/TRAINING PROGRAM

## 2023-01-10 PROCEDURE — 6360000002 HC RX W HCPCS: Performed by: EMERGENCY MEDICINE

## 2023-01-10 PROCEDURE — 6360000002 HC RX W HCPCS: Performed by: STUDENT IN AN ORGANIZED HEALTH CARE EDUCATION/TRAINING PROGRAM

## 2023-01-10 PROCEDURE — 85652 RBC SED RATE AUTOMATED: CPT

## 2023-01-10 PROCEDURE — 99285 EMERGENCY DEPT VISIT HI MDM: CPT

## 2023-01-10 RX ORDER — ACETAMINOPHEN 325 MG/1
650 TABLET ORAL EVERY 6 HOURS PRN
Status: DISCONTINUED | OUTPATIENT
Start: 2023-01-10 | End: 2023-01-11 | Stop reason: HOSPADM

## 2023-01-10 RX ORDER — POTASSIUM CHLORIDE 20 MEQ/1
40 TABLET, EXTENDED RELEASE ORAL PRN
Status: DISCONTINUED | OUTPATIENT
Start: 2023-01-10 | End: 2023-01-11 | Stop reason: HOSPADM

## 2023-01-10 RX ORDER — LISINOPRIL 10 MG/1
10 TABLET ORAL DAILY
Status: DISCONTINUED | OUTPATIENT
Start: 2023-01-10 | End: 2023-01-11 | Stop reason: HOSPADM

## 2023-01-10 RX ORDER — ASPIRIN 81 MG/1
81 TABLET ORAL DAILY
Status: DISCONTINUED | OUTPATIENT
Start: 2023-01-10 | End: 2023-01-11 | Stop reason: HOSPADM

## 2023-01-10 RX ORDER — ALOGLIPTIN 12.5 MG/1
12.5 TABLET, FILM COATED ORAL DAILY
Status: DISCONTINUED | OUTPATIENT
Start: 2023-01-10 | End: 2023-01-11 | Stop reason: HOSPADM

## 2023-01-10 RX ORDER — ACETAMINOPHEN 500 MG
500 TABLET ORAL 4 TIMES DAILY PRN
Status: DISCONTINUED | OUTPATIENT
Start: 2023-01-10 | End: 2023-01-11 | Stop reason: HOSPADM

## 2023-01-10 RX ORDER — SODIUM CHLORIDE 9 MG/ML
INJECTION, SOLUTION INTRAVENOUS CONTINUOUS
Status: DISCONTINUED | OUTPATIENT
Start: 2023-01-10 | End: 2023-01-11 | Stop reason: HOSPADM

## 2023-01-10 RX ORDER — SODIUM CHLORIDE 9 MG/ML
25 INJECTION, SOLUTION INTRAVENOUS PRN
Status: DISCONTINUED | OUTPATIENT
Start: 2023-01-10 | End: 2023-01-11 | Stop reason: HOSPADM

## 2023-01-10 RX ORDER — SODIUM CHLORIDE 0.9 % (FLUSH) 0.9 %
5-40 SYRINGE (ML) INJECTION EVERY 12 HOURS SCHEDULED
Status: DISCONTINUED | OUTPATIENT
Start: 2023-01-10 | End: 2023-01-11 | Stop reason: HOSPADM

## 2023-01-10 RX ORDER — POTASSIUM CHLORIDE 7.45 MG/ML
10 INJECTION INTRAVENOUS PRN
Status: DISCONTINUED | OUTPATIENT
Start: 2023-01-10 | End: 2023-01-11 | Stop reason: HOSPADM

## 2023-01-10 RX ORDER — ACETAMINOPHEN 650 MG/1
650 SUPPOSITORY RECTAL EVERY 6 HOURS PRN
Status: DISCONTINUED | OUTPATIENT
Start: 2023-01-10 | End: 2023-01-11 | Stop reason: HOSPADM

## 2023-01-10 RX ORDER — ENOXAPARIN SODIUM 100 MG/ML
40 INJECTION SUBCUTANEOUS DAILY
Status: DISCONTINUED | OUTPATIENT
Start: 2023-01-10 | End: 2023-01-11 | Stop reason: HOSPADM

## 2023-01-10 RX ORDER — POLYETHYLENE GLYCOL 3350 17 G/17G
17 POWDER, FOR SOLUTION ORAL DAILY PRN
Status: DISCONTINUED | OUTPATIENT
Start: 2023-01-10 | End: 2023-01-11 | Stop reason: HOSPADM

## 2023-01-10 RX ORDER — ONDANSETRON 2 MG/ML
4 INJECTION INTRAMUSCULAR; INTRAVENOUS EVERY 4 HOURS PRN
Status: DISCONTINUED | OUTPATIENT
Start: 2023-01-10 | End: 2023-01-11 | Stop reason: HOSPADM

## 2023-01-10 RX ORDER — INSULIN GLARGINE 100 [IU]/ML
35 INJECTION, SOLUTION SUBCUTANEOUS EVERY MORNING
Status: DISCONTINUED | OUTPATIENT
Start: 2023-01-10 | End: 2023-01-11 | Stop reason: HOSPADM

## 2023-01-10 RX ORDER — SODIUM CHLORIDE 0.9 % (FLUSH) 0.9 %
5-40 SYRINGE (ML) INJECTION PRN
Status: DISCONTINUED | OUTPATIENT
Start: 2023-01-10 | End: 2023-01-11 | Stop reason: HOSPADM

## 2023-01-10 RX ADMIN — INSULIN GLARGINE 25 UNITS: 100 INJECTION, SOLUTION SUBCUTANEOUS at 15:59

## 2023-01-10 RX ADMIN — LISINOPRIL 10 MG: 10 TABLET ORAL at 14:01

## 2023-01-10 RX ADMIN — ALOGLIPTIN 12.5 MG: 12.5 TABLET, FILM COATED ORAL at 14:01

## 2023-01-10 RX ADMIN — SODIUM CHLORIDE: 9 INJECTION, SOLUTION INTRAVENOUS at 16:02

## 2023-01-10 RX ADMIN — CEFTRIAXONE SODIUM 1000 MG: 1 INJECTION, POWDER, FOR SOLUTION INTRAMUSCULAR; INTRAVENOUS at 12:37

## 2023-01-10 RX ADMIN — Medication 1500 MG: at 13:24

## 2023-01-10 RX ADMIN — TETANUS TOXOID, REDUCED DIPHTHERIA TOXOID AND ACELLULAR PERTUSSIS VACCINE, ADSORBED 0.5 ML: 5; 2.5; 8; 8; 2.5 SUSPENSION INTRAMUSCULAR at 12:31

## 2023-01-10 ASSESSMENT — PAIN SCALES - GENERAL: PAINLEVEL_OUTOF10: 5

## 2023-01-10 ASSESSMENT — ENCOUNTER SYMPTOMS
RHINORRHEA: 0
NAUSEA: 0
VOMITING: 0
COUGH: 0
SHORTNESS OF BREATH: 0
SORE THROAT: 0
CHEST TIGHTNESS: 0

## 2023-01-10 ASSESSMENT — PAIN - FUNCTIONAL ASSESSMENT
PAIN_FUNCTIONAL_ASSESSMENT: 0-10
PAIN_FUNCTIONAL_ASSESSMENT: PREVENTS OR INTERFERES SOME ACTIVE ACTIVITIES AND ADLS

## 2023-01-10 ASSESSMENT — PAIN DESCRIPTION - FREQUENCY: FREQUENCY: CONTINUOUS

## 2023-01-10 ASSESSMENT — PAIN DESCRIPTION - PAIN TYPE: TYPE: ACUTE PAIN

## 2023-01-10 ASSESSMENT — PAIN DESCRIPTION - LOCATION: LOCATION: FOOT;TOE (COMMENT WHICH ONE)

## 2023-01-10 ASSESSMENT — PAIN DESCRIPTION - DESCRIPTORS: DESCRIPTORS: BURNING;SORE;TENDER

## 2023-01-10 ASSESSMENT — PAIN DESCRIPTION - ORIENTATION: ORIENTATION: LEFT

## 2023-01-10 NOTE — H&P
[facility]  CDU / OBSERVATION ENCOUNTER  ATTENDING NOTE     Pt Name: Farzad Guaman  MRN: [id number]  [place] 1970  Date of evaluation: 1/10/23  Patient's PCP is :  Jovanny Hawley MD    [address] [place] [address]       Chief Complaint   Patient presents with    Foot Pain    Wound Check     Left foot          HISTORY OF PRESENT ILLNESS    Farzad Guaman is a 47 y.o. male who presents for wound check. Patient was here approximately a week ago and was given oral antibiotics. Patient has a history of diabetes. Patient was without systemic complaint. Patient has not had any good pain relief despite being on oral antibiotics. Patient denies fevers and chills. Patient does not have other medications utilized for pain. Patient has no other injuries. Location/Symptom: Cellulitis to left foot  Timing/Onset: Development over the past week  Provocation: Patient with diabetes and with foot infection  Quality: Aching pain  Radiation: None  Severity: Moderate to severe  Timing/Duration: 1 week  Modifying Factors: Unclear    REVIEW OF SYSTEMS        General ROS - No fevers, No malaise   Ophthalmic ROS - No discharge, No changes in vision  ENT ROS -  No sore throat, No rhinorrhea,   Respiratory ROS - no shortness of breath, no cough, no  wheezing  Cardiovascular ROS - No chest pain, no dyspnea on exertion  Gastrointestinal ROS - No abdominal pain, no nausea or vomiting, no change in bowel habits, no black or bloody stools  Genito-Urinary ROS - No dysuria, trouble voiding, or hematuria  Musculoskeletal ROS - No myalgias, No arthalgias  Neurological ROS - No headache, no dizziness/lightheadedness, No focal weakness, no loss of sensation  Dermatological ROS -worsening cellulitic rash    (PQRS) Advance directives on face sheet per hospital policy.  No change unless specifically mentioned in chart    PAST MEDICAL HISTORY    has a past medical history of Anxiety and depression, Chronic back pain, HLD (hyperlipidemia), Hypertension, Marijuana abuse, Nephrolithiasis, and Type II or unspecified type diabetes mellitus without mention of complication, not stated as uncontrolled. I have reviewed the past medical history with the patient and it is  pertinent to this complaint. SURGICAL HISTORY      has a past surgical history that includes Hand surgery (Right, 10/25/2021). I have reviewed and agree with Surgical History entered and it is  pertinent to this complaint. CURRENT MEDICATIONS     vancomycin (VANCOCIN) 1500 mg in sodium chloride 0.9 % 250 mL IVPB, Once  acetaminophen (TYLENOL) tablet 500 mg, 4x Daily PRN  sertraline (ZOLOFT) tablet 50 mg, Daily  aspirin EC tablet 81 mg, Daily  lisinopril (PRINIVIL;ZESTRIL) tablet 10 mg, Daily  metFORMIN (GLUCOPHAGE) tablet 500 mg, BID WC  insulin glargine (LANTUS) injection vial 35 Units, QAM  alogliptin (NESINA) tablet 12.5 mg, Daily  0.9 % sodium chloride infusion, Continuous  sodium chloride flush 0.9 % injection 5-40 mL, 2 times per day  sodium chloride flush 0.9 % injection 5-40 mL, PRN  0.9 % sodium chloride infusion, PRN  potassium chloride (KLOR-CON M) extended release tablet 40 mEq, PRN   Or  potassium bicarb-citric acid (EFFER-K) effervescent tablet 40 mEq, PRN   Or  potassium chloride 10 mEq/100 mL IVPB (Peripheral Line), PRN  enoxaparin (LOVENOX) injection 40 mg, Daily  ondansetron (ZOFRAN) injection 4 mg, Q4H PRN  polyethylene glycol (GLYCOLAX) packet 17 g, Daily PRN  acetaminophen (TYLENOL) tablet 650 mg, Q6H PRN   Or  acetaminophen (TYLENOL) suppository 650 mg, Q6H PRN        All medication charted and reviewed. ALLERGIES     has No Known Allergies. FAMILY HISTORY     He indicated that the status of his father is unknown.     family history includes Diabetes in his father. The patient denies any pertinent family history. I have reviewed and agree with the family history entered.   I have reviewed the Family History and it is not significant to the case    SOCIAL HISTORY      reports that he has never smoked. He has never used smokeless tobacco. He reports current alcohol use. He reports current drug use. Drug: Marijuana Garrel Calender). I have reviewed and agree with all Social.  There are no  concerns for substance abuse/use. PHYSICAL EXAM     INITIAL VITALS:  height is 6' 2\" (1.88 m) and weight is 215 lb (97.5 kg). His oral temperature is 98.4 °F (36.9 °C). His blood pressure is 113/82 and his pulse is 70. His respiration is 15 and oxygen saturation is 91%. CONSTITUTIONAL: AOx4, no apparent distress, appears stated age     HEAD: normocephalic, atraumatic   EYES: PERRLA, EOMI    ENT: moist mucous membranes, uvula midline   NECK: supple, symmetric   BACK: symmetric   LUNGS: clear to auscultation bilaterally   CARDIOVASCULAR: regular rate and rhythm, no murmurs, rubs or gallops   ABDOMEN: soft, non-tender, non-distended with normal active bowel sounds   NEUROLOGIC:  MAEx4, no focal sensory or motor deficits   MUSCULOSKELETAL: no clubbing, cyanosis or edema   SKIN: no rash or wounds       DIFFERENTIAL DIAGNOSIS/MDM:      Medical decision making from ED:  Patient is nontoxic, non-lethargic. Not meeting SIRS criteria although concerns for outpatient antibiotic failure. No indication for imaging at this time as it does not appear to extend deep although concerns for outpatient antibiotic failure, we will plan for labs to include CBC, BMP, CRP, sed rate. We will plan for IV antibiotics due to outpatient failure. Plan for admission to observation unit for IV antibiotic therapy as well as to be evaluated by podiatry team.    DIAGNOSTIC RESULTS     EKG: All EKG's are interpreted by the Observation Physician who either signs or Co-signs this chart in the absence of a cardiologist.    EKG Interpretation    RADIOLOGY:   I directly visualized the following  images and reviewed the radiologist interpretations:    No results found.     LABS:  I have reviewed and interpreted all available lab results. Labs Reviewed   SEDIMENTATION RATE - Abnormal; Notable for the following components:       Result Value    Sed Rate 22 (*)     All other components within normal limits   CBC WITH AUTO DIFFERENTIAL - Abnormal; Notable for the following components:    Lymphocytes 22 (*)     Immature Granulocytes 1 (*)     All other components within normal limits   BASIC METABOLIC PANEL - Abnormal; Notable for the following components:    Glucose 119 (*)     Creatinine 0.67 (*)     All other components within normal limits   POC GLUCOSE FINGERSTICK - Abnormal; Notable for the following components:    POC Glucose 172 (*)     All other components within normal limits   C-REACTIVE PROTEIN         CDU IMPRESSION / PLAN      Jeanna Colbert is a 47 y.o. male who presents with       Outpatient treatment failure for cellulitis. Patient admitted for IV antibiotics  Podiatry consult  Continue home medications and pain control  Monitor vitals, labs, and imaging  DISPO: pending consults and clinical improvement    CONSULTS:    IP CONSULT TO PODIATRY    PROCEDURES:  Not indicated        PATIENT REFERRED TO:    No follow-up provider specified. --  Leora Lamas MD   Emergency Medicine Attending    This dictation was generated by voice recognition computer software. Although all attempts are made to edit the dictation for accuracy, there may be errors in the transcription that are not intended.

## 2023-01-10 NOTE — CONSULTS
Consultation Note  Podiatric Medicine and Surgery     Subjective     Chief Complaint: Left foot ulceration    HPI:  Clyde Damon is a 47 y.o. male seen at [facility] emergency department for left foot ulceration. Patient arrived to the ED approximately 1 week ago and given Keflex for 1 week however the situation did not improve he noticed drainage and has been walking continuously on his left foot and applying hydrogen peroxide and scrubbing and after leaving the shower daily. The patient does not notice much pain from the location and has not noticed any malodor. He did notice an increase in edema in his left lower extremity as well as a change in temperature and some redness that appeared to be changing getting worse from his previous stay in the hospital.  Denies any nausea vomiting fever shortness of breath. No other pedal complaints at this time. PCP is Scout Jordan MD    ROS:   Review of Systems   Constitutional:  Negative for chills, fatigue and fever. HENT:  Negative for rhinorrhea and sore throat. Eyes:  Negative for visual disturbance. Respiratory:  Negative for cough, chest tightness and shortness of breath. Cardiovascular:  Negative for chest pain and palpitations. Gastrointestinal:  Negative for nausea and vomiting. Musculoskeletal:  Negative for arthralgias and myalgias. Skin:  Positive for wound. Neurological:  Negative for weakness, numbness and headaches. Psychiatric/Behavioral:  Negative for agitation and confusion. Past Medical History   has a past medical history of Anxiety and depression, Chronic back pain, HLD (hyperlipidemia), Hypertension, Marijuana abuse, Nephrolithiasis, and Type II or unspecified type diabetes mellitus without mention of complication, not stated as uncontrolled. Past Surgical History   has a past surgical history that includes Hand surgery (Right, 10/25/2021).     Medications  Prior to Admission medications    Medication Sig Start Date End Date Taking? Authorizing Provider   Insulin Pen Needle 29G X 12.7MM MISC 1 each by Does not apply route daily To use with insulin 10/25/22   Vishal Jones MD   SITagliptin (JANUVIA) 50 MG tablet Take 1 tablet by mouth daily 10/25/22   Vishal Jones MD   LANTUS SOLOSTAR 100 UNIT/ML injection pen INJECT 35 UNITS SUBCUTANEOUSLY EVERY MORNING BEFORE BREAKFAST 10/24/22   Vishal Jones MD   metFORMIN (GLUCOPHAGE) 850 MG tablet TAKE 1 TABLET BY MOUTH TWO TIMES A DAY WITH MEALS 9/7/22   Vishal Jones MD   atorvastatin (LIPITOR) 20 MG tablet TAKE 1 TABLET BY MOUTH ONE TIME A DAY 9/7/22   Vishal Jones MD   TRUEPLUS PEN NEEDLES 32G X 4 MM MISC USE WITH INSULIN DAILY 5/26/22   Vishal Jones MD   sertraline (ZOLOFT) 25 MG tablet Take 1 tab daily and increase it to 50mg daily  Patient not taking: Reported on 1/10/2023 3/29/22   Vishal oJnes MD   aspirin ( ASPIRIN ADULT LOW STRENGTH) 81 MG EC tablet TAKE 1 TABLET BY MOUTH ONE TIME A DAY 3/29/22   Vishal Jones MD   Alcohol Swabs ( ALCOHOL PREP) 70 % PADS USE TO TEST ONCE DAILY 3/29/22   Vishal Jones MD   lisinopril (PRINIVIL;ZESTRIL) 10 MG tablet Take 1 tablet by mouth daily 3/29/22   Vishal Jones MD   acetaminophen (TYLENOL) 500 MG tablet Take 1 tablet by mouth 4 times daily as needed for Pain 8/17/21   Flavio Arenas MD   blood glucose monitor kit and supplies Test one time a day & as needed for symptoms of irregular blood glucose. 3/23/20   Vishal Jones MD   Lancets MISC Testing once a day. Please dispense according to patients insurance. 12/11/19   Vishal Jones MD   blood glucose monitor strips Testing once a day. Please dispense according to patients insurance.  12/11/19   Vishal Jones MD   Blood Pressure Monitor MISC Use daily to check BP 3/13/18   Vishal Jones MD    Scheduled Meds:   sertraline  50 mg Oral Daily    aspirin  81 mg Oral Daily    lisinopril  10 mg Oral Daily    metFORMIN  500 mg Oral BID WC    insulin glargine  35 Units SubCUTAneous QAM    alogliptin  12.5 mg Oral Daily    sodium chloride flush  5-40 mL IntraVENous 2 times per day    enoxaparin  40 mg SubCUTAneous Daily     Continuous Infusions:   sodium chloride 125 mL/hr at 01/10/23 1602    sodium chloride       PRN Meds:.acetaminophen, sodium chloride flush, sodium chloride, potassium chloride **OR** potassium alternative oral replacement **OR** potassium chloride, ondansetron, polyethylene glycol, acetaminophen **OR** acetaminophen    Allergies  has No Known Allergies. Family History  family history includes Diabetes in his father. Social History   reports that he has never smoked. He has never used smokeless tobacco.   reports current alcohol use. reports current drug use. Drug: Marijuana Juanjose Owens). Objective     Vitals:  Patient Vitals for the past 8 hrs:   BP Temp Temp src Pulse Resp SpO2 Height Weight   01/10/23 1405 113/82 -- -- 70 15 91 % -- --   01/10/23 1404 113/82 -- -- -- -- -- -- --   01/10/23 1133 (!) 183/103 98.4 °F (36.9 °C) Oral 82 15 94 % 6' 2\" (1.88 m) 215 lb (97.5 kg)     Average, Min, and Max for last 24 hours Vitals:  TEMPERATURE:  Temp  Av.4 °F (36.9 °C)  Min: 98.4 °F (36.9 °C)  Max: 98.4 °F (36.9 °C)    RESPIRATIONS RANGE: Resp  Avg: 15  Min: 15  Max: 15    PULSE RANGE: Pulse  Av  Min: 70  Max: 82    BLOOD PRESSURE RANGE:  Systolic (57NKA), CQU:579 , Min:113 , GJM:526   ; Diastolic (12ALC), HNZ:54, Min:82, Max:103      PULSE OXIMETRY RANGE: SpO2  Av.5 %  Min: 91 %  Max: 94 %  I&O:  No intake/output data recorded. CBC:  Recent Labs     01/10/23  1203   WBC 8.2   HGB 14.9   HCT 42.9      CRP <3.0        BMP:  Recent Labs     01/10/23  1203      K 3.9      CO2 27   BUN 17   CREATININE 0.67*   GLUCOSE 119*   CALCIUM 9.0        Coags:  No results for input(s): APTT, PROT, INR in the last 72 hours.     Lab Results   Component Value Date    LABA1C 6.3 2022     Lab Results   Component Value Date    SEDRATE 22 (H) 01/10/2023     Lab Results   Component Value Date    CRP <3.0 01/10/2023         Physical Exam:  Vascular: DP and PT pulses are palpable. CFT <3 seconds to all pedal digits. Mild nonpitting left distal dorsal pedal edema. Temperature gradient increased to left distal foot. Neuro: Light touch sensation is present to the level of the pedal digits. Musculoskeletal: Muscle strength is 5/5, baseline, to all lower extremity muscle groups. Palpation of left foot elicits no pain. Gross deformity is absent. Dermatologic: Full thickness ulcer located subleft fourth metatarsal and measures approximately 2 cm x 2 cm x 0.5 cm. Base is fibrotic. Periwound skin appears hypertrophic. Minimal amount of serous drainage noted without associated mal odor. Limited erythema with localized associated increase in warmth. Negative probe to bone. Negative sinus tracking. The wound does not undermine. No fluctuance, crepitus, or induration. Interdigital maceration absent, Bilateral.  Nails 1-10 are within normal limits. Clinical:         Imaging:   XR FOOT LEFT (MIN 3 VIEWS)    (Results Pending)       Cultures: None obtained patient has been on antibiotics     Assessment     Sorin Saldivar is a 47 y.o. male with   Diabetes mellitus with left foot ulcer  Cellulitis left foot  Edema left foot    Principal Problem:    Cellulitis  Resolved Problems:    * No resolved hospital problems. *        Plan     Patient examined and evaluated at bedside   Treatment options discussed in detail with the patient including offloading the area on a consistent basis  Radiographs reviewed and discussed in detail with patient negative for any foreign body, soft tissue gas, osseous changes  Orders to dress wound with Betadine 4 x 4's Kerlix Ace  Antibiotics.   ED Rocephin and vancomycin  Heel WB status to left lower extremity  Discussed with Dr. Lai Catherine, DPM   Podiatric Medicine & Surgery   1/10/2023 at 5:45 PM    This note is created with the assistance of a speech recognition program.  While intending to generate a document that actually reflects the content of the visit, the document can still have some errors including those of syntax and sound a like substitutions which may escape proof reading. In such instances, actual meaning can be extrapolated by contextual diversion.

## 2023-01-10 NOTE — ED NOTES
Left foot betadine and 4x4 , Kerlix and ace wrap applied; per doctor order   Cam boot at bedside; pt instructed to put on while up ambulating      Laura Ferrari RN  01/10/23 [id number]

## 2023-01-10 NOTE — ED PROVIDER NOTES
[facility]     Emergency Department     Faculty Attestation    I performed a history and physical examination of the patient and discussed management with the resident. I reviewed the residents note and agree with the documented findings including all diagnostic interpretations and plan of care. Any areas of disagreement are noted on the chart. I was personally present for the key portions of any procedures. I have documented in the chart those procedures where I was not present during the key portions. I have reviewed the emergency nurses triage note. I agree with the chief complaint, past medical history, past surgical history, allergies, medications, social and family history as documented unless otherwise noted below. Documentation of the HPI, Physical Exam and Medical Decision Making performed by scribphuong is based on my personal performance of the HPI, PE and MDM. For Physician Assistant/ Nurse Practitioner cases/documentation I have personally evaluated this patient and have completed at least one if not all key elements of the E/M (history, physical exam, and MDM). Additional findings are as noted. Primary Care Physician: Robert Gaona MD    History: This is a 47 y.o. male who presents to the Emergency Department with complaint of leg redness and swelling. Seen last week by me and was found to have a pressure ulcer/callus that split open with some surrounding cellulitis. Has been on Keflex for a week without improvement and in fact has worsened. No fevers. Physical:     height is 6' 2\" (1.88 m) and weight is 215 lb (97.5 kg). His oral temperature is 98.4 °F (36.9 °C). His blood pressure is 183/103 (abnormal) and his pulse is 82. His respiration is 15 and oxygen saturation is 94%.    47 y.o. male no acute distress, left foot shows scattered cellulitis up to mid tibia with some warmth and edema noted.   The ulcer itself appears relatively unchanged no purulent drainage no significant tenderness to palpation    Medical Decision Making  58-year-old male presenting with outpatient treatment failure of antibiotic's for cellulitis secondary to diabetic foot wound. Plan is labs antibiotics and likely admission given outpatient treatment failure. Amount and/or Complexity of Data Reviewed  External Data Reviewed: radiology. Details: prior XR reviewed, no bony abnormality  Labs: ordered. Details: BMP within normal limits for medical history, Mild ESR elevation at 22,  ECG/medicine tests: ordered. Discussion of management or test interpretation with external provider(s): Notification of admission to observation team, notification of consultation with patient history to podiatry team.    Risk  OTC drugs. Prescription drug management. Decision regarding hospitalization.         Dev Ramirez MD, Scott Manley  Attending Emergency Physician        Christine Cheng MD  01/10/23 [id number]

## 2023-01-10 NOTE — ED TRIAGE NOTES
Pt was here at 1 week ago for left foot wound, was placed on antibiotic and sent home  Since the the redness  is worsen   And  the pain is worsen 6/10, having difficulty with walking  Is a .  Is diabetic, last time he checked his blood sugar at home was 1 week ago average 180- 200s    Pedal pulses are palpable  Top of foot is very warm,

## 2023-01-10 NOTE — ED PROVIDER NOTES
[address] [facility]  ED  Emergency Department Encounter  Emergency Medicine Resident     Pt Name: Sujey Sparks  MRN: [id number]  [place] 1970  Date of evaluation: 1/10/23  PCP:  Milagros Martinez MD    [address]       Chief Complaint   Patient presents with    Foot Pain    Wound Check     Left foot        HISTORY OFPRESENT ILLNESS  (Location/Symptom, Timing/Onset, Context/Setting, Quality, Duration, Modifying Factors,Severity.)      Sujey Sparks is a 47 y.o. male with past medical history significant for diabetes mellitus. Patient was recently evaluated in the emergency department for callus which had underlying infection. Patient noted increasing swelling, increasing redness and increasing pain despite compliance with outpatient antibiotics. Patient denies any systemic symptoms including fevers or chills. Has not not had utilize anything for pain. No other injuries. No new injuries. Wants to be reevaluated due to continued and worsening pain, erythema, splitting of the area. PAST MEDICAL / SURGICAL / SOCIAL / FAMILY HISTORY      has a past medical history of Anxiety and depression, Chronic back pain, HLD (hyperlipidemia), Hypertension, Marijuana abuse, Nephrolithiasis, and Type II or unspecified type diabetes mellitus without mention of complication, not stated as uncontrolled. has a past surgical history that includes Hand surgery (Right, 10/25/2021). Social:  reports that he has never smoked. He has never used smokeless tobacco. He reports current alcohol use. He reports current drug use. Drug: Marijuana Kushal Foreman). Family Hx:   Family History   Problem Relation Age of Onset    Diabetes Father         Allergies:  Patient has no known allergies. Home Medications:  Prior to Admission medications    Medication Sig Start Date End Date Taking?  Authorizing Provider   Insulin Pen Needle 29G X 12.7MM MISC 1 each by Does not apply route daily To use with insulin 10/25/22   Sourav Burrell Allie Wood MD   SITagliptin (JANUVIA) 50 MG tablet Take 1 tablet by mouth daily 10/25/22   Jovanny Hawley MD   LANTUS SOLOSTAR 100 UNIT/ML injection pen INJECT 35 UNITS SUBCUTANEOUSLY EVERY MORNING BEFORE BREAKFAST 10/24/22   Jovanny Hawley MD   metFORMIN (GLUCOPHAGE) 850 MG tablet TAKE 1 TABLET BY MOUTH TWO TIMES A DAY WITH MEALS 9/7/22   Jovanny Hawley MD   atorvastatin (LIPITOR) 20 MG tablet TAKE 1 TABLET BY MOUTH ONE TIME A DAY 9/7/22   Jovanny Hawley MD   TRUEPLUS PEN NEEDLES 32G X 4 MM MISC USE WITH INSULIN DAILY 5/26/22   Jovanny Hawley MD   sertraline (ZOLOFT) 25 MG tablet Take 1 tab daily and increase it to 50mg daily  Patient not taking: Reported on 1/10/2023 3/29/22   Jovanny Hawley MD   aspirin ( ASPIRIN ADULT LOW STRENGTH) 81 MG EC tablet TAKE 1 TABLET BY MOUTH ONE TIME A DAY 3/29/22   Jovanny Hawley MD   Alcohol Swabs ( ALCOHOL PREP) 70 % PADS USE TO TEST ONCE DAILY 3/29/22   Jovanny Hawley MD   lisinopril (PRINIVIL;ZESTRIL) 10 MG tablet Take 1 tablet by mouth daily 3/29/22   Jovanny Hawley MD   acetaminophen (TYLENOL) 500 MG tablet Take 1 tablet by mouth 4 times daily as needed for Pain 8/17/21   Radhames Woods MD   blood glucose monitor kit and supplies Test one time a day & as needed for symptoms of irregular blood glucose. 3/23/20   Jovanny Hawley MD   Lancets MISC Testing once a day. Please dispense according to patients insurance. 12/11/19   Jovanny Hawley MD   blood glucose monitor strips Testing once a day. Please dispense according to patients insurance. 12/11/19   Jovanny Hawley MD   Blood Pressure Monitor MISC Use daily to check BP 3/13/18   Jovanny Hawley MD       REVIEW OFSYSTEMS    (2-9 systems for level 4, 10 or more for level 5)      Positive:  Foot wound, worsening redness, worsening swelling, splitting open of wound    Negative: Fevers, chills, headache,     PHYSICAL EXAM   (up to 7 for level 4, 8 or more forlevel 5)      INITIAL VITALS:   Vitals:    01/10/23 1133   BP: (!) 183/103 Pulse: 82   Resp: 15   Temp: 98.4 °F (36.9 °C)   SpO2: 94%          PHYSICAL EXAM:   Constitutional: Appears well, in no acute distress, alert, answering questions appropriately, speaking in full sentences   Extremities: moving all four extremities equally with no focal deficits noted on my examination   Neurological: alert, answering questions appropriately, easily following commands, speaking in full sentences with no slurring noted   Focused: Left foot is notable for wound on plantar aspect which is split open with underlying purulence as well as erythema. No active drainage. Tenderness to palpation. Edema is noted on the dorsal aspect of the foot as well. Dorsalis pedis and posttibial pulses are intact swelling does extend into the ankle. MDM / DIFFERENTIAL        Initial MDM/Plan: 47 y.o. male who presents with worsening of foot wound. Vital signs upon arrival are notable for hypertension at 183/103, afebrile, nontachycardic, respirations are 15, saturations are 94% on room air. Patient is nontoxic, non-lethargic. Not meeting SIRS criteria although concerns for outpatient antibiotic failure. No indication for imaging at this time as it does not appear to extend deep although concerns for outpatient antibiotic failure, we will plan for labs to include CBC, BMP, CRP, sed rate. We will plan for IV antibiotics due to outpatient failure. Plan for admission to observation unit for IV antibiotic therapy as well as to be evaluated by podiatry team.    EMERGENCY DEPARTMENT COURSE:  ED Course as of 01/10/23 1308   Tue Cristo 10, 2023   1303 Discussed plan for admission with patient. Patient is compliant understanding of this plan. [MA]   21  for admission to observation unit. IV antibiotics. Podiatry evaluation on inpatient status.  [MA]      ED Course User Index  [MA] Daniel Robins DO      Patient admitted in vitally stable condition after meeting vitally stable throughout emergency room stay, bridging orders placed by attending, Dr. Thuy Berkowitz. Patient boarding in the emergency department waiting bed placement. DIAGNOSTIC RESULTS / EMERGENCYDEPARTMENT COURSE /      LABS:  Labs Reviewed   SEDIMENTATION RATE - Abnormal; Notable for the following components:       Result Value    Sed Rate 22 (*)     All other components within normal limits   CBC WITH AUTO DIFFERENTIAL - Abnormal; Notable for the following components:    Lymphocytes 22 (*)     Immature Granulocytes 1 (*)     All other components within normal limits   BASIC METABOLIC PANEL - Abnormal; Notable for the following components:    Glucose 119 (*)     Creatinine 0.67 (*)     All other components within normal limits   C-REACTIVE PROTEIN             PROCEDURES:  None    CONSULTS:  IP CONSULT TO PODIATRY      FINAL IMPRESSION      1. Cellulitis of left lower extremity    2. Failure of outpatient treatment          DISPOSITION / [facility] [address] Admitted 01/10/2023 12:52:30 PM      PATIENT REFERRED TO:  No follow-up provider specified.     DISCHARGE MEDICATIONS:  New Prescriptions    No medications on file       Cesario Seen, DO  Emergency Medicine Resident    (Please note that portions of this note were completed with a voice recognition program.Efforts were made to edit the dictations but occasionally words are mis-transcribed.)        Cesario Seen, DO  Resident  01/10/23 2863

## 2023-01-11 VITALS
HEIGHT: 74 IN | TEMPERATURE: 97.8 F | OXYGEN SATURATION: 91 % | WEIGHT: 215 LBS | SYSTOLIC BLOOD PRESSURE: 134 MMHG | HEART RATE: 64 BPM | BODY MASS INDEX: 27.59 KG/M2 | RESPIRATION RATE: 16 BRPM | DIASTOLIC BLOOD PRESSURE: 92 MMHG

## 2023-01-11 LAB
ABSOLUTE EOS #: 0.25 K/UL (ref 0–0.44)
ABSOLUTE IMMATURE GRANULOCYTE: 0.04 K/UL (ref 0–0.3)
ABSOLUTE LYMPH #: 1.67 K/UL (ref 1.1–3.7)
ABSOLUTE MONO #: 0.64 K/UL (ref 0.1–1.2)
ANION GAP SERPL CALCULATED.3IONS-SCNC: 10 MMOL/L (ref 9–17)
BASOPHILS # BLD: 1 % (ref 0–2)
BASOPHILS ABSOLUTE: 0.06 K/UL (ref 0–0.2)
BUN BLDV-MCNC: 17 MG/DL (ref 6–20)
CALCIUM SERPL-MCNC: 8.4 MG/DL (ref 8.6–10.4)
CHLORIDE BLD-SCNC: 104 MMOL/L (ref 98–107)
CO2: 26 MMOL/L (ref 20–31)
CREAT SERPL-MCNC: 0.78 MG/DL (ref 0.7–1.2)
EOSINOPHILS RELATIVE PERCENT: 3 % (ref 1–4)
GFR SERPL CREATININE-BSD FRML MDRD: >60 ML/MIN/1.73M2
GLUCOSE BLD-MCNC: 87 MG/DL (ref 70–99)
GLUCOSE BLD-MCNC: 88 MG/DL (ref 75–110)
HCT VFR BLD CALC: 40.1 % (ref 40.7–50.3)
HEMOGLOBIN: 13.5 G/DL (ref 13–17)
IMMATURE GRANULOCYTES: 1 %
LYMPHOCYTES # BLD: 21 % (ref 24–43)
MCH RBC QN AUTO: 32.8 PG (ref 25.2–33.5)
MCHC RBC AUTO-ENTMCNC: 33.7 G/DL (ref 28.4–34.8)
MCV RBC AUTO: 97.3 FL (ref 82.6–102.9)
MONOCYTES # BLD: 8 % (ref 3–12)
NRBC AUTOMATED: 0 PER 100 WBC
PDW BLD-RTO: 12.6 % (ref 11.8–14.4)
PLATELET # BLD: 209 K/UL (ref 138–453)
PMV BLD AUTO: 10.4 FL (ref 8.1–13.5)
POTASSIUM SERPL-SCNC: 4.2 MMOL/L (ref 3.7–5.3)
RBC # BLD: 4.12 M/UL (ref 4.21–5.77)
SEG NEUTROPHILS: 66 % (ref 36–65)
SEGMENTED NEUTROPHILS ABSOLUTE COUNT: 5.21 K/UL (ref 1.5–8.1)
SODIUM BLD-SCNC: 140 MMOL/L (ref 135–144)
WBC # BLD: 7.9 K/UL (ref 3.5–11.3)

## 2023-01-11 PROCEDURE — 2580000003 HC RX 258: Performed by: EMERGENCY MEDICINE

## 2023-01-11 PROCEDURE — 6370000000 HC RX 637 (ALT 250 FOR IP): Performed by: EMERGENCY MEDICINE

## 2023-01-11 PROCEDURE — 87070 CULTURE OTHR SPECIMN AEROBIC: CPT

## 2023-01-11 PROCEDURE — 87185 SC STD ENZYME DETCJ PER NZM: CPT

## 2023-01-11 PROCEDURE — 82947 ASSAY GLUCOSE BLOOD QUANT: CPT

## 2023-01-11 PROCEDURE — 86403 PARTICLE AGGLUT ANTBDY SCRN: CPT

## 2023-01-11 PROCEDURE — 87075 CULTR BACTERIA EXCEPT BLOOD: CPT

## 2023-01-11 PROCEDURE — 85025 COMPLETE CBC W/AUTO DIFF WBC: CPT

## 2023-01-11 PROCEDURE — 87205 SMEAR GRAM STAIN: CPT

## 2023-01-11 PROCEDURE — G0378 HOSPITAL OBSERVATION PER HR: HCPCS

## 2023-01-11 PROCEDURE — 87186 SC STD MICRODIL/AGAR DIL: CPT

## 2023-01-11 PROCEDURE — 93005 ELECTROCARDIOGRAM TRACING: CPT | Performed by: EMERGENCY MEDICINE

## 2023-01-11 PROCEDURE — 87076 CULTURE ANAEROBE IDENT EACH: CPT

## 2023-01-11 PROCEDURE — 80048 BASIC METABOLIC PNL TOTAL CA: CPT

## 2023-01-11 RX ORDER — DOXYCYCLINE 100 MG/1
100 TABLET ORAL 2 TIMES DAILY
Qty: 20 TABLET | Refills: 0 | Status: SHIPPED | OUTPATIENT
Start: 2023-01-11 | End: 2023-01-21

## 2023-01-11 RX ORDER — CLINDAMYCIN HYDROCHLORIDE 300 MG/1
300 CAPSULE ORAL 4 TIMES DAILY
Qty: 28 CAPSULE | Refills: 0 | Status: SHIPPED | OUTPATIENT
Start: 2023-01-11 | End: 2023-01-18

## 2023-01-11 RX ORDER — OXYCODONE HYDROCHLORIDE 5 MG/1
5 TABLET ORAL EVERY 6 HOURS PRN
Qty: 10 TABLET | Refills: 0 | Status: SHIPPED | OUTPATIENT
Start: 2023-01-11 | End: 2023-01-14

## 2023-01-11 RX ADMIN — ASPIRIN 81 MG: 81 TABLET, COATED ORAL at 08:53

## 2023-01-11 RX ADMIN — SODIUM CHLORIDE, PRESERVATIVE FREE 10 ML: 5 INJECTION INTRAVENOUS at 08:56

## 2023-01-11 RX ADMIN — LISINOPRIL 10 MG: 10 TABLET ORAL at 08:54

## 2023-01-11 RX ADMIN — METFORMIN HYDROCHLORIDE 500 MG: 500 TABLET ORAL at 08:51

## 2023-01-11 RX ADMIN — ALOGLIPTIN 12.5 MG: 12.5 TABLET, FILM COATED ORAL at 09:18

## 2023-01-11 ASSESSMENT — PAIN SCALES - GENERAL: PAINLEVEL_OUTOF10: 0

## 2023-01-11 NOTE — DISCHARGE INSTRUCTIONS
Please change dressing daily including betadine, 4x4 gauze, kerlix roll and ace bandage. Please only weight bear on heel to offload the wound on the left foot.

## 2023-01-11 NOTE — PROGRESS NOTES
Progress Note  Podiatric Medicine and Surgery     Subjective     CC: Left foot ulceration    Swelling improved per patient   Couldn't sleep last night  In NAD    HPI :  Sorin Saldivar is a 47 y.o. male seen at [facility] emergency department for left foot ulceration. Patient arrived to the ED approximately 1 week ago and given Keflex for 1 week however the situation did not improve he noticed drainage and has been walking continuously on his left foot and applying hydrogen peroxide and scrubbing and after leaving the shower daily. The patient does not notice much pain from the location and has not noticed any malodor. He did notice an increase in edema in his left lower extremity as well as a change in temperature and some redness that appeared to be changing getting worse from his previous stay in the hospital.  Denies any nausea vomiting fever shortness of breath. No other pedal complaints at this time. PCP is Tommie Rutherford MD    ROS: Review of Systems   Constitutional:  Negative for chills, fatigue and fever. HENT:  Negative for rhinorrhea and sore throat. Eyes:  Negative for visual disturbance. Respiratory:  Negative for cough, chest tightness and shortness of breath. Cardiovascular:  Negative for chest pain and palpitations. Gastrointestinal:  Negative for nausea and vomiting. Musculoskeletal:  Negative for arthralgias and myalgias. Skin:  Positive for wound. Neurological:  Negative for weakness, numbness and headaches. Psychiatric/Behavioral:  Negative for agitation and confusion.       Medications:  Scheduled Meds:   sertraline  50 mg Oral Daily    aspirin  81 mg Oral Daily    lisinopril  10 mg Oral Daily    metFORMIN  500 mg Oral BID     insulin glargine  35 Units SubCUTAneous QAM    alogliptin  12.5 mg Oral Daily    sodium chloride flush  5-40 mL IntraVENous 2 times per day    enoxaparin  40 mg SubCUTAneous Daily       Continuous Infusions:   sodium chloride 125 mL/hr at 01/10/23 1602    sodium chloride         PRN Meds:acetaminophen, sodium chloride flush, sodium chloride, potassium chloride **OR** potassium alternative oral replacement **OR** potassium chloride, ondansetron, polyethylene glycol, acetaminophen **OR** acetaminophen    Objective     Vitals:  No data found. Average, Min, and Max for last 24 hours Vitals:  TEMPERATURE:  Temp  Av.7 °F (37.1 °C)  Min: 98.4 °F (36.9 °C)  Max: 98.9 °F (37.2 °C)    RESPIRATIONS RANGE: Resp  Avg: 15.7  Min: 15  Max: 17    PULSE RANGE: Pulse  Av  Min: 64  Max: 82    BLOOD PRESSURE RANGE:  Systolic (07SLK), RHT:463 , Min:106 , TNB:765   ; Diastolic (69XFB), MEU:21, Min:68, Max:103      PULSE OXIMETRY RANGE: SpO2  Av.5 %  Min: 91 %  Max: 94 %    I/O last 3 completed shifts: In: 250 [IV Piggyback:250]  Out: -     CBC:  Recent Labs     01/10/23  1203 23  0519   WBC 8.2 7.9   HGB 14.9 13.5   HCT 42.9 40.1*    209   CRP <3.0  --         BMP:  Recent Labs     01/10/23  1203 23  05    140   K 3.9 4.2    104   CO2 27 26   BUN 17 17   CREATININE 0.67* 0.78   GLUCOSE 119* 87   CALCIUM 9.0 8.4*        Coags:  No results for input(s): APTT, PROT, INR in the last 72 hours. Lab Results   Component Value Date    SEDRATE 22 (H) 01/10/2023     Recent Labs     01/10/23  1203   CRP <3.0       Lower Extremity Physical Exam:  Vascular: DP and PT pulses are palpable. CFT <3 seconds to all pedal digits. Mild nonpitting left distal dorsal pedal edema. Temperature gradient increased to left distal foot. Neuro: Light touch sensation is present to the level of the pedal digits. Musculoskeletal: Muscle strength is 5/5, baseline, to all lower extremity muscle groups. Palpation of left foot elicits no pain. Gross deformity is absent. Dermatologic: Full thickness ulcer located subleft fourth metatarsal and measures approximately 2 cm x 2 cm x 0.5 cm. Base is fibrotic. Periwound skin appears hypertrophic. Minimal amount of serous drainage noted without associated mal odor. Limited erythema with localized associated increase in warmth. Negative probe to bone. Negative sinus tracking. The wound does not undermine. No fluctuance, crepitus, or induration. Interdigital maceration absent, Bilateral.  Nails 1-10 are within normal limits. Clinical Images:  See media panel    Imaging:   XR FOOT LEFT (MIN 3 VIEWS)   Final Result   No acute or aggressive osseous abnormality. Cultures: obtained      Assessment   Frank Rivero is a 47 y.o. male with   Diabetes mellitus with left foot ulcer  Cellulitis left foot  Edema left foot      Principal Problem:    Cellulitis  Resolved Problems:    * No resolved hospital problems. *       Plan     Patient examined and evaluated at bedside   Treatment options discussed in detail with the patient including offloading the area on a consistent basis  Radiographs reviewed and discussed in detail with patient negative for any foreign body, soft tissue gas, osseous changes  Dressing changed today including: Betadine 4 x 4's Kerlix Ace. Okay to discharge from podiatry standpoint with antibiotic. Follow up Dr. Nydia Huntley in one week. Dressing change daily  Wound was debrided today with a 15 blade. Patient reports 0/10 pain. 100% wound debrided. Culture obtained today. Antibiotics.   ED Rocephin and vancomycin  Heel WB status to left lower extremity  Discussed with Jarrett Rock 26   Podiatric Medicine & Surgery   1/11/2023 at 7:36 AM

## 2023-01-11 NOTE — PROGRESS NOTES
OBS/CDU   Attending NOTE      Patients PCP is:  Taniya Angel MD        SUBJECTIVE      Outpatient treatment failure. Improved after podiatry evaluation. See podiatry note and appreciate consultant input. Pain controlled. Patient understands dressing changes. Patient with improvement in appearance of wound. PHYSICAL EXAM      General: NAD, AO X 3  Heent: EMOI, PERRL  Neck: SUPPLE, NO JVD  Cardiovascular: RRR, S1S2  Pulmonary: CTAB, NO SOB  Abdomen: SOFT, NTTP, ND, +BS  Extremities: +2/4 PULSES DISTAL, NO SWELLING  Neuro / Psych: NO NUMBNESS OR TINGLING, MENTATION AT BASELINE    PERTINENT TEST /EXAMS      I have reviewed all available laboratory results. MEDICATIONS CURRENT   acetaminophen (TYLENOL) tablet 500 mg, 4x Daily PRN  sertraline (ZOLOFT) tablet 50 mg, Daily  aspirin EC tablet 81 mg, Daily  lisinopril (PRINIVIL;ZESTRIL) tablet 10 mg, Daily  metFORMIN (GLUCOPHAGE) tablet 500 mg, BID WC  insulin glargine (LANTUS) injection vial 35 Units, QAM  alogliptin (NESINA) tablet 12.5 mg, Daily  0.9 % sodium chloride infusion, Continuous  sodium chloride flush 0.9 % injection 5-40 mL, 2 times per day  sodium chloride flush 0.9 % injection 5-40 mL, PRN  0.9 % sodium chloride infusion, PRN  potassium chloride (KLOR-CON M) extended release tablet 40 mEq, PRN   Or  potassium bicarb-citric acid (EFFER-K) effervescent tablet 40 mEq, PRN   Or  potassium chloride 10 mEq/100 mL IVPB (Peripheral Line), PRN  enoxaparin (LOVENOX) injection 40 mg, Daily  ondansetron (ZOFRAN) injection 4 mg, Q4H PRN  polyethylene glycol (GLYCOLAX) packet 17 g, Daily PRN  acetaminophen (TYLENOL) tablet 650 mg, Q6H PRN   Or  acetaminophen (TYLENOL) suppository 650 mg, Q6H PRN        All medication charted and reviewed. CONSULTS      IP CONSULT TO PODIATRY    ASSESSMENT/PLAN       Melinda Mccallum is a 47 y.o. male who presents with       Outpatient treatment failure foot infection. Left foot with poor healing ulceration.   Seen by podiatry. Podiatry debrided and redressed. IV antibiotics  Switch to orals to cover diabetic pathogens. Continue home medications and pain control  Monitor vitals, labs, and imaging  DISPO: pending consults and clinical improvement    --  Keanu Chungr, MD  Emergency Medicine Attending Physician     This dictation was generated by voice recognition computer software. Although all attempts are made to edit the dictation for accuracy, there may be errors in the transcription that are not intended.

## 2023-01-11 NOTE — CARE COORDINATION
Case Management Assessment  Initial Evaluation    Date/Time of Evaluation: 1/11/2023 11:26 AM  Assessment Completed by: Cecile López RN    If patient is discharged prior to next notation, then this note serves as note for discharge by case management. Patient Name: Edita aMrte                   YOB: 1970  Diagnosis: Cellulitis [L03.90]                   Date / Time: 1/10/2023 11:25 AM    Patient Admission Status: Observation   Readmission Risk (Low < 19, Mod (19-27), High > 27): No data recorded  Current PCP: Amanda Oneal MD  PCP verified by CM? (P) Yes    Chart Reviewed: Yes      History Provided by: (P) Patient  Patient Orientation: (P) Alert and Oriented    Patient Cognition: (P) Alert    Hospitalization in the last 30 days (Readmission): If yes, Readmission Assessment in  Navigator will be completed. Advance Directives:      Code Status: Full Code   Patient's Primary Decision Maker is:        Discharge Planning:    Patient lives with: (P) Parent Type of Home: (P) House  Primary Care Giver: (P) Self  Patient Support Systems include: (P) Parent   Current Financial resources: (P) Medicaid  Current community resources:    Current services prior to admission: (P) None            Current DME:              Type of Home Care services:  (P) None    ADLS  Prior functional level: (P) Independent in ADLs/IADLs  Current functional level: (P) Independent in ADLs/IADLs    PT AM-PAC:   /24  OT AM-PAC:   /24    Family can provide assistance at DC: (P) Yes  Would you like Case Management to discuss the discharge plan with any other family members/significant others, and if so, who?     Plans to Return to Present Housing: (P) Yes  Other Identified Issues/Barriers to RETURNING to current housing:   Potential Assistance needed at discharge: (P) N/A            Potential DME:    Patient expects to discharge to: (P) [facility] for transportation at discharge: (P) Self    Financial    Payor: [employer] [facility] HEALTH PLAN / Plan: [address] [facility] [address] / Product Type: *No Product type* /     Does insurance require precert for SNF: Yes    Potential assistance Purchasing Medications: (P) No  Meds-to-Beds request:        [facility] - [address] [facility] [address] [place] [address] Drive [phone] Omer Gonzáles [phone]  Yenny Rincon [address]  Phone: [phone] Fax: [phone]      Notes:    Factors facilitating achievement of predicted outcomes: Motivated    Barriers to discharge: Pain    Additional Case Management Notes: The Plan for Transition of Care is related to the following treatment goals of Cellulitis [H19.71]    IF APPLICABLE: The Patient and/or patient representative Elinor Hart and his family were provided with a choice of provider and agrees with the discharge plan. Freedom of choice list with basic dialogue that supports the patient's individualized plan of care/goals and shares the quality data associated with the providers was provided to:     Patient Representative Name:       The Patient and/or Patient Representative Agree with the Discharge Plan?       Elmer Gomez RN  Case Management Department  Ph:  Fax:

## 2023-01-12 LAB
EKG ATRIAL RATE: 61 BPM
EKG P AXIS: 32 DEGREES
EKG P-R INTERVAL: 146 MS
EKG Q-T INTERVAL: 406 MS
EKG QRS DURATION: 96 MS
EKG QTC CALCULATION (BAZETT): 408 MS
EKG T AXIS: 16 DEGREES
EKG VENTRICULAR RATE: 61 BPM

## 2023-01-12 PROCEDURE — 93010 ELECTROCARDIOGRAM REPORT: CPT | Performed by: INTERNAL MEDICINE

## 2023-01-14 LAB
CULTURE: ABNORMAL
DIRECT EXAM: ABNORMAL
SPECIMEN DESCRIPTION: ABNORMAL

## 2023-01-30 ENCOUNTER — OFFICE VISIT (OUTPATIENT)
Dept: FAMILY MEDICINE CLINIC | Age: 53
End: 2023-01-30
Payer: COMMERCIAL

## 2023-01-30 ENCOUNTER — TELEPHONE (OUTPATIENT)
Dept: FAMILY MEDICINE CLINIC | Age: 53
End: 2023-01-30

## 2023-01-30 VITALS
TEMPERATURE: 97.6 F | WEIGHT: 221 LBS | OXYGEN SATURATION: 98 % | HEART RATE: 69 BPM | HEIGHT: 74 IN | BODY MASS INDEX: 28.36 KG/M2 | DIASTOLIC BLOOD PRESSURE: 98 MMHG | SYSTOLIC BLOOD PRESSURE: 148 MMHG

## 2023-01-30 DIAGNOSIS — L08.9 DIABETIC FOOT INFECTION (HCC): ICD-10-CM

## 2023-01-30 DIAGNOSIS — E78.2 MIXED HYPERLIPIDEMIA: ICD-10-CM

## 2023-01-30 DIAGNOSIS — N52.1 ERECTILE DYSFUNCTION DUE TO DISEASES CLASSIFIED ELSEWHERE: ICD-10-CM

## 2023-01-30 DIAGNOSIS — E11.65 TYPE 2 DIABETES MELLITUS WITH HYPERGLYCEMIA, WITH LONG-TERM CURRENT USE OF INSULIN (HCC): Primary | ICD-10-CM

## 2023-01-30 DIAGNOSIS — L08.9 DIABETIC FOOT INFECTION (HCC): Primary | ICD-10-CM

## 2023-01-30 DIAGNOSIS — Z79.4 TYPE 2 DIABETES MELLITUS WITH HYPERGLYCEMIA, WITH LONG-TERM CURRENT USE OF INSULIN (HCC): Primary | ICD-10-CM

## 2023-01-30 DIAGNOSIS — E11.628 DIABETIC FOOT INFECTION (HCC): ICD-10-CM

## 2023-01-30 DIAGNOSIS — I10 PRIMARY HYPERTENSION: ICD-10-CM

## 2023-01-30 DIAGNOSIS — E11.628 DIABETIC FOOT INFECTION (HCC): Primary | ICD-10-CM

## 2023-01-30 DIAGNOSIS — Z12.11 COLON CANCER SCREENING: ICD-10-CM

## 2023-01-30 DIAGNOSIS — F32.4 MAJOR DEPRESSIVE DISORDER WITH SINGLE EPISODE, IN PARTIAL REMISSION (HCC): ICD-10-CM

## 2023-01-30 LAB — HBA1C MFR BLD: 5.6 %

## 2023-01-30 PROCEDURE — G8419 CALC BMI OUT NRM PARAM NOF/U: HCPCS | Performed by: FAMILY MEDICINE

## 2023-01-30 PROCEDURE — 3080F DIAST BP >= 90 MM HG: CPT | Performed by: FAMILY MEDICINE

## 2023-01-30 PROCEDURE — G8484 FLU IMMUNIZE NO ADMIN: HCPCS | Performed by: FAMILY MEDICINE

## 2023-01-30 PROCEDURE — 83036 HEMOGLOBIN GLYCOSYLATED A1C: CPT | Performed by: FAMILY MEDICINE

## 2023-01-30 PROCEDURE — G8427 DOCREV CUR MEDS BY ELIG CLIN: HCPCS | Performed by: FAMILY MEDICINE

## 2023-01-30 PROCEDURE — 99214 OFFICE O/P EST MOD 30 MIN: CPT | Performed by: FAMILY MEDICINE

## 2023-01-30 PROCEDURE — 1036F TOBACCO NON-USER: CPT | Performed by: FAMILY MEDICINE

## 2023-01-30 PROCEDURE — 3044F HG A1C LEVEL LT 7.0%: CPT | Performed by: FAMILY MEDICINE

## 2023-01-30 PROCEDURE — 3077F SYST BP >= 140 MM HG: CPT | Performed by: FAMILY MEDICINE

## 2023-01-30 PROCEDURE — 3017F COLORECTAL CA SCREEN DOC REV: CPT | Performed by: FAMILY MEDICINE

## 2023-01-30 PROCEDURE — 2022F DILAT RTA XM EVC RTNOPTHY: CPT | Performed by: FAMILY MEDICINE

## 2023-01-30 RX ORDER — LEVOFLOXACIN 500 MG/1
500 TABLET, FILM COATED ORAL DAILY
Qty: 10 TABLET | Refills: 0 | Status: SHIPPED | OUTPATIENT
Start: 2023-01-30 | End: 2023-02-09

## 2023-01-30 RX ORDER — MELOXICAM 7.5 MG/1
7.5 TABLET ORAL DAILY
Qty: 30 TABLET | Refills: 0 | Status: SHIPPED | OUTPATIENT
Start: 2023-01-30

## 2023-01-30 RX ORDER — LISINOPRIL 20 MG/1
20 TABLET ORAL DAILY
Qty: 90 TABLET | Refills: 1 | Status: SHIPPED | OUTPATIENT
Start: 2023-01-30

## 2023-01-30 RX ORDER — SULFAMETHOXAZOLE AND TRIMETHOPRIM 800; 160 MG/1; MG/1
1 TABLET ORAL 2 TIMES DAILY
Qty: 20 TABLET | Refills: 0 | Status: SHIPPED | OUTPATIENT
Start: 2023-01-30

## 2023-01-30 RX ORDER — SILDENAFIL 100 MG/1
100 TABLET, FILM COATED ORAL PRN
Qty: 30 TABLET | Refills: 0 | Status: SHIPPED | OUTPATIENT
Start: 2023-01-30

## 2023-01-30 ASSESSMENT — PATIENT HEALTH QUESTIONNAIRE - PHQ9
4. FEELING TIRED OR HAVING LITTLE ENERGY: 0
3. TROUBLE FALLING OR STAYING ASLEEP: 0
6. FEELING BAD ABOUT YOURSELF - OR THAT YOU ARE A FAILURE OR HAVE LET YOURSELF OR YOUR FAMILY DOWN: 0
2. FEELING DOWN, DEPRESSED OR HOPELESS: 0
9. THOUGHTS THAT YOU WOULD BE BETTER OFF DEAD, OR OF HURTING YOURSELF: 0
5. POOR APPETITE OR OVEREATING: 0
SUM OF ALL RESPONSES TO PHQ QUESTIONS 1-9: 0
SUM OF ALL RESPONSES TO PHQ9 QUESTIONS 1 & 2: 0
7. TROUBLE CONCENTRATING ON THINGS, SUCH AS READING THE NEWSPAPER OR WATCHING TELEVISION: 0
10. IF YOU CHECKED OFF ANY PROBLEMS, HOW DIFFICULT HAVE THESE PROBLEMS MADE IT FOR YOU TO DO YOUR WORK, TAKE CARE OF THINGS AT HOME, OR GET ALONG WITH OTHER PEOPLE: 0
1. LITTLE INTEREST OR PLEASURE IN DOING THINGS: 0
SUM OF ALL RESPONSES TO PHQ QUESTIONS 1-9: 0
SUM OF ALL RESPONSES TO PHQ QUESTIONS 1-9: 0
8. MOVING OR SPEAKING SO SLOWLY THAT OTHER PEOPLE COULD HAVE NOTICED. OR THE OPPOSITE, BEING SO FIGETY OR RESTLESS THAT YOU HAVE BEEN MOVING AROUND A LOT MORE THAN USUAL: 0
SUM OF ALL RESPONSES TO PHQ QUESTIONS 1-9: 0

## 2023-01-30 ASSESSMENT — ENCOUNTER SYMPTOMS
SINUS PRESSURE: 0
CHEST TIGHTNESS: 0
ABDOMINAL PAIN: 0
CONSTIPATION: 0
BLOOD IN STOOL: 0
BACK PAIN: 0
COUGH: 0
ABDOMINAL DISTENTION: 0
RECTAL PAIN: 0
DIARRHEA: 0
EYE REDNESS: 0
VOMITING: 0
STRIDOR: 0
RHINORRHEA: 0
WHEEZING: 0
NAUSEA: 0
SORE THROAT: 0
COLOR CHANGE: 1
TROUBLE SWALLOWING: 0
SHORTNESS OF BREATH: 0

## 2023-01-30 NOTE — TELEPHONE ENCOUNTER
Patient is requesting to get a refill , medication placed for something to relieve the pain, pain medication. Please Approve or Refuse. Send to Pharmacy per Pt's Request: [facility]     Next Visit Date:  5/8/2023   Last Visit Date: 1/30/2023    Hemoglobin A1C (%)   Date Value   01/30/2023 5.6   03/29/2022 6.3   10/24/2021 9.6 (H)             ( goal A1C is < 7)   BP Readings from Last 3 Encounters:   01/30/23 (!) 148/98   01/11/23 (!) 134/92   01/02/23 (!) 157/89          (goal 120/80)  BUN   Date Value Ref Range Status   01/11/2023 17 6 - 20 mg/dL Final     Creatinine   Date Value Ref Range Status   01/11/2023 0.78 0.70 - 1.20 mg/dL Final     Potassium   Date Value Ref Range Status   01/11/2023 4.2 3.7 - 5.3 mmol/L Final     Comment:     SPECIMEN SLIGHTLY HEMOLYZED, RESULTS MAY BE ADVERSELY AFFECTED.

## 2023-01-30 NOTE — PATIENT INSTRUCTIONS
New Updates for [employer] Directory/ [employer] ([facility]) CLARY    Thank you for choosing US to give you the best care! [employer] ([facility]) is always trying to think of new ways to help their patients. We are asking all patients to try out the new digital registration that is now available through your [facility] account or the new CLARY, [employer] ([facility]). Via the clary you're now able to update your personal and registration information prior to your upcoming appointment. This will save you time once you arrive at the office to check-in, not to mention your information remains safe!! Many other perks come from signing up for an account, such as:  Requesting refills  Scheduling an appointment  Completing an E-Visit  Sending a message to the office/provider  Having access to your medication list  Paying your bill/copay prior to your appointment  Scheduling your yearly mammogram  Review your test results    If you are not familiar with [facility] or the [employer] ([facility]) CLARY, please ask one of us and we will be happy to answer any questions or help you set-up your account.       Your [employer] office,  [place]

## 2023-01-30 NOTE — PROGRESS NOTES
Chief Complaint   Patient presents with    ED Follow-up     LEFT FOOT/NOT ANY BETTER HAS NOT CALLED PODIATRY TO FOLLOW UP OR MAKE APPOINTMENT/STATES TRYING TO HEAL ON ITS OWN THE LAST 2 WEEKS     Immunizations     NO TO FLUHEP B VACCINE         Angelaingrid Zuleta  here today for follow up on chronic medical problems, go over labs and/or diagnostic studies, and medication refills. ED Follow-up (LEFT FOOT/NOT ANY BETTER HAS NOT CALLED PODIATRY TO FOLLOW UP OR MAKE APPOINTMENT/STATES TRYING TO HEAL ON ITS OWN THE LAST 2 WEEKS ) and Immunizations (NO TO FLUHEP B VACCINE)      HPI: Patient is scheduled for follow-up on chronic medical conditions not seen since more than a year. Patient has history of type 2 diabetes controlled A1c has improved to 5.3. Patient is on long-acting insulin and also on oral hypoglycemic agents metformin and Janumet reports compliance. Patient denies any side effects from medication. Patient was in the ER, with 2 visits. Patient has diabetic foot ulcer which is not healing since last 3 weeks. Patient reports he was treated with 2 different antibiotics. He also received IV antibiotic in the ER. Patient had culture also taken, that showed resistance to multiple antibiotics clindamycin and other antibiotics. Wound culture is sensitive to Levaquin and also Bactrim. Patient has not taking that antibiotic never saw podiatrist.    Patient has diabetic neuropathy complains of numbness and tingling in both feet. Patient did not try any diabetic shoes. Hypertension uncontrolled, patient is currently on lisinopril 10 mg reports compliance is not monitoring his blood pressure on regular basis. He is not adherent to low-salt diet. Hyperlipidemia on statins no recent blood work. Patient has blood work ordered multiple times and he is very noncompliant with appointments.     The 10-year CVD risk score (JEREMIAS'Tobin, et al., 2008) is: 18.1%    Values used to calculate the score:      Age: 47 years      Sex: Male      Diabetic: Yes      Tobacco smoker: No      Systolic Blood Pressure: 468 mmHg      Is BP treated: Yes      HDL Cholesterol: 62 mg/dL      Total Cholesterol: 152 mg/dL        Patient has history of erectile dysfunction, is asking for Viagra. He has taken that medication in the past.      Depression was taking Zoloft. Patient reports his symptoms are stable and he has stopped taking taking that medication. He denies any anhedonia, any sleeping difficulties. Patient is not up-to-date on health screening. BP (!) 148/98   Pulse 69   Temp 97.6 °F (36.4 °C)   Ht 6' 2\" (1.88 m)   Wt 221 lb (100.2 kg)   SpO2 98%   BMI 28.37 kg/m²    Body mass index is 28.37 kg/m². Wt Readings from Last 3 Encounters:   01/30/23 221 lb (100.2 kg)   01/10/23 215 lb (97.5 kg)   01/02/23 215 lb (97.5 kg)        []Negative depression screening. PHQ Scores 1/30/2023 3/29/2022 3/23/2020 2/27/2020 12/11/2019 11/13/2013   PHQ2 Score 0 0 6 0 2 4   PHQ9 Score 0 0 13 0 2 15      []1-4 = Minimal depression   []5-9 = Milddepression   []10-14 = Moderate depression   []15-19 = Moderately severe depression   []20-27 = Severe depression    Discussed testing with the patient and all questions fully answered.     Admission on 01/10/2023, Discharged on 01/11/2023   Component Date Value Ref Range Status    Sed Rate 01/10/2023 22 (A)  0 - 20 mm/Hr Final    WBC 01/10/2023 8.2  3.5 - 11.3 k/uL Final    RBC 01/10/2023 4.48  4.21 - 5.77 m/uL Final    Hemoglobin 01/10/2023 14.9  13.0 - 17.0 g/dL Final    Hematocrit 01/10/2023 42.9  40.7 - 50.3 % Final    MCV 01/10/2023 95.8  82.6 - 102.9 fL Final    MCH 01/10/2023 33.3  25.2 - 33.5 pg Final    MCHC 01/10/2023 34.7  28.4 - 34.8 g/dL Final    RDW 01/10/2023 12.4  11.8 - 14.4 % Final    Platelets 14/06/3857 237  138 - 453 k/uL Final    MPV 01/10/2023 10.3  8.1 - 13.5 fL Final    NRBC Automated 01/10/2023 0.0  0.0 per 100 WBC Final    Seg Neutrophils 01/10/2023 64  36 - 65 % Final Lymphocytes 01/10/2023 22 (A)  24 - 43 % Final    Monocytes 01/10/2023 9  3 - 12 % Final    Eosinophils % 01/10/2023 3  1 - 4 % Final    Basophils 01/10/2023 1  0 - 2 % Final    Immature Granulocytes 01/10/2023 1 (A)  0 % Final    Segs Absolute 01/10/2023 5.34  1.50 - 8.10 k/uL Final    Absolute Lymph # 01/10/2023 1.81  1.10 - 3.70 k/uL Final    Absolute Mono # 01/10/2023 0.73  0.10 - 1.20 k/uL Final    Absolute Eos # 01/10/2023 0.23  0.00 - 0.44 k/uL Final    Basophils Absolute 01/10/2023 0.07  0.00 - 0.20 k/uL Final    Absolute Immature Granulocyte 01/10/2023 0.04  0.00 - 0.30 k/uL Final    Glucose 01/10/2023 119 (A)  70 - 99 mg/dL Final    BUN 01/10/2023 17  6 - 20 mg/dL Final    Creatinine 01/10/2023 0.67 (A)  0.70 - 1.20 mg/dL Final    Est, Glom Filt Rate 01/10/2023 >60  >60 mL/min/1.73m2 Final    Comment:       Effective Oct 3, 2022        These results are not intended for use in patients <25years of age. eGFR results are calculated without a race factor using the 2021 CKD-EPI equation. Careful clinical correlation is recommended, particularly when comparing to results   calculated using previous equations. The CKD-EPI equation is less accurate in patients with extremes of muscle mass, extra-renal   metabolism of creatine, excessive creatine ingestion, or following therapy that affects   renal tubular secretion.       Calcium 01/10/2023 9.0  8.6 - 10.4 mg/dL Final    Sodium 01/10/2023 137  135 - 144 mmol/L Final    Potassium 01/10/2023 3.9  3.7 - 5.3 mmol/L Final    Chloride 01/10/2023 100  98 - 107 mmol/L Final    CO2 01/10/2023 27  20 - 31 mmol/L Final    Anion Gap 01/10/2023 10  9 - 17 mmol/L Final    CRP 01/10/2023 <3.0  0.0 - 5.0 mg/L Final    POC Glucose 01/10/2023 172 (A)  75 - 110 mg/dL Final    POC Glucose 01/10/2023 152 (A)  75 - 110 mg/dL Final    Glucose 01/11/2023 87  70 - 99 mg/dL Final    BUN 01/11/2023 17  6 - 20 mg/dL Final    Creatinine 01/11/2023 0.78  0.70 - 1.20 mg/dL Final EstDmitry Filt Rate 01/11/2023 >60  >60 mL/min/1.73m2 Final    Comment:       Effective Oct 3, 2022        These results are not intended for use in patients <25years of age. eGFR results are calculated without a race factor using the 2021 CKD-EPI equation. Careful clinical correlation is recommended, particularly when comparing to results   calculated using previous equations. The CKD-EPI equation is less accurate in patients with extremes of muscle mass, extra-renal   metabolism of creatine, excessive creatine ingestion, or following therapy that affects   renal tubular secretion. Calcium 01/11/2023 8.4 (A)  8.6 - 10.4 mg/dL Final    Sodium 01/11/2023 140  135 - 144 mmol/L Final    Potassium 01/11/2023 4.2  3.7 - 5.3 mmol/L Final    SPECIMEN SLIGHTLY HEMOLYZED, RESULTS MAY BE ADVERSELY AFFECTED.     Chloride 01/11/2023 104  98 - 107 mmol/L Final    CO2 01/11/2023 26  20 - 31 mmol/L Final    Anion Gap 01/11/2023 10  9 - 17 mmol/L Final    WBC 01/11/2023 7.9  3.5 - 11.3 k/uL Final    RBC 01/11/2023 4.12 (A)  4.21 - 5.77 m/uL Final    Hemoglobin 01/11/2023 13.5  13.0 - 17.0 g/dL Final    Hematocrit 01/11/2023 40.1 (A)  40.7 - 50.3 % Final    MCV 01/11/2023 97.3  82.6 - 102.9 fL Final    MCH 01/11/2023 32.8  25.2 - 33.5 pg Final    MCHC 01/11/2023 33.7  28.4 - 34.8 g/dL Final    RDW 01/11/2023 12.6  11.8 - 14.4 % Final    Platelets 11/88/8636 209  138 - 453 k/uL Final    MPV 01/11/2023 10.4  8.1 - 13.5 fL Final    NRBC Automated 01/11/2023 0.0  0.0 per 100 WBC Final    Seg Neutrophils 01/11/2023 66 (A)  36 - 65 % Final    Lymphocytes 01/11/2023 21 (A)  24 - 43 % Final    Monocytes 01/11/2023 8  3 - 12 % Final    Eosinophils % 01/11/2023 3  1 - 4 % Final    Basophils 01/11/2023 1  0 - 2 % Final    Immature Granulocytes 01/11/2023 1 (A)  0 % Final    Segs Absolute 01/11/2023 5.21  1.50 - 8.10 k/uL Final    Absolute Lymph # 01/11/2023 1.67  1.10 - 3.70 k/uL Final    Absolute Mono # 01/11/2023 0.64  0.10 - 1.20 k/uL Final    Absolute Eos # 01/11/2023 0.25  0.00 - 0.44 k/uL Final    Basophils Absolute 01/11/2023 0.06  0.00 - 0.20 k/uL Final    Absolute Immature Granulocyte 01/11/2023 0.04  0.00 - 0.30 k/uL Final    POC Glucose 01/10/2023 88  75 - 110 mg/dL Final    Ventricular Rate 01/11/2023 61  BPM Final    Atrial Rate 01/11/2023 61  BPM Final    P-R Interval 01/11/2023 146  ms Final    QRS Duration 01/11/2023 96  ms Final    Q-T Interval 01/11/2023 406  ms Final    QTc Calculation (Bazett) 01/11/2023 408  ms Final    P Axis 01/11/2023 32  degrees Final    T Axis 01/11/2023 16  degrees Final    Specimen Description 01/11/2023 . WOUND   Final    Direct Exam 01/11/2023 FEW NEUTROPHILS (A)   Final    Direct Exam 01/11/2023 MODERATE GRAM POSITIVE COCCI IN CLUSTERS (A)   Final    Direct Exam 01/11/2023 FEW GRAM NEGATIVE RODS (A)   Final    Culture 01/11/2023 STAPHYLOCOCCUS AUREUS LIGHT GROWTH This isolate is methicillin susceptible.  (A)   Final    Culture 01/11/2023 NORMAL SKIN KAREEN   Final    Culture 01/11/2023 PREVOTELLA SPECIES LIGHT GROWTH BETA LACTAMASE POSITIVE (A)   Final    POC Glucose 01/11/2023 88  75 - 110 mg/dL Final         Most recent labs reviewed:     Lab Results   Component Value Date    WBC 7.9 01/11/2023    HGB 13.5 01/11/2023    HCT 40.1 (L) 01/11/2023    MCV 97.3 01/11/2023     01/11/2023       @BRIEFLAB(NA,K,CL,CO2,BUN,CREATININE,GLUCOSE,CALCIUM)@     Lab Results   Component Value Date    ALT 32 02/18/2020    AST 23 02/18/2020    ALKPHOS 102 02/18/2020    BILITOT 1.08 02/18/2020       Lab Results   Component Value Date    TSH 1.44 05/29/2020       Lab Results   Component Value Date    CHOL 152 10/25/2021    CHOL 125 11/16/2015    CHOL 212 (H) 09/24/2013     Lab Results   Component Value Date    TRIG 68 10/25/2021    TRIG 87 11/16/2015    TRIG 63 09/24/2013     Lab Results   Component Value Date    HDL 62 10/25/2021    HDL 32 (L) 11/16/2015    HDL 54 09/24/2013     Lab Results   Component Value Date    LDLCHOLESTEROL 76 10/25/2021    LDLCHOLESTEROL 76 11/16/2015    LDLCHOLESTEROL 145 (H) 09/24/2013     Lab Results   Component Value Date    VLDL NOT REPORTED 10/25/2021    VLDL NOT REPORTED 11/16/2015    VLDL NOT REPORTED 09/24/2013     Lab Results   Component Value Date    CHOLHDLRATIO 2.5 10/25/2021    CHOLHDLRATIO 3.9 11/16/2015    CHOLHDLRATIO 3.9 09/24/2013       Lab Results   Component Value Date    LABA1C 5.6 01/30/2023       No results found for: FVZERBPY83    No results found for: FOLATE    No results found for: IRON, TIBC, FERRITIN    No results found for: VITD25          Current Outpatient Medications   Medication Sig Dispense Refill    sulfamethoxazole-trimethoprim (BACTRIM DS) 800-160 MG per tablet Take 1 tablet by mouth 2 times daily 20 tablet 0    levoFLOXacin (LEVAQUIN) 500 MG tablet Take 1 tablet by mouth daily for 10 days 10 tablet 0    sildenafil (VIAGRA) 100 MG tablet Take 1 tablet by mouth as needed for Erectile Dysfunction 30 tablet 0    lisinopril (PRINIVIL;ZESTRIL) 20 MG tablet Take 1 tablet by mouth daily 90 tablet 1    Insulin Pen Needle 29G X 12.7MM MISC 1 each by Does not apply route daily To use with insulin 120 each 1    SITagliptin (JANUVIA) 50 MG tablet Take 1 tablet by mouth daily 120 tablet 1    LANTUS SOLOSTAR 100 UNIT/ML injection pen INJECT 35 UNITS SUBCUTANEOUSLY EVERY MORNING BEFORE BREAKFAST 60 mL 0    metFORMIN (GLUCOPHAGE) 850 MG tablet TAKE 1 TABLET BY MOUTH TWO TIMES A DAY WITH MEALS 60 tablet 0    atorvastatin (LIPITOR) 20 MG tablet TAKE 1 TABLET BY MOUTH ONE TIME A DAY 90 tablet 0    TRUEPLUS PEN NEEDLES 32G X 4 MM MISC USE WITH INSULIN DAILY 100 each 1    aspirin ( ASPIRIN ADULT LOW STRENGTH) 81 MG EC tablet TAKE 1 TABLET BY MOUTH ONE TIME A DAY 90 tablet 2    Alcohol Swabs ( ALCOHOL PREP) 70 % PADS USE TO TEST ONCE DAILY 100 each 0    acetaminophen (TYLENOL) 500 MG tablet Take 1 tablet by mouth 4 times daily as needed for Pain 25 tablet 1    blood glucose monitor kit and supplies Test one time a day & as needed for symptoms of irregular blood glucose. 1 kit 0    Lancets MISC Testing once a day. Please dispense according to patients insurance. 100 each 3    blood glucose monitor strips Testing once a day. Please dispense according to patients insurance. 100 strip 3    meloxicam (MOBIC) 7.5 MG tablet Take 1 tablet by mouth daily 30 tablet 0    Blood Pressure Monitor MISC Use daily to check BP (Patient not taking: Reported on 1/30/2023) 1 each 0     No current facility-administered medications for this visit. Social History     Socioeconomic History    Marital status:      Spouse name: Not on file    Number of children: Not on file    Years of education: Not on file    Highest education level: Not on file   Occupational History    Not on file   Tobacco Use    Smoking status: Never    Smokeless tobacco: Never   Substance and Sexual Activity    Alcohol use: Yes     Comment: 2 times a week    Drug use: Yes     Types: Marijuana Daril Clement)     Comment: marajunia 2 times aweek    Sexual activity: Yes   Other Topics Concern    Not on file   Social History Narrative    Not on file     Social Determinants of Health     Financial Resource Strain: Low Risk     Difficulty of Paying Living Expenses: Not hard at all   Food Insecurity: No Food Insecurity    Worried About [address] in the Last Year: Never true    [address] in the Last Year: Never true   Transportation Needs: No Transportation Needs    Lack of Transportation (Medical): No    Lack of Transportation (Non-Medical):  No   Physical Activity: Not on file   Stress: Not on file   Social Connections: Not on file   Intimate Partner Violence: Not on file   Housing Stability: Unknown    Unable to Pay for Housing in the Last Year: No    Number of Places Lived in the Last Year: Not on file    Unstable Housing in the Last Year: No     Counseling given: Not Answered        Family History   Problem Relation Age of Onset    Diabetes Father              -rest of complaints with corresponding details per ROS    The patient's past medical, surgical, social, and family history as well as his current medications and allergies were reviewed as documented intoday's encounter.        Review of Systems   Constitutional:  Positive for activity change. Negative for appetite change, fatigue, fever and unexpected weight change.   HENT:  Negative for congestion, ear pain, postnasal drip, rhinorrhea, sinus pressure, sore throat and trouble swallowing.    Eyes:  Negative for redness and visual disturbance.   Respiratory:  Negative for cough, chest tightness, shortness of breath, wheezing and stridor.    Cardiovascular:  Negative for chest pain, palpitations and leg swelling.   Gastrointestinal:  Negative for abdominal distention, abdominal pain, blood in stool, constipation, diarrhea, nausea, rectal pain and vomiting.   Endocrine: Negative for polydipsia, polyphagia and polyuria.   Genitourinary:  Negative for difficulty urinating, flank pain, frequency and urgency.   Musculoskeletal:  Positive for arthralgias. Negative for back pain, gait problem, myalgias and neck pain.   Skin:  Positive for color change and wound. Negative for rash.   Allergic/Immunologic: Positive for immunocompromised state. Negative for food allergies.   Neurological:  Positive for numbness. Negative for dizziness, speech difficulty, weakness, light-headedness and headaches.   Psychiatric/Behavioral:  Negative for agitation, behavioral problems, decreased concentration, dysphoric mood, hallucinations, sleep disturbance and suicidal ideas. The patient is nervous/anxious.          Physical Exam  Vitals and nursing note reviewed.   Constitutional:       General: He is not in acute distress.     Appearance: Normal appearance. He is well-developed. He is not diaphoretic.   HENT:      Head: Normocephalic and atraumatic.      Nose: Nose normal.   Eyes:       General:         Right eye: No discharge. Left eye: No discharge. Extraocular Movements: Extraocular movements intact. Conjunctiva/sclera: Conjunctivae normal.      Pupils: Pupils are equal, round, and reactive to light. Neck:      Thyroid: No thyromegaly. Cardiovascular:      Rate and Rhythm: Normal rate and regular rhythm. Pulses:           Dorsalis pedis pulses are 3+ on the right side. Heart sounds: Normal heart sounds. No murmur heard. Pulmonary:      Effort: Pulmonary effort is normal. No respiratory distress. Breath sounds: Normal breath sounds. No wheezing or rhonchi. Abdominal:      General: Bowel sounds are normal. There is no distension. Palpations: Abdomen is soft. There is no mass. Tenderness: There is no abdominal tenderness. There is no right CVA tenderness, left CVA tenderness, guarding or rebound. Musculoskeletal:         General: No tenderness. Cervical back: Normal range of motion and neck supple. No rigidity or spasms. Thoracic back: No spasms. Normal range of motion. Lumbar back: No spasms. Normal range of motion. Right foot: Normal range of motion. Left foot: Decreased range of motion. Deformity and Charcot foot present. Feet:    Feet:      Right foot:      Protective Sensation: 5 sites tested. 5 sites sensed. Skin integrity: No ulcer. Toenail Condition: Right toenails are normal.      Left foot:      Protective Sensation: 5 sites tested. 1 site sensed. Skin integrity: Ulcer, skin breakdown and erythema present. Toenail Condition: Left toenails are normal.   Lymphadenopathy:      Cervical: No cervical adenopathy. Skin:     Coloration: Skin is not jaundiced or pale. Findings: No bruising, erythema or rash. Neurological:      General: No focal deficit present. Mental Status: He is alert and oriented to person, place, and time. Cranial Nerves: No cranial nerve deficit. Sensory: Sensory deficit present. Motor: No weakness or tremor. Coordination: Coordination normal.      Gait: Gait and tandem walk normal.      Deep Tendon Reflexes: Reflexes are normal and symmetric. Psychiatric:         Attention and Perception: Attention and perception normal. He is attentive. Mood and Affect: Mood is anxious. Mood is not depressed. Affect is not tearful. Speech: He is communicative. Speech is not rapid and pressured, delayed or slurred. Behavior: Behavior normal. Behavior is not agitated or slowed. Thought Content: Thought content normal.         Judgment: Judgment normal.           ASSESSMENT AND PLAN      1. Type 2 diabetes mellitus with hyperglycemia, with long-term current use of insulin ([facility] [address]  Controlled on current treatment. A1c has improved continue long-acting insulin continue all medications.  -  DIABETES FOOT EXAM  - POCT glycosylated hemoglobin (Hb A1C)  - Comprehensive Metabolic Panel; Future  - Magnesium; Future  - Microalbumin / Creatinine Urine Ratio; Future  - Uric Acid; Future  - TSH with Reflex; Future  - Vitamin B12 & Folate; Future  - Vitamin D 25 Hydroxy; Future  - Diabetic Shoe    2. Diabetic foot infection ([facility] [address]  Nonhealing ulcer, referral placed to podiatrist start on 2 antibiotics, patient will benefit from diabetic shoes. - sulfamethoxazole-trimethoprim (BACTRIM DS) 800-160 MG per tablet; Take 1 tablet by mouth 2 times daily  Dispense: 20 tablet; Refill: 0  - levoFLOXacin (LEVAQUIN) 500 MG tablet; Take 1 tablet by mouth daily for 10 days  Dispense: 10 tablet; Refill: 0  - [facility] [place] [facility], [id number], STEFFANIE, Podiatry, [place]  - Uric Acid; Future  - Diabetic Shoe    3. Primary hypertension  Uncontrolled, increase lisinopril to 20 mg discussed nurse visit in 1 week for BP check. Encourage about low-salt diet  Home monitoring of blood pressure    4.  Mixed hyperlipidemia  No recent blood work continue statins recheck lipid panel  - Lipid Panel; Future    5. Erectile dysfunction due to diseases classified elsewhere  Chronic problem start on Viagra as needed basis  - sildenafil (VIAGRA) 100 MG tablet; Take 1 tablet by mouth as needed for Erectile Dysfunction  Dispense: 30 tablet; Refill: 0    6. Major depressive disorder with single episode, in partial remission ([facility] [address]  Fairly stable discontinue Zoloft as patient has stopped taking by himself    Continue to monitor    8.  Colon cancer screening    - FIT-DNA (Cologuard)      Orders Placed This Encounter   Procedures    Diabetic Shoe    FIT-DNA (Cologuard)    Lipid Panel     Standing Status:   Future     Standing Expiration Date:   1/30/2024     Order Specific Question:   Is Patient Fasting?/# of Hours     Answer:   No    Comprehensive Metabolic Panel     Fasting 8 hrs     Standing Status:   Future     Standing Expiration Date:   1/30/2024    Magnesium     Standing Status:   Future     Standing Expiration Date:   1/30/2024    Microalbumin / Creatinine Urine Ratio     Standing Status:   Future     Standing Expiration Date:   1/30/2024    Uric Acid     Standing Status:   Future     Standing Expiration Date:   1/30/2024    TSH with Reflex     Standing Status:   Future     Standing Expiration Date:   1/30/2024    Vitamin B12 & Folate     Standing Status:   Future     Standing Expiration Date:   1/30/2024    Vitamin D 25 Hydroxy     Standing Status:   Future     Standing Expiration Date:   1/30/2024    Faraz Ruiz DPM, Podiatry, [place]     Referral Priority:   Routine     Referral Type:   Eval and Treat     Referral Reason:   Specialty Services Required     Referred to Provider:   Yola Henao DPM     Requested Specialty:   Podiatry     Number of Visits Requested:   1    POCT glycosylated hemoglobin (Hb A1C)     DIABETES FOOT EXAM         Medications Discontinued During This Encounter   Medication Reason    sertraline (ZOLOFT) 25 MG tablet Therapy completed lisinopril (PRINIVIL;ZESTRIL) 10 MG tablet        Pierre received counseling on the following healthy behaviors: nutrition, exercise, and medication adherence  Reviewed prior labs and health maintenance  Continue current medications, diet and exercise. Discussed use, benefit, and side effects of prescribed medications. Barriers to medication compliance addressed. Patient given educational materials - see patient instructions  Was a self-tracking handout given in paper form or via [employer]? Yes    Requested Prescriptions     Signed Prescriptions Disp Refills    sulfamethoxazole-trimethoprim (BACTRIM DS) 800-160 MG per tablet 20 tablet 0     Sig: Take 1 tablet by mouth 2 times daily    levoFLOXacin (LEVAQUIN) 500 MG tablet 10 tablet 0     Sig: Take 1 tablet by mouth daily for 10 days    sildenafil (VIAGRA) 100 MG tablet 30 tablet 0     Sig: Take 1 tablet by mouth as needed for Erectile Dysfunction    lisinopril (PRINIVIL;ZESTRIL) 20 MG tablet 90 tablet 1     Sig: Take 1 tablet by mouth daily       All patient questions answered. Patient voiced understanding. Quality Measures    Body mass index is 28.37 kg/m². Elevated. Weight control planned discussed daily exercise regimen and Healthy diet and regular exercise. BP: (!) 148/98 Blood pressure is high. Treatment plan consists of Weight Reduction, DASH Eating Plan, Dietary Sodium Restriction, Increased Physical Activity, and Antihypertensive Medication Increased.     Lab Results   Component Value Date    LDLCHOLESTEROL 76 10/25/2021    (goal LDL reduction with dx if diabetes is 50% LDL reduction)      PHQ Scores 1/30/2023 3/29/2022 3/23/2020 2/27/2020 12/11/2019 11/13/2013   PHQ2 Score 0 0 6 0 2 4   PHQ9 Score 0 0 13 0 2 15     Interpretation of Total Score Depression Severity: 1-4 = Minimal depression, 5-9 = Mild depression, 10-14 = Moderate depression, 15-19 = Moderately severe depression, 20-27 = Severe depression    The patient'spast medical, surgical, social, and family history as well as his   current medications and allergies were reviewed as documented in today's encounter. Medications, labs, diagnostic studies, consultations andfollow-up as documented in this encounter. Return in about 3 months (around 4/30/2023) for 30min,always chronic conditons. Patient wasseen with total face to face time of 35 minutes. More than 50% of this visit was counseling and education. Future Appointments   Date Time Provider Zhao Melendez   5/8/2023  2:45 PM Didier Juarez MD [facility]     This note was completed by using the assistance of a speech-recognition program. However, inadvertent computerized transcription errors may be present. Althoughevery effort was made to ensure accuracy, no guarantees can be provided that every mistake has been identified and corrected by editing.   Electronically signed by Didier Juarez MD on 1/30/2023  1:04 PM

## 2023-01-30 NOTE — PROGRESS NOTES
Visit Information    Have you changed or started any medications since your last visit including any over-the-counter medicines, vitamins, or herbal medicines? no   Have you stopped taking any of your medications? Is so, why? -  no  Are you having any side effects from any of your medications? - no    Have you seen any other physician or provider since your last visit?  no   Have you had any other diagnostic tests since your last visit? yes -    Have you been seen in the emergency room and/or had an admission in a hospital since we last saw you?  yes -    Have you had your routine dental cleaning in the past 6 months?  no     Do you have an active MyChart account? If no, what is the barrier?   Yes    Patient Care Team:  Arin Ruffin MD as PCP - General (Family Medicine)  Arin Ruffin MD as PCP - [facility]    Medical History Review  Past Medical, Family, and Social History reviewed and does contribute to the patient presenting condition    Health Maintenance   Topic Date Due    COVID-19 Vaccine (1) Never done    HIV screen  Never done    Diabetic Alb to Cr ratio (uACR) test  Never done    Diabetic retinal exam  Never done    Hepatitis C screen  Never done    Hepatitis B vaccine (1 of 3 - Risk 3-dose series) Never done    Colorectal Cancer Screen  Never done    Shingles vaccine (1 of 2) Never done    Flu vaccine (1) Never done    Lipids  10/25/2022    A1C test (Diabetic or Prediabetic)  03/29/2023    Depression Monitoring  03/29/2023    Diabetic foot exam  01/10/2024    GFR test (Diabetes, CKD 3-4, OR last GFR 15-59)  01/11/2024    DTaP/Tdap/Td vaccine (3 - Td or Tdap) 01/10/2033    Pneumococcal 0-64 years Vaccine  Completed    Hepatitis A vaccine  Aged Out    Hib vaccine  Aged Out    Meningococcal (ACWY) vaccine  Aged Out

## 2023-01-31 ENCOUNTER — OFFICE VISIT (OUTPATIENT)
Dept: PODIATRY | Age: 53
End: 2023-01-31

## 2023-01-31 VITALS — WEIGHT: 215 LBS | BODY MASS INDEX: 27.59 KG/M2 | HEIGHT: 74 IN

## 2023-01-31 DIAGNOSIS — L97.523 ULCER OF LEFT FOOT WITH NECROSIS OF MUSCLE (HCC): Primary | ICD-10-CM

## 2023-01-31 DIAGNOSIS — E11.42 DIABETIC POLYNEUROPATHY ASSOCIATED WITH TYPE 2 DIABETES MELLITUS (HCC): ICD-10-CM

## 2023-01-31 DIAGNOSIS — M79.672 PAIN IN LEFT FOOT: ICD-10-CM

## 2023-01-31 RX ORDER — GAUZE BANDAGE 4" X 4"
1 BANDAGE TOPICAL DAILY
Qty: 100 EACH | Refills: 1 | Status: SHIPPED | OUTPATIENT
Start: 2023-01-31

## 2023-01-31 RX ORDER — BLOOD PRESSURE TEST KIT
KIT MISCELLANEOUS
Qty: 30 EACH | Refills: 1 | Status: SHIPPED | OUTPATIENT
Start: 2023-01-31

## 2023-01-31 ASSESSMENT — ENCOUNTER SYMPTOMS
BACK PAIN: 0
NAUSEA: 0
DIARRHEA: 0
COLOR CHANGE: 1
SHORTNESS OF BREATH: 0

## 2023-01-31 NOTE — PROGRESS NOTES
Omar  is a 47 y.o. male who presents to the office today with chief complaint of a wound to the left foot. Chief Complaint   Patient presents with    Wound Check     Wound left foot for about 3 weeks/ had iv antibiotics in the hospital     Diabetes     Last a1c 5.7   Symptoms began about 3 week(s) ago. Patient complains of injury to the left foot. Patient states that he stepped on a piece of glass and caused a wound. Pain is rated 8 out of 10 at it's worst and is described as intermittent. Treatments prior to today's visit include: Patient has treated it with antibiotic ointment and a band aid, but he has not been consistent with this. Patient went to the ED and was given IV antibiotics and is currently on oral antibiotics consisting of Levaquin and Bactrim. Patient is a diabetic and this is managed with insulin. No Known Allergies    Past Medical History:   Diagnosis Date    Anxiety and depression     Chronic back pain     HLD (hyperlipidemia) 11/15/2015    Hypertension     Marijuana abuse 11/15/2015    Nephrolithiasis 11/18/2015    Type II or unspecified type diabetes mellitus without mention of complication, not stated as uncontrolled        Prior to Admission medications    Medication Sig Start Date End Date Taking? Authorizing Provider   Gauze Pads & Dressings (GAUZE DRESSING) 4\"X4\" PADS 1 each by Does not apply route daily 1/31/23  Yes Sanchez Bragg DPM   Gauze Bandages (ROLLED GAUZE BANDAGE 4\"X2. 5YD) MISC Apply to wound.  1/31/23  Yes Sanchez Bragg DPM   sulfamethoxazole-trimethoprim (BACTRIM DS) 800-160 MG per tablet Take 1 tablet by mouth 2 times daily 1/30/23  Yes Read MD Rakan   levoFLOXacin (LEVAQUIN) 500 MG tablet Take 1 tablet by mouth daily for 10 days 1/30/23 2/9/23 Yes Read MD Rakan   sildenafil (VIAGRA) 100 MG tablet Take 1 tablet by mouth as needed for Erectile Dysfunction 1/30/23  Yes Read MD Rakan   lisinopril (PRINIVIL;ZESTRIL) 20 MG tablet Take 1 tablet by mouth daily 1/30/23  Yes Smita Batres MD   meloxicam (MOBIC) 7.5 MG tablet Take 1 tablet by mouth daily 1/30/23  Yes Smita Batres MD   Insulin Pen Needle 29G X 12.7MM MISC 1 each by Does not apply route daily To use with insulin 10/25/22  Yes Smita Batres MD   SITagliptin (JANUVIA) 50 MG tablet Take 1 tablet by mouth daily 10/25/22  Yes Smita Batres MD   LANTUS SOLOSTAR 100 UNIT/ML injection pen INJECT 35 UNITS SUBCUTANEOUSLY EVERY MORNING BEFORE BREAKFAST 10/24/22  Yes Smita Batres MD   metFORMIN (GLUCOPHAGE) 850 MG tablet TAKE 1 TABLET BY MOUTH TWO TIMES A DAY WITH MEALS 9/7/22  Yes Smita Batres MD   atorvastatin (LIPITOR) 20 MG tablet TAKE 1 TABLET BY MOUTH ONE TIME A DAY 9/7/22  Yes Smita Batres MD   TRUEPLUS PEN NEEDLES 32G X 4 MM MISC USE WITH INSULIN DAILY 5/26/22  Yes Smita Batres MD   aspirin ( ASPIRIN ADULT LOW STRENGTH) 81 MG EC tablet TAKE 1 TABLET BY MOUTH ONE TIME A DAY 3/29/22  Yes Smita Batres MD   acetaminophen (TYLENOL) 500 MG tablet Take 1 tablet by mouth 4 times daily as needed for Pain 8/17/21  Yes Herson Robles MD   blood glucose monitor kit and supplies Test one time a day & as needed for symptoms of irregular blood glucose. 3/23/20  Yes Smita Batres MD   Lancets MISC Testing once a day. Please dispense according to patients insurance. 12/11/19  Yes Smita Batres MD   blood glucose monitor strips Testing once a day. Please dispense according to patients insurance.  12/11/19  Yes Smita Batres MD   Blood Pressure Monitor MISC Use daily to check BP 3/13/18  Yes Smita Batres MD   Alcohol Swabs (SM ALCOHOL PREP) 70 % PADS USE TO TEST ONCE DAILY  Patient not taking: Reported on 1/31/2023 3/29/22   Smita Batres MD       Past Surgical History:   Procedure Laterality Date    HAND SURGERY Right 10/25/2021    HAND INCISION AND DRAINAGE performed by Mara Vogel MD at [id number]       Family History   Problem Relation Age of Onset    Diabetes Father        Social History Tobacco Use    Smoking status: Never    Smokeless tobacco: Never   Substance Use Topics    Alcohol use: Yes     Comment: 2 times a week       Review of Systems   Constitutional:  Negative for activity change, appetite change, chills, diaphoresis, fatigue and fever. Respiratory:  Negative for shortness of breath. Cardiovascular:  Negative for leg swelling. Gastrointestinal:  Negative for diarrhea and nausea. Endocrine: Negative for cold intolerance, heat intolerance and polyuria. Musculoskeletal:  Positive for arthralgias. Negative for back pain, gait problem, joint swelling and myalgias. Skin:  Positive for color change and wound. Negative for pallor and rash. Allergic/Immunologic: Negative for environmental allergies and food allergies. Neurological:  Negative for dizziness, weakness, light-headedness and numbness. Hematological:  Does not bruise/bleed easily. Psychiatric/Behavioral:  Negative for behavioral problems, confusion and self-injury. The patient is not nervous/anxious. Vitals: There were no vitals filed for this visit. General: AAO x 3 in NAD. Integument: There is a full thickness wound to the depth of the joint capsule of the left foot submetatarsal head three. There is erythema and mild calor noted to the left foot and leg. There is a malodor noted to the wound. There is edema noted to the right foot and leg. There is hyperkeratotic tissue formation surrounding this wound. Excisional debridement of this wound with a curette and a fifteen blade reveals necrosis of the tissue to the depth of the joint capsule. No bone is exposed or palpated. There is adequate bleeding noted upon debridement. There is no purulence noted to this wound. The wound was exsanguinated and again no purulence or other drainage was produced. There is pain with manipulation to this area of the left foot. The wound measures 1.5 cm x 1.4 cm 0.7 cm.      There is no induration, subcutaneous nodules, or tightening of the skin noted to the bilateral.     Toenails 1-5 of the right foot do present with thickness, discoloration, brittleness, subungual debris. Toenails 1-5 of the left foot do present with thickness, discoloration, brittleness, subungual debris. Interdigital maceration absent to web spaces 1-4, Bilateral.     There are no preulcerative lesions noted to the right foot. There are no preulcerative lesions noted to the left foot. The skin to the bilateral feet is not thin and shiny. The skin to the bilateral feet is  warm, supple, and dry. Vascular: DP pulse of the right foot is  palpable. DP pulse of the left foot is  palpable. PT pulse of the right foot is  palpable. PT pulse of the left foot is  palpable. CFT is less than 3 secs to the digits of the right foot. CFT is less than 3 secs to the digits of the left foot. There is edema noted to the left foot and ankle. There is hair growth noted to the digits of the bilateral feet. There are no varicosities noted to the right foot/ankle. There are no varicosities noted to the left foot/ankle. Erythema is present to the left feet. Neurological: Reflexes are present to the right plantar foot and to the Achilles tendon. Reflexes are present to the left plantar foot and to the Achilles tendon. Protective sensation is absent to the right plantar foot as noted with a 5.07 Richmond-Ana monofilament. Protective sensation is absent to the left plantar foot as noted with a 5.07 Richmond-Ana monofilament. Musculoskeletal:  Muscle strength is +5/5 to all four muscle groups of the right lower extremity and +5/5 to all four muscle groups of the left lower extremity. There are no areas of subluxation, dislocation, or laxity noted to either lower extremity. Range of motion to the right ankle is  free of pain or grinding.      Range of motion to the left ankle is  free of pain or grinding. Range of motion to the right subtalar joint is  free of pain or grinding. Range of motion to the left subtalar joint is  free of pain or grinding. No abnormalities, asymmetries, or misalignments are seen between the extremities. Weightbearing evaluation does not reveal rearfoot eversion, medial prominence of the talar head, loss of the medial longitudinal arch height, and too many toes sign bilaterally. The lesser digits of the right foot are not contracted. The lesser digits of the left foot are not contracted. There is no prominence noted to the first metatarsal head without abduction of the hallux of the right foot. There is no prominence noted to the first metatarsal head without abduction of the hallux of the left foot. Shoe examination was performed. Biomechanical Exam: abnormal left as the patient limps. X-ray's reviewed from the hospital: AP, Lateral, and Medial Oblique of the left foot. Findings: There is no soft tissue gas or bone destruction noted. Asessment: Patient is a 47 y.o. male with:    Diagnosis Orders   1. Ulcer of left foot with necrosis of muscle (HCC)  Gauze Pads & Dressings (GAUZE DRESSING) 4\"X4\" PADS    Gauze Bandages (ROLLED GAUZE BANDAGE 4\"X2. 5YD) MISC    VT DEBRIDEMENT SUBCUTANEOUS TISSUE 20 SQ CM/<      2. Pain in left foot  Gauze Pads & Dressings (GAUZE DRESSING) 4\"X4\" PADS    Gauze Bandages (ROLLED GAUZE BANDAGE 4\"X2. 5YD) MISC    VT DEBRIDEMENT SUBCUTANEOUS TISSUE 20 SQ CM/<      3. Diabetic polyneuropathy associated with type 2 diabetes mellitus (HCC)  Gauze Pads & Dressings (GAUZE DRESSING) 4\"X4\" PADS    Gauze Bandages (ROLLED GAUZE BANDAGE 4\"X2. 5YD) MISC    VT DEBRIDEMENT SUBCUTANEOUS TISSUE 20 SQ CM/<          Plan:  1. Clinical evaluation of the patient.  2. Following excisional debridement of 100% of the wound to the left foot to the depth of the joint capsule with a curette and a fifteen blade the wound was dressed with antibiotic ointment and a DSD. Patient instructed to change this dressing daily with antibiotic ointment and a DSD. Patient given an order for dressing supplies. Patient is to continue taking his oral antibiotics prescribed. I dispensed a surgical shoe to the left foot with instructions to the patient to wear this shoe at all times when WB. Patient is also instructed to limit his WB to ADL's only. 3. Contact office with any questions/problems/concerns.   Return in about 1 week (around 2/7/2023) for Wound Care.   1/31/2023      Naya Velasquez DPM

## 2023-02-07 ENCOUNTER — OFFICE VISIT (OUTPATIENT)
Dept: PODIATRY | Age: 53
End: 2023-02-07

## 2023-02-07 ENCOUNTER — HOSPITAL ENCOUNTER (OUTPATIENT)
Dept: PREADMISSION TESTING | Age: 53
Setting detail: OUTPATIENT SURGERY
Discharge: HOME | End: 2023-02-11
Payer: COMMERCIAL

## 2023-02-07 VITALS — BODY MASS INDEX: 27.59 KG/M2 | WEIGHT: 215 LBS | HEIGHT: 74 IN

## 2023-02-07 DIAGNOSIS — L08.9 INFECTION OF LEFT FOOT: ICD-10-CM

## 2023-02-07 DIAGNOSIS — E11.42 DIABETIC POLYNEUROPATHY ASSOCIATED WITH TYPE 2 DIABETES MELLITUS (HCC): ICD-10-CM

## 2023-02-07 DIAGNOSIS — L97.523 ULCER OF LEFT FOOT WITH NECROSIS OF MUSCLE (HCC): Primary | ICD-10-CM

## 2023-02-07 DIAGNOSIS — M79.672 PAIN IN LEFT FOOT: ICD-10-CM

## 2023-02-07 NOTE — PROGRESS NOTES
Pre-op Instructions For Out-Patient Surgery    Medication Instructions:  Please stop herbs and any supplements now (includes vitamins and minerals). Please contact your surgeon and prescribing physician for pre-op instructions for any blood thinners. Aspirin and Meloxicam     If you have inhalers/aerosol treatments at home, please use them the morning of your surgery and bring the inhalers with you to the hospital.    Please take the following medications the morning of your surgery with a sip of water:    Lisinopril, Levaquin and Bactrim. Surgery Instructions:  After midnight before surgery:  Do not eat or drink anything, including water, mints, gum, and hard candy. You may brush your teeth without swallowing. No smoking, chewing tobacco, or street drugs. Please shower or bathe before surgery. If you were given Surgical Scrub Chlorhexidine Gluconate Liquid (CHG), please shower the night before and the morning of your surgery following the detailed instructions you received during your pre-admission visit. Please do not wear any cologne, lotion, powder, deodorant, jewelry, piercings, perfume, makeup, nail polish, hair accessories, or hair spray on the day of surgery. Wear loose comfortable clothing. Leave your valuables at home. Bring a storage case for any glasses/contacts. An adult who is responsible for you MUST drive you home and should be with you for the first 24 hours after surgery. If having out-patient knee and foot surgeries, please arrange for planned crutches, walker, or wheelchair before arriving to the hospital.    The Day of Surgery:  Arrive at [facility] AT [facility] at the time directed by your surgeon and check in at the desk. If you have a living will or healthcare power of , please bring a copy. You will be taken to the pre-op holding area where you will be prepared for surgery.   A physical assessment will be performed by a nurse practitioner or house officer. Your IV will be started and you will meet your anesthesiologist.    When you go to surgery, your family will be directed to the surgical waiting room, where the doctor should speak with them after your surgery. After surgery, you will be taken to the recovery room then when you are awake and stable you will go to the short stay unit for preparation to be discharged. If you use a Bi-PAP or C-PAP machine, please bring it with you and leave it in the car in case it is needed in recovery room. Instructions read to Indra and understanding verbalized.      2/8/23 Surgery/ Nic

## 2023-02-07 NOTE — PROGRESS NOTES
Follow-Up Wound Progress Note   Iam Taylor  AGE: 47 y.o. GENDER: male  : 1970  TODAY'S DATE:  2/10/2023  Subjective:    Iam Taylor is a 47 y.o. male who presents today for wound check. Wound Location : Left foot. The patients pain is 10 out of 10. Patient states that they have been changing the dressing to the left lower extremity(s) with antibiotic ointment and a DSD daily as instructed since last visit. Patient states that he has been taking the prescribed Levaquin and Bactrim. Patient states that his foot is more painful and is looking worse. Modifying factors: Diabetes, smoking. Review of Systems   Constitutional:  Negative for activity change, appetite change, chills, diaphoresis, fatigue and fever. Respiratory:  Negative for shortness of breath. Cardiovascular:  Negative for leg swelling. Gastrointestinal:  Negative for diarrhea and nausea. Endocrine: Negative for cold intolerance, heat intolerance and polyuria. Musculoskeletal:  Positive for arthralgias. Negative for back pain, gait problem, joint swelling and myalgias. Skin:  Positive for color change and wound. Negative for pallor and rash. Allergic/Immunologic: Negative for environmental allergies and food allergies. Neurological:  Negative for dizziness, weakness, light-headedness and numbness. Hematological:  Does not bruise/bleed easily. Psychiatric/Behavioral:  Negative for behavioral problems, confusion and self-injury. The patient is not nervous/anxious. Objective:   Exam:  There is a full thickness wound to the depth of the joint capsule of the left foot submetatarsal head three. There is an increase in erythema and calor to the left foot today. There remains a mild malodor to the wound. There is edema noted to the right foot and leg. There is hyperkeratotic tissue formation surrounding this wound.  Excisional debridement of this wound with a curette and a fifteen blade reveals necrosis of the tissue to the depth of the joint capsule. No bone is exposed or palpated. There is adequate bleeding noted upon debridement. There is no purulence noted to this wound. The wound was exsanguinated and again no purulence or other drainage was produced. There is pain with manipulation to this area of the left foot. The wound measures 1.5 cm x 1.4 cm 0.7 cm. Procedure: The wound(s) to the left foot was debrided with removal of fibrotic, necrotic, and hyperkeratotic tissue, including a layer of surrounding healthy tissue down through and including muscle tissue,using curette and #15 blade scalpel. Excisional Debridement  Percent of Wound Debrided: 100%    Wound: has worsened    Bleeding: Minimal    Hemostasis:   by pressure    Response to treatment:  With complaints of pain. X-ray's taken: AP, Lateral, and Medial Oblique of the left foot. Findings: There is bone erosion noted to the base of the proximal phalanx of the third toe. No soft tissue gas is noted. Assessment:      Diagnosis Orders   1. Ulcer of left foot with necrosis of muscle (HCC)  XR FOOT LEFT (MIN 3 VIEWS)    IL DEBRIDEMENT SUBCUTANEOUS TISSUE 20 SQ CM/<      2. Infection of left foot  XR FOOT LEFT (MIN 3 VIEWS)    IL DEBRIDEMENT SUBCUTANEOUS TISSUE 20 SQ CM/<      3. Pain in left foot  XR FOOT LEFT (MIN 3 VIEWS)    IL DEBRIDEMENT SUBCUTANEOUS TISSUE 20 SQ CM/<      4. Diabetic polyneuropathy associated with type 2 diabetes mellitus (HCC)  XR FOOT LEFT (MIN 3 VIEWS)    IL DEBRIDEMENT SUBCUTANEOUS TISSUE 20 SQ CM/<              Plan:   Plan for wound -   Treatment:                Dressed with: Antibiotic ointment and a DSD. Home care: Patient to keep this dressing clean, dry, and intact. Patient is scheduled for surgery tomorrow for I&D and removal of all nonviable soft tissue and bone. Abx :Yes; Patient to continue taking his oral antibiotics. Cultures :were notobtained, but will be taken in the OR.   Return if symptoms worsen or fail to improve, for Patient scheduled for surgery.    2/7/2023        Dianna Govea DPM

## 2023-02-08 ENCOUNTER — HOSPITAL ENCOUNTER (OUTPATIENT)
Age: 53
Setting detail: OUTPATIENT SURGERY
Discharge: HOME | End: 2023-02-08
Attending: PODIATRIST | Admitting: PODIATRIST
Payer: COMMERCIAL

## 2023-02-08 ENCOUNTER — ANESTHESIA EVENT (OUTPATIENT)
Dept: OPERATING ROOM | Age: 53
End: 2023-02-08
Payer: COMMERCIAL

## 2023-02-08 ENCOUNTER — ANESTHESIA (OUTPATIENT)
Dept: OPERATING ROOM | Age: 53
End: 2023-02-08
Payer: COMMERCIAL

## 2023-02-08 VITALS
WEIGHT: 220 LBS | HEART RATE: 54 BPM | SYSTOLIC BLOOD PRESSURE: 105 MMHG | TEMPERATURE: 97.2 F | BODY MASS INDEX: 28.23 KG/M2 | RESPIRATION RATE: 16 BRPM | HEIGHT: 74 IN | OXYGEN SATURATION: 95 % | DIASTOLIC BLOOD PRESSURE: 72 MMHG

## 2023-02-08 DIAGNOSIS — L08.9 DIABETIC INFECTION OF LEFT FOOT (HCC): ICD-10-CM

## 2023-02-08 DIAGNOSIS — E11.628 DIABETIC INFECTION OF LEFT FOOT (HCC): ICD-10-CM

## 2023-02-08 DIAGNOSIS — G89.18 POST-OP PAIN: Primary | ICD-10-CM

## 2023-02-08 LAB
GLUCOSE BLD-MCNC: 109 MG/DL (ref 75–110)
GLUCOSE BLD-MCNC: 114 MG/DL (ref 75–110)

## 2023-02-08 PROCEDURE — 7100000001 HC PACU RECOVERY - ADDTL 15 MIN: Performed by: PODIATRIST

## 2023-02-08 PROCEDURE — 87075 CULTR BACTERIA EXCEPT BLOOD: CPT

## 2023-02-08 PROCEDURE — 3700000000 HC ANESTHESIA ATTENDED CARE: Performed by: PODIATRIST

## 2023-02-08 PROCEDURE — 3700000001 HC ADD 15 MINUTES (ANESTHESIA): Performed by: PODIATRIST

## 2023-02-08 PROCEDURE — 2709999900 HC NON-CHARGEABLE SUPPLY: Performed by: PODIATRIST

## 2023-02-08 PROCEDURE — 87205 SMEAR GRAM STAIN: CPT

## 2023-02-08 PROCEDURE — 2580000003 HC RX 258: Performed by: ANESTHESIOLOGY

## 2023-02-08 PROCEDURE — 3600000012 HC SURGERY LEVEL 2 ADDTL 15MIN: Performed by: PODIATRIST

## 2023-02-08 PROCEDURE — 6370000000 HC RX 637 (ALT 250 FOR IP): Performed by: ANESTHESIOLOGY

## 2023-02-08 PROCEDURE — 2500000003 HC RX 250 WO HCPCS

## 2023-02-08 PROCEDURE — 6360000002 HC RX W HCPCS: Performed by: PODIATRIST

## 2023-02-08 PROCEDURE — 6360000002 HC RX W HCPCS: Performed by: ANESTHESIOLOGY

## 2023-02-08 PROCEDURE — 7100000010 HC PHASE II RECOVERY - FIRST 15 MIN: Performed by: PODIATRIST

## 2023-02-08 PROCEDURE — 2500000003 HC RX 250 WO HCPCS: Performed by: PODIATRIST

## 2023-02-08 PROCEDURE — 7100000031 HC ASPR PHASE II RECOVERY - ADDTL 15 MIN: Performed by: PODIATRIST

## 2023-02-08 PROCEDURE — 87070 CULTURE OTHR SPECIMN AEROBIC: CPT

## 2023-02-08 PROCEDURE — 7100000030 HC ASPR PHASE II RECOVERY - FIRST 15 MIN: Performed by: PODIATRIST

## 2023-02-08 PROCEDURE — 6360000002 HC RX W HCPCS

## 2023-02-08 PROCEDURE — 7100000011 HC PHASE II RECOVERY - ADDTL 15 MIN: Performed by: PODIATRIST

## 2023-02-08 PROCEDURE — 3600000002 HC SURGERY LEVEL 2 BASE: Performed by: PODIATRIST

## 2023-02-08 PROCEDURE — 87102 FUNGUS ISOLATION CULTURE: CPT

## 2023-02-08 PROCEDURE — 82947 ASSAY GLUCOSE BLOOD QUANT: CPT

## 2023-02-08 PROCEDURE — 7100000000 HC PACU RECOVERY - FIRST 15 MIN: Performed by: PODIATRIST

## 2023-02-08 RX ORDER — CLINDAMYCIN HYDROCHLORIDE 300 MG/1
300 CAPSULE ORAL 4 TIMES DAILY
Qty: 40 CAPSULE | Refills: 0 | Status: SHIPPED | OUTPATIENT
Start: 2023-02-08 | End: 2023-02-18

## 2023-02-08 RX ORDER — OXYCODONE HYDROCHLORIDE AND ACETAMINOPHEN 5; 325 MG/1; MG/1
1 TABLET ORAL
Status: DISCONTINUED | OUTPATIENT
Start: 2023-02-08 | End: 2023-02-08 | Stop reason: HOSPADM

## 2023-02-08 RX ORDER — HYDRALAZINE HYDROCHLORIDE 20 MG/ML
10 INJECTION INTRAMUSCULAR; INTRAVENOUS
Status: DISCONTINUED | OUTPATIENT
Start: 2023-02-08 | End: 2023-02-08 | Stop reason: HOSPADM

## 2023-02-08 RX ORDER — KETOROLAC TROMETHAMINE 30 MG/ML
INJECTION, SOLUTION INTRAMUSCULAR; INTRAVENOUS PRN
Status: DISCONTINUED | OUTPATIENT
Start: 2023-02-08 | End: 2023-02-08 | Stop reason: SDUPTHER

## 2023-02-08 RX ORDER — MEPERIDINE HYDROCHLORIDE 25 MG/ML
12.5 INJECTION INTRAMUSCULAR; INTRAVENOUS; SUBCUTANEOUS EVERY 5 MIN PRN
Status: DISCONTINUED | OUTPATIENT
Start: 2023-02-08 | End: 2023-02-08 | Stop reason: HOSPADM

## 2023-02-08 RX ORDER — FENTANYL CITRATE 0.05 MG/ML
25 INJECTION, SOLUTION INTRAMUSCULAR; INTRAVENOUS EVERY 5 MIN PRN
Status: DISCONTINUED | OUTPATIENT
Start: 2023-02-08 | End: 2023-02-08 | Stop reason: HOSPADM

## 2023-02-08 RX ORDER — SODIUM CHLORIDE 0.9 % (FLUSH) 0.9 %
5-40 SYRINGE (ML) INJECTION EVERY 12 HOURS SCHEDULED
Status: DISCONTINUED | OUTPATIENT
Start: 2023-02-08 | End: 2023-02-08 | Stop reason: HOSPADM

## 2023-02-08 RX ORDER — ONDANSETRON 2 MG/ML
4 INJECTION INTRAMUSCULAR; INTRAVENOUS
Status: DISCONTINUED | OUTPATIENT
Start: 2023-02-08 | End: 2023-02-08 | Stop reason: HOSPADM

## 2023-02-08 RX ORDER — LIDOCAINE HYDROCHLORIDE 10 MG/ML
1 INJECTION, SOLUTION EPIDURAL; INFILTRATION; INTRACAUDAL; PERINEURAL
Status: DISCONTINUED | OUTPATIENT
Start: 2023-02-08 | End: 2023-02-08 | Stop reason: HOSPADM

## 2023-02-08 RX ORDER — BUPIVACAINE HYDROCHLORIDE 5 MG/ML
INJECTION, SOLUTION EPIDURAL; INTRACAUDAL PRN
Status: DISCONTINUED | OUTPATIENT
Start: 2023-02-08 | End: 2023-02-08 | Stop reason: ALTCHOICE

## 2023-02-08 RX ORDER — ACETAMINOPHEN 500 MG
1000 TABLET ORAL ONCE
Status: COMPLETED | OUTPATIENT
Start: 2023-02-08 | End: 2023-02-08

## 2023-02-08 RX ORDER — SODIUM CHLORIDE 9 MG/ML
INJECTION, SOLUTION INTRAVENOUS PRN
Status: DISCONTINUED | OUTPATIENT
Start: 2023-02-08 | End: 2023-02-08 | Stop reason: HOSPADM

## 2023-02-08 RX ORDER — SODIUM CHLORIDE 0.9 % (FLUSH) 0.9 %
5-40 SYRINGE (ML) INJECTION PRN
Status: DISCONTINUED | OUTPATIENT
Start: 2023-02-08 | End: 2023-02-08 | Stop reason: HOSPADM

## 2023-02-08 RX ORDER — PROPOFOL 10 MG/ML
INJECTION, EMULSION INTRAVENOUS PRN
Status: DISCONTINUED | OUTPATIENT
Start: 2023-02-08 | End: 2023-02-08 | Stop reason: SDUPTHER

## 2023-02-08 RX ORDER — SODIUM CHLORIDE 9 MG/ML
INJECTION, SOLUTION INTRAVENOUS CONTINUOUS
Status: DISCONTINUED | OUTPATIENT
Start: 2023-02-08 | End: 2023-02-08 | Stop reason: HOSPADM

## 2023-02-08 RX ORDER — FENTANYL CITRATE 50 UG/ML
INJECTION, SOLUTION INTRAMUSCULAR; INTRAVENOUS PRN
Status: DISCONTINUED | OUTPATIENT
Start: 2023-02-08 | End: 2023-02-08 | Stop reason: SDUPTHER

## 2023-02-08 RX ORDER — MIDAZOLAM HYDROCHLORIDE 1 MG/ML
INJECTION INTRAMUSCULAR; INTRAVENOUS PRN
Status: DISCONTINUED | OUTPATIENT
Start: 2023-02-08 | End: 2023-02-08 | Stop reason: SDUPTHER

## 2023-02-08 RX ORDER — ACETAMINOPHEN 325 MG/1
650 TABLET ORAL
Status: DISCONTINUED | OUTPATIENT
Start: 2023-02-08 | End: 2023-02-08 | Stop reason: HOSPADM

## 2023-02-08 RX ORDER — DIPHENHYDRAMINE HYDROCHLORIDE 50 MG/ML
12.5 INJECTION INTRAMUSCULAR; INTRAVENOUS
Status: DISCONTINUED | OUTPATIENT
Start: 2023-02-08 | End: 2023-02-08 | Stop reason: HOSPADM

## 2023-02-08 RX ORDER — LIDOCAINE HYDROCHLORIDE 10 MG/ML
INJECTION, SOLUTION EPIDURAL; INFILTRATION; INTRACAUDAL; PERINEURAL PRN
Status: DISCONTINUED | OUTPATIENT
Start: 2023-02-08 | End: 2023-02-08 | Stop reason: SDUPTHER

## 2023-02-08 RX ORDER — LABETALOL HYDROCHLORIDE 5 MG/ML
10 INJECTION, SOLUTION INTRAVENOUS
Status: DISCONTINUED | OUTPATIENT
Start: 2023-02-08 | End: 2023-02-08 | Stop reason: HOSPADM

## 2023-02-08 RX ORDER — GABAPENTIN 300 MG/1
300 CAPSULE ORAL ONCE
Status: COMPLETED | OUTPATIENT
Start: 2023-02-08 | End: 2023-02-08

## 2023-02-08 RX ORDER — OXYCODONE HYDROCHLORIDE AND ACETAMINOPHEN 5; 325 MG/1; MG/1
1 TABLET ORAL EVERY 6 HOURS PRN
Qty: 28 TABLET | Refills: 0 | Status: SHIPPED | OUTPATIENT
Start: 2023-02-08 | End: 2023-02-15

## 2023-02-08 RX ORDER — LEVOFLOXACIN 500 MG/1
500 TABLET, FILM COATED ORAL DAILY
Qty: 10 TABLET | Refills: 0 | Status: SHIPPED | OUTPATIENT
Start: 2023-02-08 | End: 2023-02-18

## 2023-02-08 RX ORDER — METOCLOPRAMIDE HYDROCHLORIDE 5 MG/ML
10 INJECTION INTRAMUSCULAR; INTRAVENOUS
Status: DISCONTINUED | OUTPATIENT
Start: 2023-02-08 | End: 2023-02-08 | Stop reason: HOSPADM

## 2023-02-08 RX ADMIN — ACETAMINOPHEN 1000 MG: 500 TABLET ORAL at 10:14

## 2023-02-08 RX ADMIN — LIDOCAINE HYDROCHLORIDE 50 MG: 10 INJECTION, SOLUTION EPIDURAL; INFILTRATION; INTRACAUDAL; PERINEURAL at 13:18

## 2023-02-08 RX ADMIN — KETOROLAC TROMETHAMINE 30 MG: 30 INJECTION, SOLUTION INTRAMUSCULAR; INTRAVENOUS at 13:59

## 2023-02-08 RX ADMIN — SODIUM CHLORIDE: 9 INJECTION, SOLUTION INTRAVENOUS at 10:40

## 2023-02-08 RX ADMIN — FENTANYL CITRATE 25 MCG: 50 INJECTION, SOLUTION INTRAMUSCULAR; INTRAVENOUS at 13:22

## 2023-02-08 RX ADMIN — Medication 2000 MG: at 13:19

## 2023-02-08 RX ADMIN — PROPOFOL INJECTABLE EMULSION 125 MCG/KG/MIN: 10 INJECTION, EMULSION INTRAVENOUS at 13:19

## 2023-02-08 RX ADMIN — PROPOFOL INJECTABLE EMULSION 60 MG: 10 INJECTION, EMULSION INTRAVENOUS at 13:18

## 2023-02-08 RX ADMIN — HYDROMORPHONE HYDROCHLORIDE 0.5 MG: 1 INJECTION, SOLUTION INTRAMUSCULAR; INTRAVENOUS; SUBCUTANEOUS at 14:46

## 2023-02-08 RX ADMIN — GABAPENTIN 300 MG: 300 CAPSULE ORAL at 10:14

## 2023-02-08 RX ADMIN — MIDAZOLAM 2 MG: 1 INJECTION INTRAMUSCULAR; INTRAVENOUS at 13:16

## 2023-02-08 RX ADMIN — FENTANYL CITRATE 25 MCG: 50 INJECTION, SOLUTION INTRAMUSCULAR; INTRAVENOUS at 13:41

## 2023-02-08 ASSESSMENT — PAIN DESCRIPTION - LOCATION
LOCATION: FOOT

## 2023-02-08 ASSESSMENT — ENCOUNTER SYMPTOMS
SORE THROAT: 0
SHORTNESS OF BREATH: 0
ABDOMINAL PAIN: 0
WHEEZING: 0
BACK PAIN: 0
NAUSEA: 0
CONSTIPATION: 0
VOMITING: 0
COUGH: 0
DIARRHEA: 0

## 2023-02-08 ASSESSMENT — PAIN DESCRIPTION - DESCRIPTORS
DESCRIPTORS: THROBBING
DESCRIPTORS: ACHING

## 2023-02-08 ASSESSMENT — PAIN SCALES - GENERAL
PAINLEVEL_OUTOF10: 8
PAINLEVEL_OUTOF10: 8
PAINLEVEL_OUTOF10: 5

## 2023-02-08 ASSESSMENT — PAIN DESCRIPTION - ORIENTATION
ORIENTATION: LEFT

## 2023-02-08 ASSESSMENT — LIFESTYLE VARIABLES: SMOKING_STATUS: 1

## 2023-02-08 ASSESSMENT — PAIN - FUNCTIONAL ASSESSMENT: PAIN_FUNCTIONAL_ASSESSMENT: 0-10

## 2023-02-08 ASSESSMENT — PAIN DESCRIPTION - PAIN TYPE
TYPE: SURGICAL PAIN
TYPE: SURGICAL PAIN

## 2023-02-08 ASSESSMENT — PAIN DESCRIPTION - FREQUENCY: FREQUENCY: CONTINUOUS

## 2023-02-08 NOTE — H&P
HISTORY and [facility] [address]       NAME:  Jess Vora  MRN: [id number]   YOB: 1970   Date: 2/8/2023   Age: 47 y.o. Gender: male       COMPLAINT AND PRESENT HISTORY:     Jess Vora is 47 y.o. male, here for left foot diabetic infection with scheduled INCISION AND DRAINAGE per Dr. Ashley Lala. Symptoms started approximately 5 weeks ago. Pt reports he broke a dish at home prior to noticing this wound and attributes this to possible stepping on glass. Otherwise, Pt cannot recall specific injury or trauma. Pt continues to have pain to area between 2nd and 3rd toe which does radiate to mid calf area. Has redness extending to mid calf from left foot. Describes pain as aching, throbbing. Rating pain 8/10. Has not taken any pain medications. Standing aggravates pain. Nothing in particular helps alleviate symptoms. Denies numbness and tingling. Pt has been treated with IV in ED and oral antibiotics. He has been taking antibiotics as prescribed. History of diabetes. Takes januvia, metformin and lantus. Denies having any previous diabetic foot wounds. Pt did not check BS prior to arrival. NPO p MN. No medications taken this am. Denies taking aspirin regularly. Denies taking anticoagulants or blood thinning medications. Denies recent or current chest pain/pressure, palpitations, SOB, recent URI, fever or chills.      BP Readings from Last 3 Encounters:   01/30/23 (!) 148/98   01/11/23 (!) 134/92   01/02/23 (!) 157/89         RECENT LABS, IMAGING AND TESTING     Lab Results   Component Value Date    WBC 7.9 01/11/2023    RBC 4.12 (L) 01/11/2023    HGB 13.5 01/11/2023    HCT 40.1 (L) 01/11/2023    MCV 97.3 01/11/2023    MCH 32.8 01/11/2023    MCHC 33.7 01/11/2023    RDW 12.6 01/11/2023     01/11/2023    MPV 10.4 01/11/2023        Lab Results   Component Value Date     01/11/2023    K 4.2 01/11/2023     01/11/2023    CO2 26 01/11/2023    BUN 17 01/11/2023    CREATININE 0.78 01/11/2023    GLUCOSE 87 01/11/2023    CALCIUM 8.4 (L) 01/11/2023    PROT 7.5 02/18/2020    LABALBU 4.4 02/18/2020    BILITOT 1.08 02/18/2020    ALKPHOS 102 02/18/2020    AST 23 02/18/2020    ALT 32 02/18/2020     Hemoglobin A1C   Date Value Ref Range Status   01/30/2023 5.6 % Final     Narrative   EXAMINATION:   THREE XRAY VIEWS OF THE LEFT FOOT       1/10/2023 4:56 pm       COMPARISON:   01/02/2023       HISTORY:   ORDERING SYSTEM PROVIDED HISTORY: foot ulcer, recent imaging 1 week ago,   progression of ulcer, re-eval for bony abnormality   TECHNOLOGIST PROVIDED HISTORY:   foot ulcer, recent imaging 1 week ago, progression of ulcer, re-eval for bony   abnormality       FINDINGS:   No acute fracture. No dislocation. There is no osseous erosion or   destruction. There is soft tissue edema. There is mild narrowing of   multiple MTP and interphalangeal joints. .           Impression   No acute or aggressive osseous abnormality.        PAST MEDICAL HISTORY     Past Medical History:   Diagnosis Date    Anxiety and depression     Chronic back pain     HLD (hyperlipidemia) 11/15/2015    Hypertension     Marijuana abuse 11/15/2015    Nephrolithiasis 11/18/2015    Type II or unspecified type diabetes mellitus without mention of complication, not stated as uncontrolled        SURGICAL HISTORY       Past Surgical History:   Procedure Laterality Date    HAND SURGERY Right 10/25/2021    HAND INCISION AND DRAINAGE performed by Ana Fuentes MD at [address] [place] [address]       Family History   Problem Relation Age of Onset    Diabetes Father        SOCIAL HISTORY       Social History     Socioeconomic History    Marital status:    Tobacco Use    Smoking status: Never    Smokeless tobacco: Never   Vaping Use    Vaping Use: Former   Substance and Sexual Activity    Alcohol use: Yes     Comment: 2 times a week    Drug use: Yes     Types: Marijuana (Weed)     Comment: marajunia 2 times aweek    Sexual activity: Yes     Social Determinants of Health     Financial Resource Strain: Low Risk     Difficulty of Paying Living Expenses: Not hard at all   Food Insecurity: No Food Insecurity    Worried About [address] in the Last Year: Never true    [facility] in the Last Year: Never true   Transportation Needs: No Transportation Needs    Lack of Transportation (Medical): No    Lack of Transportation (Non-Medical): No   Housing Stability: Unknown    Unable to Pay for Housing in the Last Year: No    Unstable Housing in the Last Year: No        REVIEW OF SYSTEMS      No Known Allergies    No current facility-administered medications on file prior to encounter. Current Outpatient Medications on File Prior to Encounter   Medication Sig Dispense Refill    Gauze Pads & Dressings (GAUZE DRESSING) 4\"X4\" PADS 1 each by Does not apply route daily 100 each 1    Gauze Bandages (ROLLED GAUZE BANDAGE 4\"X2. 5YD) MISC Apply to wound.  30 each 1    sulfamethoxazole-trimethoprim (BACTRIM DS) 800-160 MG per tablet Take 1 tablet by mouth 2 times daily 20 tablet 0    levoFLOXacin (LEVAQUIN) 500 MG tablet Take 1 tablet by mouth daily for 10 days 10 tablet 0    sildenafil (VIAGRA) 100 MG tablet Take 1 tablet by mouth as needed for Erectile Dysfunction 30 tablet 0    lisinopril (PRINIVIL;ZESTRIL) 20 MG tablet Take 1 tablet by mouth daily 90 tablet 1    meloxicam (MOBIC) 7.5 MG tablet Take 1 tablet by mouth daily 30 tablet 0    Insulin Pen Needle 29G X 12.7MM MISC 1 each by Does not apply route daily To use with insulin 120 each 1    SITagliptin (JANUVIA) 50 MG tablet Take 1 tablet by mouth daily 120 tablet 1    LANTUS SOLOSTAR 100 UNIT/ML injection pen INJECT 35 UNITS SUBCUTANEOUSLY EVERY MORNING BEFORE BREAKFAST 60 mL 0    metFORMIN (GLUCOPHAGE) 850 MG tablet TAKE 1 TABLET BY MOUTH TWO TIMES A DAY WITH MEALS 60 tablet 0    atorvastatin (LIPITOR) 20 MG tablet TAKE 1 TABLET BY MOUTH ONE TIME A DAY 90 tablet 0    TRUEPLUS PEN NEEDLES 32G X 4 MM MISC USE WITH INSULIN DAILY 100 each 1    aspirin ( ASPIRIN ADULT LOW STRENGTH) 81 MG EC tablet TAKE 1 TABLET BY MOUTH ONE TIME A DAY 90 tablet 2    Alcohol Swabs ( ALCOHOL PREP) 70 % PADS USE TO TEST ONCE DAILY (Patient not taking: No sig reported) 100 each 0    acetaminophen (TYLENOL) 500 MG tablet Take 1 tablet by mouth 4 times daily as needed for Pain 25 tablet 1    blood glucose monitor kit and supplies Test one time a day & as needed for symptoms of irregular blood glucose. 1 kit 0    Lancets MISC Testing once a day. Please dispense according to patients insurance. 100 each 3    blood glucose monitor strips Testing once a day. Please dispense according to patients insurance. 100 strip 3    Blood Pressure Monitor MISC Use daily to check BP 1 each 0     Notation: Above medications are not currently reconciled at time of signing this H&P note, to be reconciled in pre-op per RN. Review of Systems   Constitutional:  Negative for chills and fever. HENT:  Positive for dental problem (Upper dentures). Negative for congestion, hearing loss and sore throat. Eyes:  Positive for visual disturbance (Reading glasses). Respiratory:  Negative for cough, shortness of breath and wheezing. Cardiovascular:  Positive for leg swelling. Negative for chest pain. Gastrointestinal:  Negative for abdominal pain, constipation, diarrhea, nausea and vomiting. Genitourinary: Negative. Musculoskeletal:  Positive for gait problem. Negative for back pain and neck pain. Skin:  Positive for wound (left foot wound). Negative for rash. Neurological:  Negative for dizziness, speech difficulty and headaches. Hematological:  Does not bruise/bleed easily. Psychiatric/Behavioral: Negative. GENERAL PHYSICAL EXAM     Vitals: Review vitals per RN flowsheet. Physical Exam  Constitutional:       General: He is not in acute distress. Appearance: He is well-developed.  He is not ill-appearing. HENT:      Head: Normocephalic and atraumatic. Nose: Nose normal.      Mouth/Throat:      Mouth: Mucous membranes are moist.      Pharynx: Oropharynx is clear. No oropharyngeal exudate or posterior oropharyngeal erythema. Eyes:      General: No scleral icterus. Right eye: No discharge. Left eye: No discharge. Pupils: Pupils are equal, round, and reactive to light. Neck:      Trachea: No tracheal deviation. Cardiovascular:      Rate and Rhythm: Normal rate and regular rhythm. Heart sounds: Normal heart sounds. No murmur heard. No friction rub. No gallop. Pulmonary:      Effort: Pulmonary effort is normal. No respiratory distress. Breath sounds: Normal breath sounds. No wheezing, rhonchi or rales. Abdominal:      General: Bowel sounds are normal. There is no distension. Palpations: Abdomen is soft. Tenderness: There is no abdominal tenderness. There is no guarding. Musculoskeletal:         General: Tenderness (left foot into left mid calf) present. Cervical back: Neck supple. Left lower leg: Edema present. Feet:      Comments: Left foot wound covered in dressing. Erythema extending from left foot to mid calf. Skin:     General: Skin is warm and dry. Coloration: Skin is not jaundiced. Findings: Erythema (left foot into mid left calf) present. No bruising or rash. Neurological:      General: No focal deficit present. Mental Status: He is alert and oriented to person, place, and time. Cranial Nerves: No cranial nerve deficit. Gait: Gait abnormal (Left foot surgical shoe).    Psychiatric:         Mood and Affect: Mood normal.      PROVISIONAL DIAGNOSES / SURGERY:      FOOT INCISION AND DRAINAGE    LEFT FOOT DIABETIC INFECTION    Patient Active Problem List    Diagnosis Date Noted    [facility] 01/30/2023    Cellulitis 01/10/2023    Impetigo 03/23/2020    Major depressive disorder with single episode, in partial remission ([facility] 02/27/2020    Essential hypertension 03/13/2018    Mixed hyperlipidemia 03/13/2018    Nephrolithiasis 11/18/2015    Erectile dysfunction due to diseases classified elsewhere 03/18/2013    Back pain 03/18/2013    Anxiety and depression 03/18/2013    Diabetes mellitus ([facility] 02/15/2013    Depression 02/15/2013           Aj Sevilla, NORMA - CNP on 2/8/2023 at 9:39 AM

## 2023-02-08 NOTE — DISCHARGE INSTRUCTIONS
Podiatric Post Operative Instructions: You have had a surgical procedure on your left foot. Fluids and Diet:  Begin with clear liquids, broth, dry toast, and crackers. If not nauseated then resume your regular pre-operative diet when you are ready    Medications: Take your prescriptions as directed  You are receiving new prescriptions for clindamycin and levaquin. If your pain is not severe then you may take the non-prescription medication that you normally take for aches and pains  You may resume your regularly scheduled medications (unless otherwise directed)  If any side effects or adverse reactions occur, discontinue the medication and contact your doctor. Review the patient drug information that is provided before you take any medication    Ambulation and Activity:  You are advised to go directly home from the hospital  Use crutches as needed  You may not put weight on the operated foot. You should wear the surgical shoe at all times when awake. Avoid stairs if possible. Do not lift or move heavy objects  Do not drive until cleared by your physician    Bandage and Wound Care Instructions:  Keep bandage clean and dry  Do not shower or bathe the operative extremity  Do not remove the bandage (unless otherwise directed)   Do not attempt to put anything between the cast or dressing and your skin, some itching is normal.    Ice and Elevation:  Elevate operative extremity as much as possible to reduce swelling and discomfort. Elevate with 2 pillows at or above the level of the heart for the first 72 hours. Ice:  [facility] dispensed insulated ice bag over the bandage 20 minutes of every hour while awake for the first 72 hours. You may ice behind the knee as well. Special Instructions: Call your doctor immediately if you develop any of the following. Fever over 100.4 degrees Fahrenheit by mouth - take your temperature daily until your first follow up visit.   Pain not relieved by medication ordered  Swelling, increased redness, warmth, or hardness around operative area. Numb, tingling or cold toes. Toe(s) become white or bluish  Bandage becomes wet, soiled, or blood soaked (small amount of bleeding may be normal)  Increased or progressive drainage from surgical area. Follow up instructions: You will need to follow up with Dr. Deisi Howe DPM.  Call when you get home to schedule or confirm your appointment. Call your Podiatrist office if you have any questions or concerns.

## 2023-02-08 NOTE — ANESTHESIA PRE PROCEDURE
Department of Anesthesiology  Preprocedure Note       Name:  Veronica Garrido   Age:  47 y.o.  :  1970                                          MRN:  [id number]         Date:  2023      Surgeon: Giselle Shook):  Abbie Antoine DPM    Procedure: Procedure(s):  FOOT INCISION AND DRAINAGE    Medications prior to admission:   Prior to Admission medications    Medication Sig Start Date End Date Taking? Authorizing Provider   Gauze Pads & Dressings (GAUZE DRESSING) 4\"X4\" PADS 1 each by Does not apply route daily 23   Abbie Antoine DPM   Gauze Bandages (ROLLED GAUZE BANDAGE 4\"X2. 5YD) MISC Apply to wound.  23   Abbie Antoine DPM   sulfamethoxazole-trimethoprim (BACTRIM DS) 800-160 MG per tablet Take 1 tablet by mouth 2 times daily 23   Zita Ruano MD   levoFLOXacin (LEVAQUIN) 500 MG tablet Take 1 tablet by mouth daily for 10 days 23  Zita Ruano MD   sildenafil (VIAGRA) 100 MG tablet Take 1 tablet by mouth as needed for Erectile Dysfunction 23   Zita Ruano MD   lisinopril (PRINIVIL;ZESTRIL) 20 MG tablet Take 1 tablet by mouth daily 23   Zita Ruano MD   meloxicam (MOBIC) 7.5 MG tablet Take 1 tablet by mouth daily 23   Zita Ruano MD   Insulin Pen Needle 29G X 12.7MM MISC 1 each by Does not apply route daily To use with insulin 10/25/22   Zita Ruano MD   SITagliptin (JANUVIA) 50 MG tablet Take 1 tablet by mouth daily 10/25/22   Zita Ruano MD   LANTUS SOLOSTAR 100 UNIT/ML injection pen INJECT 35 UNITS SUBCUTANEOUSLY EVERY MORNING BEFORE BREAKFAST 10/24/22   Zita Ruano MD   metFORMIN (GLUCOPHAGE) 850 MG tablet TAKE 1 TABLET BY MOUTH TWO TIMES A DAY WITH MEALS 22   Zita Ruano MD   atorvastatin (LIPITOR) 20 MG tablet TAKE 1 TABLET BY MOUTH ONE TIME A DAY 22   Zita Ruano MD   TRUEPLUS PEN NEEDLES 32G X 4 MM MISC USE WITH INSULIN DAILY 22   Zita Ruano MD   aspirin (SM ASPIRIN ADULT LOW STRENGTH) 81 MG EC tablet TAKE 1 TABLET BY MOUTH ONE TIME A DAY 3/29/22   Pavel Olea MD   Alcohol Swabs (SM ALCOHOL PREP) 70 % PADS USE TO TEST ONCE DAILY  Patient not taking: No sig reported 3/29/22   Pavel Olea MD   acetaminophen (TYLENOL) 500 MG tablet Take 1 tablet by mouth 4 times daily as needed for Pain 8/17/21   Cher Eli MD   blood glucose monitor kit and supplies Test one time a day & as needed for symptoms of irregular blood glucose. 3/23/20   Pavel Olea MD   Lancets MISC Testing once a day. Please dispense according to patients insurance. 12/11/19   Pavel Olea MD   blood glucose monitor strips Testing once a day. Please dispense according to patients insurance. 12/11/19   Pavel Olea MD   Blood Pressure Monitor MISC Use daily to check BP 3/13/18   Pavel Olea MD       Current medications:    Current Facility-Administered Medications   Medication Dose Route Frequency Provider Last Rate Last Admin    ceFAZolin (ANCEF) 2000 mg in 0.9% sodium chloride 50 mL IVPB  2,000 mg IntraVENous Once Yaneli Thurman DPM        lidocaine PF 1 % injection 1 mL  1 mL IntraDERmal Once PRN Babar Fuller MD        0.9 % sodium chloride infusion   IntraVENous Continuous Babar Fuller  mL/hr at 02/08/23 1040 New Bag at 02/08/23 1040    sodium chloride flush 0.9 % injection 5-40 mL  5-40 mL IntraVENous 2 times per day Babar Fuller MD        sodium chloride flush 0.9 % injection 5-40 mL  5-40 mL IntraVENous PRN Babar Fuller MD        0.9 % sodium chloride infusion   IntraVENous PRN Babar Fuller MD           Allergies:  No Known Allergies    Problem List:    Patient Active Problem List   Diagnosis Code    Diabetes mellitus ([facility] E11.9    Depression F32. A    Erectile dysfunction due to diseases classified elsewhere N52.1    Back pain M54.9    Anxiety and depression F41.9, F32. A    Nephrolithiasis N20.0    Essential hypertension I10    Mixed hyperlipidemia E78.2    Major depressive disorder with single episode, [facility] partial [facility] F32.4    Impetigo L01.00    Cellulitis L03.90    Diabetic foot infection (HCC) E11.628, L08.9       Past Medical History:        Diagnosis Date    Anxiety and depression     Chronic back pain     HLD (hyperlipidemia) 11/15/2015    Hypertension     Marijuana abuse 11/15/2015    Nephrolithiasis 11/18/2015    Type II or unspecified type diabetes mellitus without mention of complication, not stated as uncontrolled        Past Surgical History:        Procedure Laterality Date    HAND SURGERY Right 10/25/2021    HAND INCISION AND DRAINAGE performed by Malik Almaguer MD at [facility] [place] History:    Social History     Tobacco Use    Smoking status: Never    Smokeless tobacco: Never   Substance Use Topics    Alcohol use: Yes     Comment: 2 times a week                                Counseling given: Not Answered      Vital Signs (Current):   Vitals:    02/08/23 0959   BP: (!) 150/85   Pulse: 71   Resp: 18   Temp: 97.1 °F (36.2 °C)   TempSrc: Infrared   SpO2: 98%   Weight: 220 lb (99.8 kg)   Height: 6' 2\" (1.88 m)                                              BP Readings from Last 3 Encounters:   02/08/23 (!) 150/85   01/30/23 (!) 148/98   01/11/23 (!) 134/92       NPO Status: Time of last liquid consumption: 2355                        Time of last solid consumption: 2000                        Date of last liquid consumption: 02/07/23                        Date of last solid food consumption: 02/07/23    BMI:   Wt Readings from Last 3 Encounters:   02/08/23 220 lb (99.8 kg)   02/07/23 215 lb (97.5 kg)   01/31/23 215 lb (97.5 kg)     Body mass index is 28.25 kg/m².     CBC:   Lab Results   Component Value Date/Time    WBC 7.9 01/11/2023 05:19 AM    RBC 4.12 01/11/2023 05:19 AM    HGB 13.5 01/11/2023 05:19 AM    HCT 40.1 01/11/2023 05:19 AM    MCV 97.3 01/11/2023 05:19 AM    RDW 12.6 01/11/2023 05:19 AM     01/11/2023 05:19 AM       CMP:   Lab Results Component Value Date/Time     01/11/2023 05:19 AM    K 4.2 01/11/2023 05:19 AM     01/11/2023 05:19 AM    CO2 26 01/11/2023 05:19 AM    BUN 17 01/11/2023 05:19 AM    CREATININE 0.78 01/11/2023 05:19 AM    GFRAA >60 10/26/2021 07:13 AM    LABGLOM >60 01/11/2023 05:19 AM    GLUCOSE 87 01/11/2023 05:19 AM    PROT 7.5 02/18/2020 11:50 AM    CALCIUM 8.4 01/11/2023 05:19 AM    BILITOT 1.08 02/18/2020 11:50 AM    ALKPHOS 102 02/18/2020 11:50 AM    AST 23 02/18/2020 11:50 AM    ALT 32 02/18/2020 11:50 AM       POC Tests:   Recent Labs     02/08/23  1037   POCGLU 114*       Coags: No results found for: PROTIME, INR, APTT    HCG (If Applicable): No results found for: PREGTESTUR, PREGSERUM, HCG, HCGQUANT     ABGs: No results found for: PHART, PO2ART, VBM8WCL, FED6GGZ, BEART, F0WSJTGP     Type & Screen (If Applicable):  No results found for: LABABO, LABRH    Drug/Infectious Status (If Applicable):  No results found for: HIV, HEPCAB    COVID-19 Screening (If Applicable):   Lab Results   Component Value Date/Time    COVID19 Not Detected 10/25/2021 10:00 AM           Anesthesia Evaluation  Patient summary reviewed and Nursing notes reviewed no history of anesthetic complications:   Airway: Mallampati: II  TM distance: >3 FB   Neck ROM: full  Mouth opening: > = 3 FB   Dental:    (+) upper dentures and edentulous      Pulmonary:normal exam  breath sounds clear to auscultation  (+) current smoker (marijuana)          Patient did not smoke on day of surgery. Cardiovascular:    (+) hypertension:, hyperlipidemia      ECG reviewed  Rhythm: regular  Rate: normal  Echocardiogram reviewed  Stress test reviewed                Neuro/Psych:   (+) psychiatric history:depression/anxiety             GI/Hepatic/Renal: Neg GI/Hepatic/Renal ROS            Endo/Other:    (+) DiabetesType II DM, using insulin, . ROS comment: Left foot infection Abdominal:             Vascular:           Other Findings: Anesthesia Plan      general     ASA 2     (TIVA vs LMA pending patient tolerance)  Induction: intravenous. MIPS: Postoperative opioids intended and Prophylactic antiemetics administered. Anesthetic plan and risks discussed with patient. Plan discussed with CRNA.                     Ronen Saucedo MD   2/8/2023

## 2023-02-08 NOTE — PROGRESS NOTES
No response from podiatry resident, regarding pain pill script  Patient understanding. Left with mother for home.

## 2023-02-08 NOTE — ANESTHESIA POSTPROCEDURE EVALUATION
POST- ANESTHESIA EVALUATION       Pt Name: Mike Keller  MRN: [id number]  YOB: 1970  Date of evaluation: 2/8/2023  Time:  5:24 PM      /72   Pulse 54   Temp 97.2 °F (36.2 °C) (Temporal)   Resp 16   Ht 6' 2\" (1.88 m)   Wt 220 lb (99.8 kg)   SpO2 95%   BMI 28.25 kg/m²      Consciousness Level  Awake  Cardiopulmonary Status  Stable  Pain Adequately Treated YES  Nausea / Vomiting  NO  Adequate Hydration  YES  Anesthesia Related Complications NONE      Electronically signed by Bob Oliver MD on 2/8/2023 at 5:24 PM       Department of Anesthesiology  Postprocedure Note    Patient: Mike Keller  MRN: [id number]  YOB: 1970  Date of evaluation: 2/8/2023      Procedure Summary     Date: 02/08/23 Room / Location: [address]  / [facility]    Anesthesia Start: 0916 Anesthesia Stop: 6597    Procedure: INCISION AND DRAINAGE WITH EXCISION OF BONE AND SOFT TISSUE. (Left: Foot) Diagnosis:       Diabetic infection of left foot ([address]      (LEFT FOOT DIABETIC INFECTION)    Surgeons: Jennifer Lamas DPM Responsible Provider: Black Leiva MD    Anesthesia Type: general ASA Status: 2          Anesthesia Type: No value filed.     Nanci Phase I: Nanci Score: 9    Nanci Phase II: Nanci Score: 10      Anesthesia Post Evaluation

## 2023-02-08 NOTE — OP NOTE
PODIATRY OP NOTE    PATIENT NAME: Gerardo Mendes DATE: 1970  -  47 y.o. male  MRN: [id number]  DATE: 2/8/2023  BILLING #: [id number]    Surgeon(s):  Heath Guillen DPM     ASSISTANTS: Yoav Scott DPM PGY-3    PRE-OP DIAGNOSIS:   Osteomyelitis, 3rd metatarsal left foot. POST-OP DIAGNOSIS: Same as above. PROCEDURE:   Incision and drainage left foot. Excision of 3rd metatarsal head, left foot. ANESTHESIA: MAC with local    HEMOSTASIS: direct pressure    ESTIMATED BLOOD LOSS: Less than 50cc. MATERIALS:   * No implants in log *    INJECTABLES:   Pre-op 10cc 0.5% Marcaine plain. SPECIMEN:   ID Type Source Tests Collected by Time Destination   1 : LEFT THIRD TOE CULTURE Swab Toe CULTURE, ANAEROBIC AND AEROBIC Heath Guillen DPM 2/8/2023 1338    2 : PROXIMAL PHALANX LEFT THIRD TOE Bone Toe CULTURE, FUNGUS, CULTURE, ANAEROBIC AND AEROBIC Heath Guillen DPM 2/8/2023 9415        COMPLICATIONS: significant infection see op note    FINDINGS: see op note below. INDICATIONS FOR PROCEDURE:  Patient is well known to Dr. Heath Guillen DPM with a chief complaint of chronic nonhealing wound to the plantar left foot. Patient has failed conservative treatment, the patient's wound infection has been worsening over the last few days. The patient has elected to undergo surgical intervention for treatment of the infection. All risks and rewards of the procedure were discussed at length. Consent was then signed and placed in the chart. The left foot was marked, labs and images reviewed, NPO status confirmed. No guarantees were given or implied. PROCEDURE IN DETAIL: The patient was brought into the operating room and placed on the operating table in the lateral/supine position. A timeout was performed confirming the patient identity, correct site, correct procedure, allergies, and preoperative antibiotics.  After adequate sedation by Anesthesia, a local block of 10 cc of half percent Marcaine plain. The surgical site was then scrubbed, prepped, and draped in the usual aseptic manner. Attention was directed to the plantar aspect left foot where a wound with exposed fibrotic/necrotic tissue was noted, wound extended to bone. An incision was made extending from the sulcus of the third digit to the base of the third metatarsal using a 15 blade. Approximate 3 cc of purulence was then expressed from the third digit. This presents was cultured. Dissection was then carried down to the proximal phalanx of the third digit which was excised using combination of bone rongeur and 15 blade. Attention was then directed proximally where the intermetatarsal was appreciated. Using a sagittal saw the third metatarsal was resected and also passed to the back table to be sent to lab for specimen. The surgical site was inspected, any necrotic/infected tissue was then excised as needed. The wound was then irrigated with copious amounts of sterile saline via pulse lavage. After irrigation it was inspected and no more loose necrotic tissue was seen. The wound was packed with a saline soaked 4 4 gauze, and the foot was dressed with 4 4 gauze, Kerlix, Ace. A wet to dry sterile dressing was then applied consisting of saline soaked gauze, DSD, ABD, and ACE. The patient tolerated the above procedure and anesthesia well without complications. The patient was transported from the operating room to the PACU with vital signs stable and vascular status intact to the left foot. It should be noted that upon debridement of the wound, there is significant soft tissue necrosis of the third digit. The ability to preserve the third digit remains very guarded and there is high likelihood of need for amputation of the third digit in the near future.       Casey Waite DPM   Podiatric Medicine & Surgery   2/8/2023 at 2:15 PM

## 2023-02-10 ENCOUNTER — OFFICE VISIT (OUTPATIENT)
Dept: PODIATRY | Age: 53
End: 2023-02-10

## 2023-02-10 ENCOUNTER — HOSPITAL ENCOUNTER (OUTPATIENT)
Dept: PREADMISSION TESTING | Age: 53
Discharge: HOME | End: 2023-02-14

## 2023-02-10 VITALS — BODY MASS INDEX: 28.23 KG/M2 | WEIGHT: 220 LBS | HEIGHT: 74 IN

## 2023-02-10 VITALS — WEIGHT: 220 LBS | BODY MASS INDEX: 28.23 KG/M2 | HEIGHT: 74 IN

## 2023-02-10 DIAGNOSIS — M79.672 PAIN IN LEFT FOOT: ICD-10-CM

## 2023-02-10 DIAGNOSIS — L08.9 INFECTION OF LEFT FOOT: ICD-10-CM

## 2023-02-10 DIAGNOSIS — E11.9 TYPE 2 DIABETES MELLITUS WITHOUT COMPLICATION, UNSPECIFIED WHETHER LONG TERM INSULIN USE (HCC): ICD-10-CM

## 2023-02-10 DIAGNOSIS — E11.42 DIABETIC POLYNEUROPATHY ASSOCIATED WITH TYPE 2 DIABETES MELLITUS (HCC): ICD-10-CM

## 2023-02-10 DIAGNOSIS — E11.8 TYPE 2 DIABETES MELLITUS WITH COMPLICATION, WITHOUT LONG-TERM CURRENT USE OF INSULIN (HCC): ICD-10-CM

## 2023-02-10 DIAGNOSIS — L97.523 ULCER OF LEFT FOOT WITH NECROSIS OF MUSCLE (HCC): Primary | ICD-10-CM

## 2023-02-10 PROCEDURE — 99024 POSTOP FOLLOW-UP VISIT: CPT | Performed by: PODIATRIST

## 2023-02-10 RX ORDER — GLUCOSAMINE HCL/CHONDROITIN SU 500-400 MG
CAPSULE ORAL
Qty: 100 STRIP | Refills: 3 | Status: SHIPPED | OUTPATIENT
Start: 2023-02-10

## 2023-02-10 ASSESSMENT — ENCOUNTER SYMPTOMS
BACK PAIN: 0
COLOR CHANGE: 1
SHORTNESS OF BREATH: 0
NAUSEA: 0
DIARRHEA: 0

## 2023-02-10 NOTE — PROGRESS NOTES
Pre-op Instructions For Out-Patient Surgery    Medication Instructions:  Please stop herbs and any supplements now (includes vitamins and minerals). Please contact your surgeon and prescribing physician for pre-op instructions for any blood thinners. Aspirin and Meloxicam     If you have inhalers/aerosol treatments at home, please use them the morning of your surgery and bring the inhalers with you to the hospital.    Please take the following medications the morning of your surgery with a sip of water:    Lisinopril     Surgery Instructions:  After midnight before surgery:  Do not eat or drink anything, including water, mints, gum, and hard candy. You may brush your teeth without swallowing. No smoking, chewing tobacco, or street drugs. Please shower or bathe before surgery. If you were given Surgical Scrub Chlorhexidine Gluconate Liquid (CHG), please shower the night before and the morning of your surgery following the detailed instructions you received during your pre-admission visit. Please do not wear any cologne, lotion, powder, deodorant, jewelry, piercings, perfume, makeup, nail polish, hair accessories, or hair spray on the day of surgery. Wear loose comfortable clothing. Leave your valuables at home. Bring a storage case for any glasses/contacts. An adult who is responsible for you MUST drive you home and should be with you for the first 24 hours after surgery. If having out-patient knee and foot surgeries, please arrange for planned crutches, walker, or wheelchair before arriving to the hospital.    The Day of Surgery:  Arrive at [facility] Surgery Entrance at the time directed by your surgeon and check in at the desk. If you have a living will or healthcare power of , please bring a copy. You will be taken to the pre-op holding area where you will be prepared for surgery.   A physical assessment will be performed by a nurse practitioner or house officer. Your IV will be started and you will meet your anesthesiologist.    When you go to surgery, your family will be directed to the surgical waiting room, where the doctor should speak with them after your surgery. After surgery, you will be taken to the recovery room then when you are awake and stable you will go to the short stay unit for preparation to be discharged. If you use a Bi-PAP or C-PAP machine, please bring it with you and leave it in the car in case it is needed in recovery room. Instructions reviewed with Reyna Jaffe and understanding verbalized.      2/15/23 Surgery

## 2023-02-10 NOTE — TELEPHONE ENCOUNTER
Patient is requesting for a new script on his blood pressure machine and kit to be sent into [facility]. Please Approve or Refuse. Send to Pharmacy per Pt's Request: [facility]      Next Visit Date:  5/8/2023   Last Visit Date: 1/30/2023    Hemoglobin A1C (%)   Date Value   01/30/2023 5.6   03/29/2022 6.3   10/24/2021 9.6 (H)             ( goal A1C is < 7)   BP Readings from Last 3 Encounters:   02/08/23 105/72   01/30/23 (!) 148/98   01/11/23 (!) 134/92          (goal 120/80)  BUN   Date Value Ref Range Status   01/11/2023 17 6 - 20 mg/dL Final     Creatinine   Date Value Ref Range Status   01/11/2023 0.78 0.70 - 1.20 mg/dL Final     Potassium   Date Value Ref Range Status   01/11/2023 4.2 3.7 - 5.3 mmol/L Final     Comment:     SPECIMEN SLIGHTLY HEMOLYZED, RESULTS MAY BE ADVERSELY AFFECTED.

## 2023-02-12 LAB
MICROORGANISM SPEC CULT: ABNORMAL
MICROORGANISM/AGENT SPEC: ABNORMAL
SPECIMEN DESCRIPTION: ABNORMAL
SPECIMEN DESCRIPTION: ABNORMAL

## 2023-02-13 ENCOUNTER — OFFICE VISIT (OUTPATIENT)
Dept: PODIATRY | Age: 53
End: 2023-02-13

## 2023-02-13 VITALS — BODY MASS INDEX: 28.23 KG/M2 | HEIGHT: 74 IN | WEIGHT: 220 LBS

## 2023-02-13 DIAGNOSIS — M79.672 PAIN IN LEFT FOOT: ICD-10-CM

## 2023-02-13 DIAGNOSIS — L97.523 ULCER OF LEFT FOOT WITH NECROSIS OF MUSCLE (HCC): Primary | ICD-10-CM

## 2023-02-13 DIAGNOSIS — E11.42 DIABETIC POLYNEUROPATHY ASSOCIATED WITH TYPE 2 DIABETES MELLITUS (HCC): ICD-10-CM

## 2023-02-13 DIAGNOSIS — L08.9 INFECTION OF LEFT FOOT: ICD-10-CM

## 2023-02-13 PROCEDURE — 99024 POSTOP FOLLOW-UP VISIT: CPT | Performed by: PODIATRIST

## 2023-02-13 ASSESSMENT — ENCOUNTER SYMPTOMS
BACK PAIN: 0
NAUSEA: 0
COLOR CHANGE: 0
DIARRHEA: 0
BACK PAIN: 0
NAUSEA: 0
SHORTNESS OF BREATH: 0
DIARRHEA: 0
SHORTNESS OF BREATH: 0
COLOR CHANGE: 0

## 2023-02-13 NOTE — PROGRESS NOTES
[facility] Podiatry  Return Patient Progress Note    Subjective: Mitesh Pedraza 47 y.o. male that presents for follow up evaluation of wound to the left foot. Chief Complaint   Patient presents with    Post-Op Check     Post op left foot/wound care     Patient's treatment thus far has included keeping dressing of betadine and a DSD clean, dry, and intact. Patient has been taking his oral Levaquin and clindamycin as prescribed. Pain is rated 1 out of 10 and is described as intermittent as he is neuropathic. Patient has been following my prescribed course of therapy as instructed. Review of Systems   Constitutional:  Negative for activity change, appetite change, chills, diaphoresis, fatigue and fever. Respiratory:  Negative for shortness of breath. Cardiovascular:  Negative for leg swelling. Gastrointestinal:  Negative for diarrhea and nausea. Endocrine: Negative for cold intolerance, heat intolerance and polyuria. Musculoskeletal:  Positive for arthralgias and gait problem. Negative for back pain, joint swelling and myalgias. Skin:  Positive for wound. Negative for color change, pallor and rash. Allergic/Immunologic: Negative for environmental allergies and food allergies. Neurological:  Positive for numbness. Negative for dizziness, weakness and light-headedness. Hematological:  Does not bruise/bleed easily. Psychiatric/Behavioral:  Negative for behavioral problems, confusion and self-injury. The patient is not nervous/anxious. Objective: Clinical evaluation of the patient reveals full thickness wound to the depth of the third metatarsal of the left foot. The wound base is granular. There remains nearly no erythema or edema to the left foot. There is no calor, purulence, or malodor noted to the left foot. There is dark discoloration noted to the plantar aspect of the left third toe, but the dorsal aspect of this toe is normal in appearance.  This toe is not cooler than the remaining toes of the left foot. These findings of discoloration have not worsened since last visit. Assessment:    Diagnosis Orders   1. Ulcer of left foot with necrosis of muscle ([facility] [address]        2. Infection of left foot        3. Pain in left foot        4. Diabetic polyneuropathy associated with type 2 diabetes mellitus ([facility]              Plan: 1. Clinical evaluation of the patient. 2. Silver Cell, antibiotic ointment, and a DSD. Patient to keep this dressing clean, dry, and intact until next visit. Patient informed that at this time I am not as concerned that he could lose his toe and so the surgery I had scheduled for this Wednesday will be cancelled. 3. Return in about 3 days (around 2/16/2023) for Wound Care.    2/13/2023      Jose Luis Tellez DPM

## 2023-02-13 NOTE — PROGRESS NOTES
[facility] Podiatry  Return Patient Progress Note    Subjective: J Luis Blotter 47 y.o. male that presents for follow up evaluation of diabetic foot infection left foot. Chief Complaint   Patient presents with    Post-Op Check     Left foot    Diabetes     Last blood sugar 128    Patient's treatment thus far has included I&D with excision of soft tissue and bone of the left foot. Patient is taking oral Levaquin and clindamycin. Pain is rated 1 out of 10 and is described as intermittent; patient is neuropathic. Patient has been following my prescribed course of therapy as instructed. Review of Systems   Constitutional:  Negative for activity change, appetite change, chills, diaphoresis, fatigue and fever. Respiratory:  Negative for shortness of breath. Cardiovascular:  Negative for leg swelling. Gastrointestinal:  Negative for diarrhea and nausea. Endocrine: Negative for cold intolerance, heat intolerance and polyuria. Musculoskeletal:  Positive for arthralgias and gait problem. Negative for back pain, joint swelling and myalgias. Skin:  Positive for wound. Negative for color change, pallor and rash. Allergic/Immunologic: Negative for environmental allergies and food allergies. Neurological:  Positive for numbness. Negative for dizziness, weakness and light-headedness. Hematological:  Does not bruise/bleed easily. Psychiatric/Behavioral:  Negative for behavioral problems, confusion and self-injury. The patient is not nervous/anxious. Objective: Clinical evaluation of the patient reveals full thickness wound to the depth of the third metatarsal of the left foot. The wound base is granular. There is a significant decrease in erythema and edema to the left foot. There is no remaining calor, purulence, or malodor noted to the left foot. There is dark discoloration noted to the plantar aspect of the left third toe, but the dorsal aspect of this toe is normal in appearance.  This toe is not cooler than the remaining toes of the left foot. Assessment:    Diagnosis Orders   1. Ulcer of left foot with necrosis of muscle ([facility] [address]        2. Infection of left foot        3. Pain in left foot        4. Diabetic polyneuropathy associated with type 2 diabetes mellitus ([facility]              Plan: 1. Clinical evaluation of the patient. 2. The wound to the left foot was dressed with betadine soaked gauze and a DSD. Patient is to keep this dressing clean, dry, and intact until next visit. Patient is to take his oral antibiotics as prescribed and is to limit his WB to ADL's only. Patient informed that he may require amputation of the left third toe, but I will reassess this on his next appointment. Patient informed that if the dark appearance to the toe has not worsened, then I will hold off on any surgery.  3. Return in about 3 days (around 2/13/2023) for Wound Care.   2/10/2023      Genaro Penny DPM

## 2023-02-16 ENCOUNTER — OFFICE VISIT (OUTPATIENT)
Dept: PODIATRY | Age: 53
End: 2023-02-16

## 2023-02-16 VITALS — WEIGHT: 220 LBS | HEIGHT: 74 IN | BODY MASS INDEX: 28.23 KG/M2

## 2023-02-16 DIAGNOSIS — L08.9 INFECTION OF LEFT FOOT: ICD-10-CM

## 2023-02-16 DIAGNOSIS — L97.523 ULCER OF LEFT FOOT WITH NECROSIS OF MUSCLE (HCC): Primary | ICD-10-CM

## 2023-02-16 DIAGNOSIS — M79.672 PAIN IN LEFT FOOT: ICD-10-CM

## 2023-02-16 DIAGNOSIS — E11.42 DIABETIC POLYNEUROPATHY ASSOCIATED WITH TYPE 2 DIABETES MELLITUS (HCC): ICD-10-CM

## 2023-02-19 ASSESSMENT — ENCOUNTER SYMPTOMS
NAUSEA: 0
DIARRHEA: 0
SHORTNESS OF BREATH: 0
BACK PAIN: 0
COLOR CHANGE: 0

## 2023-02-20 ENCOUNTER — OFFICE VISIT (OUTPATIENT)
Dept: PODIATRY | Age: 53
End: 2023-02-20
Payer: COMMERCIAL

## 2023-02-20 VITALS — HEIGHT: 74 IN | WEIGHT: 220 LBS | BODY MASS INDEX: 28.23 KG/M2

## 2023-02-20 DIAGNOSIS — L08.9 INFECTION OF LEFT FOOT: ICD-10-CM

## 2023-02-20 DIAGNOSIS — L97.523 ULCER OF LEFT FOOT WITH NECROSIS OF MUSCLE (HCC): Primary | ICD-10-CM

## 2023-02-20 DIAGNOSIS — M79.672 PAIN IN LEFT FOOT: ICD-10-CM

## 2023-02-20 DIAGNOSIS — E11.42 DIABETIC POLYNEUROPATHY ASSOCIATED WITH TYPE 2 DIABETES MELLITUS (HCC): ICD-10-CM

## 2023-02-20 PROCEDURE — 11042 DBRDMT SUBQ TIS 1ST 20SQCM/<: CPT | Performed by: PODIATRIST

## 2023-02-20 PROCEDURE — 99999 PR OFFICE/OUTPT VISIT,PROCEDURE ONLY: CPT | Performed by: PODIATRIST

## 2023-02-20 ASSESSMENT — ENCOUNTER SYMPTOMS
NAUSEA: 0
DIARRHEA: 0
SHORTNESS OF BREATH: 0
COLOR CHANGE: 0
BACK PAIN: 0

## 2023-02-20 NOTE — PROGRESS NOTES
Follow-Up Wound Progress Note   Awa Dutton  AGE: 47 y.o. GENDER: male  : 1970  TODAY'S DATE:  2023  Subjective:    Awa Dutton is a 47 y.o. male who presents today for wound check. Wound Location : Left foot. The patients pain is 1 out of 10. Patient states that they have kept the dressing to the left lower extremity(s) clean, dry, and intact as instructed since last visit. Patient states that he is taking his oral Levaquin and clindamycin as prescribed. Modifying factors: Diabetes. Review of Systems   Constitutional:  Negative for activity change, appetite change, chills, diaphoresis, fatigue and fever. Respiratory:  Negative for shortness of breath. Cardiovascular:  Negative for leg swelling. Gastrointestinal:  Negative for diarrhea and nausea. Endocrine: Negative for cold intolerance, heat intolerance and polyuria. Musculoskeletal:  Positive for arthralgias and gait problem. Negative for back pain, joint swelling and myalgias. Skin:  Positive for wound. Negative for color change, pallor and rash. Allergic/Immunologic: Negative for environmental allergies and food allergies. Neurological:  Positive for numbness. Negative for dizziness, weakness and light-headedness. Hematological:  Does not bruise/bleed easily. Psychiatric/Behavioral:  Negative for behavioral problems, confusion and self-injury. The patient is not nervous/anxious. Objective:   Exam:  Clinical evaluation of the patient reveals full thickness wound to the depth of the third metatarsal of the left foot. There remains nearly no erythema or edema to the left foot. There is no calor, purulence, or malodor noted to the left foot. There is dark discoloration noted to the plantar aspect of the left third toe. However, this discoloration has decreased in severity since last visit. This toe is not cooler than the remaining toes of the left foot.  The wound base is primarily granular with only about 10% fibrotic tissue. Debridement of the wound with a curette produces adequate bleeding. The wound measures 4.8 cm x 2.3 cm x 1.5 cm. Procedure: The wound(s) to the left foot was debrided with removal of fibrotic, necrotic, and hyperkeratotic tissue, including a layer of surrounding healthy tissue down through and including subcutaneous tissue,using curette. Excisional Debridement  Percent of Wound Debrided: 100%    Wound: is unchanged    Bleeding: Minimal    Hemostasis:   by pressure    Response to treatment:  Well tolerated by patient. Assessment:      Diagnosis Orders   1. Ulcer of left foot with necrosis of muscle (HCC)  VA DEBRIDEMENT SUBCUTANEOUS TISSUE 20 SQ CM/<      2. Infection of left foot  VA DEBRIDEMENT SUBCUTANEOUS TISSUE 20 SQ CM/<      3. Pain in left foot  VA DEBRIDEMENT SUBCUTANEOUS TISSUE 20 SQ CM/<      4. Diabetic polyneuropathy associated with type 2 diabetes mellitus (HCC)  VA DEBRIDEMENT SUBCUTANEOUS TISSUE 20 SQ CM/<              Plan:   Plan for wound -   Treatment:                Dressed with: Silver Cell, antibiotic ointment and a DSD. Home care: Patient to keep this dressing clean, dry, and intact until next visit. Abx :Yes; Patient to continue taking his prescribed oral antibiotics. Cultures :were notobtained. Return in about 1 week (around 2/23/2023) for Wound Care.    2/16/2023        hCe Soliz DPM

## 2023-02-20 NOTE — PROGRESS NOTES
Follow-Up Wound Progress Note   Jenifer Dean  AGE: 47 y.o. GENDER: male  : 1970  TODAY'S DATE:  2023  Subjective:    Jenifer Dean is a 47 y.o. male who presents today for wound check. Wound Location : Left foot. The patients pain is 1 out of 10. Patient states that they have kept the dressing to the left lower extremity(s) with clean, dry, and intact as instructed since last visit. Patient states that he is taking his oral Levaquin and clindamycin as prescribed. Modifying factors: Diabetes. Review of Systems   Constitutional:  Negative for activity change, appetite change, chills, diaphoresis, fatigue and fever. Respiratory:  Negative for shortness of breath. Cardiovascular:  Negative for leg swelling. Gastrointestinal:  Negative for diarrhea and nausea. Endocrine: Negative for cold intolerance, heat intolerance and polyuria. Musculoskeletal:  Positive for arthralgias and gait problem. Negative for back pain, joint swelling and myalgias. Skin:  Positive for wound. Negative for color change, pallor and rash. Allergic/Immunologic: Negative for environmental allergies and food allergies. Neurological:  Positive for numbness. Negative for dizziness, weakness and light-headedness. Hematological:  Does not bruise/bleed easily. Psychiatric/Behavioral:  Negative for behavioral problems, confusion and self-injury. The patient is not nervous/anxious. Objective:   Exam:  Clinical evaluation of the patient reveals full thickness wound to the depth of the third metatarsal of the left foot. There is no erythema or edema to the left foot. There is no calor, purulence, or malodor noted to the left foot. There is dark discoloration noted to the plantar aspect of the left third toe. However, this discoloration has decreased in severity since last visit. Also, this discoloration to the skin is going to slough off as it is starting to become lytic.  This toe is not cooler than the remaining toes of the left foot. The wound base is primarily granular with only about 10% fibrotic tissue. Debridement of the wound with a curette produces adequate bleeding. The wound measures 4.5 cm x 1.8 cm x 1.4 cm. Procedure: The wound(s) to the left foot was debrided with removal of fibrotic, necrotic, and hyperkeratotic tissue, including a layer of surrounding healthy tissue down through and including subcutaneous tissue,using curette. Excisional Debridement  Percent of Wound Debrided: 100%    Wound: has improved    Bleeding: Minimal    Hemostasis:   by pressure    Response to treatment:  With complaints of pain. Assessment:      Diagnosis Orders   1. Ulcer of left foot with necrosis of muscle (HCC)  IL DEBRIDEMENT SUBCUTANEOUS TISSUE 20 SQ CM/<      2. Infection of left foot  IL DEBRIDEMENT SUBCUTANEOUS TISSUE 20 SQ CM/<      3. Pain in left foot  IL DEBRIDEMENT SUBCUTANEOUS TISSUE 20 SQ CM/<      4. Diabetic polyneuropathy associated with type 2 diabetes mellitus (HCC)  IL DEBRIDEMENT SUBCUTANEOUS TISSUE 20 SQ CM/<              Plan:   Plan for wound -   Treatment:                Dressed with: Silver Cell, antibiotic ointment, and a DSD. Home care: Patient to change the dressing to the left foot on Thursday of this week with Silver Cell, antibiotic ointment, and a DSD. Abx :Yes; Patient to continue taking his oral Levaquin and clindamycin. Cultures :were notobtained.   Return in about 1 week (around 2/27/2023) for Wound Care.   2/20/2023        Delfina Peter DPM

## 2023-02-27 ENCOUNTER — OFFICE VISIT (OUTPATIENT)
Dept: PODIATRY | Age: 53
End: 2023-02-27
Payer: COMMERCIAL

## 2023-02-27 VITALS — BODY MASS INDEX: 28.23 KG/M2 | HEIGHT: 74 IN | WEIGHT: 220 LBS

## 2023-02-27 DIAGNOSIS — E11.42 DIABETIC POLYNEUROPATHY ASSOCIATED WITH TYPE 2 DIABETES MELLITUS (HCC): ICD-10-CM

## 2023-02-27 DIAGNOSIS — L97.523 ULCER OF LEFT FOOT WITH NECROSIS OF MUSCLE (HCC): Primary | ICD-10-CM

## 2023-02-27 DIAGNOSIS — L97.522 SKIN ULCER OF TOE OF LEFT FOOT WITH FAT LAYER EXPOSED (HCC): ICD-10-CM

## 2023-02-27 DIAGNOSIS — L08.9 INFECTION OF LEFT FOOT: ICD-10-CM

## 2023-02-27 DIAGNOSIS — M79.672 PAIN IN LEFT FOOT: ICD-10-CM

## 2023-02-27 PROBLEM — M79.675 PAIN OF TOE OF LEFT FOOT: Status: ACTIVE | Noted: 2023-02-27

## 2023-02-27 PROBLEM — M86.9 OSTEOMYELITIS OF THIRD TOE OF LEFT FOOT (HCC): Status: ACTIVE | Noted: 2023-02-27

## 2023-02-27 PROBLEM — E11.628 DIABETIC INFECTION OF LEFT FOOT (HCC): Status: ACTIVE | Noted: 2023-02-27

## 2023-02-27 PROBLEM — M86.172 ACUTE OSTEOMYELITIS OF METATARSAL BONE OF LEFT FOOT (HCC): Status: ACTIVE | Noted: 2023-02-27

## 2023-02-27 PROCEDURE — 99999 PR OFFICE/OUTPT VISIT,PROCEDURE ONLY: CPT | Performed by: PODIATRIST

## 2023-02-27 PROCEDURE — 11042 DBRDMT SUBQ TIS 1ST 20SQCM/<: CPT | Performed by: PODIATRIST

## 2023-02-27 RX ORDER — CIPROFLOXACIN 500 MG/1
500 TABLET, FILM COATED ORAL 2 TIMES DAILY
Qty: 28 TABLET | Refills: 0 | Status: SHIPPED | OUTPATIENT
Start: 2023-02-27 | End: 2023-03-13

## 2023-02-27 RX ORDER — CLINDAMYCIN HYDROCHLORIDE 300 MG/1
300 CAPSULE ORAL 4 TIMES DAILY
Qty: 56 CAPSULE | Refills: 0 | Status: SHIPPED | OUTPATIENT
Start: 2023-02-27 | End: 2023-03-13

## 2023-02-27 ASSESSMENT — ENCOUNTER SYMPTOMS
COLOR CHANGE: 0
DIARRHEA: 0
NAUSEA: 0
SHORTNESS OF BREATH: 0
BACK PAIN: 0

## 2023-02-27 NOTE — PROGRESS NOTES
Follow-Up Wound Progress Note   Pierre Gordon  AGE: 52 y.o.   GENDER: male  : 1970  TODAY'S DATE:  2023  Subjective:    Pierre Gordon is a 52 y.o. male who presents today for wound check.  Wound Location : Left foot.  The patients pain is 1 out of 10.  Patient states that they have been changing the dressing to the left lower extremity(s) with Silver Cell, antibiotic ointment, and a DSD as instructed since last visit.  Modifying factors: Diabetes.  Review of Systems   Constitutional:  Negative for activity change, appetite change, chills, diaphoresis, fatigue and fever.   Respiratory:  Negative for shortness of breath.    Cardiovascular:  Negative for leg swelling.   Gastrointestinal:  Negative for diarrhea and nausea.   Endocrine: Negative for cold intolerance, heat intolerance and polyuria.   Musculoskeletal:  Positive for arthralgias and gait problem. Negative for back pain, joint swelling and myalgias.   Skin:  Positive for wound. Negative for color change, pallor and rash.   Allergic/Immunologic: Negative for environmental allergies and food allergies.   Neurological:  Positive for numbness. Negative for dizziness, weakness and light-headedness.   Hematological:  Does not bruise/bleed easily.   Psychiatric/Behavioral:  Negative for behavioral problems, confusion and self-injury. The patient is not nervous/anxious.    Objective:   Exam:  Clinical evaluation of the patient reveals full thickness wound to the depth of the third metatarsal of the left foot. There is no erythema or edema to the left foot. There is no calor, purulence, or malodor noted to the left foot. There is dark discoloration noted to the plantar aspect of the left third toe. However, this discoloration has decreased in severity since last visit. Also, this discoloration to the skin is going to slough off as it is starting to become lytic. This toe is not cooler than the remaining toes of the left foot. The wound base is primarily  granular with only about 10% fibrotic tissue. Debridement of the wound with a curette produces adequate bleeding. The wound measures 4.5 cm x 1.8 cm x 1.4 cm. There is a new wound noted to the medial aspect of the left third toe. The wound base is necrotic. There is no surrounding erythema or calor noted. Debridement of this wound with a curette produces adequate bleeding and the wound is to the depth of the subcutaneous tissue. There is no penetration to bone. There is no purulence or malodor noted to this wound. This wound measures 0.5 cm x 0.4 cm x 0.2 cm. Procedure: The wound(s) to the left foot was debrided with removal of fibrotic, necrotic, and hyperkeratotic tissue, including a layer of surrounding healthy tissue down through and including subcutaneous tissue,using curette. Excisional Debridement  Percent of Wound Debrided: 100%    Wound: is unchanged    Bleeding: Minimal    Hemostasis:   by pressure    Response to treatment:  With complaints of pain. Assessment:      Diagnosis Orders   1.  Ulcer of left foot with necrosis of muscle (HCC)  clindamycin (CLEOCIN) 300 MG capsule    ciprofloxacin (CIPRO) 500 MG tablet    MN DEBRIDEMENT SUBCUTANEOUS TISSUE 20 SQ CM/<      2. Skin ulcer of toe of left foot with fat layer exposed (HCC)  MN DEBRIDEMENT SUBCUTANEOUS TISSUE 20 SQ CM/<      3. Infection of left foot  clindamycin (CLEOCIN) 300 MG capsule    ciprofloxacin (CIPRO) 500 MG tablet    MN DEBRIDEMENT SUBCUTANEOUS TISSUE 20 SQ CM/<      4. Pain in left foot  clindamycin (CLEOCIN) 300 MG capsule    ciprofloxacin (CIPRO) 500 MG tablet    MN DEBRIDEMENT SUBCUTANEOUS TISSUE 20 SQ CM/<      5. Diabetic polyneuropathy associated with type 2 diabetes mellitus (HCC)  clindamycin (CLEOCIN) 300 MG capsule    ciprofloxacin (CIPRO) 500 MG tablet    MN DEBRIDEMENT SUBCUTANEOUS TISSUE 20 SQ CM/<              Plan:   Plan for wound -   Treatment:                Dressed with: Silver Cell, antibiotic ointment and a DSD.              Home care: Patient to change the dressing on Thursday of this week with Silver Cell, antibiotic ointment, and a DSD. Abx :Yes; Patient was given two additional weeks of clindamycin and ciprofloxacin. Cultures :were notobtained. Return in about 1 week (around 3/6/2023) for Wound Care.    2/27/2023        Perfecto Mitchell, STEFFANIE

## 2023-03-01 DIAGNOSIS — Z79.4 TYPE 2 DIABETES MELLITUS WITH HYPERGLYCEMIA, WITH LONG-TERM CURRENT USE OF INSULIN (HCC): ICD-10-CM

## 2023-03-01 DIAGNOSIS — E11.65 TYPE 2 DIABETES MELLITUS WITH HYPERGLYCEMIA, WITH LONG-TERM CURRENT USE OF INSULIN (HCC): ICD-10-CM

## 2023-03-02 RX ORDER — INSULIN GLARGINE 100 [IU]/ML
INJECTION, SOLUTION SUBCUTANEOUS
Qty: 60 ML | Refills: 0 | Status: SHIPPED | OUTPATIENT
Start: 2023-03-02

## 2023-03-02 RX ORDER — PEN NEEDLE, DIABETIC 32GX 5/32"
NEEDLE, DISPOSABLE MISCELLANEOUS
Qty: 100 EACH | Refills: 2 | Status: SHIPPED | OUTPATIENT
Start: 2023-03-02

## 2023-03-02 NOTE — TELEPHONE ENCOUNTER
Please Approve or Refuse. Send to Pharmacy per Pt's Request: [facility]      Next Visit Date:  5/8/2023   Last Visit Date: 1/30/2023    Hemoglobin A1C (%)   Date Value   01/30/2023 5.6   03/29/2022 6.3   10/24/2021 9.6 (H)             ( goal A1C is < 7)   BP Readings from Last 3 Encounters:   02/08/23 105/72   01/30/23 (!) 148/98   01/11/23 (!) 134/92          (goal 120/80)  BUN   Date Value Ref Range Status   01/11/2023 17 6 - 20 mg/dL Final     Creatinine   Date Value Ref Range Status   01/11/2023 0.78 0.70 - 1.20 mg/dL Final     Potassium   Date Value Ref Range Status   01/11/2023 4.2 3.7 - 5.3 mmol/L Final     Comment:     SPECIMEN SLIGHTLY HEMOLYZED, RESULTS MAY BE ADVERSELY AFFECTED.

## 2023-03-06 ENCOUNTER — OFFICE VISIT (OUTPATIENT)
Dept: PODIATRY | Age: 53
End: 2023-03-06
Payer: COMMERCIAL

## 2023-03-06 VITALS — BODY MASS INDEX: 28.23 KG/M2 | WEIGHT: 220 LBS | HEIGHT: 74 IN

## 2023-03-06 DIAGNOSIS — L97.523 ULCER OF LEFT FOOT WITH NECROSIS OF MUSCLE (HCC): Primary | ICD-10-CM

## 2023-03-06 DIAGNOSIS — E11.42 DIABETIC POLYNEUROPATHY ASSOCIATED WITH TYPE 2 DIABETES MELLITUS (HCC): ICD-10-CM

## 2023-03-06 DIAGNOSIS — L97.522 SKIN ULCER OF TOE OF LEFT FOOT WITH FAT LAYER EXPOSED (HCC): ICD-10-CM

## 2023-03-06 DIAGNOSIS — L08.9 INFECTION OF LEFT FOOT: ICD-10-CM

## 2023-03-06 PROCEDURE — 99999 PR OFFICE/OUTPT VISIT,PROCEDURE ONLY: CPT | Performed by: PODIATRIST

## 2023-03-06 PROCEDURE — 11042 DBRDMT SUBQ TIS 1ST 20SQCM/<: CPT | Performed by: PODIATRIST

## 2023-03-07 ASSESSMENT — ENCOUNTER SYMPTOMS
BACK PAIN: 0
COLOR CHANGE: 0
NAUSEA: 0
DIARRHEA: 0
SHORTNESS OF BREATH: 0

## 2023-03-07 NOTE — PROGRESS NOTES
Follow-Up Wound Progress Note   Gris Wynn  AGE: 47 y.o. GENDER: male  : 1970  TODAY'S DATE:  3/7/2023  Subjective:    Gris Wynn is a 47 y.o. male who presents today for wound check. Wound Location : Left foot. The patients pain is 1 out of 10. Patient states that they have been changing the dressing to the left lower extremity(s) with Silver Cell and a DSD as instructed since last visit. Modifying factors: Diabetes. Review of Systems   Constitutional:  Negative for activity change, appetite change, chills, diaphoresis, fatigue and fever. Respiratory:  Negative for shortness of breath. Cardiovascular:  Negative for leg swelling. Gastrointestinal:  Negative for diarrhea and nausea. Endocrine: Negative for cold intolerance, heat intolerance and polyuria. Musculoskeletal:  Positive for arthralgias and gait problem. Negative for back pain, joint swelling and myalgias. Skin:  Positive for wound. Negative for color change, pallor and rash. Allergic/Immunologic: Negative for environmental allergies and food allergies. Neurological:  Positive for numbness. Negative for dizziness, weakness and light-headedness. Hematological:  Does not bruise/bleed easily. Psychiatric/Behavioral:  Negative for behavioral problems, confusion and self-injury. The patient is not nervous/anxious. Objective:   Exam:  Clinical evaluation of the patient reveals full thickness wound to the depth of the third metatarsal of the left foot. There is no erythema or edema to the left foot. There is no calor, purulence, or malodor noted to the left foot. There is no remaining dark discoloration noted to the plantar aspect of the left third toe. This toe is not cooler than the remaining toes of the left foot. The wound base is primarily granular with only about 10% fibrotic tissue. Debridement of the wound with a curette produces adequate bleeding. The wound measures 4.1 cm x 1.5 cm x 0.8 cm.  There is a wound noted to the medial aspect of the left third toe. The wound base is granular today. There is no surrounding erythema or calor noted. Debridement of this wound with a curette produces adequate bleeding and the wound is to the depth of the subcutaneous tissue. There is no penetration to bone. There is no purulence or malodor noted to this wound. This wound measures 0.5 cm x 0.4 cm x 0.2 cm. Procedure: The wound(s) to the left foot was debrided with removal of fibrotic, necrotic, and hyperkeratotic tissue, including a layer of surrounding healthy tissue down through and including subcutaneous tissue,using curette. Excisional Debridement  Percent of Wound Debrided: 100%    Wound: has improved    Bleeding: Minimal    Hemostasis:   by pressure    Response to treatment:  Well tolerated by patient. Assessment:      Diagnosis Orders   1. Ulcer of left foot with necrosis of muscle (HCC)  IL DEBRIDEMENT SUBCUTANEOUS TISSUE 20 SQ CM/<      2. Skin ulcer of toe of left foot with fat layer exposed (HCC)  IL DEBRIDEMENT SUBCUTANEOUS TISSUE 20 SQ CM/<      3. Infection of left foot  IL DEBRIDEMENT SUBCUTANEOUS TISSUE 20 SQ CM/<      4. Diabetic polyneuropathy associated with type 2 diabetes mellitus (HCC)  IL DEBRIDEMENT SUBCUTANEOUS TISSUE 20 SQ CM/<              Plan:   Plan for wound -   Treatment:                Dressed with: Silver Cell and a DSD. Home care: Patient to change the dressing daily with antibiotic ointment and a DSD. Abx :No Cultures :were notobtained. Return in about 1 week (around 3/13/2023) for Wound Care.    3/6/2023        Rosita Jessica DPM

## 2023-03-13 ENCOUNTER — OFFICE VISIT (OUTPATIENT)
Dept: PODIATRY | Age: 53
End: 2023-03-13
Payer: COMMERCIAL

## 2023-03-13 VITALS — BODY MASS INDEX: 28.23 KG/M2 | WEIGHT: 220 LBS | HEIGHT: 74 IN

## 2023-03-13 DIAGNOSIS — L97.523 ULCER OF LEFT FOOT WITH NECROSIS OF MUSCLE (HCC): Primary | ICD-10-CM

## 2023-03-13 DIAGNOSIS — M79.672 PAIN IN LEFT FOOT: ICD-10-CM

## 2023-03-13 DIAGNOSIS — E11.42 DIABETIC POLYNEUROPATHY ASSOCIATED WITH TYPE 2 DIABETES MELLITUS (HCC): ICD-10-CM

## 2023-03-13 DIAGNOSIS — L97.522 SKIN ULCER OF TOE OF LEFT FOOT WITH FAT LAYER EXPOSED (HCC): ICD-10-CM

## 2023-03-13 PROCEDURE — 11042 DBRDMT SUBQ TIS 1ST 20SQCM/<: CPT | Performed by: PODIATRIST

## 2023-03-13 PROCEDURE — 99999 PR OFFICE/OUTPT VISIT,PROCEDURE ONLY: CPT | Performed by: PODIATRIST

## 2023-03-15 ASSESSMENT — ENCOUNTER SYMPTOMS
COLOR CHANGE: 0
DIARRHEA: 0
BACK PAIN: 0
NAUSEA: 0
SHORTNESS OF BREATH: 0

## 2023-03-15 NOTE — PROGRESS NOTES
Follow-Up Wound Progress Note   Savannah Basilio  AGE: 47 y.o. GENDER: male  : 1970  TODAY'S DATE:  3/15/2023  Subjective:    Savannah Basilio is a 47 y.o. male who presents today for wound check. Wound Location : Left foot. The patients pain is 1 out of 10. Patient states that they have been changing the dressing to the left lower extremity(s) with antibiotic ointment and DSD daily as instructed since last visit. Modifying factors: Diabetes. Review of Systems   Constitutional:  Negative for activity change, appetite change, chills, diaphoresis, fatigue and fever. Respiratory:  Negative for shortness of breath. Cardiovascular:  Negative for leg swelling. Gastrointestinal:  Negative for diarrhea and nausea. Endocrine: Negative for cold intolerance, heat intolerance and polyuria. Musculoskeletal:  Positive for arthralgias and gait problem. Negative for back pain, joint swelling and myalgias. Skin:  Positive for wound. Negative for color change, pallor and rash. Allergic/Immunologic: Negative for environmental allergies and food allergies. Neurological:  Positive for numbness. Negative for dizziness, weakness and light-headedness. Hematological:  Does not bruise/bleed easily. Psychiatric/Behavioral:  Negative for behavioral problems, confusion and self-injury. The patient is not nervous/anxious. Objective:   Exam:  Clinical evaluation of the patient reveals full thickness wound to the depth of the subcutaneous tissue of the left foot. There is no bone exposed to this wound today. There is no erythema or edema to the left foot. There is no calor, purulence, or malodor noted to the left foot. The wound base is granular. Debridement of the wound with a curette produces adequate bleeding. The wound measures 4.0 cm x 0.8 cm x 0.5 cm. There remains a wound to the medial aspect of the left third toe. The wound base is granular today. There is no surrounding erythema or calor noted.  Debridement of this wound with a curette produces adequate bleeding and the wound is to the depth of the subcutaneous tissue. There is no penetration to bone. There is no purulence or malodor noted to this wound. This wound measures 0.3 cm x 0.3 cm x 0.2 cm. Procedure: The wound(s) to the left foot was debrided with removal of fibrotic, necrotic, and hyperkeratotic tissue, including a layer of surrounding healthy tissue down through and including subcutaneous tissue,using curette. Excisional Debridement  Percent of Wound Debrided: 100%    Wound: has improved    Bleeding: Minimal    Hemostasis:   by pressure    Response to treatment:  Well tolerated by patient. Assessment:      Diagnosis Orders   1. Ulcer of left foot with necrosis of muscle (HCC)  CO DEBRIDEMENT SUBCUTANEOUS TISSUE 20 SQ CM/<      2. Skin ulcer of toe of left foot with fat layer exposed (HCC)  CO DEBRIDEMENT SUBCUTANEOUS TISSUE 20 SQ CM/<      3. Pain in left foot  CO DEBRIDEMENT SUBCUTANEOUS TISSUE 20 SQ CM/<      4. Diabetic polyneuropathy associated with type 2 diabetes mellitus (HCC)  CO DEBRIDEMENT SUBCUTANEOUS TISSUE 20 SQ CM/<              Plan:   Plan for wound -   Treatment:                Dressed with: antibiotic ointment and a DSD to both wounds. Home care: Patient is to change the dressing to both wounds with antibiotic ointment and a DSD. Abx :No Cultures :were notobtained. Return in about 1 week (around 3/20/2023) for Wound Care.    3/13/2023        Agatha Salazar DPM

## 2023-03-20 ENCOUNTER — OFFICE VISIT (OUTPATIENT)
Dept: PODIATRY | Age: 53
End: 2023-03-20
Payer: COMMERCIAL

## 2023-03-20 VITALS — BODY MASS INDEX: 28.23 KG/M2 | HEIGHT: 74 IN | WEIGHT: 220 LBS

## 2023-03-20 DIAGNOSIS — E11.42 DIABETIC POLYNEUROPATHY ASSOCIATED WITH TYPE 2 DIABETES MELLITUS (HCC): ICD-10-CM

## 2023-03-20 DIAGNOSIS — L97.522 SKIN ULCER OF TOE OF LEFT FOOT WITH FAT LAYER EXPOSED (HCC): ICD-10-CM

## 2023-03-20 DIAGNOSIS — L97.523 ULCER OF LEFT FOOT WITH NECROSIS OF MUSCLE (HCC): Primary | ICD-10-CM

## 2023-03-20 DIAGNOSIS — M79.672 PAIN IN LEFT FOOT: ICD-10-CM

## 2023-03-20 PROCEDURE — 99999 PR OFFICE/OUTPT VISIT,PROCEDURE ONLY: CPT | Performed by: PODIATRIST

## 2023-03-20 PROCEDURE — 11042 DBRDMT SUBQ TIS 1ST 20SQCM/<: CPT | Performed by: PODIATRIST

## 2023-03-21 ASSESSMENT — ENCOUNTER SYMPTOMS
SHORTNESS OF BREATH: 0
BACK PAIN: 0
COLOR CHANGE: 0
DIARRHEA: 0
NAUSEA: 0

## 2023-03-21 NOTE — PROGRESS NOTES
of this wound with a curette produces adequate bleeding and the wound is to the depth of the subcutaneous tissue. There is no penetration to bone. There is no purulence or malodor noted to this wound. This wound measures 0.3 cm x 0.3 cm x 0.2 cm. Procedure: The wound(s) to the left foot was debrided with removal of fibrotic, necrotic, and hyperkeratotic tissue, including a layer of surrounding healthy tissue down through and including subcutaneous tissue,using curette. Excisional Debridement  Percent of Wound Debrided: 100%    Wound: is unchanged    Bleeding: Minimal    Hemostasis:   by pressure    Response to treatment:  Well tolerated by patient. Assessment:      Diagnosis Orders   1. Ulcer of left foot with necrosis of muscle (HCC)  SC DEBRIDEMENT SUBCUTANEOUS TISSUE 20 SQ CM/<      2. Skin ulcer of toe of left foot with fat layer exposed (HCC)  SC DEBRIDEMENT SUBCUTANEOUS TISSUE 20 SQ CM/<      3. Pain in left foot  SC DEBRIDEMENT SUBCUTANEOUS TISSUE 20 SQ CM/<      4. Diabetic polyneuropathy associated with type 2 diabetes mellitus (HCC)  SC DEBRIDEMENT SUBCUTANEOUS TISSUE 20 SQ CM/<              Plan:   Plan for wound -   Treatment:                Dressed with: Kathrny and a DSD. Home care: Patient to change the dressing to the left foot daily with Kathryn and a DSD. Abx :No Cultures :were notobtained.   Return in about 1 week (around 3/27/2023) for Wound Care.   3/20/2023        Marilyn Gleason DPM

## 2023-03-28 ENCOUNTER — OFFICE VISIT (OUTPATIENT)
Dept: PODIATRY | Age: 53
End: 2023-03-28
Payer: COMMERCIAL

## 2023-03-28 VITALS — HEIGHT: 74 IN | WEIGHT: 220 LBS | BODY MASS INDEX: 28.23 KG/M2

## 2023-03-28 DIAGNOSIS — E11.42 DIABETIC POLYNEUROPATHY ASSOCIATED WITH TYPE 2 DIABETES MELLITUS (HCC): ICD-10-CM

## 2023-03-28 DIAGNOSIS — L97.522 SKIN ULCER OF TOE OF LEFT FOOT WITH FAT LAYER EXPOSED (HCC): ICD-10-CM

## 2023-03-28 DIAGNOSIS — L97.523 ULCER OF LEFT FOOT WITH NECROSIS OF MUSCLE (HCC): Primary | ICD-10-CM

## 2023-03-28 DIAGNOSIS — M79.672 PAIN IN LEFT FOOT: ICD-10-CM

## 2023-03-28 PROCEDURE — 99999 PR OFFICE/OUTPT VISIT,PROCEDURE ONLY: CPT | Performed by: PODIATRIST

## 2023-03-28 PROCEDURE — 11042 DBRDMT SUBQ TIS 1ST 20SQCM/<: CPT | Performed by: PODIATRIST

## 2023-03-28 ASSESSMENT — ENCOUNTER SYMPTOMS
NAUSEA: 0
BACK PAIN: 0
COLOR CHANGE: 0
DIARRHEA: 0
SHORTNESS OF BREATH: 0

## 2023-03-28 NOTE — PROGRESS NOTES
Follow-Up Wound Progress Note   Katie Burgos  AGE: 47 y.o. GENDER: male  : 1970  TODAY'S DATE:  3/30/2023  Subjective:    Katie Burgos is a 47 y.o. male who presents today for wound check. Wound Location : Left foot. The patients pain is 1 out of 10. Patient states that they have been changing the dressing to the left lower extremity(s) with Kathryn and a DSD daily as instructed since last visit. Modifying factors: Diabetes. Review of Systems   Constitutional:  Negative for activity change, appetite change, chills, diaphoresis, fatigue and fever. Respiratory:  Negative for shortness of breath. Cardiovascular:  Negative for leg swelling. Gastrointestinal:  Negative for diarrhea and nausea. Endocrine: Negative for cold intolerance, heat intolerance and polyuria. Musculoskeletal:  Positive for arthralgias and gait problem. Negative for back pain, joint swelling and myalgias. Skin:  Positive for wound. Negative for color change, pallor and rash. Allergic/Immunologic: Negative for environmental allergies and food allergies. Neurological:  Positive for numbness. Negative for dizziness, weakness and light-headedness. Hematological:  Does not bruise/bleed easily. Psychiatric/Behavioral:  Negative for behavioral problems, confusion and self-injury. The patient is not nervous/anxious. Objective:   Exam:  Clinical evaluation of the patient reveals full thickness wound to the depth of the subcutaneous tissue of the left foot. There is no bone exposed to this wound today. There is no erythema or edema to the left foot. There is no calor, purulence, or malodor noted to the left foot. The wound base is granular. Debridement of the wound with a curette produces adequate bleeding. The wound measures 3.4 cm x 0.5 cm x 0.4 cm. There remains a wound to the medial aspect of the left third toe. The wound base is granular today. There is no surrounding erythema or calor noted.  Debridement of this

## 2023-04-04 ENCOUNTER — OFFICE VISIT (OUTPATIENT)
Dept: PODIATRY | Age: 53
End: 2023-04-04
Payer: COMMERCIAL

## 2023-04-04 VITALS — WEIGHT: 220 LBS | HEIGHT: 74 IN | BODY MASS INDEX: 28.23 KG/M2

## 2023-04-04 DIAGNOSIS — E11.42 DIABETIC POLYNEUROPATHY ASSOCIATED WITH TYPE 2 DIABETES MELLITUS (HCC): ICD-10-CM

## 2023-04-04 DIAGNOSIS — M79.672 PAIN IN LEFT FOOT: ICD-10-CM

## 2023-04-04 DIAGNOSIS — L97.522 SKIN ULCER OF TOE OF LEFT FOOT WITH FAT LAYER EXPOSED (HCC): ICD-10-CM

## 2023-04-04 DIAGNOSIS — L97.523 ULCER OF LEFT FOOT WITH NECROSIS OF MUSCLE (HCC): Primary | ICD-10-CM

## 2023-04-04 PROCEDURE — 99999 PR OFFICE/OUTPT VISIT,PROCEDURE ONLY: CPT | Performed by: PODIATRIST

## 2023-04-04 PROCEDURE — 11042 DBRDMT SUBQ TIS 1ST 20SQCM/<: CPT | Performed by: PODIATRIST

## 2023-04-04 ASSESSMENT — ENCOUNTER SYMPTOMS
DIARRHEA: 0
BACK PAIN: 0
SHORTNESS OF BREATH: 0
COLOR CHANGE: 0
NAUSEA: 0

## 2023-04-04 NOTE — PROGRESS NOTES
fibrotic, necrotic, and hyperkeratotic tissue, including a layer of surrounding healthy tissue down through and including subcutaneous tissue,using curette. Excisional Debridement  Percent of Wound Debrided: 100%    Wound: has improved    Bleeding: Minimal    Hemostasis:   by pressure    Response to treatment:  Well tolerated by patient. Assessment:      Diagnosis Orders   1. Ulcer of left foot with necrosis of muscle (HCC)  [place] DEBRIDEMENT SUBCUTANEOUS TISSUE 20 SQ CM/<      2. Skin ulcer of toe of left foot with fat layer exposed ([address]        3. Pain in left foot  TN DEBRIDEMENT SUBCUTANEOUS TISSUE 20 SQ CM/<      4. Diabetic polyneuropathy associated with type 2 diabetes mellitus (HCC)  TN DEBRIDEMENT SUBCUTANEOUS TISSUE 20 SQ CM/<              Plan:   Plan for wound -   Treatment:                Dressed with: Kathryn and a dry sterile dressing. I applied a Band-Aid with nothing on it to the left third toe. Patient is to remove this later today and if there is no drainage on the Band-Aid then he may discontinue dressing changes to this toe. However, if there is drainage, then he should continue with daily dressing changes of antibiotic ointment and a Band-Aid. Patient is to continue with daily dressing changes to the foot with Kathryn and a dry sterile dressing. Abx :No Cultures :were notobtained. Return in about 2 weeks (around 4/18/2023) for Wound Care.    4/4/2023        Eugenio Robison DPM

## 2023-04-18 ENCOUNTER — OFFICE VISIT (OUTPATIENT)
Dept: PODIATRY | Age: 53
End: 2023-04-18
Payer: COMMERCIAL

## 2023-04-18 VITALS — WEIGHT: 220 LBS | BODY MASS INDEX: 28.23 KG/M2 | HEIGHT: 74 IN

## 2023-04-18 DIAGNOSIS — M79.672 PAIN IN LEFT FOOT: ICD-10-CM

## 2023-04-18 DIAGNOSIS — E11.42 DIABETIC POLYNEUROPATHY ASSOCIATED WITH TYPE 2 DIABETES MELLITUS (HCC): ICD-10-CM

## 2023-04-18 DIAGNOSIS — L97.523 ULCER OF LEFT FOOT WITH NECROSIS OF MUSCLE (HCC): Primary | ICD-10-CM

## 2023-04-18 PROCEDURE — 11042 DBRDMT SUBQ TIS 1ST 20SQCM/<: CPT | Performed by: PODIATRIST

## 2023-04-18 PROCEDURE — 99999 PR OFFICE/OUTPT VISIT,PROCEDURE ONLY: CPT | Performed by: PODIATRIST

## 2023-04-20 ASSESSMENT — ENCOUNTER SYMPTOMS
NAUSEA: 0
DIARRHEA: 0
COLOR CHANGE: 0
BACK PAIN: 0
SHORTNESS OF BREATH: 0

## 2023-04-25 ENCOUNTER — OFFICE VISIT (OUTPATIENT)
Dept: PODIATRY | Age: 53
End: 2023-04-25

## 2023-04-25 VITALS — BODY MASS INDEX: 28.23 KG/M2 | WEIGHT: 220 LBS | HEIGHT: 74 IN

## 2023-04-25 DIAGNOSIS — L97.523 ULCER OF LEFT FOOT WITH NECROSIS OF MUSCLE (HCC): Primary | ICD-10-CM

## 2023-04-25 DIAGNOSIS — M79.672 PAIN IN LEFT FOOT: ICD-10-CM

## 2023-04-25 DIAGNOSIS — E11.42 DIABETIC POLYNEUROPATHY ASSOCIATED WITH TYPE 2 DIABETES MELLITUS (HCC): ICD-10-CM

## 2023-04-27 ASSESSMENT — ENCOUNTER SYMPTOMS
COLOR CHANGE: 0
NAUSEA: 0
SHORTNESS OF BREATH: 0
BACK PAIN: 0
DIARRHEA: 0

## 2023-04-27 NOTE — PROGRESS NOTES
subcutaneous tissue,using curette. Excisional Debridement  Percent of Wound Debrided: 100%    Wound: has improved    Bleeding: Minimal    Hemostasis:   by pressure    Response to treatment:  Well tolerated by patient. Assessment:      Diagnosis Orders   1. Ulcer of left foot with necrosis of muscle (HCC)  IN DEBRIDEMENT SUBCUTANEOUS TISSUE 20 SQ CM/<      2. Pain in left foot  IN DEBRIDEMENT SUBCUTANEOUS TISSUE 20 SQ CM/<      3. Diabetic polyneuropathy associated with type 2 diabetes mellitus (HCC)  IN DEBRIDEMENT SUBCUTANEOUS TISSUE 20 SQ CM/<              Plan:   Plan for wound -   Treatment:                Dressed with: Kathryn and a dry sterile dressing. Home care: Patient to change the dressing to the left foot daily with Kathryn and a dry sterile dressing. Abx :No Cultures :were notobtained. Return in about 1 week (around 5/2/2023) for Wound Care.    4/25/2023        Agatha Salazar DPM

## 2023-05-09 ENCOUNTER — OFFICE VISIT (OUTPATIENT)
Dept: PODIATRY | Age: 53
End: 2023-05-09
Payer: COMMERCIAL

## 2023-05-09 VITALS — WEIGHT: 220 LBS | BODY MASS INDEX: 28.23 KG/M2 | HEIGHT: 74 IN

## 2023-05-09 DIAGNOSIS — L97.523 ULCER OF LEFT FOOT WITH NECROSIS OF MUSCLE (HCC): Primary | ICD-10-CM

## 2023-05-09 DIAGNOSIS — M79.672 PAIN IN LEFT FOOT: ICD-10-CM

## 2023-05-09 DIAGNOSIS — E11.42 DIABETIC POLYNEUROPATHY ASSOCIATED WITH TYPE 2 DIABETES MELLITUS (HCC): ICD-10-CM

## 2023-05-09 PROCEDURE — 11042 DBRDMT SUBQ TIS 1ST 20SQCM/<: CPT | Performed by: PODIATRIST

## 2023-05-09 PROCEDURE — 99999 PR OFFICE/OUTPT VISIT,PROCEDURE ONLY: CPT | Performed by: PODIATRIST

## 2023-05-11 ASSESSMENT — ENCOUNTER SYMPTOMS
NAUSEA: 0
COLOR CHANGE: 0
DIARRHEA: 0
BACK PAIN: 0
SHORTNESS OF BREATH: 0

## 2023-05-11 NOTE — PROGRESS NOTES
Follow-Up Wound Progress Note   Renay Gold  AGE: 46 y.o. GENDER: male  : 1970  TODAY'S DATE:  2023  Subjective:    Renay Gold is a 46 y.o. male who presents today for wound check. Wound Location : Left foot. The patients pain is 1 out of 10. Patient states that they have been changing the dressing to the left lower extremity(s) with Kathryn and a dry sterile dressing daily as instructed since last visit. Modifying factors: Diabetes. Review of Systems   Constitutional:  Negative for activity change, appetite change, chills, diaphoresis, fatigue and fever. Respiratory:  Negative for shortness of breath. Cardiovascular:  Negative for leg swelling. Gastrointestinal:  Negative for diarrhea and nausea. Endocrine: Negative for cold intolerance, heat intolerance and polyuria. Musculoskeletal:  Positive for arthralgias. Negative for back pain, gait problem, joint swelling and myalgias. Skin:  Positive for wound. Negative for color change, pallor and rash. Allergic/Immunologic: Negative for environmental allergies and food allergies. Neurological:  Positive for numbness. Negative for dizziness, weakness and light-headedness. Hematological:  Does not bruise/bleed easily. Psychiatric/Behavioral:  Negative for behavioral problems, confusion and self-injury. The patient is not nervous/anxious. Objective:   Exam:  Clinical evaluation of the patient reveals full thickness wound to the depth of the subcutaneous tissue of the left foot. There is no erythema or edema to the left foot. There is no calor, purulence, or malodor noted to the left foot. The wound base is granular. Debridement of the wound with a curette produces adequate bleeding. The wound measures 0.7 cm x 0.2 cm x 0.2 cm. Procedure:   The wound(s) to the left foot was debrided with removal of fibrotic, necrotic, and hyperkeratotic tissue, including a layer of surrounding healthy tissue down through and including

## 2023-05-17 NOTE — DISCHARGE SUMMARY
CDU Discharge Summary        Patient:  Sorin Saldivar  YOB: 1970    MRN: [id number]   Acct: [de-identified]    Primary Care Physician: Tommie Rutherford MD    Admit date:  1/10/2023 11:25 AM  Discharge date: 1/11/2023 12:50 PM      Discharge Diagnoses:     1.)  Outpatient treatment failure cellulitis left foot. Seen by podiatry. Debrided. Treated with antibiotics IV and changed over to oral    Follow-up:  Call today/tomorrow for a follow up appointment with Tommie Rutherford MD , or return to the Emergency Room with worsening symptoms    Stressed to patient the importance of following up with primary care doctor for further workup/management of symptoms. Pt verbalizes understanding and agrees with plan. Discharge Medication Changes:       Medication List        START taking these medications      clindamycin 300 MG capsule  Commonly known as: Cleocin  Take 1 capsule by mouth 4 times daily for 7 days     doxycycline monohydrate 100 MG tablet  Commonly known as: ADOXA  Take 1 tablet by mouth 2 times daily for 10 days     oxyCODONE 5 MG immediate release tablet  Commonly known as: ROXICODONE  Take 1 tablet by mouth every 6 hours as needed for Pain for up to 3 days. Max Daily Amount: 20 mg            CONTINUE taking these medications      acetaminophen 500 MG tablet  Commonly known as: TYLENOL  Take 1 tablet by mouth 4 times daily as needed for Pain     aspirin 81 MG EC tablet  Commonly known as: SM Aspirin Adult Low Strength  TAKE 1 TABLET BY MOUTH ONE TIME A DAY     atorvastatin 20 MG tablet  Commonly known as: LIPITOR  TAKE 1 TABLET BY MOUTH ONE TIME A DAY     blood glucose monitor kit and supplies  Test one time a day & as needed for symptoms of irregular blood glucose. blood glucose test strips  Testing once a day. Please dispense according to patients insurance. Blood Pressure Monitor Misc  Use daily to check BP     Lancets Misc  Testing once a day.   Please dispense according to patients insurance. Lantus SoloStar 100 UNIT/ML injection pen  Generic drug: insulin glargine  INJECT 35 UNITS SUBCUTANEOUSLY EVERY MORNING BEFORE BREAKFAST     lisinopril 10 MG tablet  Commonly known as: PRINIVIL;ZESTRIL  Take 1 tablet by mouth daily     metFORMIN 850 MG tablet  Commonly known as: GLUCOPHAGE  TAKE 1 TABLET BY MOUTH TWO TIMES A DAY WITH MEALS     SITagliptin 50 MG tablet  Commonly known as: JANUVIA  Take 1 tablet by mouth daily     SM Alcohol Prep 70 % Pads  USE TO TEST ONCE DAILY     * TRUEplus Pen Needles 32G X 4 MM Misc  Generic drug: Insulin Pen Needle  USE WITH INSULIN DAILY     * Insulin Pen Needle 29G X 12.7MM Misc  1 each by Does not apply route daily To use with insulin           * This list has 2 medication(s) that are the same as other medications prescribed for you. Read the directions carefully, and ask your doctor or other care provider to review them with you. STOP taking these medications      zoster recombinant adjuvanted vaccine 50 MCG/0.5ML Susr injection  Commonly known as: Abelino Mehta 587 your doctor about these medications      sertraline 25 MG tablet  Commonly known as: Zoloft  Take 1 tab daily and increase it to 50mg daily               Where to Get Your Medications        These medications were sent to [address] [place] [address] [phone] - F [phone]  [address] [facility] [address]      Phone: [phone]   clindamycin 300 MG capsule  doxycycline monohydrate 100 MG tablet  oxyCODONE 5 MG immediate release tablet         Diet:  No diet orders on file, advance as tolerated     Activity:  As tolerated    Consultants: IP CONSULT TO PODIATRY    Procedures: See podiatry note    Diagnostic Test:   Results for orders placed or performed during the hospital encounter of 01/10/23   Culture, Anaerobic and Aerobic    Specimen: Wound   Result Value Ref Range    Specimen Description . WOUND     Direct Exam FEW NEUTROPHILS (A) Direct Exam MODERATE GRAM POSITIVE COCCI IN CLUSTERS (A)     Direct Exam FEW GRAM NEGATIVE RODS (A)     Culture PENDING    Sedimentation Rate   Result Value Ref Range    Sed Rate 22 (H) 0 - 20 mm/Hr   CBC with Auto Differential   Result Value Ref Range    WBC 8.2 3.5 - 11.3 k/uL    RBC 4.48 4.21 - 5.77 m/uL    Hemoglobin 14.9 13.0 - 17.0 g/dL    Hematocrit 42.9 40.7 - 50.3 %    MCV 95.8 82.6 - 102.9 fL    MCH 33.3 25.2 - 33.5 pg    MCHC 34.7 28.4 - 34.8 g/dL    RDW 12.4 11.8 - 14.4 %    Platelets 182 516 - 013 k/uL    MPV 10.3 8.1 - 13.5 fL    NRBC Automated 0.0 0.0 per 100 WBC    Seg Neutrophils 64 36 - 65 %    Lymphocytes 22 (L) 24 - 43 %    Monocytes 9 3 - 12 %    Eosinophils % 3 1 - 4 %    Basophils 1 0 - 2 %    Immature Granulocytes 1 (H) 0 %    Segs Absolute 5.34 1.50 - 8.10 k/uL    Absolute Lymph # 1.81 1.10 - 3.70 k/uL    Absolute Mono # 0.73 0.10 - 1.20 k/uL    Absolute Eos # 0.23 0.00 - 0.44 k/uL    Basophils Absolute 0.07 0.00 - 0.20 k/uL    Absolute Immature Granulocyte 0.04 0.00 - 0.30 k/uL   Basic Metabolic Panel   Result Value Ref Range    Glucose 119 (H) 70 - 99 mg/dL    BUN 17 6 - 20 mg/dL    Creatinine 0.67 (L) 0.70 - 1.20 mg/dL    Est, Glom Filt Rate >60 >60 mL/min/1.73m2    Calcium 9.0 8.6 - 10.4 mg/dL    Sodium 137 135 - 144 mmol/L    Potassium 3.9 3.7 - 5.3 mmol/L    Chloride 100 98 - 107 mmol/L    CO2 27 20 - 31 mmol/L    Anion Gap 10 9 - 17 mmol/L   C-Reactive Protein   Result Value Ref Range    CRP <3.0 0.0 - 5.0 mg/L   Basic Metabolic Panel w/ Reflex to MG   Result Value Ref Range    Glucose 87 70 - 99 mg/dL    BUN 17 6 - 20 mg/dL    Creatinine 0.78 0.70 - 1.20 mg/dL    Est, Glom Filt Rate >60 >60 mL/min/1.73m2    Calcium 8.4 (L) 8.6 - 10.4 mg/dL    Sodium 140 135 - 144 mmol/L    Potassium 4.2 3.7 - 5.3 mmol/L    Chloride 104 98 - 107 mmol/L    CO2 26 20 - 31 mmol/L    Anion Gap 10 9 - 17 mmol/L   CBC with Auto Differential   Result Value Ref Range    WBC 7.9 3.5 - 11.3 k/uL    RBC 4.12 (L) 4.21 - 5.77 m/uL    Hemoglobin 13.5 13.0 - 17.0 g/dL    Hematocrit 40.1 (L) 40.7 - 50.3 %    MCV 97.3 82.6 - 102.9 fL    MCH 32.8 25.2 - 33.5 pg    MCHC 33.7 28.4 - 34.8 g/dL    RDW 12.6 11.8 - 14.4 %    Platelets 182 373 - 132 k/uL    MPV 10.4 8.1 - 13.5 fL    NRBC Automated 0.0 0.0 per 100 WBC    Seg Neutrophils 66 (H) 36 - 65 %    Lymphocytes 21 (L) 24 - 43 %    Monocytes 8 3 - 12 %    Eosinophils % 3 1 - 4 %    Basophils 1 0 - 2 %    Immature Granulocytes 1 (H) 0 %    Segs Absolute 5.21 1.50 - 8.10 k/uL    Absolute Lymph # 1.67 1.10 - 3.70 k/uL    Absolute Mono # 0.64 0.10 - 1.20 k/uL    Absolute Eos # 0.25 0.00 - 0.44 k/uL    Basophils Absolute 0.06 0.00 - 0.20 k/uL    Absolute Immature Granulocyte 0.04 0.00 - 0.30 k/uL   POC Glucose Fingerstick   Result Value Ref Range    POC Glucose 172 (H) 75 - 110 mg/dL   POC Glucose Fingerstick   Result Value Ref Range    POC Glucose 152 (H) 75 - 110 mg/dL   POC Glucose Fingerstick   Result Value Ref Range    POC Glucose 88 75 - 110 mg/dL   POC Glucose Fingerstick   Result Value Ref Range    POC Glucose 88 75 - 110 mg/dL   EKG 12 lead   Result Value Ref Range    Ventricular Rate 61 BPM    Atrial Rate 61 BPM    P-R Interval 146 ms    QRS Duration 96 ms    Q-T Interval 406 ms    QTc Calculation (Bazett) 408 ms    P Axis 32 degrees    T Axis 16 degrees     XR FOOT LEFT (MIN 3 VIEWS)    Result Date: 1/10/2023  EXAMINATION: THREE XRAY VIEWS OF THE LEFT FOOT 1/10/2023 4:56 pm COMPARISON: 01/02/2023 HISTORY: ORDERING SYSTEM PROVIDED HISTORY: foot ulcer, recent imaging 1 week ago, progression of ulcer, re-eval for bony abnormality TECHNOLOGIST PROVIDED HISTORY: foot ulcer, recent imaging 1 week ago, progression of ulcer, re-eval for bony abnormality FINDINGS: No acute fracture. No dislocation. There is no osseous erosion or destruction. There is soft tissue edema. There is mild narrowing of multiple MTP and interphalangeal joints. .     No acute or aggressive osseous abnormality. Physical Exam:    General appearance - NAD, AOx 3    Lungs -CTAB, no R/R/R  Heart - RRR, no M/R/G  Abdomen - Soft, NT/ND  Neurological:  MAEx4, No focal motor deficit, sensory loss  Extremities - Cap refil <2 sec in all ext., no edema  Skin -warm, dry      Hospital Course:  Clinical course has improved, labs and imaging reviewed. Angela Zuleta originally presented to the hospital on 1/10/2023 11:25 AM with outpatient treatment failure of left foot. At that time it was determined that He required further observation and podiatry evaluation intervention. Patient needed IV antibiotics and reassessment. Patient did well overnight. Patient with resolution of symptoms. Patient with pain improved. Discharge confirmed by podiatry. Patient sent home in good condition. Labs and imaging were followed daily. Imaging results as above. He is medically stable to be discharged. Disposition: Home    Patient stated that they will not drive themselves home from the hospital if they have gotten pain killers/ narcotics earlier that day and that they will arrange for transportation on their own or work with the  for a ride. Patient counseled NOT to drive while under the influence of narcotics/ pain killers. Condition: Good    Patient stable and ready for discharge home. I have discussed plan of care with patient and they are in understanding. They were instructed to read discharge paperwork. All of their questions and concerns were addressed. Time Spent: 0 day      --  Venecia Hyman MD  Emergency Medicine Attending Physician    This dictation was generated by voice recognition computer software. Although all attempts are made to edit the dictation for accuracy, there may be errors in the transcription that are not intended. Complex Repair Preamble Text (Leave Blank If You Do Not Want): Extensive wide undermining was performed.

## 2023-05-23 ENCOUNTER — OFFICE VISIT (OUTPATIENT)
Dept: PODIATRY | Age: 53
End: 2023-05-23
Payer: COMMERCIAL

## 2023-05-23 VITALS — BODY MASS INDEX: 28.23 KG/M2 | WEIGHT: 220 LBS | HEIGHT: 74 IN

## 2023-05-23 DIAGNOSIS — E11.42 DIABETIC POLYNEUROPATHY ASSOCIATED WITH TYPE 2 DIABETES MELLITUS (HCC): ICD-10-CM

## 2023-05-23 DIAGNOSIS — M79.672 PAIN IN LEFT FOOT: ICD-10-CM

## 2023-05-23 DIAGNOSIS — L97.523 ULCER OF LEFT FOOT WITH NECROSIS OF MUSCLE (HCC): Primary | ICD-10-CM

## 2023-05-23 PROCEDURE — 11042 DBRDMT SUBQ TIS 1ST 20SQCM/<: CPT | Performed by: PODIATRIST

## 2023-05-23 PROCEDURE — 99999 PR OFFICE/OUTPT VISIT,PROCEDURE ONLY: CPT | Performed by: PODIATRIST

## 2023-05-23 ASSESSMENT — ENCOUNTER SYMPTOMS
SHORTNESS OF BREATH: 0
BACK PAIN: 0
COLOR CHANGE: 0
NAUSEA: 0
DIARRHEA: 0

## 2023-05-23 NOTE — PROGRESS NOTES
Follow-Up Wound Progress Note   Peggy Jaime  AGE: 46 y.o. GENDER: male  : 1970  TODAY'S DATE:  2023  Subjective:    Peggy Jaime is a 46 y.o. male who presents today for wound check. Wound Location : Left foot. The patients pain is 1 out of 10. Patient states that they have been changing the dressing to the left lower extremity(s) with Kathryn and a Band-Aid daily as instructed since last visit. Modifying factors: Diabetes. Review of Systems   Constitutional:  Negative for activity change, appetite change, chills, diaphoresis, fatigue and fever. Respiratory:  Negative for shortness of breath. Cardiovascular:  Negative for leg swelling. Gastrointestinal:  Negative for diarrhea and nausea. Endocrine: Negative for cold intolerance, heat intolerance and polyuria. Musculoskeletal:  Positive for arthralgias. Negative for back pain, gait problem, joint swelling and myalgias. Skin:  Positive for wound. Negative for color change, pallor and rash. Allergic/Immunologic: Negative for environmental allergies and food allergies. Neurological:  Positive for numbness. Negative for dizziness, weakness and light-headedness. Hematological:  Does not bruise/bleed easily. Psychiatric/Behavioral:  Negative for behavioral problems, confusion and self-injury. The patient is not nervous/anxious. Objective:   Exam:  Clinical evaluation of the patient reveals full thickness wound to the depth of the subcutaneous tissue of the left foot. There is no erythema or edema to the left foot. There is no calor, purulence, or malodor noted to the left foot. The wound base is granular. Debridement of the wound with a curette produces adequate bleeding. The wound measures 0.1 cm x 0.1 cm x 0.1 cm. Procedure:   The wound(s) to the left foot was debrided with removal of fibrotic, necrotic, and hyperkeratotic tissue, including a layer of surrounding healthy tissue down through and including subcutaneous

## 2023-06-20 ENCOUNTER — OFFICE VISIT (OUTPATIENT)
Dept: PODIATRY | Age: 53
End: 2023-06-20
Payer: COMMERCIAL

## 2023-06-20 VITALS — BODY MASS INDEX: 28.23 KG/M2 | HEIGHT: 74 IN | WEIGHT: 220 LBS

## 2023-06-20 DIAGNOSIS — M79.672 PAIN IN LEFT FOOT: ICD-10-CM

## 2023-06-20 DIAGNOSIS — L84 CORNS AND CALLOSITIES: Primary | ICD-10-CM

## 2023-06-20 PROCEDURE — 11055 PARING/CUTG B9 HYPRKER LES 1: CPT | Performed by: PODIATRIST

## 2023-06-20 PROCEDURE — 99999 PR OFFICE/OUTPT VISIT,PROCEDURE ONLY: CPT | Performed by: PODIATRIST

## 2023-06-22 ASSESSMENT — ENCOUNTER SYMPTOMS
SHORTNESS OF BREATH: 0
DIARRHEA: 0
BACK PAIN: 0
COLOR CHANGE: 0
NAUSEA: 0

## 2023-06-22 NOTE — PROGRESS NOTES
debridement of this lesion. Assessment:    Diagnosis Orders   1. Corns and callosities  ND PARING/CUTTING BENIGN HYPERKERATOTIC LESION 1      2. Pain in left foot  ND PARING/CUTTING BENIGN HYPERKERATOTIC LESION 1            Plan: 1. Clinical evaluation of the patient. 2.  The lesion(s) to the left foot debrided with a 15 blade without event. 3. Return if symptoms worsen or fail to improve.    6/20/2023      Verlinda Castleman, DPM

## 2023-07-05 DIAGNOSIS — E11.65 TYPE 2 DIABETES MELLITUS WITH HYPERGLYCEMIA, WITH LONG-TERM CURRENT USE OF INSULIN (HCC): ICD-10-CM

## 2023-07-05 DIAGNOSIS — Z79.4 TYPE 2 DIABETES MELLITUS WITH HYPERGLYCEMIA, WITH LONG-TERM CURRENT USE OF INSULIN (HCC): ICD-10-CM

## 2023-09-20 ENCOUNTER — HOSPITAL ENCOUNTER (EMERGENCY)
Age: 53
Discharge: HOME | End: 2023-09-20
Attending: EMERGENCY MEDICINE
Payer: COMMERCIAL

## 2023-09-20 ENCOUNTER — APPOINTMENT (OUTPATIENT)
Dept: GENERAL RADIOLOGY | Age: 53
End: 2023-09-20
Payer: COMMERCIAL

## 2023-09-20 VITALS
TEMPERATURE: 98.3 F | DIASTOLIC BLOOD PRESSURE: 65 MMHG | RESPIRATION RATE: 19 BRPM | WEIGHT: 220.68 LBS | HEART RATE: 74 BPM | BODY MASS INDEX: 28.32 KG/M2 | SYSTOLIC BLOOD PRESSURE: 101 MMHG | OXYGEN SATURATION: 99 % | HEIGHT: 74 IN

## 2023-09-20 DIAGNOSIS — S91.301A OPEN WOUND OF RIGHT FOOT, INITIAL ENCOUNTER: Primary | ICD-10-CM

## 2023-09-20 PROCEDURE — 6370000000 HC RX 637 (ALT 250 FOR IP): Performed by: STUDENT IN AN ORGANIZED HEALTH CARE EDUCATION/TRAINING PROGRAM

## 2023-09-20 PROCEDURE — 99283 EMERGENCY DEPT VISIT LOW MDM: CPT

## 2023-09-20 PROCEDURE — 73630 X-RAY EXAM OF FOOT: CPT

## 2023-09-20 RX ORDER — IBUPROFEN 400 MG/1
400 TABLET ORAL ONCE
Status: COMPLETED | OUTPATIENT
Start: 2023-09-20 | End: 2023-09-20

## 2023-09-20 RX ORDER — ACETAMINOPHEN 500 MG
1000 TABLET ORAL ONCE
Status: COMPLETED | OUTPATIENT
Start: 2023-09-20 | End: 2023-09-20

## 2023-09-20 RX ADMIN — ACETAMINOPHEN 1000 MG: 500 TABLET ORAL at 12:57

## 2023-09-20 RX ADMIN — IBUPROFEN 400 MG: 400 TABLET, FILM COATED ORAL at 12:57

## 2023-09-20 ASSESSMENT — PAIN DESCRIPTION - FREQUENCY: FREQUENCY: CONTINUOUS

## 2023-09-20 ASSESSMENT — PAIN - FUNCTIONAL ASSESSMENT: PAIN_FUNCTIONAL_ASSESSMENT: 0-10

## 2023-09-20 ASSESSMENT — PAIN SCALES - GENERAL
PAINLEVEL_OUTOF10: 8
PAINLEVEL_OUTOF10: 9

## 2023-09-20 ASSESSMENT — PAIN DESCRIPTION - ORIENTATION: ORIENTATION: RIGHT

## 2023-09-20 ASSESSMENT — PAIN DESCRIPTION - DESCRIPTORS: DESCRIPTORS: ACHING

## 2023-09-20 ASSESSMENT — PAIN DESCRIPTION - LOCATION: LOCATION: FOOT

## 2023-09-20 NOTE — DISCHARGE INSTRUCTIONS
We evaluated you for your foot ulcer. It is consistent with a diabetic foot ulcer. There are no concerning signs for infection at this time. Your x-ray imaging was unremarkable. Please follow-up with your primary care provider as well as your podiatrist as soon as possible for further evaluation. Please return to the emergency department if you develop any worsening or concerning symptoms.

## 2023-09-24 ENCOUNTER — APPOINTMENT (OUTPATIENT)
Dept: GENERAL RADIOLOGY | Age: 53
DRG: 710 | End: 2023-09-24
Payer: COMMERCIAL

## 2023-09-24 ENCOUNTER — HOSPITAL ENCOUNTER (INPATIENT)
Age: 53
LOS: 8 days | Discharge: HOME | DRG: 710 | End: 2023-10-02
Attending: STUDENT IN AN ORGANIZED HEALTH CARE EDUCATION/TRAINING PROGRAM | Admitting: INTERNAL MEDICINE
Payer: COMMERCIAL

## 2023-09-24 DIAGNOSIS — L08.9 TYPE 2 DIABETES MELLITUS WITH RIGHT DIABETIC FOOT INFECTION (HCC): ICD-10-CM

## 2023-09-24 DIAGNOSIS — A48.0 GAS GANGRENE (HCC): ICD-10-CM

## 2023-09-24 DIAGNOSIS — E11.628 TYPE 2 DIABETES MELLITUS WITH RIGHT DIABETIC FOOT INFECTION (HCC): ICD-10-CM

## 2023-09-24 DIAGNOSIS — M72.6 NECROTIZING FASCIITIS OF ANKLE AND FOOT (HCC): Primary | ICD-10-CM

## 2023-09-24 DIAGNOSIS — G89.18 POST-OP PAIN: ICD-10-CM

## 2023-09-24 LAB
CRP SERPL HS-MCNC: 129 MG/L (ref 0–5)
ERYTHROCYTE [SEDIMENTATION RATE] IN BLOOD BY PHOTOMETRIC METHOD: 24 MM/HR (ref 0–20)
LACTATE BLDV-SCNC: 1.5 MMOL/L (ref 0.5–1.9)

## 2023-09-24 PROCEDURE — 85652 RBC SED RATE AUTOMATED: CPT

## 2023-09-24 PROCEDURE — 83605 ASSAY OF LACTIC ACID: CPT

## 2023-09-24 PROCEDURE — 85025 COMPLETE CBC W/AUTO DIFF WBC: CPT

## 2023-09-24 PROCEDURE — 86900 BLOOD TYPING SEROLOGIC ABO: CPT

## 2023-09-24 PROCEDURE — 96365 THER/PROPH/DIAG IV INF INIT: CPT

## 2023-09-24 PROCEDURE — 86901 BLOOD TYPING SEROLOGIC RH(D): CPT

## 2023-09-24 PROCEDURE — 87040 BLOOD CULTURE FOR BACTERIA: CPT

## 2023-09-24 PROCEDURE — 1200000000 HC SEMI PRIVATE

## 2023-09-24 PROCEDURE — 99285 EMERGENCY DEPT VISIT HI MDM: CPT

## 2023-09-24 PROCEDURE — 86850 RBC ANTIBODY SCREEN: CPT

## 2023-09-24 PROCEDURE — 2580000003 HC RX 258: Performed by: STUDENT IN AN ORGANIZED HEALTH CARE EDUCATION/TRAINING PROGRAM

## 2023-09-24 PROCEDURE — 6360000002 HC RX W HCPCS

## 2023-09-24 PROCEDURE — 6360000002 HC RX W HCPCS: Performed by: STUDENT IN AN ORGANIZED HEALTH CARE EDUCATION/TRAINING PROGRAM

## 2023-09-24 PROCEDURE — 80053 COMPREHEN METABOLIC PANEL: CPT

## 2023-09-24 PROCEDURE — 84145 PROCALCITONIN (PCT): CPT

## 2023-09-24 PROCEDURE — 96375 TX/PRO/DX INJ NEW DRUG ADDON: CPT

## 2023-09-24 PROCEDURE — 36415 COLL VENOUS BLD VENIPUNCTURE: CPT

## 2023-09-24 PROCEDURE — 86140 C-REACTIVE PROTEIN: CPT

## 2023-09-24 PROCEDURE — 73630 X-RAY EXAM OF FOOT: CPT

## 2023-09-24 RX ORDER — SODIUM CHLORIDE, SODIUM LACTATE, POTASSIUM CHLORIDE, AND CALCIUM CHLORIDE .6; .31; .03; .02 G/100ML; G/100ML; G/100ML; G/100ML
30 INJECTION, SOLUTION INTRAVENOUS ONCE
Status: COMPLETED | OUTPATIENT
Start: 2023-09-24 | End: 2023-09-25

## 2023-09-24 RX ORDER — LINEZOLID 2 MG/ML
600 INJECTION, SOLUTION INTRAVENOUS ONCE
Status: COMPLETED | OUTPATIENT
Start: 2023-09-24 | End: 2023-09-25

## 2023-09-24 RX ADMIN — SODIUM CHLORIDE, POTASSIUM CHLORIDE, SODIUM LACTATE AND CALCIUM CHLORIDE 2466 ML: 600; 310; 30; 20 INJECTION, SOLUTION INTRAVENOUS at 23:21

## 2023-09-24 RX ADMIN — PIPERACILLIN AND TAZOBACTAM 4500 MG: 4; .5 INJECTION, POWDER, LYOPHILIZED, FOR SOLUTION INTRAVENOUS at 23:22

## 2023-09-24 RX ADMIN — HYDROMORPHONE HYDROCHLORIDE 0.5 MG: 1 INJECTION, SOLUTION INTRAMUSCULAR; INTRAVENOUS; SUBCUTANEOUS at 23:07

## 2023-09-24 RX ADMIN — LINEZOLID 600 MG: 600 INJECTION, SOLUTION INTRAVENOUS at 23:56

## 2023-09-24 ASSESSMENT — PAIN DESCRIPTION - LOCATION: LOCATION: FOOT

## 2023-09-24 ASSESSMENT — PAIN DESCRIPTION - PAIN TYPE: TYPE: ACUTE PAIN

## 2023-09-24 ASSESSMENT — PAIN - FUNCTIONAL ASSESSMENT
PAIN_FUNCTIONAL_ASSESSMENT: INTOLERABLE, UNABLE TO DO ANY ACTIVE OR PASSIVE ACTIVITIES
PAIN_FUNCTIONAL_ASSESSMENT: 0-10

## 2023-09-24 ASSESSMENT — PAIN DESCRIPTION - DESCRIPTORS: DESCRIPTORS: SHARP;ACHING;SHOOTING

## 2023-09-24 ASSESSMENT — PAIN DESCRIPTION - ORIENTATION: ORIENTATION: RIGHT

## 2023-09-25 ENCOUNTER — ANESTHESIA EVENT (OUTPATIENT)
Dept: OPERATING ROOM | Age: 53
End: 2023-09-25
Payer: COMMERCIAL

## 2023-09-25 ENCOUNTER — APPOINTMENT (OUTPATIENT)
Dept: CT IMAGING | Age: 53
DRG: 710 | End: 2023-09-25
Payer: COMMERCIAL

## 2023-09-25 ENCOUNTER — APPOINTMENT (OUTPATIENT)
Dept: MRI IMAGING | Age: 53
DRG: 710 | End: 2023-09-25
Payer: COMMERCIAL

## 2023-09-25 ENCOUNTER — APPOINTMENT (OUTPATIENT)
Dept: GENERAL RADIOLOGY | Age: 53
DRG: 710 | End: 2023-09-25
Payer: COMMERCIAL

## 2023-09-25 ENCOUNTER — ANESTHESIA (OUTPATIENT)
Dept: OPERATING ROOM | Age: 53
End: 2023-09-25
Payer: COMMERCIAL

## 2023-09-25 PROBLEM — E11.628 TYPE 2 DIABETES MELLITUS WITH RIGHT DIABETIC FOOT INFECTION (HCC): Status: ACTIVE | Noted: 2023-09-25

## 2023-09-25 PROBLEM — M72.6 NECROTIZING FASCIITIS OF ANKLE AND FOOT (HCC): Status: ACTIVE | Noted: 2023-09-25

## 2023-09-25 PROBLEM — L08.9 TYPE 2 DIABETES MELLITUS WITH RIGHT DIABETIC FOOT INFECTION (HCC): Status: ACTIVE | Noted: 2023-09-25

## 2023-09-25 LAB
ABO + RH BLD: NORMAL
ABSOLUTE BANDS #: 0.53 K/UL (ref 0–1)
ALBUMIN SERPL-MCNC: 3.7 G/DL (ref 3.5–5.2)
ALP SERPL-CCNC: 93 U/L (ref 40–129)
ALT SERPL-CCNC: 13 U/L (ref 5–41)
ANION GAP SERPL CALCULATED.3IONS-SCNC: 16 MMOL/L (ref 9–17)
ANION GAP SERPL CALCULATED.3IONS-SCNC: 8 MMOL/L (ref 9–17)
ARM BAND NUMBER: NORMAL
AST SERPL-CCNC: 13 U/L
BANDS: 3 % (ref 0–10)
BASOPHILS # BLD: 0.1 K/UL (ref 0–0.2)
BASOPHILS # BLD: 0.18 K/UL (ref 0–0.2)
BASOPHILS NFR BLD: 0 % (ref 0–2)
BASOPHILS NFR BLD: 1 % (ref 0–2)
BILIRUB SERPL-MCNC: 1.1 MG/DL (ref 0.3–1.2)
BLOOD BANK SAMPLE EXPIRATION: NORMAL
BLOOD GROUP ANTIBODIES SERPL: NEGATIVE
BUN SERPL-MCNC: 15 MG/DL (ref 6–20)
BUN SERPL-MCNC: 19 MG/DL (ref 6–20)
CALCIUM SERPL-MCNC: 8.4 MG/DL (ref 8.6–10.4)
CALCIUM SERPL-MCNC: 9.2 MG/DL (ref 8.6–10.4)
CHLORIDE SERPL-SCNC: 93 MMOL/L (ref 98–107)
CHLORIDE SERPL-SCNC: 98 MMOL/L (ref 98–107)
CO2 SERPL-SCNC: 23 MMOL/L (ref 20–31)
CO2 SERPL-SCNC: 26 MMOL/L (ref 20–31)
CREAT SERPL-MCNC: 0.8 MG/DL (ref 0.7–1.2)
CREAT SERPL-MCNC: 1.1 MG/DL (ref 0.7–1.2)
CRP SERPL HS-MCNC: 113.5 MG/L (ref 0–5)
EOSINOPHIL # BLD: 0 K/UL (ref 0–0.4)
EOSINOPHIL # BLD: 0.18 K/UL (ref 0–0.4)
EOSINOPHILS RELATIVE PERCENT: 0 % (ref 0–4)
EOSINOPHILS RELATIVE PERCENT: 1 % (ref 0–4)
ERYTHROCYTE [DISTWIDTH] IN BLOOD BY AUTOMATED COUNT: 12.3 % (ref 11.5–14.9)
ERYTHROCYTE [DISTWIDTH] IN BLOOD BY AUTOMATED COUNT: 12.4 % (ref 11.5–14.9)
ERYTHROCYTE [SEDIMENTATION RATE] IN BLOOD BY PHOTOMETRIC METHOD: 44 MM/HR (ref 0–20)
GFR SERPL CREATININE-BSD FRML MDRD: >60 ML/MIN/1.73M2
GFR SERPL CREATININE-BSD FRML MDRD: >60 ML/MIN/1.73M2
GLUCOSE BLD-MCNC: 132 MG/DL (ref 75–110)
GLUCOSE BLD-MCNC: 136 MG/DL (ref 75–110)
GLUCOSE BLD-MCNC: 137 MG/DL (ref 75–110)
GLUCOSE BLD-MCNC: 148 MG/DL (ref 75–110)
GLUCOSE BLD-MCNC: 162 MG/DL (ref 75–110)
GLUCOSE BLD-MCNC: 177 MG/DL (ref 75–110)
GLUCOSE SERPL-MCNC: 153 MG/DL (ref 70–99)
GLUCOSE SERPL-MCNC: 159 MG/DL (ref 70–99)
HCT VFR BLD AUTO: 35.3 % (ref 41–53)
HCT VFR BLD AUTO: 38.4 % (ref 41–53)
HGB BLD-MCNC: 11.8 G/DL (ref 13.5–17.5)
HGB BLD-MCNC: 13.2 G/DL (ref 13.5–17.5)
LYMPHOCYTES NFR BLD: 1.58 K/UL (ref 1–4.8)
LYMPHOCYTES NFR BLD: 1.9 K/UL (ref 1–4.8)
LYMPHOCYTES RELATIVE PERCENT: 14 % (ref 24–44)
LYMPHOCYTES RELATIVE PERCENT: 9 % (ref 24–44)
MCH RBC QN AUTO: 32 PG (ref 26–34)
MCH RBC QN AUTO: 32.3 PG (ref 26–34)
MCHC RBC AUTO-ENTMCNC: 33.5 G/DL (ref 31–37)
MCHC RBC AUTO-ENTMCNC: 34.4 G/DL (ref 31–37)
MCV RBC AUTO: 94 FL (ref 80–100)
MCV RBC AUTO: 95.4 FL (ref 80–100)
MONOCYTES NFR BLD: 1.23 K/UL (ref 0.1–1.3)
MONOCYTES NFR BLD: 1.3 K/UL (ref 0.1–1.3)
MONOCYTES NFR BLD: 10 % (ref 1–7)
MONOCYTES NFR BLD: 7 % (ref 1–7)
MORPHOLOGY: ABNORMAL
NEUTROPHILS NFR BLD: 76 % (ref 36–66)
NEUTROPHILS NFR BLD: 79 % (ref 36–66)
NEUTS SEG NFR BLD: 10.4 K/UL (ref 1.3–9.1)
NEUTS SEG NFR BLD: 13.8 K/UL (ref 1.3–9.1)
PLATELET # BLD AUTO: 225 K/UL (ref 150–450)
PLATELET # BLD AUTO: 266 K/UL (ref 150–450)
PMV BLD AUTO: 7.9 FL (ref 6–12)
PMV BLD AUTO: 8.3 FL (ref 6–12)
POTASSIUM SERPL-SCNC: 4.4 MMOL/L (ref 3.7–5.3)
POTASSIUM SERPL-SCNC: 4.6 MMOL/L (ref 3.7–5.3)
PROCALCITONIN SERPL-MCNC: 0.12 NG/ML
PROT SERPL-MCNC: 7.9 G/DL (ref 6.4–8.3)
RBC # BLD AUTO: 3.7 M/UL (ref 4.5–5.9)
RBC # BLD AUTO: 4.09 M/UL (ref 4.5–5.9)
SODIUM SERPL-SCNC: 132 MMOL/L (ref 135–144)
SODIUM SERPL-SCNC: 132 MMOL/L (ref 135–144)
WBC OTHER # BLD: 13.7 K/UL (ref 3.5–11)
WBC OTHER # BLD: 17.5 K/UL (ref 3.5–11)

## 2023-09-25 PROCEDURE — 6360000002 HC RX W HCPCS: Performed by: NURSE ANESTHETIST, CERTIFIED REGISTERED

## 2023-09-25 PROCEDURE — 99223 1ST HOSP IP/OBS HIGH 75: CPT | Performed by: INTERNAL MEDICINE

## 2023-09-25 PROCEDURE — 86140 C-REACTIVE PROTEIN: CPT

## 2023-09-25 PROCEDURE — 87076 CULTURE ANAEROBE IDENT EACH: CPT

## 2023-09-25 PROCEDURE — 2500000003 HC RX 250 WO HCPCS: Performed by: PODIATRIST

## 2023-09-25 PROCEDURE — 2580000003 HC RX 258

## 2023-09-25 PROCEDURE — 6360000002 HC RX W HCPCS

## 2023-09-25 PROCEDURE — 73720 MRI LWR EXTREMITY W/O&W/DYE: CPT

## 2023-09-25 PROCEDURE — 87205 SMEAR GRAM STAIN: CPT

## 2023-09-25 PROCEDURE — 86403 PARTICLE AGGLUT ANTBDY SCRN: CPT

## 2023-09-25 PROCEDURE — 88311 DECALCIFY TISSUE: CPT

## 2023-09-25 PROCEDURE — 2580000003 HC RX 258: Performed by: NURSE PRACTITIONER

## 2023-09-25 PROCEDURE — 87186 SC STD MICRODIL/AGAR DIL: CPT

## 2023-09-25 PROCEDURE — 6360000004 HC RX CONTRAST MEDICATION

## 2023-09-25 PROCEDURE — 73630 X-RAY EXAM OF FOOT: CPT

## 2023-09-25 PROCEDURE — 0Y6T0Z0 DETACHMENT AT RIGHT 3RD TOE, COMPLETE, OPEN APPROACH: ICD-10-PCS | Performed by: PODIATRIST

## 2023-09-25 PROCEDURE — 3600000002 HC SURGERY LEVEL 2 BASE: Performed by: PODIATRIST

## 2023-09-25 PROCEDURE — 88305 TISSUE EXAM BY PATHOLOGIST: CPT

## 2023-09-25 PROCEDURE — 87075 CULTR BACTERIA EXCEPT BLOOD: CPT

## 2023-09-25 PROCEDURE — 36415 COLL VENOUS BLD VENIPUNCTURE: CPT

## 2023-09-25 PROCEDURE — 3700000000 HC ANESTHESIA ATTENDED CARE: Performed by: PODIATRIST

## 2023-09-25 PROCEDURE — A9579 GAD-BASE MR CONTRAST NOS,1ML: HCPCS

## 2023-09-25 PROCEDURE — 28003 TREATMENT OF FOOT INFECTION: CPT | Performed by: PODIATRIST

## 2023-09-25 PROCEDURE — 3600000012 HC SURGERY LEVEL 2 ADDTL 15MIN: Performed by: PODIATRIST

## 2023-09-25 PROCEDURE — 7100000000 HC PACU RECOVERY - FIRST 15 MIN: Performed by: PODIATRIST

## 2023-09-25 PROCEDURE — 99221 1ST HOSP IP/OBS SF/LOW 40: CPT | Performed by: PODIATRIST

## 2023-09-25 PROCEDURE — 28820 AMPUTATION OF TOE: CPT | Performed by: PODIATRIST

## 2023-09-25 PROCEDURE — 82947 ASSAY GLUCOSE BLOOD QUANT: CPT

## 2023-09-25 PROCEDURE — 2709999900 HC NON-CHARGEABLE SUPPLY: Performed by: PODIATRIST

## 2023-09-25 PROCEDURE — 6370000000 HC RX 637 (ALT 250 FOR IP): Performed by: NURSE PRACTITIONER

## 2023-09-25 PROCEDURE — 85025 COMPLETE CBC W/AUTO DIFF WBC: CPT

## 2023-09-25 PROCEDURE — 87077 CULTURE AEROBIC IDENTIFY: CPT

## 2023-09-25 PROCEDURE — 6360000002 HC RX W HCPCS: Performed by: STUDENT IN AN ORGANIZED HEALTH CARE EDUCATION/TRAINING PROGRAM

## 2023-09-25 PROCEDURE — 2500000003 HC RX 250 WO HCPCS: Performed by: NURSE ANESTHETIST, CERTIFIED REGISTERED

## 2023-09-25 PROCEDURE — 3700000001 HC ADD 15 MINUTES (ANESTHESIA): Performed by: PODIATRIST

## 2023-09-25 PROCEDURE — 1200000000 HC SEMI PRIVATE

## 2023-09-25 PROCEDURE — 6370000000 HC RX 637 (ALT 250 FOR IP)

## 2023-09-25 PROCEDURE — 85652 RBC SED RATE AUTOMATED: CPT

## 2023-09-25 PROCEDURE — 80048 BASIC METABOLIC PNL TOTAL CA: CPT

## 2023-09-25 PROCEDURE — 7100000001 HC PACU RECOVERY - ADDTL 15 MIN: Performed by: PODIATRIST

## 2023-09-25 PROCEDURE — 6360000002 HC RX W HCPCS: Performed by: INTERNAL MEDICINE

## 2023-09-25 PROCEDURE — 6360000002 HC RX W HCPCS: Performed by: PODIATRIST

## 2023-09-25 PROCEDURE — 73702 CT LWR EXTREMITY W/O&W/DYE: CPT

## 2023-09-25 PROCEDURE — 87185 SC STD ENZYME DETCJ PER NZM: CPT

## 2023-09-25 PROCEDURE — 87070 CULTURE OTHR SPECIMN AEROBIC: CPT

## 2023-09-25 PROCEDURE — 6360000002 HC RX W HCPCS: Performed by: NURSE PRACTITIONER

## 2023-09-25 RX ORDER — SODIUM CHLORIDE 9 MG/ML
INJECTION, SOLUTION INTRAVENOUS PRN
Status: DISCONTINUED | OUTPATIENT
Start: 2023-09-25 | End: 2023-09-25 | Stop reason: HOSPADM

## 2023-09-25 RX ORDER — SODIUM CHLORIDE 0.9 % (FLUSH) 0.9 %
10 SYRINGE (ML) INJECTION PRN
Status: DISCONTINUED | OUTPATIENT
Start: 2023-09-25 | End: 2023-10-02 | Stop reason: HOSPADM

## 2023-09-25 RX ORDER — MIDAZOLAM HYDROCHLORIDE 1 MG/ML
INJECTION INTRAMUSCULAR; INTRAVENOUS PRN
Status: DISCONTINUED | OUTPATIENT
Start: 2023-09-25 | End: 2023-09-25 | Stop reason: SDUPTHER

## 2023-09-25 RX ORDER — DIPHENHYDRAMINE HYDROCHLORIDE 50 MG/ML
12.5 INJECTION INTRAMUSCULAR; INTRAVENOUS
Status: DISCONTINUED | OUTPATIENT
Start: 2023-09-25 | End: 2023-09-25 | Stop reason: HOSPADM

## 2023-09-25 RX ORDER — DEXTROSE MONOHYDRATE 100 MG/ML
INJECTION, SOLUTION INTRAVENOUS CONTINUOUS PRN
Status: DISCONTINUED | OUTPATIENT
Start: 2023-09-25 | End: 2023-10-02 | Stop reason: HOSPADM

## 2023-09-25 RX ORDER — SODIUM CHLORIDE 0.9 % (FLUSH) 0.9 %
5-40 SYRINGE (ML) INJECTION EVERY 12 HOURS SCHEDULED
Status: DISCONTINUED | OUTPATIENT
Start: 2023-09-25 | End: 2023-09-25 | Stop reason: HOSPADM

## 2023-09-25 RX ORDER — LABETALOL HYDROCHLORIDE 5 MG/ML
10 INJECTION, SOLUTION INTRAVENOUS
Status: DISCONTINUED | OUTPATIENT
Start: 2023-09-25 | End: 2023-09-25 | Stop reason: HOSPADM

## 2023-09-25 RX ORDER — LINEZOLID 2 MG/ML
600 INJECTION, SOLUTION INTRAVENOUS EVERY 12 HOURS
Status: DISCONTINUED | OUTPATIENT
Start: 2023-09-25 | End: 2023-09-26

## 2023-09-25 RX ORDER — MEPERIDINE HYDROCHLORIDE 25 MG/ML
12.5 INJECTION INTRAMUSCULAR; INTRAVENOUS; SUBCUTANEOUS EVERY 5 MIN PRN
Status: DISCONTINUED | OUTPATIENT
Start: 2023-09-25 | End: 2023-09-25 | Stop reason: HOSPADM

## 2023-09-25 RX ORDER — PROPOFOL 10 MG/ML
INJECTION, EMULSION INTRAVENOUS CONTINUOUS PRN
Status: DISCONTINUED | OUTPATIENT
Start: 2023-09-25 | End: 2023-09-25 | Stop reason: SDUPTHER

## 2023-09-25 RX ORDER — LISINOPRIL 20 MG/1
20 TABLET ORAL DAILY
Status: DISCONTINUED | OUTPATIENT
Start: 2023-09-25 | End: 2023-10-02 | Stop reason: HOSPADM

## 2023-09-25 RX ORDER — SODIUM CHLORIDE 9 MG/ML
INJECTION, SOLUTION INTRAVENOUS CONTINUOUS
Status: DISCONTINUED | OUTPATIENT
Start: 2023-09-25 | End: 2023-10-01

## 2023-09-25 RX ORDER — ACETAMINOPHEN 650 MG/1
650 SUPPOSITORY RECTAL EVERY 6 HOURS PRN
Status: DISCONTINUED | OUTPATIENT
Start: 2023-09-25 | End: 2023-10-02 | Stop reason: HOSPADM

## 2023-09-25 RX ORDER — INSULIN LISPRO 100 [IU]/ML
0-8 INJECTION, SOLUTION INTRAVENOUS; SUBCUTANEOUS
Status: DISCONTINUED | OUTPATIENT
Start: 2023-09-25 | End: 2023-10-02 | Stop reason: HOSPADM

## 2023-09-25 RX ORDER — LIDOCAINE HYDROCHLORIDE 10 MG/ML
INJECTION, SOLUTION EPIDURAL; INFILTRATION; INTRACAUDAL; PERINEURAL PRN
Status: DISCONTINUED | OUTPATIENT
Start: 2023-09-25 | End: 2023-09-25 | Stop reason: SDUPTHER

## 2023-09-25 RX ORDER — ACETAMINOPHEN 650 MG
TABLET, EXTENDED RELEASE ORAL PRN
Status: DISCONTINUED | OUTPATIENT
Start: 2023-09-25 | End: 2023-10-02 | Stop reason: HOSPADM

## 2023-09-25 RX ORDER — HYDRALAZINE HYDROCHLORIDE 20 MG/ML
10 INJECTION INTRAMUSCULAR; INTRAVENOUS
Status: DISCONTINUED | OUTPATIENT
Start: 2023-09-25 | End: 2023-09-25 | Stop reason: HOSPADM

## 2023-09-25 RX ORDER — INSULIN LISPRO 100 [IU]/ML
0-4 INJECTION, SOLUTION INTRAVENOUS; SUBCUTANEOUS NIGHTLY
Status: DISCONTINUED | OUTPATIENT
Start: 2023-09-25 | End: 2023-10-02 | Stop reason: HOSPADM

## 2023-09-25 RX ORDER — EPHEDRINE SULFATE/0.9% NACL/PF 50 MG/5 ML
SYRINGE (ML) INTRAVENOUS PRN
Status: DISCONTINUED | OUTPATIENT
Start: 2023-09-25 | End: 2023-09-25 | Stop reason: SDUPTHER

## 2023-09-25 RX ORDER — SODIUM CHLORIDE 9 MG/ML
INJECTION, SOLUTION INTRAVENOUS PRN
Status: DISCONTINUED | OUTPATIENT
Start: 2023-09-25 | End: 2023-10-02 | Stop reason: HOSPADM

## 2023-09-25 RX ORDER — FENTANYL CITRATE 0.05 MG/ML
25 INJECTION, SOLUTION INTRAMUSCULAR; INTRAVENOUS EVERY 5 MIN PRN
Status: DISCONTINUED | OUTPATIENT
Start: 2023-09-25 | End: 2023-09-25 | Stop reason: HOSPADM

## 2023-09-25 RX ORDER — LIDOCAINE HYDROCHLORIDE 10 MG/ML
INJECTION, SOLUTION INFILTRATION; PERINEURAL PRN
Status: DISCONTINUED | OUTPATIENT
Start: 2023-09-25 | End: 2023-09-25 | Stop reason: HOSPADM

## 2023-09-25 RX ORDER — PROPOFOL 10 MG/ML
INJECTION, EMULSION INTRAVENOUS PRN
Status: DISCONTINUED | OUTPATIENT
Start: 2023-09-25 | End: 2023-09-25 | Stop reason: SDUPTHER

## 2023-09-25 RX ORDER — CLINDAMYCIN PHOSPHATE 600 MG/50ML
600 INJECTION INTRAVENOUS EVERY 8 HOURS
Status: DISPENSED | OUTPATIENT
Start: 2023-09-25 | End: 2023-09-30

## 2023-09-25 RX ORDER — 0.9 % SODIUM CHLORIDE 0.9 %
100 INTRAVENOUS SOLUTION INTRAVENOUS ONCE
Status: COMPLETED | OUTPATIENT
Start: 2023-09-25 | End: 2023-09-25

## 2023-09-25 RX ORDER — SODIUM CHLORIDE 0.9 % (FLUSH) 0.9 %
5-40 SYRINGE (ML) INJECTION PRN
Status: DISCONTINUED | OUTPATIENT
Start: 2023-09-25 | End: 2023-09-25 | Stop reason: HOSPADM

## 2023-09-25 RX ORDER — GLYCOPYRROLATE 0.2 MG/ML
INJECTION INTRAMUSCULAR; INTRAVENOUS PRN
Status: DISCONTINUED | OUTPATIENT
Start: 2023-09-25 | End: 2023-09-25 | Stop reason: SDUPTHER

## 2023-09-25 RX ORDER — SODIUM HYPOCHLORITE 1.25 MG/ML
SOLUTION TOPICAL DAILY PRN
Status: DISCONTINUED | OUTPATIENT
Start: 2023-09-25 | End: 2023-10-02 | Stop reason: HOSPADM

## 2023-09-25 RX ORDER — INSULIN GLARGINE 100 [IU]/ML
35 INJECTION, SOLUTION SUBCUTANEOUS EVERY MORNING
Status: DISCONTINUED | OUTPATIENT
Start: 2023-09-25 | End: 2023-10-02 | Stop reason: HOSPADM

## 2023-09-25 RX ORDER — METOCLOPRAMIDE HYDROCHLORIDE 5 MG/ML
10 INJECTION INTRAMUSCULAR; INTRAVENOUS
Status: DISCONTINUED | OUTPATIENT
Start: 2023-09-25 | End: 2023-09-25 | Stop reason: HOSPADM

## 2023-09-25 RX ORDER — ACETAMINOPHEN 325 MG/1
650 TABLET ORAL
Status: DISCONTINUED | OUTPATIENT
Start: 2023-09-25 | End: 2023-09-25 | Stop reason: HOSPADM

## 2023-09-25 RX ORDER — ONDANSETRON 4 MG/1
4 TABLET, ORALLY DISINTEGRATING ORAL EVERY 8 HOURS PRN
Status: DISCONTINUED | OUTPATIENT
Start: 2023-09-25 | End: 2023-10-02 | Stop reason: HOSPADM

## 2023-09-25 RX ORDER — FENTANYL CITRATE 50 UG/ML
INJECTION, SOLUTION INTRAMUSCULAR; INTRAVENOUS PRN
Status: DISCONTINUED | OUTPATIENT
Start: 2023-09-25 | End: 2023-09-25 | Stop reason: SDUPTHER

## 2023-09-25 RX ORDER — ONDANSETRON 2 MG/ML
4 INJECTION INTRAMUSCULAR; INTRAVENOUS
Status: DISCONTINUED | OUTPATIENT
Start: 2023-09-25 | End: 2023-09-25 | Stop reason: HOSPADM

## 2023-09-25 RX ORDER — SODIUM CHLORIDE 0.9 % (FLUSH) 0.9 %
5-40 SYRINGE (ML) INJECTION EVERY 12 HOURS SCHEDULED
Status: DISCONTINUED | OUTPATIENT
Start: 2023-09-25 | End: 2023-10-02 | Stop reason: HOSPADM

## 2023-09-25 RX ORDER — ACETAMINOPHEN 325 MG/1
650 TABLET ORAL EVERY 6 HOURS PRN
Status: DISCONTINUED | OUTPATIENT
Start: 2023-09-25 | End: 2023-10-02 | Stop reason: HOSPADM

## 2023-09-25 RX ORDER — BUPIVACAINE HYDROCHLORIDE 5 MG/ML
INJECTION, SOLUTION EPIDURAL; INTRACAUDAL PRN
Status: DISCONTINUED | OUTPATIENT
Start: 2023-09-25 | End: 2023-09-25 | Stop reason: HOSPADM

## 2023-09-25 RX ORDER — ONDANSETRON 2 MG/ML
4 INJECTION INTRAMUSCULAR; INTRAVENOUS EVERY 6 HOURS PRN
Status: DISCONTINUED | OUTPATIENT
Start: 2023-09-25 | End: 2023-10-02 | Stop reason: HOSPADM

## 2023-09-25 RX ADMIN — PIPERACILLIN AND TAZOBACTAM 3375 MG: 3; .375 INJECTION, POWDER, LYOPHILIZED, FOR SOLUTION INTRAVENOUS at 18:20

## 2023-09-25 RX ADMIN — SODIUM CHLORIDE, PRESERVATIVE FREE 10 ML: 5 INJECTION INTRAVENOUS at 03:26

## 2023-09-25 RX ADMIN — IOPAMIDOL 75 ML: 755 INJECTION, SOLUTION INTRAVENOUS at 03:26

## 2023-09-25 RX ADMIN — CLINDAMYCIN IN 5 PERCENT DEXTROSE 600 MG: 12 INJECTION, SOLUTION INTRAVENOUS at 19:07

## 2023-09-25 RX ADMIN — HYDROMORPHONE HYDROCHLORIDE 1 MG: 1 INJECTION, SOLUTION INTRAMUSCULAR; INTRAVENOUS; SUBCUTANEOUS at 21:50

## 2023-09-25 RX ADMIN — FENTANYL CITRATE 50 MCG: 50 INJECTION, SOLUTION INTRAMUSCULAR; INTRAVENOUS at 14:24

## 2023-09-25 RX ADMIN — SODIUM CHLORIDE: 9 INJECTION, SOLUTION INTRAVENOUS at 01:30

## 2023-09-25 RX ADMIN — HYDROMORPHONE HYDROCHLORIDE 1 MG: 1 INJECTION, SOLUTION INTRAMUSCULAR; INTRAVENOUS; SUBCUTANEOUS at 16:54

## 2023-09-25 RX ADMIN — Medication 10 MG: at 14:38

## 2023-09-25 RX ADMIN — SODIUM CHLORIDE 100 ML: 9 INJECTION, SOLUTION INTRAVENOUS at 03:26

## 2023-09-25 RX ADMIN — PROPOFOL 50 MG: 10 INJECTION, EMULSION INTRAVENOUS at 14:15

## 2023-09-25 RX ADMIN — HYDROMORPHONE HYDROCHLORIDE 1 MG: 1 INJECTION, SOLUTION INTRAMUSCULAR; INTRAVENOUS; SUBCUTANEOUS at 09:58

## 2023-09-25 RX ADMIN — FENTANYL CITRATE 50 MCG: 50 INJECTION, SOLUTION INTRAMUSCULAR; INTRAVENOUS at 14:15

## 2023-09-25 RX ADMIN — PROPOFOL 100 MG: 10 INJECTION, EMULSION INTRAVENOUS at 14:04

## 2023-09-25 RX ADMIN — SODIUM CHLORIDE, PRESERVATIVE FREE 10 ML: 5 INJECTION INTRAVENOUS at 21:31

## 2023-09-25 RX ADMIN — GLYCOPYRROLATE 0.2 MG: 0.2 INJECTION INTRAMUSCULAR; INTRAVENOUS at 14:15

## 2023-09-25 RX ADMIN — LINEZOLID 600 MG: 600 INJECTION, SOLUTION INTRAVENOUS at 11:49

## 2023-09-25 RX ADMIN — LISINOPRIL 20 MG: 20 TABLET ORAL at 10:23

## 2023-09-25 RX ADMIN — CLINDAMYCIN IN 5 PERCENT DEXTROSE 600 MG: 12 INJECTION, SOLUTION INTRAVENOUS at 10:53

## 2023-09-25 RX ADMIN — PHENYLEPHRINE HYDROCHLORIDE 100 MCG: 10 INJECTION INTRAVENOUS at 14:29

## 2023-09-25 RX ADMIN — ACETAMINOPHEN 650 MG: 325 TABLET ORAL at 20:07

## 2023-09-25 RX ADMIN — HYDROMORPHONE HYDROCHLORIDE 1 MG: 1 INJECTION, SOLUTION INTRAMUSCULAR; INTRAVENOUS; SUBCUTANEOUS at 00:10

## 2023-09-25 RX ADMIN — SODIUM CHLORIDE: 9 INJECTION, SOLUTION INTRAVENOUS at 13:30

## 2023-09-25 RX ADMIN — LIDOCAINE HYDROCHLORIDE 40 MG: 10 INJECTION, SOLUTION EPIDURAL; INFILTRATION; INTRACAUDAL; PERINEURAL at 14:04

## 2023-09-25 RX ADMIN — SODIUM CHLORIDE, PRESERVATIVE FREE 10 ML: 5 INJECTION INTRAVENOUS at 10:26

## 2023-09-25 RX ADMIN — GADOTERIDOL 20 ML: 279.3 INJECTION, SOLUTION INTRAVENOUS at 21:30

## 2023-09-25 RX ADMIN — PROPOFOL 150 MCG/KG/MIN: 10 INJECTION, EMULSION INTRAVENOUS at 14:04

## 2023-09-25 RX ADMIN — PIPERACILLIN AND TAZOBACTAM 3375 MG: 3; .375 INJECTION, POWDER, LYOPHILIZED, FOR SOLUTION INTRAVENOUS at 06:17

## 2023-09-25 RX ADMIN — ACETAMINOPHEN 650 MG: 325 TABLET ORAL at 04:00

## 2023-09-25 RX ADMIN — SODIUM CHLORIDE: 9 INJECTION, SOLUTION INTRAVENOUS at 17:23

## 2023-09-25 RX ADMIN — MIDAZOLAM 2 MG: 1 INJECTION INTRAMUSCULAR; INTRAVENOUS at 13:58

## 2023-09-25 ASSESSMENT — PAIN SCALES - GENERAL
PAINLEVEL_OUTOF10: 5
PAINLEVEL_OUTOF10: 8
PAINLEVEL_OUTOF10: 0
PAINLEVEL_OUTOF10: 2
PAINLEVEL_OUTOF10: 9

## 2023-09-25 ASSESSMENT — ENCOUNTER SYMPTOMS
VOMITING: 0
GASTROINTESTINAL NEGATIVE: 1
ABDOMINAL PAIN: 0
STRIDOR: 0
SHORTNESS OF BREATH: 0
COLOR CHANGE: 1
RESPIRATORY NEGATIVE: 1
EYES NEGATIVE: 1
NAUSEA: 0
ALLERGIC/IMMUNOLOGIC NEGATIVE: 1

## 2023-09-25 ASSESSMENT — PAIN DESCRIPTION - LOCATION
LOCATION: FOOT
LOCATION: FOOT
LOCATION: LEG

## 2023-09-25 ASSESSMENT — PAIN - FUNCTIONAL ASSESSMENT: PAIN_FUNCTIONAL_ASSESSMENT: 0-10

## 2023-09-25 ASSESSMENT — LIFESTYLE VARIABLES: SMOKING_STATUS: 0

## 2023-09-25 ASSESSMENT — PAIN DESCRIPTION - ORIENTATION
ORIENTATION: RIGHT

## 2023-09-25 NOTE — OP NOTE
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]  Necrotizing fasciitis right foot  Diabetic foot infection right  Diabetes with neuropathy     Post-Op Diagnosis: Same       Procedure(s):  Incision and drainage down to bone/subfascial, right foot  Third digit amputation, right foot     Surgeon(s):  Elias Marie DPM     Assistant:  Resident: Luz Wood DPM, Ej Marquez PGY1     Anesthesia: Monitor Anesthesia Care, 20 cc 1:1 mix of 0.5% marcaine plain and 1% lidocaine plain injected via ankle block preoperatively     Hemostasis: Direct pressure and electrocautery were used for hemostasis     Injectables: None      Estimated Blood Loss (mL): less than 575      Complications: None     Specimens:   ID Type Source Tests Collected by Time Destination   1 : RIGHT FOOT CULTURES  Tissue Foot CULTURE, ANAEROBIC AND AEROBIC Elias Marie DPM 9/25/2023 1453     A : RIGHT THIRD TOE  Tissue Toe SURGICAL PATHOLOGY Elias Marie DPM 9/25/2023 1439           Implants:  * No implants in log *      Drains: * No LDAs found *     Findings: Patient underwent incision and drainage from a curvilinear approach on the dorsal aspect of the foot. The plantar ulceration was extended and explored. It was noted that the tissues adjacent and within the third digit were necrotic and nonviable, with minimal bleeding and myonecrosis. The decision was made to amputate the third digit. Specimens were sent for culture and pathology. The remaining metatarsal head appeared intact with normal coloration. The wound was packed open and left to drain. There is concern for viability of the second and fourth digits. A postoperative MRI was ordered with attention directed to these areas.   Postoperative plan for the patient is to have daily dressing changes and return to the OR on Friday for another

## 2023-09-25 NOTE — BRIEF OP NOTE
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]    Post-Op Diagnosis: Same       Procedure(s):  INCISION AND DRAINAGE AND DEBRIDEMENT RIGHT FOOT W/ 3RD RIGHT AMPUTATION    Surgeon(s):  Suzi Villatoro DPM    Assistant:  Resident: Zachariah Antoine DPM, Tierra Downing PGY1    Anesthesia: Monitor Anesthesia Care, 20 cc 1:1 mix of 0.5% marcaine plain and 1% lidocaine plain injected via ankle block preoperatively    Hemostasis: Direct pressure and electrocautery were used for hemostasis    Injectables: None     Estimated Blood Loss (mL): less than 671     Complications: None    Specimens:   ID Type Source Tests Collected by Time Destination   1 : RIGHT FOOT CULTURES  Tissue Foot CULTURE, ANAEROBIC AND AEROBIC Suzi Villatoro DPM 9/25/2023 1453    A : RIGHT THIRD TOE  Tissue Toe SURGICAL PATHOLOGY Suzi Villatoro DPM 9/25/2023 1439        Implants:  * No implants in log *      Drains: * No LDAs found *    Findings: Patient underwent incision and drainage from a curvilinear approach on the dorsal aspect of the foot. The plantar ulceration was extended and explored. It was noted that the tissues adjacent and within the third digit were necrotic and nonviable, with minimal bleeding and myonecrosis. The decision was made to amputate the third digit. Specimens were sent for culture and pathology. The remaining metatarsal head appeared intact with normal coloration. The wound was packed open and left to drain. There is concern for viability of the second and fourth digits. A postoperative MRI was ordered with attention directed to these areas. Postoperative plan for the patient is to have daily dressing changes and return to the OR on Friday for another washout versus closure with skin graft substitute.       Electronically signed by Zachariah Antoine DPM on 9/25/2023 at 3:11

## 2023-09-25 NOTE — PLAN OF CARE
Patient was seen and evaluated bedside this morning. Dressings were left intact. Pain action does report some pain to the right foot. Discussed surgical planning with the patient including incision and drainage of that foot with possible third ray amputation. Patient is scheduled for surgery midday today. He has been n.p.o. since midnight. Patient will remain in house and on IV antibiotics after surgery. Please page podiatry resident on-call with any concerns.     Trish Haile DPM  Podiatric Medicine & Surgery, PGY-2  [address] [place] [address]

## 2023-09-25 NOTE — ED NOTES
Able to reposition self independently for comfort. RLE elevated with pillows. Call light w/in reach. No add'l needs at this time.       Romina Garcia RN  09/25/23 7025
Attempted report to [id number] [facility] Chavez, primary RN unavailable. Await return call.       Cammie Castro, BONG  09/25/23 7749
Patient returned from [address] [facility]. Requested urinal, provided.       Edita Harrison RN  09/25/23 3345
Podiatry at bedside for I&D. Bedside procedure performed and dressing applied by Podiatry. Dry and intact.      Devin Rubio RN  09/25/23 0828
Report called to [facility]. All questions answered. Patient okay to transport via bed.       Melany Olson RN  09/25/23 8972
Repositioned for comfort.  One Casey Perkins RN  09/25/23 4659
Detail Level: Zone
Recommendations (Free Text): Triamcinolone cream twice daily to affected area \\nCerave moisturizer twice a day \\nIf no improvement at follow up will re-biopsy

## 2023-09-25 NOTE — PLAN OF CARE
Please fill out the electronic MRI Screening Form with the patient, or the patient representative. Any questions. .please call [phone] MRI @ [phone]. MRI exam will be scheduled after receiving the completed screening form.  Thank you!!

## 2023-09-25 NOTE — ANESTHESIA PRE PROCEDURE
Department of Anesthesiology  Preprocedure Note       Name:  Dina Tan   Age:  46 y.o.  :  1970                                          MRN:  [id number]         Date:  2023      Surgeon: Luis A Rod):  Christine Calderon DPM    Procedure: Procedure(s):  I/D RIGHT FOOT W/POSSIBLE 3RD RIGHT AMPUTATION    Medications prior to admission:   Prior to Admission medications    Medication Sig Start Date End Date Taking? Authorizing Provider   metFORMIN (GLUCOPHAGE) 850 MG tablet TAKE 1 TABLET BY MOUTH TWO TIMES A DAY WITH MEALS  Patient taking differently: Has only been taking daily d/t side effects 23  Yes Elliott Richter MD   LANTUS SOLOSTAR 100 UNIT/ML injection pen INJECT [address] BREAKFAST 3/2/23  Yes Elliott Richter MD   lisinopril (PRINIVIL;ZESTRIL) 20 MG tablet Take 1 tablet by mouth daily 23  Yes Elliott Richter MD   SITagliptin (JANUVIA) 50 MG tablet Take 1 tablet by mouth daily 10/25/22  Yes Elliott Richter MD   Alcohol Swabs (ALCOHOL PREP) PADS USE TO TEST BLOOD SUAGR ONCE DAILY 23   Elliott Richter MD   Insulin Pen Needle (TRUEPLUS 5-BEVEL PEN NEEDLES) 32G X 4 MM MISC USE WITH INSULIN DAILY 3/2/23   Elliott Richter MD   blood glucose monitor kit and supplies Test one time a day & as needed for symptoms of irregular blood glucose. 2/10/23   Elliott Richter MD   blood glucose monitor strips Testing once a day. Please dispense according to patients insurance. 2/10/23   Elliott Richter MD   Insulin Pen Needle 29G X 12.7MM MISC 1 each by Does not apply route daily To use with insulin 10/25/22   Elliott Richter MD   acetaminophen (TYLENOL) 500 MG tablet Take 1 tablet by mouth 4 times daily as needed for Pain 21   Chantal Murillo MD   Lancets MISC Testing once a day. Please dispense according to patients insurance.  19   Elliott Richter MD   Blood Pressure Monitor MISC Use daily to check BP 3/13/18   Elliott Richter MD       Current medications:    Current

## 2023-09-25 NOTE — H&P
in the soft tissues of visualized distal right leg, around right ankle and in the visualized proximal and midportion of the right foot. Evidence of several locules of gas in the subcutaneous fatty tissues at the posteromedial and posterolateral aspect of distal right leg, just superior to right ankle. Also there are small locules of gas in the soft tissues in the dorsal aspect of medial portion of mid section of right foot dorsal to the distal tarsal and metatarsal bones. The findings may indicate infection with gas-forming organism. There is no definite localized abscess or hematoma in the soft tissues around right ankle or in proximal right foot. CT FOOT RIGHT W WO CONTRAST    Result Date: 9/25/2023  Diffuse soft tissue swelling with edema and cellulitis in right foot and around right ankle extended to visualized distal portion of right leg. Inferior to the distal ends of the right 2nd and 3rd metatarsal bones and extended inferior to the base of the 2nd and 3rd toes. In the plantar soft tissue there is focal soft tissue swelling with a prominent gas containing area measuring 2.8 x 1.49 x 2.3 cm, indicating focal abscess, which might have been drained partially. Evidence of some locules of gas dorsal to the proximal portion of the 1st intermetatarsal space between the 1st and 2nd metatarsal bones and also dorsal to the base of the 1st and 2nd metatarsal bones and continue dorsal to the medial cuneiform bones. Additional locules of gas can be identified in the soft tissues in the distal right leg at the posteromedial and posterolateral aspect superior right ankle but without any localized abscess. No evidence of fracture, healing fracture, bony erosion, osteomyelitis or septic arthritis in the bones and joints of right ankle and foot. XR FOOT RIGHT (MIN 3 VIEWS)    Result Date: 9/24/2023  1. Soft tissue edema with new gas formation right 3rd toe concerning for gas gangrene.  2. No acute osseous

## 2023-09-26 ENCOUNTER — APPOINTMENT (OUTPATIENT)
Dept: VASCULAR LAB | Age: 53
DRG: 710 | End: 2023-09-26
Payer: COMMERCIAL

## 2023-09-26 PROBLEM — I73.9 PAD (PERIPHERAL ARTERY DISEASE) (HCC): Status: ACTIVE | Noted: 2023-09-26

## 2023-09-26 LAB
ANION GAP SERPL CALCULATED.3IONS-SCNC: 7 MMOL/L (ref 9–17)
BASOPHILS # BLD: 0 K/UL (ref 0–0.2)
BASOPHILS NFR BLD: 0 % (ref 0–2)
BUN SERPL-MCNC: 13 MG/DL (ref 6–20)
CALCIUM SERPL-MCNC: 8.3 MG/DL (ref 8.6–10.4)
CHLORIDE SERPL-SCNC: 99 MMOL/L (ref 98–107)
CO2 SERPL-SCNC: 28 MMOL/L (ref 20–31)
CREAT SERPL-MCNC: 0.8 MG/DL (ref 0.7–1.2)
CRP SERPL HS-MCNC: 129.6 MG/L (ref 0–5)
ECHO BSA: 2.26 M2
EOSINOPHIL # BLD: 0.1 K/UL (ref 0–0.4)
EOSINOPHILS RELATIVE PERCENT: 1 % (ref 0–4)
ERYTHROCYTE [DISTWIDTH] IN BLOOD BY AUTOMATED COUNT: 12.3 % (ref 11.5–14.9)
GFR SERPL CREATININE-BSD FRML MDRD: >60 ML/MIN/1.73M2
GLUCOSE BLD-MCNC: 117 MG/DL (ref 75–110)
GLUCOSE BLD-MCNC: 134 MG/DL (ref 75–110)
GLUCOSE BLD-MCNC: 142 MG/DL (ref 75–110)
GLUCOSE BLD-MCNC: 161 MG/DL (ref 75–110)
GLUCOSE SERPL-MCNC: 185 MG/DL (ref 70–99)
HCT VFR BLD AUTO: 33.6 % (ref 41–53)
HGB BLD-MCNC: 11.1 G/DL (ref 13.5–17.5)
LYMPHOCYTES NFR BLD: 1.3 K/UL (ref 1–4.8)
LYMPHOCYTES RELATIVE PERCENT: 13 % (ref 24–44)
MCH RBC QN AUTO: 31.2 PG (ref 26–34)
MCHC RBC AUTO-ENTMCNC: 33.1 G/DL (ref 31–37)
MCV RBC AUTO: 94.3 FL (ref 80–100)
MONOCYTES NFR BLD: 1 K/UL (ref 0.1–1.3)
MONOCYTES NFR BLD: 10 % (ref 1–7)
NEUTROPHILS NFR BLD: 76 % (ref 36–66)
NEUTS SEG NFR BLD: 8 K/UL (ref 1.3–9.1)
PLATELET # BLD AUTO: 254 K/UL (ref 150–450)
PMV BLD AUTO: 7.9 FL (ref 6–12)
POTASSIUM SERPL-SCNC: 4.4 MMOL/L (ref 3.7–5.3)
RBC # BLD AUTO: 3.57 M/UL (ref 4.5–5.9)
SODIUM SERPL-SCNC: 134 MMOL/L (ref 135–144)
VAS LEFT ABI: 1.88
VAS LEFT ARM BP: 133 MMHG
VAS LEFT CALF PRESSURE: 250 MMHG
VAS LEFT DORSALIS PEDIS BP: 250 MMHG
VAS LEFT LOW THIGH PRESSURE: 172 MMHG
VAS LEFT PTA BP: 250 MMHG
VAS RIGHT ABI: 1.41
VAS RIGHT CALF PRESSURE: 195 MMHG
VAS RIGHT DORSALIS PEDIS BP: 186 MMHG
VAS RIGHT LOW THIGH PRESSURE: 189 MMHG
VAS RIGHT PTA BP: 187 MMHG
WBC OTHER # BLD: 10.5 K/UL (ref 3.5–11)

## 2023-09-26 PROCEDURE — 6360000002 HC RX W HCPCS

## 2023-09-26 PROCEDURE — 80048 BASIC METABOLIC PNL TOTAL CA: CPT

## 2023-09-26 PROCEDURE — 99255 IP/OBS CONSLTJ NEW/EST HI 80: CPT | Performed by: INTERNAL MEDICINE

## 2023-09-26 PROCEDURE — 82947 ASSAY GLUCOSE BLOOD QUANT: CPT

## 2023-09-26 PROCEDURE — 6360000002 HC RX W HCPCS: Performed by: INTERNAL MEDICINE

## 2023-09-26 PROCEDURE — 85025 COMPLETE CBC W/AUTO DIFF WBC: CPT

## 2023-09-26 PROCEDURE — 36415 COLL VENOUS BLD VENIPUNCTURE: CPT

## 2023-09-26 PROCEDURE — 6370000000 HC RX 637 (ALT 250 FOR IP)

## 2023-09-26 PROCEDURE — 2580000003 HC RX 258: Performed by: INTERNAL MEDICINE

## 2023-09-26 PROCEDURE — 97161 PT EVAL LOW COMPLEX 20 MIN: CPT

## 2023-09-26 PROCEDURE — 1200000000 HC SEMI PRIVATE

## 2023-09-26 PROCEDURE — 99254 IP/OBS CNSLTJ NEW/EST MOD 60: CPT | Performed by: SURGERY

## 2023-09-26 PROCEDURE — 97165 OT EVAL LOW COMPLEX 30 MIN: CPT

## 2023-09-26 PROCEDURE — 93923 UPR/LXTR ART STDY 3+ LVLS: CPT

## 2023-09-26 PROCEDURE — 99232 SBSQ HOSP IP/OBS MODERATE 35: CPT | Performed by: INTERNAL MEDICINE

## 2023-09-26 PROCEDURE — 2580000003 HC RX 258

## 2023-09-26 PROCEDURE — 93923 UPR/LXTR ART STDY 3+ LVLS: CPT | Performed by: SURGERY

## 2023-09-26 PROCEDURE — 86140 C-REACTIVE PROTEIN: CPT

## 2023-09-26 RX ADMIN — INSULIN GLARGINE 35 UNITS: 100 INJECTION, SOLUTION SUBCUTANEOUS at 08:07

## 2023-09-26 RX ADMIN — CLINDAMYCIN IN 5 PERCENT DEXTROSE 600 MG: 12 INJECTION, SOLUTION INTRAVENOUS at 19:44

## 2023-09-26 RX ADMIN — CLINDAMYCIN IN 5 PERCENT DEXTROSE 600 MG: 12 INJECTION, SOLUTION INTRAVENOUS at 02:08

## 2023-09-26 RX ADMIN — CLINDAMYCIN IN 5 PERCENT DEXTROSE 600 MG: 12 INJECTION, SOLUTION INTRAVENOUS at 10:38

## 2023-09-26 RX ADMIN — HYDROMORPHONE HYDROCHLORIDE 1 MG: 1 INJECTION, SOLUTION INTRAMUSCULAR; INTRAVENOUS; SUBCUTANEOUS at 02:04

## 2023-09-26 RX ADMIN — LINEZOLID 600 MG: 600 INJECTION, SOLUTION INTRAVENOUS at 12:40

## 2023-09-26 RX ADMIN — PIPERACILLIN AND TAZOBACTAM 3375 MG: 3; .375 INJECTION, POWDER, LYOPHILIZED, FOR SOLUTION INTRAVENOUS at 05:47

## 2023-09-26 RX ADMIN — LISINOPRIL 20 MG: 20 TABLET ORAL at 08:07

## 2023-09-26 RX ADMIN — CEFEPIME 2000 MG: 2 INJECTION, POWDER, FOR SOLUTION INTRAVENOUS at 14:00

## 2023-09-26 RX ADMIN — LINEZOLID 600 MG: 600 INJECTION, SOLUTION INTRAVENOUS at 00:30

## 2023-09-26 RX ADMIN — HYDROMORPHONE HYDROCHLORIDE 1 MG: 1 INJECTION, SOLUTION INTRAMUSCULAR; INTRAVENOUS; SUBCUTANEOUS at 10:40

## 2023-09-26 RX ADMIN — VANCOMYCIN HYDROCHLORIDE 2500 MG: 1.5 INJECTION, POWDER, LYOPHILIZED, FOR SOLUTION INTRAVENOUS at 15:58

## 2023-09-26 RX ADMIN — SODIUM CHLORIDE: 9 INJECTION, SOLUTION INTRAVENOUS at 12:38

## 2023-09-26 ASSESSMENT — PAIN SCALES - GENERAL
PAINLEVEL_OUTOF10: 0
PAINLEVEL_OUTOF10: 8
PAINLEVEL_OUTOF10: 9

## 2023-09-26 ASSESSMENT — PAIN DESCRIPTION - LOCATION: LOCATION: FOOT

## 2023-09-26 ASSESSMENT — ENCOUNTER SYMPTOMS
SHORTNESS OF BREATH: 0
CHEST TIGHTNESS: 0
COLOR CHANGE: 1
ABDOMINAL PAIN: 0
ALLERGIC/IMMUNOLOGIC NEGATIVE: 1

## 2023-09-26 ASSESSMENT — PAIN DESCRIPTION - DESCRIPTORS: DESCRIPTORS: SHARP

## 2023-09-26 ASSESSMENT — PAIN DESCRIPTION - ORIENTATION: ORIENTATION: RIGHT

## 2023-09-26 NOTE — PLAN OF CARE
Problem: Discharge Planning  Goal: Discharge to home or other facility with appropriate resources  9/26/2023 0445 by Charla Sherman RN  Outcome: Progressing  9/26/2023 0443 by Charla Sherman RN  Outcome: Progressing  9/25/2023 1812 by Rasta Camp RN  Outcome: Progressing     Problem: Pain  Goal: Verbalizes/displays adequate comfort level or baseline comfort level  9/26/2023 0445 by Charla Sherman RN  Outcome: Progressing  9/26/2023 0443 by Charla Sherman RN  Outcome: Progressing  9/25/2023 1812 by Rasta Camp RN  Outcome: Progressing     Problem: Safety - Adult  Goal: Free from fall injury  9/26/2023 0445 by Charla Sherman RN  Outcome: Progressing  9/26/2023 0443 by Charla Sherman RN  Outcome: Progressing  9/25/2023 1812 by Rasta Camp RN  Outcome: Progressing     Problem: Skin/Tissue Integrity - Adult  Goal: Skin integrity remains intact  Outcome: Progressing     Problem: Infection - Adult  Goal: Absence of infection at discharge  Outcome: Progressing

## 2023-09-26 NOTE — PLAN OF CARE
Problem: Pain  Goal: Verbalizes/displays adequate comfort level or baseline comfort level  9/26/2023 1705 by Tita Millan RN  Outcome: Progressing     Problem: Discharge Planning  Goal: Discharge to home or other facility with appropriate resources  9/26/2023 1705 by Tita Millan RN  Outcome: Progressing     Problem: Safety - Adult  Goal: Free from fall injury  9/26/2023 1705 by Tita Millan RN  Outcome: Progressing     Problem: Skin/Tissue Integrity - Adult  Goal: Skin integrity remains intact  9/26/2023 1705 by Tita Millan RN  Outcome: Progressing     Problem: Skin/Tissue Integrity - Adult  Goal: Incisions, wounds, or drain sites healing without S/S of infection  Outcome: Progressing     Problem: Infection - Adult  Goal: Absence of infection at discharge  9/26/2023 1705 by Tita Millan RN  Outcome: Progressing     Problem: Infection - Adult  Goal: Absence of infection during hospitalization  Outcome: Progressing     Problem: Chronic Conditions and Co-morbidities  Goal: Patient's chronic conditions and co-morbidity symptoms are monitored and maintained or improved  Outcome: Progressing     Problem: ABCDS Injury Assessment  Goal: Absence of physical injury  Outcome: Progressing     Problem: Nutrition Deficit:  Goal: Optimize nutritional status  Outcome: Progressing

## 2023-09-27 LAB
ANION GAP SERPL CALCULATED.3IONS-SCNC: 8 MMOL/L (ref 9–17)
BASOPHILS # BLD: 0 K/UL (ref 0–0.2)
BASOPHILS NFR BLD: 1 % (ref 0–2)
BUN SERPL-MCNC: 10 MG/DL (ref 6–20)
CALCIUM SERPL-MCNC: 8.8 MG/DL (ref 8.6–10.4)
CHLORIDE SERPL-SCNC: 100 MMOL/L (ref 98–107)
CO2 SERPL-SCNC: 28 MMOL/L (ref 20–31)
CREAT SERPL-MCNC: 0.8 MG/DL (ref 0.7–1.2)
CRP SERPL HS-MCNC: 113.7 MG/L (ref 0–5)
DATE LAST DOSE: NORMAL
EOSINOPHIL # BLD: 0.1 K/UL (ref 0–0.4)
EOSINOPHILS RELATIVE PERCENT: 2 % (ref 0–4)
ERYTHROCYTE [DISTWIDTH] IN BLOOD BY AUTOMATED COUNT: 12.3 % (ref 11.5–14.9)
ERYTHROCYTE [SEDIMENTATION RATE] IN BLOOD BY PHOTOMETRIC METHOD: 59 MM/HR (ref 0–20)
GFR SERPL CREATININE-BSD FRML MDRD: >60 ML/MIN/1.73M2
GLUCOSE BLD-MCNC: 112 MG/DL (ref 75–110)
GLUCOSE BLD-MCNC: 124 MG/DL (ref 75–110)
GLUCOSE BLD-MCNC: 146 MG/DL (ref 75–110)
GLUCOSE BLD-MCNC: 83 MG/DL (ref 75–110)
GLUCOSE SERPL-MCNC: 159 MG/DL (ref 70–99)
HCT VFR BLD AUTO: 35.4 % (ref 41–53)
HGB BLD-MCNC: 12.1 G/DL (ref 13.5–17.5)
LYMPHOCYTES NFR BLD: 1 K/UL (ref 1–4.8)
LYMPHOCYTES RELATIVE PERCENT: 13 % (ref 24–44)
MCH RBC QN AUTO: 32.3 PG (ref 26–34)
MCHC RBC AUTO-ENTMCNC: 34.2 G/DL (ref 31–37)
MCV RBC AUTO: 94.4 FL (ref 80–100)
MONOCYTES NFR BLD: 0.7 K/UL (ref 0.1–1.3)
MONOCYTES NFR BLD: 9 % (ref 1–7)
NEUTROPHILS NFR BLD: 75 % (ref 36–66)
NEUTS SEG NFR BLD: 5.6 K/UL (ref 1.3–9.1)
PLATELET # BLD AUTO: 259 K/UL (ref 150–450)
PMV BLD AUTO: 8.3 FL (ref 6–12)
POTASSIUM SERPL-SCNC: 4.1 MMOL/L (ref 3.7–5.3)
RBC # BLD AUTO: 3.75 M/UL (ref 4.5–5.9)
SODIUM SERPL-SCNC: 136 MMOL/L (ref 135–144)
TME LAST DOSE: 523 H
VANCOMYCIN DOSE: 1250 MG
VANCOMYCIN SERPL-MCNC: 15 UG/ML
WBC OTHER # BLD: 7.4 K/UL (ref 3.5–11)

## 2023-09-27 PROCEDURE — 6370000000 HC RX 637 (ALT 250 FOR IP)

## 2023-09-27 PROCEDURE — 6360000002 HC RX W HCPCS

## 2023-09-27 PROCEDURE — 86140 C-REACTIVE PROTEIN: CPT

## 2023-09-27 PROCEDURE — 80202 ASSAY OF VANCOMYCIN: CPT

## 2023-09-27 PROCEDURE — 85025 COMPLETE CBC W/AUTO DIFF WBC: CPT

## 2023-09-27 PROCEDURE — 82947 ASSAY GLUCOSE BLOOD QUANT: CPT

## 2023-09-27 PROCEDURE — 36415 COLL VENOUS BLD VENIPUNCTURE: CPT

## 2023-09-27 PROCEDURE — 85652 RBC SED RATE AUTOMATED: CPT

## 2023-09-27 PROCEDURE — 99232 SBSQ HOSP IP/OBS MODERATE 35: CPT | Performed by: INTERNAL MEDICINE

## 2023-09-27 PROCEDURE — 2580000003 HC RX 258: Performed by: INTERNAL MEDICINE

## 2023-09-27 PROCEDURE — 80048 BASIC METABOLIC PNL TOTAL CA: CPT

## 2023-09-27 PROCEDURE — 97530 THERAPEUTIC ACTIVITIES: CPT

## 2023-09-27 PROCEDURE — 6360000002 HC RX W HCPCS: Performed by: INTERNAL MEDICINE

## 2023-09-27 PROCEDURE — 1200000000 HC SEMI PRIVATE

## 2023-09-27 PROCEDURE — 2580000003 HC RX 258

## 2023-09-27 PROCEDURE — 97116 GAIT TRAINING THERAPY: CPT

## 2023-09-27 RX ORDER — ACETAMINOPHEN 650 MG
TABLET, EXTENDED RELEASE ORAL PRN
Status: DISCONTINUED | OUTPATIENT
Start: 2023-09-27 | End: 2023-10-02 | Stop reason: HOSPADM

## 2023-09-27 RX ADMIN — SODIUM CHLORIDE: 9 INJECTION, SOLUTION INTRAVENOUS at 10:02

## 2023-09-27 RX ADMIN — SODIUM CHLORIDE, PRESERVATIVE FREE 10 ML: 5 INJECTION INTRAVENOUS at 20:34

## 2023-09-27 RX ADMIN — INSULIN GLARGINE 35 UNITS: 100 INJECTION, SOLUTION SUBCUTANEOUS at 08:44

## 2023-09-27 RX ADMIN — CLINDAMYCIN IN 5 PERCENT DEXTROSE 600 MG: 12 INJECTION, SOLUTION INTRAVENOUS at 21:17

## 2023-09-27 RX ADMIN — LISINOPRIL 20 MG: 20 TABLET ORAL at 08:44

## 2023-09-27 RX ADMIN — VANCOMYCIN HYDROCHLORIDE 1250 MG: 1.25 INJECTION, POWDER, LYOPHILIZED, FOR SOLUTION INTRAVENOUS at 19:14

## 2023-09-27 RX ADMIN — CLINDAMYCIN IN 5 PERCENT DEXTROSE 600 MG: 12 INJECTION, SOLUTION INTRAVENOUS at 00:42

## 2023-09-27 RX ADMIN — CEFEPIME 2000 MG: 2 INJECTION, POWDER, FOR SOLUTION INTRAVENOUS at 14:29

## 2023-09-27 RX ADMIN — ACETAMINOPHEN 650 MG: 325 TABLET ORAL at 20:28

## 2023-09-27 RX ADMIN — HYDROMORPHONE HYDROCHLORIDE 1 MG: 1 INJECTION, SOLUTION INTRAMUSCULAR; INTRAVENOUS; SUBCUTANEOUS at 20:29

## 2023-09-27 RX ADMIN — VANCOMYCIN HYDROCHLORIDE 1250 MG: 1.25 INJECTION, POWDER, LYOPHILIZED, FOR SOLUTION INTRAVENOUS at 05:23

## 2023-09-27 RX ADMIN — HYDROMORPHONE HYDROCHLORIDE 1 MG: 1 INJECTION, SOLUTION INTRAMUSCULAR; INTRAVENOUS; SUBCUTANEOUS at 00:50

## 2023-09-27 RX ADMIN — ACETAMINOPHEN 650 MG: 325 TABLET ORAL at 00:50

## 2023-09-27 RX ADMIN — ONDANSETRON 4 MG: 2 INJECTION INTRAMUSCULAR; INTRAVENOUS at 00:49

## 2023-09-27 RX ADMIN — CEFEPIME 2000 MG: 2 INJECTION, POWDER, FOR SOLUTION INTRAVENOUS at 01:16

## 2023-09-27 RX ADMIN — CLINDAMYCIN IN 5 PERCENT DEXTROSE 600 MG: 12 INJECTION, SOLUTION INTRAVENOUS at 10:03

## 2023-09-27 RX ADMIN — CEFEPIME 2000 MG: 2 INJECTION, POWDER, FOR SOLUTION INTRAVENOUS at 22:17

## 2023-09-27 RX ADMIN — HYDROMORPHONE HYDROCHLORIDE 1 MG: 1 INJECTION, SOLUTION INTRAMUSCULAR; INTRAVENOUS; SUBCUTANEOUS at 08:59

## 2023-09-27 ASSESSMENT — PAIN DESCRIPTION - LOCATION: LOCATION: FOOT

## 2023-09-27 ASSESSMENT — PAIN SCALES - GENERAL
PAINLEVEL_OUTOF10: 8

## 2023-09-27 ASSESSMENT — PAIN DESCRIPTION - ORIENTATION: ORIENTATION: RIGHT

## 2023-09-27 ASSESSMENT — PAIN DESCRIPTION - DESCRIPTORS: DESCRIPTORS: ACHING

## 2023-09-28 ENCOUNTER — ANESTHESIA EVENT (OUTPATIENT)
Dept: OPERATING ROOM | Age: 53
End: 2023-09-28
Payer: COMMERCIAL

## 2023-09-28 LAB
ANION GAP SERPL CALCULATED.3IONS-SCNC: 7 MMOL/L (ref 9–17)
BASOPHILS # BLD: 0.1 K/UL (ref 0–0.2)
BASOPHILS NFR BLD: 1 % (ref 0–2)
BUN SERPL-MCNC: 12 MG/DL (ref 6–20)
CALCIUM SERPL-MCNC: 9 MG/DL (ref 8.6–10.4)
CHLORIDE SERPL-SCNC: 99 MMOL/L (ref 98–107)
CO2 SERPL-SCNC: 30 MMOL/L (ref 20–31)
CREAT SERPL-MCNC: 0.8 MG/DL (ref 0.7–1.2)
CRP SERPL HS-MCNC: 78 MG/L (ref 0–5)
EOSINOPHIL # BLD: 0.3 K/UL (ref 0–0.4)
EOSINOPHILS RELATIVE PERCENT: 5 % (ref 0–4)
ERYTHROCYTE [DISTWIDTH] IN BLOOD BY AUTOMATED COUNT: 12.4 % (ref 11.5–14.9)
GFR SERPL CREATININE-BSD FRML MDRD: >60 ML/MIN/1.73M2
GLUCOSE BLD-MCNC: 104 MG/DL (ref 75–110)
GLUCOSE BLD-MCNC: 112 MG/DL (ref 75–110)
GLUCOSE BLD-MCNC: 118 MG/DL (ref 75–110)
GLUCOSE BLD-MCNC: 126 MG/DL (ref 75–110)
GLUCOSE SERPL-MCNC: 137 MG/DL (ref 70–99)
HCT VFR BLD AUTO: 34.4 % (ref 41–53)
HGB BLD-MCNC: 11.8 G/DL (ref 13.5–17.5)
LYMPHOCYTES NFR BLD: 1.5 K/UL (ref 1–4.8)
LYMPHOCYTES RELATIVE PERCENT: 23 % (ref 24–44)
MCH RBC QN AUTO: 32 PG (ref 26–34)
MCHC RBC AUTO-ENTMCNC: 34.2 G/DL (ref 31–37)
MCV RBC AUTO: 93.6 FL (ref 80–100)
MICROORGANISM SPEC CULT: ABNORMAL
MICROORGANISM/AGENT SPEC: ABNORMAL
MONOCYTES NFR BLD: 0.7 K/UL (ref 0.1–1.3)
MONOCYTES NFR BLD: 10 % (ref 1–7)
NEUTROPHILS NFR BLD: 61 % (ref 36–66)
NEUTS SEG NFR BLD: 4 K/UL (ref 1.3–9.1)
PLATELET # BLD AUTO: 257 K/UL (ref 150–450)
PMV BLD AUTO: 7.9 FL (ref 6–12)
POTASSIUM SERPL-SCNC: 3.9 MMOL/L (ref 3.7–5.3)
RBC # BLD AUTO: 3.68 M/UL (ref 4.5–5.9)
SODIUM SERPL-SCNC: 136 MMOL/L (ref 135–144)
SPECIMEN DESCRIPTION: ABNORMAL
SPECIMEN DESCRIPTION: ABNORMAL
SURGICAL PATHOLOGY REPORT: NORMAL
WBC OTHER # BLD: 6.5 K/UL (ref 3.5–11)

## 2023-09-28 PROCEDURE — 6360000002 HC RX W HCPCS: Performed by: INTERNAL MEDICINE

## 2023-09-28 PROCEDURE — 85025 COMPLETE CBC W/AUTO DIFF WBC: CPT

## 2023-09-28 PROCEDURE — 6360000002 HC RX W HCPCS

## 2023-09-28 PROCEDURE — 2580000003 HC RX 258

## 2023-09-28 PROCEDURE — 6370000000 HC RX 637 (ALT 250 FOR IP)

## 2023-09-28 PROCEDURE — 1200000000 HC SEMI PRIVATE

## 2023-09-28 PROCEDURE — 86140 C-REACTIVE PROTEIN: CPT

## 2023-09-28 PROCEDURE — 97116 GAIT TRAINING THERAPY: CPT

## 2023-09-28 PROCEDURE — 2580000003 HC RX 258: Performed by: INTERNAL MEDICINE

## 2023-09-28 PROCEDURE — 36415 COLL VENOUS BLD VENIPUNCTURE: CPT

## 2023-09-28 PROCEDURE — 82947 ASSAY GLUCOSE BLOOD QUANT: CPT

## 2023-09-28 PROCEDURE — 97110 THERAPEUTIC EXERCISES: CPT

## 2023-09-28 PROCEDURE — 99232 SBSQ HOSP IP/OBS MODERATE 35: CPT | Performed by: INTERNAL MEDICINE

## 2023-09-28 PROCEDURE — 80048 BASIC METABOLIC PNL TOTAL CA: CPT

## 2023-09-28 RX ADMIN — HYDROMORPHONE HYDROCHLORIDE 1 MG: 1 INJECTION, SOLUTION INTRAMUSCULAR; INTRAVENOUS; SUBCUTANEOUS at 21:53

## 2023-09-28 RX ADMIN — LISINOPRIL 20 MG: 20 TABLET ORAL at 08:50

## 2023-09-28 RX ADMIN — VANCOMYCIN HYDROCHLORIDE 1250 MG: 1.25 INJECTION, POWDER, LYOPHILIZED, FOR SOLUTION INTRAVENOUS at 04:37

## 2023-09-28 RX ADMIN — Medication 2000 MG: at 23:08

## 2023-09-28 RX ADMIN — CLINDAMYCIN IN 5 PERCENT DEXTROSE 600 MG: 12 INJECTION, SOLUTION INTRAVENOUS at 03:53

## 2023-09-28 RX ADMIN — HYDROMORPHONE HYDROCHLORIDE 1 MG: 1 INJECTION, SOLUTION INTRAMUSCULAR; INTRAVENOUS; SUBCUTANEOUS at 14:55

## 2023-09-28 RX ADMIN — CLINDAMYCIN IN 5 PERCENT DEXTROSE 600 MG: 12 INJECTION, SOLUTION INTRAVENOUS at 14:58

## 2023-09-28 RX ADMIN — CEFEPIME 2000 MG: 2 INJECTION, POWDER, FOR SOLUTION INTRAVENOUS at 06:11

## 2023-09-28 RX ADMIN — SODIUM CHLORIDE, PRESERVATIVE FREE 10 ML: 5 INJECTION INTRAVENOUS at 08:50

## 2023-09-28 RX ADMIN — CLINDAMYCIN IN 5 PERCENT DEXTROSE 600 MG: 12 INJECTION, SOLUTION INTRAVENOUS at 19:55

## 2023-09-28 RX ADMIN — HYDROMORPHONE HYDROCHLORIDE 1 MG: 1 INJECTION, SOLUTION INTRAMUSCULAR; INTRAVENOUS; SUBCUTANEOUS at 08:50

## 2023-09-28 RX ADMIN — Medication 2000 MG: at 16:11

## 2023-09-28 RX ADMIN — HYDROMORPHONE HYDROCHLORIDE 1 MG: 1 INJECTION, SOLUTION INTRAMUSCULAR; INTRAVENOUS; SUBCUTANEOUS at 03:57

## 2023-09-28 RX ADMIN — INSULIN GLARGINE 35 UNITS: 100 INJECTION, SOLUTION SUBCUTANEOUS at 08:50

## 2023-09-28 ASSESSMENT — PAIN SCALES - GENERAL
PAINLEVEL_OUTOF10: 7
PAINLEVEL_OUTOF10: 8

## 2023-09-28 ASSESSMENT — PAIN DESCRIPTION - LOCATION
LOCATION: FOOT
LOCATION: FOOT

## 2023-09-28 ASSESSMENT — PAIN DESCRIPTION - ORIENTATION: ORIENTATION: RIGHT

## 2023-09-28 NOTE — ANESTHESIA PRE PROCEDURE
Anesthesia Evaluation  Patient summary reviewed and Nursing notes reviewed no history of anesthetic complications:   Airway: Mallampati: II  TM distance: >3 FB   Neck ROM: full  Mouth opening: > = 3 FB   Dental:    (+) upper dentures and lower dentures      Pulmonary:normal exam                               Cardiovascular:    (+) hypertension:, hyperlipidemia      ECG reviewed  Rhythm: regular  Rate: normal                    Neuro/Psych:   (+) psychiatric history: stable with treatmentdepression/anxiety             GI/Hepatic/Renal:             Endo/Other:    (+) DiabetesType II DM, , .                 Abdominal:             Vascular: negative vascular ROS. Other Findings:           Anesthesia Plan      general     ASA 3       Induction: intravenous. MIPS: Postoperative opioids intended and Prophylactic antiemetics administered. Anesthetic plan and risks discussed with patient. Plan discussed with CRNA.                     Annie Antunez MD   9/28/2023

## 2023-09-28 NOTE — PLAN OF CARE
Problem: Discharge Planning  Goal: Discharge to home or other facility with appropriate resources  Outcome: Progressing     Problem: Pain  Goal: Verbalizes/displays adequate comfort level or baseline comfort level  Outcome: Progressing     Problem: Safety - Adult  Goal: Free from fall injury  Outcome: Progressing     Problem: Skin/Tissue Integrity - Adult  Goal: Skin integrity remains intact  Outcome: Progressing  Goal: Incisions, wounds, or drain sites healing without S/S of infection  Outcome: Progressing     Problem: Infection - Adult  Goal: Absence of infection at discharge  Outcome: Progressing  Goal: Absence of infection during hospitalization  Outcome: Progressing     Problem: Chronic Conditions and Co-morbidities  Goal: Patient's chronic conditions and co-morbidity symptoms are monitored and maintained or improved  Outcome: Progressing     Problem: ABCDS Injury Assessment  Goal: Absence of physical injury  Outcome: Progressing

## 2023-09-29 ENCOUNTER — TELEPHONE (OUTPATIENT)
Dept: FAMILY MEDICINE CLINIC | Age: 53
End: 2023-09-29

## 2023-09-29 ENCOUNTER — ANESTHESIA (OUTPATIENT)
Dept: OPERATING ROOM | Age: 53
End: 2023-09-29
Payer: COMMERCIAL

## 2023-09-29 LAB
ANION GAP SERPL CALCULATED.3IONS-SCNC: 10 MMOL/L (ref 9–17)
BASOPHILS # BLD: 0.1 K/UL (ref 0–0.2)
BASOPHILS NFR BLD: 1 % (ref 0–2)
BUN SERPL-MCNC: 11 MG/DL (ref 6–20)
CALCIUM SERPL-MCNC: 8.7 MG/DL (ref 8.6–10.4)
CHLORIDE SERPL-SCNC: 101 MMOL/L (ref 98–107)
CO2 SERPL-SCNC: 25 MMOL/L (ref 20–31)
CREAT SERPL-MCNC: 0.7 MG/DL (ref 0.7–1.2)
CRP SERPL HS-MCNC: 43.7 MG/L (ref 0–5)
EOSINOPHIL # BLD: 0.4 K/UL (ref 0–0.4)
EOSINOPHILS RELATIVE PERCENT: 4 % (ref 0–4)
ERYTHROCYTE [DISTWIDTH] IN BLOOD BY AUTOMATED COUNT: 12.1 % (ref 11.5–14.9)
ERYTHROCYTE [SEDIMENTATION RATE] IN BLOOD BY PHOTOMETRIC METHOD: 53 MM/HR (ref 0–20)
EST. AVERAGE GLUCOSE BLD GHB EST-MCNC: 123 MG/DL
GFR SERPL CREATININE-BSD FRML MDRD: >60 ML/MIN/1.73M2
GLUCOSE BLD-MCNC: 103 MG/DL (ref 75–110)
GLUCOSE BLD-MCNC: 120 MG/DL (ref 75–110)
GLUCOSE BLD-MCNC: 120 MG/DL (ref 75–110)
GLUCOSE BLD-MCNC: 137 MG/DL (ref 75–110)
GLUCOSE BLD-MCNC: 191 MG/DL (ref 75–110)
GLUCOSE SERPL-MCNC: 126 MG/DL (ref 70–99)
HBA1C MFR BLD: 5.9 % (ref 4–6)
HCT VFR BLD AUTO: 36.1 % (ref 41–53)
HGB BLD-MCNC: 12.4 G/DL (ref 13.5–17.5)
LYMPHOCYTES NFR BLD: 1.2 K/UL (ref 1–4.8)
LYMPHOCYTES RELATIVE PERCENT: 15 % (ref 24–44)
MCH RBC QN AUTO: 32 PG (ref 26–34)
MCHC RBC AUTO-ENTMCNC: 34.3 G/DL (ref 31–37)
MCV RBC AUTO: 93.2 FL (ref 80–100)
MONOCYTES NFR BLD: 0.7 K/UL (ref 0.1–1.3)
MONOCYTES NFR BLD: 8 % (ref 1–7)
NEUTROPHILS NFR BLD: 72 % (ref 36–66)
NEUTS SEG NFR BLD: 5.9 K/UL (ref 1.3–9.1)
PLATELET # BLD AUTO: 304 K/UL (ref 150–450)
PMV BLD AUTO: 9.1 FL (ref 6–12)
POTASSIUM SERPL-SCNC: 4 MMOL/L (ref 3.7–5.3)
RBC # BLD AUTO: 3.88 M/UL (ref 4.5–5.9)
SODIUM SERPL-SCNC: 136 MMOL/L (ref 135–144)
WBC OTHER # BLD: 8.2 K/UL (ref 3.5–11)

## 2023-09-29 PROCEDURE — 2580000003 HC RX 258

## 2023-09-29 PROCEDURE — 99232 SBSQ HOSP IP/OBS MODERATE 35: CPT | Performed by: PODIATRIST

## 2023-09-29 PROCEDURE — 15004 WOUND PREP F/N/HF/G: CPT | Performed by: PODIATRIST

## 2023-09-29 PROCEDURE — 15275 SKIN SUB GRAFT FACE/NK/HF/G: CPT | Performed by: PODIATRIST

## 2023-09-29 PROCEDURE — 0HRMXK3 REPLACEMENT OF RIGHT FOOT SKIN WITH NONAUTOLOGOUS TISSUE SUBSTITUTE, FULL THICKNESS, EXTERNAL APPROACH: ICD-10-PCS | Performed by: PODIATRIST

## 2023-09-29 PROCEDURE — 2500000003 HC RX 250 WO HCPCS: Performed by: PODIATRIST

## 2023-09-29 PROCEDURE — 99232 SBSQ HOSP IP/OBS MODERATE 35: CPT | Performed by: INTERNAL MEDICINE

## 2023-09-29 PROCEDURE — 3600000002 HC SURGERY LEVEL 2 BASE: Performed by: PODIATRIST

## 2023-09-29 PROCEDURE — 3600000012 HC SURGERY LEVEL 2 ADDTL 15MIN: Performed by: PODIATRIST

## 2023-09-29 PROCEDURE — 85025 COMPLETE CBC W/AUTO DIFF WBC: CPT

## 2023-09-29 PROCEDURE — 2709999900 HC NON-CHARGEABLE SUPPLY: Performed by: PODIATRIST

## 2023-09-29 PROCEDURE — 6360000002 HC RX W HCPCS: Performed by: INTERNAL MEDICINE

## 2023-09-29 PROCEDURE — 6360000002 HC RX W HCPCS

## 2023-09-29 PROCEDURE — 2W1LX6Z COMPRESSION OF RIGHT LOWER EXTREMITY USING PRESSURE DRESSING: ICD-10-PCS | Performed by: PODIATRIST

## 2023-09-29 PROCEDURE — 6360000002 HC RX W HCPCS: Performed by: PODIATRIST

## 2023-09-29 PROCEDURE — 86140 C-REACTIVE PROTEIN: CPT

## 2023-09-29 PROCEDURE — 6360000002 HC RX W HCPCS: Performed by: NURSE ANESTHETIST, CERTIFIED REGISTERED

## 2023-09-29 PROCEDURE — 28112 PART REMOVAL OF METATARSAL: CPT | Performed by: PODIATRIST

## 2023-09-29 PROCEDURE — 3700000000 HC ANESTHESIA ATTENDED CARE: Performed by: PODIATRIST

## 2023-09-29 PROCEDURE — 7100000001 HC PACU RECOVERY - ADDTL 15 MIN: Performed by: PODIATRIST

## 2023-09-29 PROCEDURE — 7100000000 HC PACU RECOVERY - FIRST 15 MIN: Performed by: PODIATRIST

## 2023-09-29 PROCEDURE — 2580000003 HC RX 258: Performed by: NURSE ANESTHETIST, CERTIFIED REGISTERED

## 2023-09-29 PROCEDURE — 83036 HEMOGLOBIN GLYCOSYLATED A1C: CPT

## 2023-09-29 PROCEDURE — 82947 ASSAY GLUCOSE BLOOD QUANT: CPT

## 2023-09-29 PROCEDURE — 36415 COLL VENOUS BLD VENIPUNCTURE: CPT

## 2023-09-29 PROCEDURE — 2500000003 HC RX 250 WO HCPCS: Performed by: NURSE ANESTHETIST, CERTIFIED REGISTERED

## 2023-09-29 PROCEDURE — 85652 RBC SED RATE AUTOMATED: CPT

## 2023-09-29 PROCEDURE — 80048 BASIC METABOLIC PNL TOTAL CA: CPT

## 2023-09-29 PROCEDURE — 3700000001 HC ADD 15 MINUTES (ANESTHESIA): Performed by: PODIATRIST

## 2023-09-29 PROCEDURE — 0QBN0ZZ EXCISION OF RIGHT METATARSAL, OPEN APPROACH: ICD-10-PCS | Performed by: PODIATRIST

## 2023-09-29 PROCEDURE — 1200000000 HC SEMI PRIVATE

## 2023-09-29 DEVICE — INTEGRA® MESHED BILAYER WOUND MATRIX 4 IN*5 IN (10 CM*12.5 CM)
Type: IMPLANTABLE DEVICE | Site: FOOT | Status: FUNCTIONAL
Brand: INTEGRA®

## 2023-09-29 RX ORDER — BUPIVACAINE HYDROCHLORIDE 5 MG/ML
INJECTION, SOLUTION EPIDURAL; INTRACAUDAL PRN
Status: DISCONTINUED | OUTPATIENT
Start: 2023-09-29 | End: 2023-09-29 | Stop reason: ALTCHOICE

## 2023-09-29 RX ORDER — ONDANSETRON 2 MG/ML
4 INJECTION INTRAMUSCULAR; INTRAVENOUS
Status: DISCONTINUED | OUTPATIENT
Start: 2023-09-29 | End: 2023-09-29 | Stop reason: HOSPADM

## 2023-09-29 RX ORDER — DIPHENHYDRAMINE HYDROCHLORIDE 50 MG/ML
12.5 INJECTION INTRAMUSCULAR; INTRAVENOUS
Status: DISCONTINUED | OUTPATIENT
Start: 2023-09-29 | End: 2023-09-29 | Stop reason: HOSPADM

## 2023-09-29 RX ORDER — SODIUM CHLORIDE 9 MG/ML
INJECTION, SOLUTION INTRAVENOUS CONTINUOUS PRN
Status: DISCONTINUED | OUTPATIENT
Start: 2023-09-29 | End: 2023-09-29 | Stop reason: SDUPTHER

## 2023-09-29 RX ORDER — LIDOCAINE HYDROCHLORIDE 10 MG/ML
INJECTION, SOLUTION INFILTRATION; PERINEURAL PRN
Status: DISCONTINUED | OUTPATIENT
Start: 2023-09-29 | End: 2023-09-29 | Stop reason: ALTCHOICE

## 2023-09-29 RX ORDER — SODIUM CHLORIDE 0.9 % (FLUSH) 0.9 %
5-40 SYRINGE (ML) INJECTION EVERY 12 HOURS SCHEDULED
Status: DISCONTINUED | OUTPATIENT
Start: 2023-09-29 | End: 2023-09-29 | Stop reason: HOSPADM

## 2023-09-29 RX ORDER — DEXAMETHASONE SODIUM PHOSPHATE 4 MG/ML
INJECTION, SOLUTION INTRA-ARTICULAR; INTRALESIONAL; INTRAMUSCULAR; INTRAVENOUS; SOFT TISSUE PRN
Status: DISCONTINUED | OUTPATIENT
Start: 2023-09-29 | End: 2023-09-29 | Stop reason: SDUPTHER

## 2023-09-29 RX ORDER — HYDRALAZINE HYDROCHLORIDE 20 MG/ML
10 INJECTION INTRAMUSCULAR; INTRAVENOUS EVERY 6 HOURS PRN
Status: DISCONTINUED | OUTPATIENT
Start: 2023-09-29 | End: 2023-10-02 | Stop reason: HOSPADM

## 2023-09-29 RX ORDER — EPHEDRINE SULFATE/0.9% NACL/PF 50 MG/5 ML
SYRINGE (ML) INTRAVENOUS PRN
Status: DISCONTINUED | OUTPATIENT
Start: 2023-09-29 | End: 2023-09-29 | Stop reason: SDUPTHER

## 2023-09-29 RX ORDER — PROPOFOL 10 MG/ML
INJECTION, EMULSION INTRAVENOUS PRN
Status: DISCONTINUED | OUTPATIENT
Start: 2023-09-29 | End: 2023-09-29 | Stop reason: SDUPTHER

## 2023-09-29 RX ORDER — LIDOCAINE HYDROCHLORIDE 10 MG/ML
INJECTION, SOLUTION EPIDURAL; INFILTRATION; INTRACAUDAL; PERINEURAL PRN
Status: DISCONTINUED | OUTPATIENT
Start: 2023-09-29 | End: 2023-09-29 | Stop reason: SDUPTHER

## 2023-09-29 RX ORDER — SODIUM CHLORIDE 9 MG/ML
INJECTION, SOLUTION INTRAVENOUS PRN
Status: DISCONTINUED | OUTPATIENT
Start: 2023-09-29 | End: 2023-09-29 | Stop reason: HOSPADM

## 2023-09-29 RX ORDER — MIDAZOLAM HYDROCHLORIDE 1 MG/ML
INJECTION INTRAMUSCULAR; INTRAVENOUS PRN
Status: DISCONTINUED | OUTPATIENT
Start: 2023-09-29 | End: 2023-09-29 | Stop reason: SDUPTHER

## 2023-09-29 RX ORDER — SODIUM CHLORIDE 0.9 % (FLUSH) 0.9 %
5-40 SYRINGE (ML) INJECTION PRN
Status: DISCONTINUED | OUTPATIENT
Start: 2023-09-29 | End: 2023-09-29 | Stop reason: HOSPADM

## 2023-09-29 RX ORDER — SODIUM HYPOCHLORITE 1.25 MG/ML
SOLUTION TOPICAL DAILY PRN
Qty: 437 ML | Refills: 0 | Status: SHIPPED | OUTPATIENT
Start: 2023-09-29

## 2023-09-29 RX ORDER — ONDANSETRON 2 MG/ML
INJECTION INTRAMUSCULAR; INTRAVENOUS PRN
Status: DISCONTINUED | OUTPATIENT
Start: 2023-09-29 | End: 2023-09-29 | Stop reason: SDUPTHER

## 2023-09-29 RX ORDER — ATORVASTATIN CALCIUM 20 MG/1
20 TABLET, FILM COATED ORAL DAILY
Qty: 30 TABLET | Refills: 3 | Status: SHIPPED | OUTPATIENT
Start: 2023-09-29

## 2023-09-29 RX ADMIN — SODIUM CHLORIDE: 9 INJECTION, SOLUTION INTRAVENOUS at 11:45

## 2023-09-29 RX ADMIN — DEXAMETHASONE SODIUM PHOSPHATE 4 MG: 4 INJECTION INTRA-ARTICULAR; INTRALESIONAL; INTRAMUSCULAR; INTRAVENOUS; SOFT TISSUE at 13:49

## 2023-09-29 RX ADMIN — CLINDAMYCIN IN 5 PERCENT DEXTROSE 600 MG: 12 INJECTION, SOLUTION INTRAVENOUS at 20:23

## 2023-09-29 RX ADMIN — ONDANSETRON 4 MG: 2 INJECTION INTRAMUSCULAR; INTRAVENOUS at 13:49

## 2023-09-29 RX ADMIN — HYDRALAZINE HYDROCHLORIDE 10 MG: 20 INJECTION, SOLUTION INTRAMUSCULAR; INTRAVENOUS at 16:39

## 2023-09-29 RX ADMIN — MIDAZOLAM 2 MG: 1 INJECTION INTRAMUSCULAR; INTRAVENOUS at 13:37

## 2023-09-29 RX ADMIN — Medication 10 MG: at 13:59

## 2023-09-29 RX ADMIN — HYDROMORPHONE HYDROCHLORIDE 1 MG: 1 INJECTION, SOLUTION INTRAMUSCULAR; INTRAVENOUS; SUBCUTANEOUS at 06:50

## 2023-09-29 RX ADMIN — Medication 2000 MG: at 06:04

## 2023-09-29 RX ADMIN — HYDROMORPHONE HYDROCHLORIDE 1 MG: 1 INJECTION, SOLUTION INTRAMUSCULAR; INTRAVENOUS; SUBCUTANEOUS at 21:00

## 2023-09-29 RX ADMIN — PROPOFOL 200 MG: 10 INJECTION, EMULSION INTRAVENOUS at 13:42

## 2023-09-29 RX ADMIN — Medication 2000 MG: at 23:06

## 2023-09-29 RX ADMIN — SODIUM CHLORIDE: 9 INJECTION, SOLUTION INTRAVENOUS at 13:37

## 2023-09-29 RX ADMIN — LIDOCAINE HYDROCHLORIDE 40 MG: 10 INJECTION, SOLUTION EPIDURAL; INFILTRATION; INTRACAUDAL; PERINEURAL at 13:42

## 2023-09-29 RX ADMIN — CLINDAMYCIN IN 5 PERCENT DEXTROSE 600 MG: 12 INJECTION, SOLUTION INTRAVENOUS at 03:39

## 2023-09-29 RX ADMIN — HYDROMORPHONE HYDROCHLORIDE 1 MG: 1 INJECTION, SOLUTION INTRAMUSCULAR; INTRAVENOUS; SUBCUTANEOUS at 16:06

## 2023-09-29 RX ADMIN — Medication 10 MG: at 14:02

## 2023-09-29 ASSESSMENT — PAIN SCALES - GENERAL
PAINLEVEL_OUTOF10: 8
PAINLEVEL_OUTOF10: 10

## 2023-09-29 ASSESSMENT — PAIN DESCRIPTION - DESCRIPTORS
DESCRIPTORS: ACHING
DESCRIPTORS: THROBBING

## 2023-09-29 ASSESSMENT — PAIN DESCRIPTION - LOCATION: LOCATION: FOOT

## 2023-09-29 NOTE — TELEPHONE ENCOUNTER
CALLED OUT TO [facility] TO GIVE MESSAGE BELOW, SHE VERBAIZLED UNDERSTANDING WILL HELP HAVING PT SCHEDULE, ALSO DISCHARGE NURSE AT HOSPITAL WILL ALSO HELP SCHEDULE PT.

## 2023-09-29 NOTE — BRIEF OP NOTE
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]  Necrotizing fasciitis right foot  Diabetic foot infection right foot  S/P amputation of third toe right, Incision and drainage right foot    Post-Op Diagnosis: Same       Procedure:  Excision of 3rd metatarsal bone; right foot  Preparation of wound bed; right foot  Application of Integra Bilayer graft right foot  Application of wound vac; right foot    Surgeon(s):  Jared Denise DPM    Assistant:  Resident: Sara Weller DPM; Manolo Barksdale DPM    Anesthesia: General    Estimated Blood Loss (mL): Minimal    Complications: None    Specimens:   * No specimens in log *    Implants:  Implant Name Type Inv.  Item Serial No.  Lot No. LRB No. Used Action   DRESSING BIO [id number] BOV TEND CLLGN GLYCOSAMINOGLYCAN - [id number]  DRESSING BIO [id number] BOV TEND CLLGN GLYCOSAMINOGLYCAN  [employer] R[employer]- [id number] Right 1 Implanted         Drains: * No LDAs found *    Findings: metatarsal was excised and wound vac was applied after prepping the wound bed      Electronically signed by Yamileth John DPM on 9/29/2023 at 3:03 PM

## 2023-09-29 NOTE — DISCHARGE INSTR - COC
Continuity of Care Form    Patient Name: Keily Bhardwaj   :  1970  MRN:  [id number]    Admit date:  2023  Discharge date:  ***    Code Status Order: Full Code   Advance Directives:   Advance Care Flowsheet Documentation       Date/Time Healthcare Directive Type of Healthcare Directive Copy in [address] Agent's Name Healthcare Agent's Phone Number    23 1243 No, patient does not have an advance directive for healthcare treatment -- -- -- -- --            Admitting Physician:  Lucho Agustin MD  PCP: Al Styles MD    Discharging Nurse: [facility] Unit/Room#: 9723/9148-48  Discharging Unit Phone Number: ***    Emergency Contact:   Extended Emergency Contact Information  Primary Emergency Contact: Zuhair Green  Address: [address] [facility], [address] [place] [address]  Home Phone: [phone]  Relation: Parent  Secondary Emergency Contact: Rj Gordon  Address: [address] [place] [address], [address] [place] [address]  Home Phone: [phone]  Relation: Other    Past Surgical History:  Past Surgical History:   Procedure Laterality Date    FOOT DEBRIDEMENT Left 2023    Incision and drainage left foot performed by Cassie Regalado DPM at [facility] Right 2023    Incision and drainage down to bone/subfascial, right foot performed by Danielle Martinez DPM at [address] [address]  2023    Excision of 3rd metatarsal head performed by Cassie Regalado DPM at [address] [facility] Right 10/25/2021    HAND INCISION AND DRAINAGE performed by Jen Shah MD at [address] [facility] [address] N/A 2023    TOE 3RD RIGHT AMPUTATION performed by Danielle Martinez DPM at [address]       Immunization History:   Immunization History   Administered Date(s) Administered    Pneumococcal, PPSV23, PNEUMOVAX 23, (age 2y+), SC/IM, 0.5mL 2018    TDaP, ADACEL (age 6y-58y), BOOSTRIX (age 10y+), IM, 0.5mL 2010, 01/10/2023       Active

## 2023-09-29 NOTE — TELEPHONE ENCOUNTER
Incoming call came from [address]:  [facility]. Nurse CATARINO is calling to see, if provider. Ruynestor Silvaman will be able to sign off on home health care orders PT/OT order's for patient. Recent Hospital Discharge:will be discharging in a few weeks    Recommended Disposition per Home Health facility: [address] [place] [address] IF DR. HERNANDEZ WOULD AGREE FOR PT, ALSO WITH WOUND CARE ONCE DISCHARGED, THEY WILL ALSO ADIVSE PT ONCE DISCHARGED TO CALL OFFICE TO [address] [place] [address]     Please give a call back at [facility] did give permission (Voicemail is secure) to leave a detail message with a (verbal response/answer) if provider will sign off. Please advise, Thank you!       Future Appointments   Date Time Provider [address]   10/3/2023  2:15 PM Ata Diaz, [address]

## 2023-09-29 NOTE — TELEPHONE ENCOUNTER
I have not sent any home orders, patient is a noncompliant has missed his appointment.   He needs to be seen in the office, before we sign any papers

## 2023-09-29 NOTE — PLAN OF CARE
Foot and Ankle Surgery Plan of Care    Plan for OR tomorrow morning at 10 am repeat irrigation and debridement with graft application, AURELIO, and Wound VAC with Dr. Jefferson Julian. Patient consent in chart. NPO and do not hold AC at [facility].     Thank you    Fannie Ramirez DPM PGY2  Foot and [address]

## 2023-09-30 LAB
ANION GAP SERPL CALCULATED.3IONS-SCNC: 8 MMOL/L (ref 9–17)
BASOPHILS # BLD: 0.1 K/UL (ref 0–0.2)
BASOPHILS NFR BLD: 1 % (ref 0–2)
BUN SERPL-MCNC: 13 MG/DL (ref 6–20)
CALCIUM SERPL-MCNC: 9 MG/DL (ref 8.6–10.4)
CHLORIDE SERPL-SCNC: 103 MMOL/L (ref 98–107)
CO2 SERPL-SCNC: 29 MMOL/L (ref 20–31)
CREAT SERPL-MCNC: 0.7 MG/DL (ref 0.7–1.2)
CRP SERPL HS-MCNC: 29.9 MG/L (ref 0–5)
EOSINOPHIL # BLD: 0.1 K/UL (ref 0–0.4)
EOSINOPHILS RELATIVE PERCENT: 1 % (ref 0–4)
ERYTHROCYTE [DISTWIDTH] IN BLOOD BY AUTOMATED COUNT: 12.2 % (ref 11.5–14.9)
GFR SERPL CREATININE-BSD FRML MDRD: >60 ML/MIN/1.73M2
GLUCOSE BLD-MCNC: 109 MG/DL (ref 75–110)
GLUCOSE BLD-MCNC: 112 MG/DL (ref 75–110)
GLUCOSE BLD-MCNC: 119 MG/DL (ref 75–110)
GLUCOSE BLD-MCNC: 125 MG/DL (ref 75–110)
GLUCOSE SERPL-MCNC: 119 MG/DL (ref 70–99)
HCT VFR BLD AUTO: 35.4 % (ref 41–53)
HGB BLD-MCNC: 12.6 G/DL (ref 13.5–17.5)
LYMPHOCYTES NFR BLD: 1.6 K/UL (ref 1–4.8)
LYMPHOCYTES RELATIVE PERCENT: 15 % (ref 24–44)
MCH RBC QN AUTO: 32.4 PG (ref 26–34)
MCHC RBC AUTO-ENTMCNC: 35.4 G/DL (ref 31–37)
MCV RBC AUTO: 91.4 FL (ref 80–100)
MICROORGANISM SPEC CULT: NORMAL
MICROORGANISM SPEC CULT: NORMAL
MONOCYTES NFR BLD: 0.9 K/UL (ref 0.1–1.3)
MONOCYTES NFR BLD: 9 % (ref 1–7)
NEUTROPHILS NFR BLD: 74 % (ref 36–66)
NEUTS SEG NFR BLD: 8.1 K/UL (ref 1.3–9.1)
PLATELET # BLD AUTO: 313 K/UL (ref 150–450)
PMV BLD AUTO: 7.4 FL (ref 6–12)
POTASSIUM SERPL-SCNC: 3.9 MMOL/L (ref 3.7–5.3)
RBC # BLD AUTO: 3.87 M/UL (ref 4.5–5.9)
SERVICE CMNT-IMP: NORMAL
SERVICE CMNT-IMP: NORMAL
SODIUM SERPL-SCNC: 140 MMOL/L (ref 135–144)
SPECIMEN DESCRIPTION: NORMAL
SPECIMEN DESCRIPTION: NORMAL
WBC OTHER # BLD: 10.8 K/UL (ref 3.5–11)

## 2023-09-30 PROCEDURE — 82947 ASSAY GLUCOSE BLOOD QUANT: CPT

## 2023-09-30 PROCEDURE — 6370000000 HC RX 637 (ALT 250 FOR IP)

## 2023-09-30 PROCEDURE — 6360000002 HC RX W HCPCS

## 2023-09-30 PROCEDURE — 2580000003 HC RX 258

## 2023-09-30 PROCEDURE — 86140 C-REACTIVE PROTEIN: CPT

## 2023-09-30 PROCEDURE — 80048 BASIC METABOLIC PNL TOTAL CA: CPT

## 2023-09-30 PROCEDURE — 85025 COMPLETE CBC W/AUTO DIFF WBC: CPT

## 2023-09-30 PROCEDURE — 99232 SBSQ HOSP IP/OBS MODERATE 35: CPT | Performed by: INTERNAL MEDICINE

## 2023-09-30 PROCEDURE — 1200000000 HC SEMI PRIVATE

## 2023-09-30 PROCEDURE — 36415 COLL VENOUS BLD VENIPUNCTURE: CPT

## 2023-09-30 RX ADMIN — SODIUM CHLORIDE, PRESERVATIVE FREE 10 ML: 5 INJECTION INTRAVENOUS at 20:44

## 2023-09-30 RX ADMIN — SODIUM CHLORIDE: 9 INJECTION, SOLUTION INTRAVENOUS at 00:42

## 2023-09-30 RX ADMIN — CLINDAMYCIN IN 5 PERCENT DEXTROSE 600 MG: 12 INJECTION, SOLUTION INTRAVENOUS at 03:58

## 2023-09-30 RX ADMIN — Medication 2000 MG: at 22:59

## 2023-09-30 RX ADMIN — HYDROMORPHONE HYDROCHLORIDE 1 MG: 1 INJECTION, SOLUTION INTRAMUSCULAR; INTRAVENOUS; SUBCUTANEOUS at 01:28

## 2023-09-30 RX ADMIN — Medication 2000 MG: at 15:44

## 2023-09-30 RX ADMIN — HYDROMORPHONE HYDROCHLORIDE 1 MG: 1 INJECTION, SOLUTION INTRAMUSCULAR; INTRAVENOUS; SUBCUTANEOUS at 12:00

## 2023-09-30 RX ADMIN — INSULIN GLARGINE 35 UNITS: 100 INJECTION, SOLUTION SUBCUTANEOUS at 08:06

## 2023-09-30 RX ADMIN — LISINOPRIL 20 MG: 20 TABLET ORAL at 08:06

## 2023-09-30 RX ADMIN — HYDROMORPHONE HYDROCHLORIDE 1 MG: 1 INJECTION, SOLUTION INTRAMUSCULAR; INTRAVENOUS; SUBCUTANEOUS at 20:44

## 2023-09-30 RX ADMIN — Medication 2000 MG: at 06:43

## 2023-09-30 RX ADMIN — HYDROMORPHONE HYDROCHLORIDE 1 MG: 1 INJECTION, SOLUTION INTRAMUSCULAR; INTRAVENOUS; SUBCUTANEOUS at 06:46

## 2023-09-30 ASSESSMENT — PAIN DESCRIPTION - LOCATION
LOCATION: FOOT

## 2023-09-30 ASSESSMENT — PAIN DESCRIPTION - ORIENTATION
ORIENTATION: RIGHT

## 2023-09-30 ASSESSMENT — PAIN DESCRIPTION - DESCRIPTORS
DESCRIPTORS: THROBBING
DESCRIPTORS: THROBBING

## 2023-09-30 ASSESSMENT — PAIN SCALES - GENERAL
PAINLEVEL_OUTOF10: 8

## 2023-09-30 NOTE — OP NOTE
Operative Note      Patient: Cam Taylor  YOB: 1970  MRN: [id number]    Date of Procedure: 9/29/2023    Pre-Op Diagnosis:  Gas gangrene [facility]), right foot  Necrotizing fasciitis right foot  Diabetic foot infection, right foot  S/p amputation of third toe right, incision and drainage, right foot    Post-Op Diagnosis: Same       Procedure:  Partial excision of 3rd metatarsal bone; right foot (CPT: 46865)  Preparation of wound bed; right foot (CPT: 51653)  Application of Integra Bilayer graft; right foot (CPT: 78871)  Application of wound vac; right foot (CPT: 21163)    Surgeon(s):  Terrie Rivas DPM    Assistant:   Resident: Rose Moreno DPM; Jason Barksdale DPM    Anesthesia: General    Hemostasis: Direct pressure    Injectables: 10 cc mix of 1% lidocaine plain and 0.5% marcaine plain      Estimated Blood Loss (mL): Minimal    Complications: None    Specimens:   * No specimens in log *    Implants:  Implant Name Type Inv. Item Serial No.  Lot No. LRB No. Used Action   DRESSING BIO [id number] BOV TEND CLLGN GLYCOSAMINOGLYCAN - [id number] Collagen DRESSING BIO [id number] BOV TEND CLLGN GLYCOSAMINOGLYCAN  [employer] RIC- [id number] Right 1 Implanted         Drains:   Negative Pressure Wound Therapy Foot Right; Anterior (Active)   Wound Type Surgical 09/29/23 2030   Dressing Status Clean, dry & intact 09/29/23 2030   Drainage Description Serosanguinous 09/29/23 2030   Wound Assessment Other (Comment) 09/29/23 2030       Findings: Open surgical site noted at 3rd metatarsal.     Detailed Description of Procedure:     INDICATIONS FOR PROCEDURE:  Patient is a 46year-old  male well known to Dr. Amie Espinal with a chief complaint of surgical wound to the right foot. The wound bed is mixed necrotic, fibrotic slough, and granular, and is now at a point where graft application is necessary to augment further healing of the wound. The patient has elected to undergo surgical treatment.  All

## 2023-09-30 NOTE — PLAN OF CARE
Problem: Pain  Goal: Verbalizes/displays adequate comfort level or baseline comfort level  9/30/2023 0237 by aCstro Marvin RN  Outcome: Progressing, patients pain managed by dilaudid 1 mg every 4 hours as needed for pain. Problem: Safety - Adult  Goal: Free from fall injury  Outcome: Progressing, Patient remains free of incidence/ injury. Bed remains in low position. Side rails up x2. Problem: Skin/Tissue Integrity - Adult  Goal: Skin integrity remains intact  Outcome: Progressing, No new occurrence of skin breakdown noted during this shift.    Flowsheets (Taken 9/29/2023 2030)  Skin Integrity Remains Intact: Monitor for areas of redness and/or skin breakdown      Problem: Infection - Adult  Goal: Absence of infection at discharge  Outcome: Progressing  Flowsheets (Taken 9/29/2023 2030)  Absence of infection at discharge:   Assess and monitor for signs and symptoms of infection   Monitor lab/diagnostic results   Monitor all insertion sites i.e., indwelling lines, tubes and drains

## 2023-09-30 NOTE — PLAN OF CARE
Problem: Discharge Planning  Goal: Discharge to home or other facility with appropriate resources  Outcome: Progressing     Problem: Pain  Goal: Verbalizes/displays adequate comfort level or baseline comfort level  Outcome: Progressing     Problem: Safety - Adult  Goal: Free from fall injury  Outcome: Progressing     Problem: Skin/Tissue Integrity - Adult  Goal: Skin integrity remains intact  Outcome: Progressing  Goal: Incisions, wounds, or drain sites healing without S/S of infection  Outcome: Progressing     Problem: Infection - Adult  Goal: Absence of infection at discharge  Outcome: Progressing  Goal: Absence of infection during hospitalization  Outcome: Progressing

## 2023-10-01 LAB
ANION GAP SERPL CALCULATED.3IONS-SCNC: 7 MMOL/L (ref 9–17)
BASOPHILS # BLD: 0.1 K/UL (ref 0–0.2)
BASOPHILS NFR BLD: 1 % (ref 0–2)
BUN SERPL-MCNC: 14 MG/DL (ref 6–20)
CALCIUM SERPL-MCNC: 8.6 MG/DL (ref 8.6–10.4)
CHLORIDE SERPL-SCNC: 104 MMOL/L (ref 98–107)
CO2 SERPL-SCNC: 26 MMOL/L (ref 20–31)
CREAT SERPL-MCNC: 0.7 MG/DL (ref 0.7–1.2)
CRP SERPL HS-MCNC: 16.2 MG/L (ref 0–5)
EOSINOPHIL # BLD: 0.3 K/UL (ref 0–0.4)
EOSINOPHILS RELATIVE PERCENT: 4 % (ref 0–4)
ERYTHROCYTE [DISTWIDTH] IN BLOOD BY AUTOMATED COUNT: 12.3 % (ref 11.5–14.9)
GFR SERPL CREATININE-BSD FRML MDRD: >60 ML/MIN/1.73M2
GLUCOSE BLD-MCNC: 102 MG/DL (ref 75–110)
GLUCOSE BLD-MCNC: 126 MG/DL (ref 75–110)
GLUCOSE BLD-MCNC: 136 MG/DL (ref 75–110)
GLUCOSE BLD-MCNC: 155 MG/DL (ref 75–110)
GLUCOSE SERPL-MCNC: 109 MG/DL (ref 70–99)
HCT VFR BLD AUTO: 35.8 % (ref 41–53)
HGB BLD-MCNC: 12.1 G/DL (ref 13.5–17.5)
LYMPHOCYTES NFR BLD: 2.2 K/UL (ref 1–4.8)
LYMPHOCYTES RELATIVE PERCENT: 28 % (ref 24–44)
MCH RBC QN AUTO: 31.6 PG (ref 26–34)
MCHC RBC AUTO-ENTMCNC: 33.8 G/DL (ref 31–37)
MCV RBC AUTO: 93.7 FL (ref 80–100)
MONOCYTES NFR BLD: 0.7 K/UL (ref 0.1–1.3)
MONOCYTES NFR BLD: 9 % (ref 1–7)
NEUTROPHILS NFR BLD: 58 % (ref 36–66)
NEUTS SEG NFR BLD: 4.6 K/UL (ref 1.3–9.1)
PLATELET # BLD AUTO: 360 K/UL (ref 150–450)
PMV BLD AUTO: 7.6 FL (ref 6–12)
POTASSIUM SERPL-SCNC: 4 MMOL/L (ref 3.7–5.3)
RBC # BLD AUTO: 3.82 M/UL (ref 4.5–5.9)
SODIUM SERPL-SCNC: 137 MMOL/L (ref 135–144)
WBC OTHER # BLD: 7.9 K/UL (ref 3.5–11)

## 2023-10-01 PROCEDURE — 6360000002 HC RX W HCPCS

## 2023-10-01 PROCEDURE — 99232 SBSQ HOSP IP/OBS MODERATE 35: CPT | Performed by: INTERNAL MEDICINE

## 2023-10-01 PROCEDURE — 6360000002 HC RX W HCPCS: Performed by: INTERNAL MEDICINE

## 2023-10-01 PROCEDURE — 85025 COMPLETE CBC W/AUTO DIFF WBC: CPT

## 2023-10-01 PROCEDURE — 1200000000 HC SEMI PRIVATE

## 2023-10-01 PROCEDURE — 6370000000 HC RX 637 (ALT 250 FOR IP)

## 2023-10-01 PROCEDURE — 2580000003 HC RX 258

## 2023-10-01 PROCEDURE — 86140 C-REACTIVE PROTEIN: CPT

## 2023-10-01 PROCEDURE — 80048 BASIC METABOLIC PNL TOTAL CA: CPT

## 2023-10-01 PROCEDURE — 82947 ASSAY GLUCOSE BLOOD QUANT: CPT

## 2023-10-01 PROCEDURE — 36415 COLL VENOUS BLD VENIPUNCTURE: CPT

## 2023-10-01 RX ORDER — CLINDAMYCIN PHOSPHATE 600 MG/50ML
600 INJECTION INTRAVENOUS EVERY 8 HOURS
Status: DISCONTINUED | OUTPATIENT
Start: 2023-10-01 | End: 2023-10-02 | Stop reason: HOSPADM

## 2023-10-01 RX ADMIN — HYDROMORPHONE HYDROCHLORIDE 1 MG: 1 INJECTION, SOLUTION INTRAMUSCULAR; INTRAVENOUS; SUBCUTANEOUS at 01:29

## 2023-10-01 RX ADMIN — HYDROMORPHONE HYDROCHLORIDE 1 MG: 1 INJECTION, SOLUTION INTRAMUSCULAR; INTRAVENOUS; SUBCUTANEOUS at 18:02

## 2023-10-01 RX ADMIN — Medication 2000 MG: at 06:27

## 2023-10-01 RX ADMIN — Medication 2000 MG: at 14:20

## 2023-10-01 RX ADMIN — HYDROMORPHONE HYDROCHLORIDE 1 MG: 1 INJECTION, SOLUTION INTRAMUSCULAR; INTRAVENOUS; SUBCUTANEOUS at 08:06

## 2023-10-01 RX ADMIN — CLINDAMYCIN IN 5 PERCENT DEXTROSE 600 MG: 12 INJECTION, SOLUTION INTRAVENOUS at 09:27

## 2023-10-01 RX ADMIN — HYDROMORPHONE HYDROCHLORIDE 1 MG: 1 INJECTION, SOLUTION INTRAMUSCULAR; INTRAVENOUS; SUBCUTANEOUS at 22:17

## 2023-10-01 RX ADMIN — INSULIN GLARGINE 35 UNITS: 100 INJECTION, SOLUTION SUBCUTANEOUS at 07:59

## 2023-10-01 RX ADMIN — LISINOPRIL 20 MG: 20 TABLET ORAL at 07:59

## 2023-10-01 RX ADMIN — Medication 2000 MG: at 22:50

## 2023-10-01 RX ADMIN — CLINDAMYCIN IN 5 PERCENT DEXTROSE 600 MG: 12 INJECTION, SOLUTION INTRAVENOUS at 18:04

## 2023-10-01 ASSESSMENT — PAIN SCALES - WONG BAKER
WONGBAKER_NUMERICALRESPONSE: 0

## 2023-10-01 ASSESSMENT — PAIN SCALES - GENERAL
PAINLEVEL_OUTOF10: 8
PAINLEVEL_OUTOF10: 8
PAINLEVEL_OUTOF10: 7
PAINLEVEL_OUTOF10: 0
PAINLEVEL_OUTOF10: 8

## 2023-10-02 VITALS
HEART RATE: 64 BPM | DIASTOLIC BLOOD PRESSURE: 87 MMHG | HEIGHT: 74 IN | TEMPERATURE: 98.4 F | BODY MASS INDEX: 28.72 KG/M2 | SYSTOLIC BLOOD PRESSURE: 137 MMHG | WEIGHT: 223.77 LBS | OXYGEN SATURATION: 98 % | RESPIRATION RATE: 18 BRPM

## 2023-10-02 LAB
GLUCOSE BLD-MCNC: 120 MG/DL (ref 75–110)
GLUCOSE BLD-MCNC: 129 MG/DL (ref 75–110)

## 2023-10-02 PROCEDURE — 82947 ASSAY GLUCOSE BLOOD QUANT: CPT

## 2023-10-02 PROCEDURE — 6360000002 HC RX W HCPCS: Performed by: INTERNAL MEDICINE

## 2023-10-02 PROCEDURE — 6370000000 HC RX 637 (ALT 250 FOR IP)

## 2023-10-02 PROCEDURE — 99239 HOSP IP/OBS DSCHRG MGMT >30: CPT | Performed by: INTERNAL MEDICINE

## 2023-10-02 PROCEDURE — 6360000002 HC RX W HCPCS

## 2023-10-02 PROCEDURE — 99232 SBSQ HOSP IP/OBS MODERATE 35: CPT | Performed by: INTERNAL MEDICINE

## 2023-10-02 RX ORDER — GLUCOSAMINE HCL/CHONDROITIN SU 500-400 MG
CAPSULE ORAL
Qty: 300 STRIP | Refills: 0 | Status: SHIPPED | OUTPATIENT
Start: 2023-10-02

## 2023-10-02 RX ORDER — INSULIN GLARGINE 100 [IU]/ML
35 INJECTION, SOLUTION SUBCUTANEOUS
Qty: 5 ADJUSTABLE DOSE PRE-FILLED PEN SYRINGE | Refills: 3 | Status: SHIPPED | OUTPATIENT
Start: 2023-10-02

## 2023-10-02 RX ORDER — METRONIDAZOLE 500 MG/1
500 TABLET ORAL 3 TIMES DAILY
Qty: 39 TABLET | Refills: 0 | Status: SHIPPED | OUTPATIENT
Start: 2023-10-02 | End: 2023-10-15

## 2023-10-02 RX ORDER — CEPHALEXIN 500 MG/1
500 CAPSULE ORAL 3 TIMES DAILY
Qty: 39 CAPSULE | Refills: 0 | Status: SHIPPED | OUTPATIENT
Start: 2023-10-02 | End: 2023-10-15

## 2023-10-02 RX ORDER — BLOOD PRESSURE TEST KIT
100 KIT MISCELLANEOUS 3 TIMES DAILY
Qty: 100 EACH | Refills: 1 | Status: SHIPPED | OUTPATIENT
Start: 2023-10-02

## 2023-10-02 RX ORDER — LANCETS 30 GAUGE
1 EACH MISCELLANEOUS DAILY
Qty: 100 EACH | Refills: 5 | Status: SHIPPED | OUTPATIENT
Start: 2023-10-02

## 2023-10-02 RX ORDER — HYDROCODONE BITARTRATE AND ACETAMINOPHEN 5; 325 MG/1; MG/1
1 TABLET ORAL EVERY 6 HOURS PRN
Qty: 28 TABLET | Refills: 0 | Status: SHIPPED | OUTPATIENT
Start: 2023-10-02 | End: 2023-10-09

## 2023-10-02 RX ORDER — LISINOPRIL 20 MG/1
20 TABLET ORAL DAILY
Qty: 30 TABLET | Refills: 0 | Status: SHIPPED | OUTPATIENT
Start: 2023-10-02

## 2023-10-02 RX ADMIN — CLINDAMYCIN IN 5 PERCENT DEXTROSE 600 MG: 12 INJECTION, SOLUTION INTRAVENOUS at 08:05

## 2023-10-02 RX ADMIN — HYDROMORPHONE HYDROCHLORIDE 1 MG: 1 INJECTION, SOLUTION INTRAMUSCULAR; INTRAVENOUS; SUBCUTANEOUS at 05:34

## 2023-10-02 RX ADMIN — Medication 2000 MG: at 06:22

## 2023-10-02 RX ADMIN — INSULIN GLARGINE 35 UNITS: 100 INJECTION, SOLUTION SUBCUTANEOUS at 08:02

## 2023-10-02 RX ADMIN — HYDROMORPHONE HYDROCHLORIDE 1 MG: 1 INJECTION, SOLUTION INTRAMUSCULAR; INTRAVENOUS; SUBCUTANEOUS at 12:17

## 2023-10-02 RX ADMIN — CLINDAMYCIN IN 5 PERCENT DEXTROSE 600 MG: 12 INJECTION, SOLUTION INTRAVENOUS at 01:11

## 2023-10-02 RX ADMIN — LISINOPRIL 20 MG: 20 TABLET ORAL at 08:02

## 2023-10-02 ASSESSMENT — PAIN SCALES - WONG BAKER

## 2023-10-02 ASSESSMENT — PAIN DESCRIPTION - DESCRIPTORS: DESCRIPTORS: THROBBING

## 2023-10-02 ASSESSMENT — PAIN SCALES - GENERAL
PAINLEVEL_OUTOF10: 8
PAINLEVEL_OUTOF10: 9

## 2023-10-02 ASSESSMENT — PAIN DESCRIPTION - ORIENTATION: ORIENTATION: RIGHT

## 2023-10-02 ASSESSMENT — PAIN DESCRIPTION - LOCATION: LOCATION: FOOT

## 2023-10-02 NOTE — PLAN OF CARE
Problem: Discharge Planning  Goal: Discharge to home or other facility with appropriate resources  Outcome: Completed     Problem: Pain  Goal: Verbalizes/displays adequate comfort level or baseline comfort level  Outcome: Completed     Problem: Safety - Adult  Goal: Free from fall injury  Outcome: Completed     Problem: Skin/Tissue Integrity - Adult  Goal: Skin integrity remains intact  Outcome: Completed  Goal: Incisions, wounds, or drain sites healing without S/S of infection  Outcome: Completed     Problem: Infection - Adult  Goal: Absence of infection at discharge  Outcome: Completed  Goal: Absence of infection during hospitalization  Outcome: Completed     Problem: Chronic Conditions and Co-morbidities  Goal: Patient's chronic conditions and co-morbidity symptoms are monitored and maintained or improved  Outcome: Completed     Problem: ABCDS Injury Assessment  Goal: Absence of physical injury  Outcome: Completed     Problem: Nutrition Deficit:  Goal: Optimize nutritional status  Outcome: Completed

## 2023-10-02 NOTE — CARE COORDINATION
[address] [address] [address] Encounter Date/Time: 2023    Hospital Account: [de-identified]    MRN: [id number]    Patient: Keily Bhardwaj    Contact Serial #: [id number]      ENCOUNTER          Patient Class: I Private Enc? No Unit RM BD: [address]    Hospital Service: MED   Encounter DX: Gas gangrene ([address]) Adriana.Sylvia. *   ADM Provider: Lucho Agustin MD   Procedure:     ATT Provider: Lucho Agustin MD   REF Provider:        Admission DX: Gas gangrene [facility]), Necrotizing fasciitis of ankle and foot ([address]) and DX codes: A48.0, M72.6      PATIENT                 Name: Keily Bhardwaj : 1970 (46 yrs)   Address: [facility] Sex: Male   Thida: [address] [place] [address]         Marital Status:    Employer: SELF EMPLOYED         Yarsanism: [address]   Primary Care Provider: Al Styles MD         Primary Phone: [phone]   EMERGENCY CONTACT   Contact Name Legal Guardian? Relationship to Patient Home Phone Work Phone   1. Marisa Vázquez      Parent  Other [phone]              GUARANTOR            Guarantor: Keily Bhardwaj     : 1970   Address: [address] [place] [address] Sex: Male     [address] [place] [address]     Relation to Patient: Self       Home Phone: [phone]   Guarantor ID: [id number]       Work Phone:     Guarantor Employer: SELF EMPLOYED         Status: SELF EMPL*      COVERAGE        PRIMARY INSURANCE   Payor: [facility]* Plan: [facility] Address: [facility]       Group Number:   Insurance Type: [address] [place] [address] Name: Mini Valerio : 1970   Subscriber ID: [id number] Christie Ann. Rel. to Sub: Self   SECONDARY INSURANCE   Payor:   Plan:     Payor Address:  ,           Group Number:   Insurance Type:     Subscriber Name:   Subscriber :     Subscriber ID:   Pat.  Rel. to Sub:          CSN: [id number]          CSN                                    Req/Control # [Problem retrieving Specimen ID]
DISCHARGE PLANNING NOTE:    Faxed order for walker to [facility] along with face sheet. Requested delivery to bedside.     Electronically signed by Georgia Cordero RN on 9/26/2023 at 3:58 PM
DISCHARGE PLANNING NOTE:    Notified podiatry resident that PT is recommending walker for discharge. She will place order.     Electronically signed by Yariel Grande RN on 9/26/2023 at 3:35 PM
ONGOING DISCHARGE PLAN:     Patient is alert and oriented x4. Spoke with patient regarding discharge plan and patient confirms that plan is still home without needs. Continues to decline VNS. Walker delivered from [facility]. POD#3 right foot I&D with 3rd digit amp. Pt going back to surgery tomorrow for repeat I&D with graft application. Active order for IV Cefepime, IV Clinda, and IV Vanco.     Will continue to follow for additional discharge needs. If patient is discharged prior to next notation, then this note serves as note for discharge by case management.     Electronically signed by Arianne Mancini RN on 9/28/2023 at 10:28 AM
ONGOING DISCHARGE PLAN:    Patient is alert and oriented x4. Spoke with patient regarding discharge plan and patient confirms that plan is still home without needs. Continues to decline VNS. Walker ordered from [facility] [place] yesterday. POD#2 right foot I&D with 3rd digit amp. Per podiatry notes, will be going back to surgery for closure while inpatient once infection resolves. Active order for IV Cefepime and IV Vanco.    Will continue to follow for additional discharge needs. If patient is discharged prior to next notation, then this note serves as note for discharge by case management.     Electronically signed by Herbert Almeida RN on 9/27/2023 at 11:07 AM
ONGOING DISCHARGE PLAN:    Patient is alert and oriented x4. Spoke with patient regarding discharge plan and patient confirms that plan is still home without needs. Declined VNS. States his mother can assist him with dressing changes. POD#1 right foot I&D with 3rd digit amp. Active order for IV Clinda, Zyvox, and Zosyn. Will continue to follow for additional discharge needs. If patient is discharged prior to next notation, then this note serves as note for discharge by case management.     Electronically signed by Carissa Cifuentes RN on 9/26/2023 at 10:45 AM
ONGOING DISCHARGE PLAN:    Patient is alert and oriented x4. Spoke with patient regarding discharge plan and patient confirms that plan is still home. Explained to patient that podiatry may be placing a wound vac in surgery today and if so, he will need VNS. Post Acute Facility/Agency List     Provided patient with the following list, the list includes the overall star ratings obtained from CMS per the Medicare Web site (www.Medicare.gov):     [] [address]  [] Acute Inpatient Rehabilitation Facilities  [] Skilled Nursing Facilities  [] Home Care    Provided verbal instructions on how to utilize the QR Code to obtain additional detailed star ratings from www. Medicare. gov     offered to print and provide the detailed list:    [x]Accepted   []Declined    The following printed detailed lists were provided:     VNS    Pt selected [address] [place] [address]. Referral faxed and notified Danyell Koch from [address] [place] [address]. [employer] Wound Vac order form placed on front of the chart for podiatry to sign. POD#4 right foot I&D with 3rd digit amp. Going back to surgery today for repeat I&D with graft application and wound vac. Active order for IV Clinda and IV Cefazolin. Per ID, likely oral atb's on d/c. Will continue to follow for additional discharge needs. If patient is discharged prior to next notation, then this note serves as note for discharge by case management. Electronically signed by Yovanny Carolina RN on 9/29/2023 at 11:06 AM    Received call from Danyell Koch from [address] [place] [address] stating they are unable to accept patient's insurance. Referral sent to   [address] [place] [address] at [facility], [address]. Electronically signed by Yovanny Carolina RN on 9/29/2023 at 11:54 AM    Received message from [employer] at [facility] stating they are unable to accept patient d/t insurance.     Electronically signed by Yovanny Carolina RN on
ONGOING DISCHARGE PLAN:    Patient is alert and oriented x4. Spoke with patient regarding discharge plan and patient confirms that plan is still home. Pt will be going home with Prevena wound vac that will stay in place for 14 days without dressing changes. Pt will no longer need VNS. F/U made with Dr. Regina Koehler in the [address] for 10/17/23 @ 0994. Per Dr. Yolanda Nielsen, pt can be discharged on PO Keflex and PO Flagyl through 10/14/23. POD#3 repeat right foot I & D w/ Graft Application & PREVENA Wound Vac placement. Will continue to follow for additional discharge needs. If patient is discharged prior to next notation, then this note serves as note for discharge by case management.     Electronically signed by Paola Yen RN on 10/2/2023 at 10:29 AM
ONGOING DISCHARGE PLAN:    Patient is alert and oriented x4. Spoke with patient regarding discharge plan and patient confirms that plan is still to DC to home w/ VNS, if possible. Multiple referrals have already been sent w/ no success. Writer spoke to [place], she is leaving an Email for Alfredo Lomax, who was reviewing [place] on Friday. Will call CM ramy if they can accept. Pt. Is POD #2, RT Repeat I & D w/ Graft Application & PREVENA Wound Vac placement. Per Podiatry, Vac will be changed ramy & will need to stay intact for 14 days w/ no dressing changed. ID on board. Remains on IV Cefazolin/Clinda. Likely Oral Antibiotics on DC. Will continue to follow for additional discharge needs. If patient is discharged prior to next notation, then this note serves as note for discharge by case management.     Electronically signed by Zion Bhatt RN on 10/1/2023 at 3:23 PM
discharge: Other (Comment) (BP cuff, glucometer)            Potential DME:  n/a  Patient expects to discharge to: [address] [place] [address] [place] for transportation at discharge: [place]: [address] [place] / Plan: [address] [place] / Product Type: *No Product type* /     Does insurance require precert for SNF: Yes    Potential assistance Purchasing Medications: No  Meds-to-Beds request:        [id number] - [address]  [address]  Phone: [phone] Fax: [phone]      Notes:    Factors facilitating achievement of predicted outcomes: Family support, Motivated, Cooperative, and Pleasant    Barriers to discharge: n/a    Additional Case Management Notes: From home with his mother, independent and drives. DME: BP cuff and glucometer. Declined VNS (states he/his mom can do dressing changes). Denies needs. I&D right foot today. 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 Freedom of choice list with basic dialogue that supports the patient's individualized plan of care/goals and shares the quality data associated with the providers was provided to: Patient   Patient Representative Name:       The Patient and/or Patient Representative Agree with the Discharge Plan?  Yes    Suyapa Rodas RN  Case Management Department  Ph: [phone] Fax: [phone]

## 2023-10-02 NOTE — DISCHARGE INSTR - DIET

## 2023-10-03 ENCOUNTER — TELEPHONE (OUTPATIENT)
Dept: VASCULAR SURGERY | Age: 53
End: 2023-10-03

## 2023-10-03 NOTE — PROGRESS NOTES
09/25/23 1150   Encounter Summary   Encounter Overview/Reason   Encounter   Service Provided For: Patient   Referral/Consult From: Rounding   Last Encounter  09/25/23   Complexity of Encounter Low   Spiritual/Emotional needs   Type Spiritual Support   Rituals, Rites and Sacraments   Type Sacrament of Sick; Anointing
09/27/23 1556   Encounter Summary   Encounter Overview/Reason  Spiritual/Emotional Needs   Service Provided For: Patient   Referral/Consult From: Tata   Last Encounter  09/27/23   Complexity of Encounter Low   Spiritual/Emotional needs   Type Spiritual Support   Assessment/Intervention/Outcome   Intervention Prayer (assurance of)/[place]
09/28/23 1417   Encounter Summary   Encounter Overview/Reason  Volunteer Encounter   Service Provided For: Patient   Referral/Consult From: Rounding   Complexity of Encounter Low   Spiritual/Emotional needs   Type Spiritual Support   Rituals, Rites and Sacraments   Type   (Declined Communion)
09/29/23 1837   Encounter Summary   Encounter Overview/Reason  Volunteer Encounter   Service Provided For: Patient   Referral/Consult From: Tata   Last Encounter  09/29/23   Complexity of Encounter Low   Spiritual/Emotional needs   Type Spiritual Support   Assessment/Intervention/Outcome   Intervention Prayer (assurance of)/[place]   Plan and Referrals   Plan/Referrals Provided reading/devotional materials  (bronson)
[id number] [facility]   Occupational Therapy Evaluation  Date: 23  Patient Name: Karina Traylor       Room: 5906/8061-18  MRN: [id number]  Account: [de-identified]   : 1970  (46 y.o.) Gender: male     Discharge Recommendations: The patient's needs are being met with no further Occupational Therapy recommended at discharge. OT Equipment Recommendations  Other: TBD       Diagnosis: Gas gangrene      Treatment Diagnosis: Impaired self care status    Past Medical History:  has a past medical history of Anxiety and depression, Chronic back pain, HLD (hyperlipidemia), Hypertension, Marijuana abuse, Nephrolithiasis, and Type II or unspecified type diabetes mellitus without mention of complication, not stated as uncontrolled. Past Surgical History:   has a past surgical history that includes Hand surgery (Right, 10/25/2021); Foot Debridement (Left, 2023); Foot surgery (2023); Toe amputation (N/A, 2023); and Foot Debridement (Right, 2023). Restrictions  Restrictions/Precautions  Restrictions/Precautions: Weight Bearing;General Precautions  Required Braces or Orthoses?: Yes (surgical shoe)  Implants present? :  (denies)  Lower Extremity Weight Bearing Restrictions  Right Lower Extremity Weight Bearing: Non Weight Bearing      Vitals  Vitals  Pulse: 67  BP: 128/78  MAP (Calculated): 95  Respirations: 18  SpO2: 96 %  O2 Device: None (Room air)     Subjective  Subjective: \"I'm glad you guys got me out of bed\".  Pt was pleasant and agreeable to OT/PT eval.  Comments: Ok per BONG Diaz for OT/PT eval         Social/Functional History  Social/Functional History  Lives With: Parent (Mother)  Type of Home: House  Home Layout: One level  Home Access: Stairs to enter with rails  Entrance Stairs - Number of Steps: 1 WILLIAM  Entrance Stairs - Rails: Right  Bathroom Shower/Tub: Tub/Shower unit, Doors  Bathroom Toilet: Standard  Bathroom Equipment: Grab bars in shower (1 grab bar in shower)  Bathroom
[address] [facility]   INPATIENT OCCUPATIONAL THERAPY  PROGRESS NOTE  Date: 2023  Patient Name: Kathy Powell       Room: 2080/1879-98  MRN: [id number]    : 1970  (46 y.o.)  Gender: male      Diagnosis: Gas gangrene      Discharge Recommendations:  Further Occupational Therapy is recommended upon facility discharge. OT Equipment Recommendations  Other: TBD    Restrictions/Precautions  Restrictions/Precautions  Restrictions/Precautions: Weight Bearing;General Precautions  Required Braces or Orthoses?: Yes (surgical shoe)  Implants present? :  (denies)  Lower Extremity Weight Bearing Restrictions  Right Lower Extremity Weight Bearing: Non Weight Bearing (c sx)    Vitals  O2 Device: None (Room air)    Subjective  Subjective  Subjective: pt states that receiived pain meds shortly prior to writer arrival. \"It's just sore\" pt reports 4/10, R foot. Subjective  Pain: 8-9/10 in foot radiating up to lower extremity  Comments: pt agreeable for tx. Objective  Orientation  Overall Orientation Status: Within Normal Limits  Cognition  Overall Cognitive Status: WNL    Activities of Daily Living  ADL  Feeding: Independent  Grooming: Independent  UE Bathing: Independent  LE Bathing: Stand by assistance  UE Dressing: Independent  LE Dressing: Stand by assistance  Toileting: Stand by assistance  Additional Comments: ADL scores based on clinical reasoning and skilled observation unless otherwise noted. Pt currently limited by non-weight bearing precautions, safety awareness, and fatigue. pt educated on DME to increase safety during bathing and shower transfers. ie tub transfer bench, cast protector for waterproofing of bandage.     Balance  Balance  Sitting Balance: Independent  Standing Balance: Stand by assistance  Standing Balance  Time: ~3 minutes  Activity: functional mobility  Comment: VCs for safety with increased time and maintaining NWB status, pt up c sx
[address] [facility]   OCCUPATIONAL THERAPY MISSED TREATMENT NOTE   INPATIENT   Date: 23  Patient Name: Laura Caraballo       Room: 8925/8840-82  MRN: [id number]   Account #: [de-identified]    : 1970  (46 y.o.)  Gender: male   Diagnosis: Gas gangrene           REASON FOR MISSED TREATMENT:  Patient declined   -    Pt reports no further needs for OT services, requesting to be taken off caseload. Will discuss with OTR. Per discussion with pt, reports independent with self care and feels confident/comfortable with maintaining NWB to RLE.            Electronically signed by BAILEY Bauer on 23 at 3:21 PM EDT
Blood sugar 177 pre op
CLINICAL PHARMACY NOTE: MEDS TO BEDS    Total # of Prescriptions Filled: 7   The following medications were delivered to the patient:  Cephalexin 500mg  Metronidazole 500mg  Lisinopril 20mg  Lantus Solostar 100/ml  Metformin HCL 850mg  Januvia 50mg  Pen Needles [id number]    Additional Documentation:  Delivered medications to patients room
Comprehensive Nutrition Assessment    Type and Reason for Visit:  Positive Nutrition Screen (wt loss, poor appetite)    Nutrition Recommendations/Plan:   Will provide 5 carbohydrate choices per tray and start Ensure High protein 3 times daily. Malnutrition Assessment:  Malnutrition Status: At risk for malnutrition (Comment) (09/26/23 1348)    Context:  Acute Illness     Findings of the 6 clinical characteristics of malnutrition:  Energy Intake:  Mild decrease in energy intake (Comment)  Weight Loss:  No significant weight loss     Body Fat Loss:  Unable to assess     Muscle Mass Loss:  Unable to assess    Fluid Accumulation:  No significant fluid accumulation     Strength:  Not Performed    Nutrition Assessment:    Pt was admitted due to Gangrene/Necrotizing Fasciitis of toe. Plan is for toe amp/I&D later todat. REview of wt history suggests pt is WNL of bis usual body wt. Nutrition Related Findings:    no edema, Labs: Glu 185, Meds: Reviewed, PMH: DM Wound Type: None       Current Nutrition Intake & Therapies:    Average Meal Intake: Unable to assess     ADULT DIET; Regular; 5 carb choices (75 gm/meal)    Anthropometric Measures:  Height: 6' 2\" (188 cm)  Ideal Body Weight (IBW): 190 lbs (86 kg)    Admission Body Weight: 220 lb (99.8 kg) (9/20/23)  Current Body Weight: 220 lb (99.8 kg) (most recent scale wt),   IBW. Current BMI (kg/m2): 28.2  Usual Body Weight:  (215-220#)                       BMI Categories: Overweight (BMI 25.0-29. 9)    Estimated Daily Nutrient Needs:  Energy Requirements Based On: Formula  Weight Used for Energy Requirements: Admission  Energy (kcal/day): [place]ard  x 1.2= 2300 kcal  Weight Used for Protein Requirements: Current  Protein (g/day): 140 g (1.4g/kg)     Fluid (ml/day): 2300 ml    Nutrition Diagnosis:   Increased nutrient needs related to  (healling) as evidenced by  (wound)    Nutrition Interventions:   Food and/or Nutrient Delivery: Modify Current Diet, Start Oral
DATE: 2023    NAME: Julien Kern  MRN: [id number]   : 1970    Patient not seen this date for Physical Therapy due to:      [] Cancel by RN or physician due to:    [] Hemodialysis    [] Critical Lab Value Level     [] Blood transfusion in progress    [] Acute or unstable cardiovascular status   _MAP < 55 or more than >115  _HR < 40 or > 130    [] Acute or unstable pulmonary status   -FiO2 > 60%   _RR < 5 or >40    _O2 sats < 85%    [] Strict Bedrest    [x] Off Unit for surgery or procedure:     Pt having I & D with closure of wound. [] Off Unit for testing       [] Pending imaging to R/O fracture    [] Refusal by Patient      [] Other      [] PT being discontinued at this time. Patient independent. No further needs. [] PT being discontinued at this time as the patient has been transferred to hospice care. No further needs.       Koko Patel, PTA
Femi Knight is a 46 y.o.  /  male who presents with Foot Injury   and is admitted to the hospital for the management of Gas gangrene ([address] [facility]). According to patient, he noticed a puncture wound on the bottom of his right foot last week and went immediately to [facility] due to concern for developing a diabetic foot ulcer. Reports that he had a similar episode with his left foot in the past which required surgical intervention. States that at [facility], he was told that he did not need any antibiotics because the wound was not infected. States that he wrapped the wound Saturday night and when he took the wrap off this evening to get in the shower, he noted purple discoloration of his right middle toe and erythema extending from the toe up across the dorsal aspect of his right foot. Patient unaware of how or when he developed the puncture wound. Symptoms are associated with chills, malaise, and poor p.o. intake. Reports that he has been sleeping for long periods of time and feeling overall unwell. Denies chest pain, cough, abdominal pain, nausea, vomiting, diarrhea, and urinary symptoms. There are no aggravating or alleviating factors. Symptoms are reported as constant and severe; ongoing since 9/19; worse over past 48 hours.       Patient status inpatient in the Progressive Unit/Step down    Hospital Problems             Last Modified POA    * (Principal) Gas gangrene ([address] ) 9/24/2023 Yes       Gas gangrene right mid toe  -IV Zyvox and Zosyn initiated in ED  --Continue on admission to unit  -Blood cultures obtained x 2  -Wound culture/gram stain pending  -Right foot x-ray -no evidence of fracture or healing fracture  --No destructive lesion or osteomyelitis  --Evidence of diffuse edema and cellulitis and soft tissue of visualized distal right leg around left ankle and in the visualized proximal and mid portions of right foot  --Evidence of several locules of gas in the subcutaneous
[facility] -   Infectious diseases evaluation  admission date 9/24/2023    reason for consultation:   Right foot infection/necrotizing fasciitis    Impression :   Current:  Right foot infection/gas gangrene/necrotizing fasciitis s/p right foot I&D and 3rd digit amputation 9/25/2023 status post excision of the third metatarsal bone, additional debridement, Integra graft application and wound VAC 9/29/23  Diabetes mellitus uncontrolled. Nephrolithiasis  Hypertension  Marijuana abuse  History of left foot debridement 2/8/23      Recommendations     IV Cefazolin and clindamycin  Follow final cultures and adjust antibiotics as needed  Likely oral antibiotics on discharge  Vascular surgery evaluation noted, no surgical intervention planned, considering angiogram as outpatient. Follow C-reactive protein and sedimentation rate  Follow CBC and renal function    Infection Control Recommendations   [place] Precautions  Contact Isolation       Antimicrobial Stewardship Recommendations   Simplification of therapy  Targeted therapy      History of Present Illness:   Initial history:  Phu Fang is a 46y.o.-year-old male presented to the hospital with worsening right foot wounds associated with pain, swelling and redness to the foot. He also had purulent drainage. Symptoms worsening over several days, moderate to severe, no alleviating or aggravating factors . He denied fever or chills, does have decreased appetite, no other complaints. Initial WBC was 17.5, C-reactive protein 129, sedimentation rate 24  The patient was seen at the ER on 9/20/2023. He was noted to have dark discoloration to the right third toe.   Right foot x-ray suggestive with soft tissue edema and gas formation to the right third toe.  9/25/2023 preop right foot CT reviewed showed soft tissue gas to the right foot,at the dorsal aspect of the 2nd  cuneiform bone and at the dorsal aspect of the proximal
[facility] -   Infectious diseases evaluation  admission date 9/24/2023    reason for consultation:   Right foot infection/necrotizing fasciitis    Impression :   Current:  Right foot infection/gas gangrene/necrotizing fasciitis s/p right foot I&D and 3rd digit amputation 9/25/2023  Diabetes mellitus uncontrolled. Nephrolithiasis  Hypertension  Marijuana abuse  History of left foot debridement 2/8/23      Recommendations     IV vancomycin, cefepime and clindamycin  Follow cultures and adjust antibiotics as needed  Vascular surgery evaluation noted, no surgical intervention planned, considering angiogram as outpatient. Follow C-reactive protein and sedimentation rate  Follow CBC and renal function    Infection Control Recommendations   [place] Precautions  Contact Isolation       Antimicrobial Stewardship Recommendations   Simplification of therapy  Targeted therapy      History of Present Illness:   Initial history:  Rebeca Lizarraga is a 46y.o.-year-old male presented to the hospital with worsening right foot wounds associated with pain, swelling and redness to the foot. He also had purulent drainage. Symptoms worsening over several days, moderate to severe, no alleviating or aggravating factors . He denied fever or chills, does have decreased appetite, no other complaints. Initial WBC was 17.5, C-reactive protein 129, sedimentation rate 24  The patient was seen at the ER on 9/20/2023. He was noted to have dark discoloration to the right third toe. Right foot x-ray suggestive with soft tissue edema and gas formation to the right third toe.  9/25/2023 preop right foot CT reviewed showed soft tissue gas to the right foot,at the dorsal aspect of the 2nd  cuneiform bone and at the dorsal aspect of the proximal portion of 1st and 2nd metatarsal bones.  Evidence of several locules of gas in the subcutaneous fatty  tissues at the posteromedial and posterolateral aspect
[facility] -   Infectious diseases evaluation  admission date 9/24/2023    reason for consultation:   Right foot infection/necrotizing fasciitis    Impression :   Current:  Right foot infection/gas gangrene/necrotizing fasciitis s/p right foot I&D and 3rd digit amputation 9/25/2023 status post excision of the third metatarsal bone, additional debridement, Integra graft application and wound VAC 9/29/23  Diabetes mellitus uncontrolled. Nephrolithiasis  Hypertension  Marijuana abuse  History of left foot debridement 2/8/23      Recommendations     IV Cefazolin and clindamycin  Follow final cultures and adjust antibiotics as needed  Likely oral antibiotics on discharge  Vascular surgery evaluation noted, no surgical intervention planned, considering angiogram as outpatient. Follow C-reactive protein and sedimentation rate  Follow CBC and renal function    Infection Control Recommendations   [place] Precautions  Contact Isolation       Antimicrobial Stewardship Recommendations   Simplification of therapy  Targeted therapy      History of Present Illness:   Initial history:  Nimisha Garcia is a 46y.o.-year-old male presented to the hospital with worsening right foot wounds associated with pain, swelling and redness to the foot. He also had purulent drainage. Symptoms worsening over several days, moderate to severe, no alleviating or aggravating factors . He denied fever or chills, does have decreased appetite, no other complaints. Initial WBC was 17.5, C-reactive protein 129, sedimentation rate 24  The patient was seen at the ER on 9/20/2023. He was noted to have dark discoloration to the right third toe.   Right foot x-ray suggestive with soft tissue edema and gas formation to the right third toe.  9/25/2023 preop right foot CT reviewed showed soft tissue gas to the right foot,at the dorsal aspect of the 2nd  cuneiform bone and at the dorsal aspect of the proximal
[facility] Internal Medicine  Maricarmen Lepe MD; Noble Adkins MD; Zion Alvarez MD; Marylen Dress, MD Cherisse Daft, MD; Margene Homans, MD [facility]   [address]    HISTORY AND PHYSICAL EXAMINATION            Date:   9/27/2023  Patient name:  Tracy Casarez  Date of admission:  9/24/2023 10:47 PM  MRN:   [id number]  Account:  [de-identified]  YOB: 1970  PCP:    Armani Braden MD  Room:   6674/0234-02  Code Status:    Full Code    Chief Complaint:     Chief Complaint   Patient presents with    Foot Injury   Foot infection with gas in sub tissu    History Obtained From:     Pt medical record and nursing staff    History of Present Illness:     Tracy Casarez is a 46 y.o.  /  male who presents with Foot Injury   and is admitted to the hospital for the management of Gas gangrene ([address] [facility]). Tracy Casarez is a 46 y.o.  /  male who presents with Foot Injury   and is admitted to the hospital for the management of Gas gangrene ([address] [facility]). According to patient, he noticed a puncture wound on the bottom of his right foot last week and went immediately to [facility] due to concern for developing a diabetic foot ulcer. Reports that he had a similar episode with his left foot in the past which required surgical intervention. States that at [facility], he was told that he did not need any antibiotics because the wound was not infected. States that he wrapped the wound Saturday night and when he took the wrap off this evening to get in the shower, he noted purple discoloration of his right middle toe and erythema extending from the toe up across the dorsal aspect of his right foot. Patient unaware of how or when he developed the puncture wound. Symptoms are associated with chills, malaise, and poor p.o. intake. Reports that he has been sleeping for long periods of time and feeling overall unwell.
[facility] Internal Medicine  Gael Santana MD; Fransisca Crandall MD; Kelly Jenkins MD; MD Alanna Pacheco MD; MD JANETT [facility] Internal Medicine   [address]    HISTORY AND PHYSICAL EXAMINATION            Date:   9/29/2023  Patient name:  Eduardo Perez  Date of admission:  9/24/2023 10:47 PM  MRN:   [id number]  Account:  [de-identified]  YOB: 1970  PCP:    María Rebollar MD  Room:   [address] [place] [address]  Code Status:    Full Code    Chief Complaint:     Chief Complaint   Patient presents with    Foot Injury   Foot infection with gas in sub tissu    History Obtained From:     Pt medical record and nursing staff    History of Present Illness:     Eduardo Perez is a 46 y.o.  /  male who presents with Foot Injury   and is admitted to the hospital for the management of Gas gangrene ([address] [facility]). Eduardo Perez is a 46 y.o.  /  male who presents with Foot Injury   and is admitted to the hospital for the management of Gas gangrene ([address] [facility]). According to patient, he noticed a puncture wound on the bottom of his right foot last week and went immediately to [facility] due to concern for developing a diabetic foot ulcer. Reports that he had a similar episode with his left foot in the past which required surgical intervention. States that at [facility], he was told that he did not need any antibiotics because the wound was not infected. States that he wrapped the wound Saturday night and when he took the wrap off this evening to get in the shower, he noted purple discoloration of his right middle toe and erythema extending from the toe up across the dorsal aspect of his right foot. Patient unaware of how or when he developed the puncture wound. Symptoms are associated with chills, malaise, and poor p.o. intake. Reports that he has been sleeping for long periods of time and feeling overall unwell.
[facility]  Aryan Vann MD; Cynthia Atkins MD; Nikki Pond MD; MD Laz Manning MD; MD JANETT Venegas [facility] Internal Medicine   [address]    HISTORY AND PHYSICAL EXAMINATION            Date:   9/26/2023  Patient name:  Eve Hill  Date of admission:  9/24/2023 10:47 PM  MRN:   [id number]  Account:  [de-identified]  YOB: 1970  PCP:    Jorje Aguilera MD  Room:   6379/6722-10  Code Status:    Full Code    Chief Complaint:     Chief Complaint   Patient presents with    Foot Injury   Foot infection with gas in sub tissu    History Obtained From:     Pt medical record and nursing staff    History of Present Illness:     Eve Hill is a 46 y.o.  /  male who presents with Foot Injury   and is admitted to the hospital for the management of Gas gangrene ([address] [facility]). Eve Hill is a 46 y.o.  /  male who presents with Foot Injury   and is admitted to the hospital for the management of Gas gangrene ([address] [facility]). According to patient, he noticed a puncture wound on the bottom of his right foot last week and went immediately to [facility] due to concern for developing a diabetic foot ulcer. Reports that he had a similar episode with his left foot in the past which required surgical intervention. States that at [facility], he was told that he did not need any antibiotics because the wound was not infected. States that he wrapped the wound Saturday night and when he took the wrap off this evening to get in the shower, he noted purple discoloration of his right middle toe and erythema extending from the toe up across the dorsal aspect of his right foot. Patient unaware of how or when he developed the puncture wound. Symptoms are associated with chills, malaise, and poor p.o. intake. Reports that he has been sleeping for long periods of time and feeling overall unwell.
New consult sent to Dr Vogel Blocker
Patient back from surgery. A/o x4. Patient still lethargic following surgery and having mild hallucinations. BP elevated. Attending and podiatry notified. Bed alarm in use. Wound vac in place and continues to beep. According to PACU nurse, podiatry was made aware and wound vac rep was also notified. If [address] continues to beep tomorrow, RN to call Awais Reis with [facility] at [phone]. Family at bedside. See chart for more details.
Patient discharged home with all personal belongings. Discharge instructions and prescriptions given. Patient taken down to front entrance via wheelchair.
Patient taken to MRI via Wheelchair.
Physical Therapy          Physical Therapy Cancel Note      DATE: 2023    NAME: Marjan Escoto  MRN: [id number]   : 1970      Patient not seen this date for Physical Therapy due to:    Pt defers PT services, pt states no concerns with \" ambulation, stair negotiation, and wound vac management \" .   No further PT at this time      Electronically signed by Renetta Phelan PTA on 2023 at 9:59 AM
Physical Therapy  Facility/Department: [facility] MED SURG  Physical Therapy Initial Assessment    Name: Leilani Alfaro  : 1970  MRN: [id number]  Date of Service: 2023    Discharge Recommendations:      PT Equipment Recommendations  Equipment Needed: Yes  Mobility Devices: Brady Filter: Rolling      Patient Diagnosis(es): The primary encounter diagnosis was Necrotizing fasciitis of ankle and foot ([address]). Diagnoses of Gas gangrene ([address]) and Type 2 diabetes mellitus with right diabetic foot infection ([address]) were also pertinent to this visit. Past Medical History:  has a past medical history of Anxiety and depression, Chronic back pain, HLD (hyperlipidemia), Hypertension, Marijuana abuse, Nephrolithiasis, and Type II or unspecified type diabetes mellitus without mention of complication, not stated as uncontrolled. Past Surgical History:  has a past surgical history that includes Hand surgery (Right, 10/25/2021); Foot Debridement (Left, 2023); Foot surgery (2023); Toe amputation (N/A, 2023); and Foot Debridement (Right, 2023).     Assessment   Body Structures, Functions, Activity Limitations Requiring Skilled Therapeutic Intervention: Decreased functional mobility   Assessment: Impaired mobility due to NWB status s/p R toe amputation/ I & D  Decision Making: Low Complexity  History: Gas Gangrene  Exam: decreased mobility  Clinical Presentation: stable  Requires PT Follow-Up: Yes  Activity Tolerance  Activity Tolerance: Patient tolerated evaluation without incident     Plan   Physcial Therapy Plan  General Plan: 1 time a day 7 days a week  Current Treatment Recommendations: Gait training, Stair training, Safety education & training  Safety Devices  Type of Devices: Left in bed, Call light within reach, Gait belt     Restrictions  Restrictions/Precautions  Restrictions/Precautions: Weight Bearing, General Precautions  Required Braces or Orthoses?: Yes (surgical shoe)  Implants present? :
Physical Therapy  Facility/Department: [facility] MED SURG  Daily Treatment Note  NAME: Quin Pugh  : 1970  MRN: [id number]    Date of Service: 2023    Discharge Recommendations:      PT Equipment Recommendations  Equipment Needed: Yes  Madelyn Stuart: Nori    Patient Diagnosis(es): The primary encounter diagnosis was Necrotizing fasciitis of ankle and foot ([address] Diagnoses of Gas gangrene ([address]) and Type 2 diabetes mellitus with right diabetic foot infection ([address]) were also pertinent to this visit. Assessment   Activity Tolerance: Patient tolerated treatment well  Equipment Needed: Yes     Plan    Physcial Therapy Plan  General Plan: 1 time a day 7 days a week  Current Treatment Recommendations: Gait training;Stair training; Safety education & training     Restrictions  Restrictions/Precautions  Restrictions/Precautions: Weight Bearing, General Precautions  Required Braces or Orthoses?: Yes (surgical shoe)  Implants present? :  (denies)  Lower Extremity Weight Bearing Restrictions  Right Lower Extremity Weight Bearing: Non Weight Bearing (c sx)     Subjective    Subjective  Subjective: Pt in bed upon arrival. Reports frustration with his situation but is agreeable to PT. Pain: pt did not rate pain, stated \"I'm ok, I just took pain meds\".   Orientation  Overall Orientation Status: Within Normal Limits     Objective     Bed Mobility Training  Bed Mobility Training: Yes  Supine to Sit: Independent  Scooting: Independent  Balance  Sitting: Intact (pt sat EOB 7 minutes with no LOB during dynamic balance activities)  Standing: With support (CGA for standing balance secondary to NWB RLE)  Transfer Training  Transfer Training: Yes  Sit to Stand: Stand-by assistance (completed from bed and w/c 3x with good ability to maintain NWB RLE)  Stand to Sit: Contact-guard assistance (required cues for safe hand placement; mildly impulsive)  Bed to Chair: Contact-guard assistance;Assist X1  Gait Training  Gait Training: Yes  Right
Physical Therapy  Facility/Department: [facility] MED SURG  Daily Treatment Note  NAME: Quin Pugh  : 1970  MRN: [id number]    Date of Service: 2023    Discharge Recommendations:      PT Equipment Recommendations  Equipment Needed: Yes  Madelyn Stuart: Nori    Patient Diagnosis(es): The primary encounter diagnosis was Necrotizing fasciitis of ankle and foot ([address] Diagnoses of Gas gangrene ([address]) and Type 2 diabetes mellitus with right diabetic foot infection ([address]) were also pertinent to this visit. Assessment   Activity Tolerance: Patient tolerated treatment well  Equipment Needed: Yes     Plan    Physcial Therapy Plan  General Plan: 1 time a day 7 days a week     Restrictions  Restrictions/Precautions  Restrictions/Precautions: Weight Bearing, General Precautions  Required Braces or Orthoses?: Yes (surgical shoe)  Implants present? :  (denies)  Lower Extremity Weight Bearing Restrictions  Right Lower Extremity Weight Bearing: Non Weight Bearing (c sx)     Subjective    Subjective  Subjective: Pt positioned supine in bed, Agreeable to tx. BONG new's tx.   Pain: 8-9/10 in foot radiating up to lower extremity  Orientation  Overall Orientation Status: Within Normal Limits  Cognition  Overall Cognitive Status: WNL     Objective   Bed Mobility Training  Bed Mobility Training: Yes (HOB Flat, no hand rails.)  Rolling: Independent  Supine to Sit: Independent  Sit to Supine: Independent  Scooting: Independent  Balance  Sitting: Intact  Standing: Intact  Transfer Training  Transfer Training: Yes (with RW)  Sit to Stand: Modified independent  Stand to Sit: Modified independent  Stand Pivot Transfers: Modified independent  Gait Training  Gait Training: Yes  Gait  Overall Level of Assistance: Supervision  Stance:  (Pt compliant with WB precautions throughout ambulation)  Distance (ft): 30 Feet (limited to wet halllway floors)  Assistive Device: Walker, rolling  Stairs - Level of Assistance: Stand-by assistance  Number of
Physician Progress Note      Marcio Hernandez  [facility] #:                  [id number]  :                       1970  ADMIT DATE:       2023 10:47 PM  [address] [facility] DATE:        10/2/2023 2:00 PM  RESPONDING  PROVIDER #:        Elvi Rey MD          QUERY TEXT:    [address] [facility] Internal Medicine    Pt admitted with gas gangrene.  Pt noted to have Sepsis- POA. If possible,   please document in progress notes and discharge summary if Sepsis was : The medical record reflects the following:  Risk Factors: gas gangrene  Clinical Indicators: WBC 17.5-13.7-10.5 w/ L shift, LA 1.5, Procal 0.12, CRP   129.0-113.5-129.6, SED 24-44  Treatment: LR bolus; NS; Zyvox; Zosyn & amputation    Nyasia Mitchell BSN, RN  Options provided:  -- Sepsis confirmed after study  -- Sepsis treated and resolved  -- Sepsis ruled out after study  -- Other - I will add my own diagnosis  -- Disagree - Not applicable / Not valid  -- Disagree - Clinically unable to determine / Unknown  -- Refer to Clinical Documentation Reviewer    PROVIDER RESPONSE TEXT:    Sepsis confirmed after study.     Query created by: Dominic Huston on 10/2/2023 11:28 AM      Electronically signed by:  Elvi Rey MD 10/3/2023 8:58 AM
Physician Progress Note      PATIENTMajareth Mistry  CSN #:                  [id number]  :                       1970  ADMIT DATE:       2023 10:47 PM  [address] [facility] DATE:  RESPONDING  PROVIDER #:        Zechariah Louise MD          QUERY TEXT:    Pt admitted with gas gangrene toe. Pt noted to have elev WBC, elev CRP. If   possible, please document in the progress notes and discharge summary if you   are evaluating and /or treating any of the following: The medical record reflects the following:  Risk Factors: DM ft infection w/ gas gangrene    Clinical Indicators:    WBC 17.5-13.7-10.5 w/ L shift  LA 1.5  Procal 0.12  .0-113.5-129.6  SED 24-44    Treatment: LR bolus; NS; Zyvox; Zosyn    Thank you,  Laura Isbell RN, BSN, CRCR, CCDS, SMART  Clinical Documentation Improvement  [email or web address]  [phone] or via Perfect Serve  Options provided:  -- Sepsis, present on admission  -- Sepsis, present on admission, now resolved  -- DM foot infection without Sepsis  -- Other - I will add my own diagnosis  -- Disagree - Not applicable / Not valid  -- Disagree - Clinically unable to determine / Unknown  -- Refer to Clinical Documentation Reviewer    PROVIDER RESPONSE TEXT:    This patient has sepsis which was present on admission.     Query created by: Laura Isbell on 2023 8:52 AM      Electronically signed by:  Zechariah Louise MD 2023 9:02 AM
Progress Note  Podiatric Medicine and Surgery     Subjective     CC: Gas in right foot; S/p right foot I&D and 3rd digit amputation (DOS: 9/25/23)    - Patient seen and examined at bedside  - No acute events overnight  - Afebrile, vital signs stable   - Patient is concerned about infection  - Pain well controlled  - Denies any constitutional symptoms  - S/p right foot I&D and 3rd digit amputation (DOS: 9/25/23)    Labs:  WBC: 17.5 -> 10.5  CRP: 129.0 -> 129.6  ESR: 24 -> 44 (9/25)    HPI :  Kilo Hook is a 46 y.o. male diabetic patient seen at [facility] for foot wounds to the right lower extremity. They have had this wound since 9/19/23. Patient was seen in ED on 9/20/23 for same wound. Patient was discharged from ED. Patient states he was not prescribe antibiotics. Today he noticed increased swelling, redness, pain, and drainage and came to the ED. Patient states he drank 1.5  bottles of water on his way to the ED. Patient denies constitutional symptoms. Podiatry was consulted for further wound care intervention. ROS: Denies N/V/F/C/SOB/CP. Otherwise negative except at stated in the HPI.      Medications:  Scheduled Meds:   sodium chloride flush  5-40 mL IntraVENous 2 times per day    insulin glargine  35 Units SubCUTAneous QAM    lisinopril  20 mg Oral Daily    linezolid  600 mg IntraVENous Q12H    piperacillin-tazobactam  3,375 mg IntraVENous Q8H    insulin lispro  0-8 Units SubCUTAneous TID     insulin lispro  0-4 Units SubCUTAneous Nightly    clindamycin (CLEOCIN) IV  600 mg IntraVENous Q8H       Continuous Infusions:   sodium chloride 125 mL/hr at 09/25/23 1330    sodium chloride 100 mL/hr at 09/25/23 1723    dextrose         PRN Meds:sodium chloride flush, sodium chloride, ondansetron **OR** ondansetron, magnesium hydroxide, acetaminophen **OR** acetaminophen, HYDROmorphone, povidone-iodine, sodium chloride flush, glucose, dextrose bolus **OR** dextrose bolus, glucagon (rDNA), dextrose, sodium
Progress Note  Podiatric Medicine and Surgery     Subjective     CC: Gas in right foot; S/p right foot I&D and 3rd digit amputation (DOS: 9/25/23)  POD3    - Patient seen and examined at bedside  - No acute events overnight  - Afebrile, vital signs stable   - Pain well controlled  - Denies any constitutional symptoms    Labs:  WBC: 17.5 -> 10.5--6.5  CRP: 129.0 -> 129.6-- 78  ESR: 24 -> 44 (9/25)-- 59    HPI :  Dina Tan is a 46 y.o. male diabetic patient seen at [facility] for foot wounds to the right lower extremity. They have had this wound since 9/19/23. Patient was seen in ED on 9/20/23 for same wound. Patient was discharged from ED. Patient states he was not prescribe antibiotics. Today he noticed increased swelling, redness, pain, and drainage and came to the ED. Patient states he drank 1.5  bottles of water on his way to the ED. Patient denies constitutional symptoms. Podiatry was consulted for further wound care intervention. ROS: Denies N/V/F/C/SOB/CP. Otherwise negative except at stated in the HPI.      Medications:  Scheduled Meds:   cefepime  2,000 mg IntraVENous Q8H    vancomycin (VANCOCIN) intermittent dosing (placeholder)   Other RX Placeholder    vancomycin  1,250 mg IntraVENous Q12H    sodium chloride flush  5-40 mL IntraVENous 2 times per day    insulin glargine  35 Units SubCUTAneous QAM    lisinopril  20 mg Oral Daily    insulin lispro  0-8 Units SubCUTAneous TID WC    insulin lispro  0-4 Units SubCUTAneous Nightly    clindamycin (CLEOCIN) IV  600 mg IntraVENous Q8H       Continuous Infusions:   sodium chloride 125 mL/hr at 09/25/23 1330    sodium chloride Stopped (09/28/23 0437)    dextrose         PRN Meds:povidone-iodine, sodium chloride flush, sodium chloride, ondansetron **OR** ondansetron, magnesium hydroxide, acetaminophen **OR** acetaminophen, HYDROmorphone, povidone-iodine, sodium chloride flush, glucose, dextrose bolus **OR** dextrose bolus, glucagon (rDNA), dextrose, sodium
Progress Note  Podiatric Medicine and Surgery     Subjective     CC: Gas in right foot; S/p right foot I&D and 3rd digit amputation (DOS: 9/25/23)  POD3    - Patient seen and examined at bedside  - No acute events overnight  - Afebrile, vital signs stable   - Pain well controlled  - Denies any constitutional symptoms    Labs:  WBC: 17.5 -> 10.5--6.5  CRP: 129.0 -> 129.6-- 78  ESR: 24 -> 44 (9/25)-- 59    Update culture:  BACTEROIDES THETAIOTAOMICRON LIGHT GROWTH BETA LACTAMASE POSITIVE Abnormal     HPI :  Phu Fang is a 46 y.o. male diabetic patient seen at [facility] for foot wounds to the right lower extremity. They have had this wound since 9/19/23. Patient was seen in ED on 9/20/23 for same wound. Patient was discharged from ED. Patient states he was not prescribe antibiotics. Today he noticed increased swelling, redness, pain, and drainage and came to the ED. Patient states he drank 1.5  bottles of water on his way to the ED. Patient denies constitutional symptoms. Podiatry was consulted for further wound care intervention. ROS: Denies N/V/F/C/SOB/CP. Otherwise negative except at stated in the HPI.      Medications:  Scheduled Meds:   ceFAZolin  2,000 mg IntraVENous Q8H    sodium chloride flush  5-40 mL IntraVENous 2 times per day    insulin glargine  35 Units SubCUTAneous QAM    lisinopril  20 mg Oral Daily    insulin lispro  0-8 Units SubCUTAneous TID WC    insulin lispro  0-4 Units SubCUTAneous Nightly    clindamycin (CLEOCIN) IV  600 mg IntraVENous Q8H       Continuous Infusions:   sodium chloride 125 mL/hr at 09/25/23 1330    sodium chloride Stopped (09/28/23 0437)    dextrose         PRN Meds:povidone-iodine, sodium chloride flush, sodium chloride, ondansetron **OR** ondansetron, magnesium hydroxide, acetaminophen **OR** acetaminophen, HYDROmorphone, povidone-iodine, sodium chloride flush, glucose, dextrose bolus **OR** dextrose bolus, glucagon (rDNA), dextrose, sodium hypochlorite, sodium
Progress Note  Podiatric Medicine and Surgery     Subjective     CC: Gas in right foot; S/p right foot I&D and 3rd digit amputation (DOS: 9/25/23)  POD3    - Patient seen and examined at bedside  - No acute events overnight  - Afebrile, vital signs stable   - Pain well controlled  - Denies any constitutional symptoms, noted the pain and potentially coming from wound VAC, leak was fixed today by turning machine off-and-on. Labs:  WBC: 17.5 -> 10.5--6.5--10.8   CRP: 129.0 -> 129.6-- 78-- 29  ESR: 24 -> 44 (9/25)-- 59--53    Update culture:  BACTEROIDES THETAIOTAOMICRON LIGHT GROWTH BETA LACTAMASE POSITIVE Abnormal     HPI :  Kilo Hook is a 46 y.o. male diabetic patient seen at [facility] for foot wounds to the right lower extremity. They have had this wound since 9/19/23. Patient was seen in ED on 9/20/23 for same wound. Patient was discharged from ED. Patient states he was not prescribe antibiotics. Today he noticed increased swelling, redness, pain, and drainage and came to the ED. Patient states he drank 1.5  bottles of water on his way to the ED. Patient denies constitutional symptoms. Podiatry was consulted for further wound care intervention. ROS: Denies N/V/F/C/SOB/CP. Otherwise negative except at stated in the HPI.      Medications:  Scheduled Meds:   ceFAZolin  2,000 mg IntraVENous Q8H    sodium chloride flush  5-40 mL IntraVENous 2 times per day    insulin glargine  35 Units SubCUTAneous QAM    lisinopril  20 mg Oral Daily    insulin lispro  0-8 Units SubCUTAneous TID     insulin lispro  0-4 Units SubCUTAneous Nightly    clindamycin (CLEOCIN) IV  600 mg IntraVENous Q8H       Continuous Infusions:   sodium chloride 125 mL/hr at 09/25/23 1330    sodium chloride 100 mL/hr at 09/30/23 0042    dextrose         PRN Meds:hydrALAZINE, povidone-iodine, sodium chloride flush, sodium chloride, ondansetron **OR** ondansetron, magnesium hydroxide, acetaminophen **OR** acetaminophen, HYDROmorphone,
Progress Note  Podiatric Medicine and Surgery     Subjective     CC: Gas in right foot; S/p right foot I&D and 3rd digit amputation (DOS: 9/25/23); status post I&D with application of Integra bilayer and Prevena wound VAC (DOS: 9/29/2023)    -Patient seen and examined at bedside during rounding this morning  -No acute events overnight. Vital signs stable at this time.  -Patient states that he is doing well. Pain is well controlled with current pain regimen.  -Patient denies any systemic signs of infection at this time.  -Prevena wound VAC in place. Working properly. Patient will need 1 more wound VAC change prior to being discharged    Labs:  WBC: 17.5 -> 10.5--6.5--10.8--> 7.9  CRP: 129.0 -> 129.6-- 78-- 29--> 16.2  ESR: 24 -> 44 (9/25)-- 59--53    Update culture:  BACTEROIDES THETAIOTAOMICRON LIGHT GROWTH BETA LACTAMASE POSITIVE Abnormal     HPI :  Grady Gordon is a 46 y.o. male diabetic patient seen at [facility] for foot wounds to the right lower extremity. They have had this wound since 9/19/23. Patient was seen in ED on 9/20/23 for same wound. Patient was discharged from ED. Patient states he was not prescribe antibiotics. Today he noticed increased swelling, redness, pain, and drainage and came to the ED. Patient states he drank 1.5  bottles of water on his way to the ED. Patient denies constitutional symptoms. Podiatry was consulted for further wound care intervention. ROS: Denies N/V/F/C/SOB/CP. Otherwise negative except at stated in the HPI.      Medications:  Scheduled Meds:   ceFAZolin  2,000 mg IntraVENous Q8H    sodium chloride flush  5-40 mL IntraVENous 2 times per day    insulin glargine  35 Units SubCUTAneous QAM    lisinopril  20 mg Oral Daily    insulin lispro  0-8 Units SubCUTAneous TID WC    insulin lispro  0-4 Units SubCUTAneous Nightly       Continuous Infusions:   sodium chloride 125 mL/hr at 09/25/23 1330    sodium chloride 100 mL/hr at 09/30/23 0042    dextrose         PRN
[facility] Internal Medicine  Mirna Cerna MD; Espinoza Sanchez MD; Arnaldo Cooper MD; MD Levi Serna MD; Didier Valadez MD    [facility] Internal Medicine   [address] [place] [address]    HISTORY AND PHYSICAL EXAMINATION            Date:   9/30/2023  Patient name:  Grady Gordon  Date of admission:  9/24/2023 10:47 PM  MRN:   [id number]  Account:  [de-identified]  YOB: 1970  PCP:    Alfa Miller MD  Room:   [facility]2731-  Code Status:    Full Code    Chief Complaint:     Chief Complaint   Patient presents with    Foot Injury   Foot infection with gas in sub tissu    History Obtained From:     Pt medical record and nursing staff    History of Present Illness:     Grady Gordon is a 46 y.o.  /  male who presents with Foot Injury   and is admitted to the hospital for the management of Gas gangrene ([address] [facility]). Grady Gordon is a 46 y.o.  /  male who presents with Foot Injury   and is admitted to the hospital for the management of Gas gangrene ([address] [facility]). According to patient, he noticed a puncture wound on the bottom of his right foot last week and went immediately to [facility] due to concern for developing a diabetic foot ulcer. Reports that he had a similar episode with his left foot in the past which required surgical intervention. States that at [facility], he was told that he did not need any antibiotics because the wound was not infected. States that he wrapped the wound Saturday night and when he took the wrap off this evening to get in the shower, he noted purple discoloration of his right middle toe and erythema extending from the toe up across the dorsal aspect of his right foot. Patient unaware of how or when he developed the puncture wound. Symptoms are associated with chills, malaise, and poor p.o. intake. Reports that he has been sleeping for long periods of time and feeling overall unwell.
Vancomycin Dosing by Pharmacy - Daily Note   Vancomycin Therapy Day:  2  Indication: SSTI, gas gangrene    Allergies:  Patient has no known allergies. Actual Weight:    Wt Readings from Last 1 Encounters:   09/27/23 220 lb 14.4 oz (100.2 kg)       Labs/Ancillary Data  Estimated Creatinine Clearance: 133 mL/min (based on SCr of 0.8 mg/dL). Recent Labs     09/25/23  0604 09/26/23  0609 09/27/23  1247   CREATININE 0.8 0.8 0.8   BUN 15 13 10   WBC 13.7* 10.5 7.4     Procalcitonin   Date Value Ref Range Status   09/24/2023 0.12 (H) <0.09 ng/mL Final     Comment:           Suspected Sepsis:  <0.50 ng/mL     Low likelihood of sepsis. 0.50-2.00 ng/mL     Increased likelihood of sepsis. Antibiotics encouraged. >2.00 ng/mL     High risk of sepsis/shock. Antibiotics strongly encouraged. Suspected Lower Resp Tract Infections:  <0.24 ng/mL     Low likelihood of bacterial infection. >0.24 ng/mL     Increased likelihood of bacterial infection. Antibiotics encouraged. With successful antibiotic therapy, PCT levels should decrease rapidly. (Half-life of 24 to   36 hours.)        Procalcitonin values from samples collected within the first 6 hours of systemic infection   may still be low. Retesting may be indicated. Values from day 1 and day 4 can be entered into the Change in Procalcitonin Calculator   (www.[employer]-pct-calculator. com) to determine the patient's Mortality Risk Prognosis        In healthy neonates, plasma Procalcitonin (PCT) concentrations increase gradually after   birth, reaching peak values at about 24 hours of age then decrease to normal values below   0.5 ng/mL by 48-72 hours of age.          Intake/Output Summary (Last 24 hours) at 9/27/2023 1423  Last data filed at 9/27/2023 0459  Gross per 24 hour   Intake 5135.92 ml   Output 975 ml   Net 4160.92 ml     Temp: 98.4 F    Culture Date / Source  /  Results  See micro  Recent vancomycin administrations                     vancomycin (VANCOCIN)
Vancomycin Dosing by Pharmacy - Daily Note   Vancomycin Therapy Day:  3  Indication: SSTI, gangrene     Allergies:  Patient has no known allergies. Actual Weight:    Wt Readings from Last 1 Encounters:   09/28/23 219 lb 9.3 oz (99.6 kg)       Labs/Ancillary Data  Estimated Creatinine Clearance: 133 mL/min (based on SCr of 0.8 mg/dL). Recent Labs     09/26/23  0609 09/27/23  1247 09/28/23  0728   CREATININE 0.8 0.8 0.8   BUN 13 10 12   WBC 10.5 7.4 6.5     Procalcitonin   Date Value Ref Range Status   09/24/2023 0.12 (H) <0.09 ng/mL Final     Comment:           Suspected Sepsis:  <0.50 ng/mL     Low likelihood of sepsis. 0.50-2.00 ng/mL     Increased likelihood of sepsis. Antibiotics encouraged. >2.00 ng/mL     High risk of sepsis/shock. Antibiotics strongly encouraged. Suspected Lower Resp Tract Infections:  <0.24 ng/mL     Low likelihood of bacterial infection. >0.24 ng/mL     Increased likelihood of bacterial infection. Antibiotics encouraged. With successful antibiotic therapy, PCT levels should decrease rapidly. (Half-life of 24 to   36 hours.)        Procalcitonin values from samples collected within the first 6 hours of systemic infection   may still be low. Retesting may be indicated. Values from day 1 and day 4 can be entered into the Change in Procalcitonin Calculator   (www.[employer]-pct-calculator. CCM Benchmark) to determine the patient's Mortality Risk Prognosis        In healthy neonates, plasma Procalcitonin (PCT) concentrations increase gradually after   birth, reaching peak values at about 24 hours of age then decrease to normal values below   0.5 ng/mL by 48-72 hours of age.          Intake/Output Summary (Last 24 hours) at 9/28/2023 0919  Last data filed at 9/28/2023 0525  Gross per 24 hour   Intake 2800.58 ml   Output 800 ml   Net 2000.58 ml     Temp: 98.6 F    Culture Date / Source  /  Results  See micro  Recent vancomycin administrations                     vancomycin (VANCOCIN) 1,250 mg
Vancomycin Dosing by Pharmacy - Initial Note   TODAY'S DATE:  9/26/2023  Patient name, age:  Nimisha Garcia, 46 y.o. Indication: SSTI  Additional antimicrobials:  Cefepime    Allergies:  Patient has no known allergies. Actual Weight:    Wt Readings from Last 1 Encounters:   09/24/23 215 lb (97.5 kg)     Labs/Ancillary Data  Estimated Creatinine Clearance: 123 mL/min (based on SCr of 0.8 mg/dL). Recent Labs     09/24/23  2305 09/25/23  0604 09/26/23  0609   CREATININE 1.1 0.8 0.8   BUN 19 15 13   WBC 17.5* 13.7* 10.5     Procalcitonin   Date Value Ref Range Status   09/24/2023 0.12 (H) <0.09 ng/mL Final     Comment:           Suspected Sepsis:  <0.50 ng/mL     Low likelihood of sepsis. 0.50-2.00 ng/mL     Increased likelihood of sepsis. Antibiotics encouraged. >2.00 ng/mL     High risk of sepsis/shock. Antibiotics strongly encouraged. Suspected Lower Resp Tract Infections:  <0.24 ng/mL     Low likelihood of bacterial infection. >0.24 ng/mL     Increased likelihood of bacterial infection. Antibiotics encouraged. With successful antibiotic therapy, PCT levels should decrease rapidly. (Half-life of 24 to   36 hours.)        Procalcitonin values from samples collected within the first 6 hours of systemic infection   may still be low. Retesting may be indicated. Values from day 1 and day 4 can be entered into the Change in Procalcitonin Calculator   ([email or web address]) to determine the patient's Mortality Risk Prognosis        In healthy neonates, plasma Procalcitonin (PCT) concentrations increase gradually after   birth, reaching peak values at about 24 hours of age then decrease to normal values below   0.5 ng/mL by 48-72 hours of age.          Intake/Output Summary (Last 24 hours) at 9/26/2023 1406  Last data filed at 9/26/2023 1402  Gross per 24 hour   Intake 950 ml   Output 1200 ml   Net -250 ml     Temp: 98.2    Recent vancomycin administrations        No vancomycin IV orders with
Saph/sural/SP/DP/plantar sensation diminished to light touch. Musculoskeletal: Muscle strength is 5/5 to all lower extremity muscle groups. Deformity is left 3rd digit partial amputation and right 3rd digit amputation. Compartments are soft and compressible. Dermatologic: Open surgical incision noted to right metatarsal head and dorsal foot with exposed 3rd metatarsal head. 2nd digit and medial aspect of 4th digit boggy appearance. No crepitus noted or drainage noted. Clinical Images:            Imaging:   Vascular lower extremity arterial segmental pressures w PPG   Final Result      MRI FOOT RIGHT W WO CONTRAST   Final Result   1. Interval 3rd digit amputation at the level of the MTP with expected   postsurgical change. 2. No evidence of osteomyelitis or septic arthropathy. 3. Findings consistent with cellulitis/myositis. 4. No soft tissue abscess. 5. Soft tissue gas identified earlier in the day in the foot and ankle   concerning for necrotizing infectious process. XR FOOT RIGHT (MIN 3 VIEWS)   Final Result   Successful distal amputation of the 3rd toe with postsurgical changes and   soft tissue air. CT ANKLE RIGHT W WO CONTRAST   Final Result   No evidence of fracture, healing fracture, destructive lesion or   osteomyelitis in the visualized distal portions of right tibia and fibula,   bones of right ankle and bones of right proximal foot. Evidence of diffuse edema and cellulitis in the soft tissues of visualized   distal right leg, around right ankle and in the visualized proximal and   midportion of the right foot. Evidence of several locules of gas in the subcutaneous fatty tissues at the   posteromedial and posterolateral aspect of distal right leg, just superior to   right ankle. Also there are small locules of gas in the soft tissues in the   dorsal aspect of medial portion of mid section of right foot dorsal to the   distal tarsal and metatarsal bones.   The
children: Not on file    Years of education: Not on file    Highest education level: Not on file   Occupational History    Not on file   Tobacco Use    Smoking status: Never    Smokeless tobacco: Never   Vaping Use    Vaping Use: Former   Substance and Sexual Activity    Alcohol use: Yes     Comment: 2 times a week    Drug use: Yes     Types: Marijuana (Weed)     Comment: marajunia 2 times aweek    Sexual activity: Yes   Other Topics Concern    Not on file   Social History Narrative    Not on file     Social Determinants of Health     Financial Resource Strain: Low Risk  (3/29/2022)    Overall Financial Resource Strain (CARDIA)     Difficulty of Paying Living Expenses: Not hard at all   Food Insecurity: No Food Insecurity (3/29/2022)    Hunger Vital Sign     Worried About Running Out of Food in the Last Year: Never true     [address] in the Last Year: Never true   Transportation Needs: No Transportation Needs (3/29/2022)    PRAPARE - Transportation     Lack of Transportation (Medical): No     Lack of Transportation (Non-Medical):  No   Physical Activity: Not on file   Stress: Not on file   Social Connections: Not on file   Intimate Partner Violence: Not on file   Housing Stability: Unknown (3/29/2022)    Housing Stability Vital Sign     Unable to Pay for Housing in the Last Year: No     Number of Places Lived in the Last Year: Not on file     Unstable Housing in the Last Year: No       Family History:     Family History   Problem Relation Age of Onset    Diabetes Father       Medical Decision Making:   I have independently reviewed/ordered the following labs:    CBC with Differential:   Recent Labs     09/27/23  1247 09/28/23  0728   WBC 7.4 6.5   HGB 12.1* 11.8*   HCT 35.4* 34.4*    257   LYMPHOPCT 13* 23*   MONOPCT 9* 10*       BMP:  Recent Labs     09/27/23  1247 09/28/23  0728    136   K 4.1 3.9    99   CO2 28 30   BUN 10 12   CREATININE 0.8 0.8       Hepatic Function Panel:   No results
mg Oral Daily    insulin lispro  0-8 Units SubCUTAneous TID WC    insulin lispro  0-4 Units SubCUTAneous Nightly       Social History:     Social History     Socioeconomic History    Marital status:      Spouse name: Not on file    Number of children: Not on file    Years of education: Not on file    Highest education level: Not on file   Occupational History    Not on file   Tobacco Use    Smoking status: Never    Smokeless tobacco: Never   Vaping Use    Vaping Use: Former   Substance and Sexual Activity    Alcohol use: Yes     Comment: 2 times a week    Drug use: Yes     Types: Marijuana (Weed)     Comment: marajunia 2 times aweek    Sexual activity: Yes   Other Topics Concern    Not on file   Social History Narrative    Not on file     Social Determinants of Health     Financial Resource Strain: Low Risk  (3/29/2022)    Overall Financial Resource Strain (CARDIA)     Difficulty of Paying Living Expenses: Not hard at all   Food Insecurity: No Food Insecurity (3/29/2022)    Hunger Vital Sign     Worried About Running Out of Food in the Last Year: Never true     [address] in the Last Year: Never true   Transportation Needs: No Transportation Needs (3/29/2022)    PRAPARE - Transportation     Lack of Transportation (Medical): No     Lack of Transportation (Non-Medical):  No   Physical Activity: Not on file   Stress: Not on file   Social Connections: Not on file   Intimate Partner Violence: Not on file   Housing Stability: Unknown (3/29/2022)    Housing Stability Vital Sign     Unable to Pay for Housing in the Last Year: No     Number of Places Lived in the Last Year: Not on file     Unstable Housing in the Last Year: No       Family History:     Family History   Problem Relation Age of Onset    Diabetes Father       Medical Decision Making:   I have independently reviewed/ordered the following labs:    CBC with Differential:   Recent Labs     09/30/23  0553 10/01/23  0626   WBC 10.8 7.9   HGB 12.6* 12.1*
of children: Not on file    Years of education: Not on file    Highest education level: Not on file   Occupational History    Not on file   Tobacco Use    Smoking status: Never    Smokeless tobacco: Never   Vaping Use    Vaping Use: Former   Substance and Sexual Activity    Alcohol use: Yes     Comment: 2 times a week    Drug use: Yes     Types: Marijuana (Weed)     Comment: marajunia 2 times aweek    Sexual activity: Yes   Other Topics Concern    Not on file   Social History Narrative    Not on file     Social Determinants of Health     Financial Resource Strain: Low Risk  (3/29/2022)    Overall Financial Resource Strain (CARDIA)     Difficulty of Paying Living Expenses: Not hard at all   Food Insecurity: No Food Insecurity (3/29/2022)    Hunger Vital Sign     Worried About Running Out of Food in the Last Year: Never true     [address] in the Last Year: Never true   Transportation Needs: No Transportation Needs (3/29/2022)    PRAPARE - Transportation     Lack of Transportation (Medical): No     Lack of Transportation (Non-Medical):  No   Physical Activity: Not on file   Stress: Not on file   Social Connections: Not on file   Intimate Partner Violence: Not on file   Housing Stability: Unknown (3/29/2022)    Housing Stability Vital Sign     Unable to Pay for Housing in the Last Year: No     Number of Places Lived in the Last Year: Not on file     Unstable Housing in the Last Year: No       Family History:     Family History   Problem Relation Age of Onset    Diabetes Father       Medical Decision Making:   I have independently reviewed/ordered the following labs:    CBC with Differential:   Recent Labs     09/27/23  1247 09/28/23  0728   WBC 7.4 6.5   HGB 12.1* 11.8*   HCT 35.4* 34.4*    257   LYMPHOPCT 13* 23*   MONOPCT 9* 10*       BMP:  Recent Labs     09/27/23  1247 09/28/23  0728    136   K 4.1 3.9    99   CO2 28 30   BUN 10 12   CREATININE 0.8 0.8       Hepatic Function Panel:   No
posteromedial and posterolateral aspect of distal right leg, just superior to   right ankle. Also there are small locules of gas in the soft tissues in the   dorsal aspect of medial portion of mid section of right foot dorsal to the   distal tarsal and metatarsal bones. The findings may indicate infection with   gas-forming organism. There is no definite localized abscess or hematoma in the soft tissues around   right ankle or in proximal right foot. CT FOOT RIGHT W WO CONTRAST   Final Result   Diffuse soft tissue swelling with edema and cellulitis in right foot and   around right ankle extended to visualized distal portion of right leg. Inferior to the distal ends of the right 2nd and 3rd metatarsal bones and   extended inferior to the base of the 2nd and 3rd toes. In the plantar soft   tissue there is focal soft tissue swelling with a prominent gas containing   area measuring 2.8 x 1.49 x 2.3 cm, indicating focal abscess, which might   have been drained partially. Evidence of some locules of gas dorsal to the proximal portion of the 1st   intermetatarsal space between the 1st and 2nd metatarsal bones and also   dorsal to the base of the 1st and 2nd metatarsal bones and continue dorsal to   the medial cuneiform bones. Additional locules of gas can be identified in   the soft tissues in the distal right leg at the posteromedial and   posterolateral aspect superior right ankle but without any localized abscess. No evidence of fracture, healing fracture, bony erosion, osteomyelitis or   septic arthritis in the bones and joints of right ankle and foot. XR FOOT RIGHT (MIN 3 VIEWS)   Final Result   1. Soft tissue edema with new gas formation right 3rd toe concerning for gas   gangrene. 2. No acute osseous abnormality evident. 3. Vascular calcifications are noted reflecting calcific atherosclerosis.              Cultures:   2x cultures of the right foot obtained in ED 9/25/23  -
Problems             Last Modified POA    * (Principal) Gas gangrene ([address]) 9/24/2023 Yes    Necrotizing fasciitis of ankle and foot ([address]) 9/25/2023 Yes    Type 2 diabetes mellitus with right diabetic foot infection ([address]) 9/25/2023 Yes    PAD (peripheral artery disease) ([address]) 9/26/2023 Yes   Plan:     Patient status inpatient in the Med/Surge    78-year-old gentleman with history of uncontrolled diabetes mellitus for last 15 years smokes cannabis but not nicotine admitted with less than 1 week history of increasing redness pain and swelling in the right foot patient had x-ray done which shows gas under the skin CT scan shows suspected gas-forming bacteria extensive infection of the right foot admitted for IV antibiotics surgical debridement IV pain medication  Given uncontrolled diabetes gas gangrene suspected with free air under the skin requires inpatient hospital admission high risk of mortality morbidity right foot at risk of amputation  Hyponatremia mild sodium 132 will observe  I personally reviewed the CT scan and films of the ankle  Podiatry surgery input noted plan for surgery today  DVT prophylaxis will defer to the surgeon    Sep 28  Post op  Plan for debridement washout and graft placement Friday morning  Iv abx  Cx proteus and gram postive cocci likey staph  Gram stain shows multiple gram-negative rods and gram-positive cocci  Status post middle toe amputation and extensive debridement  Clinda vanco, cefpime  Id input noted  Pt pain free  Good diabetes control 122 -140 blood sugar random    Consultations:   IP CONSULT TO PODIATRY  IP CONSULT TO INTERNAL MEDICINE  IP CONSULT TO INFECTIOUS DISEASES  IP CONSULT TO VASCULAR SURGERY  PHARMACY TO DOSE VANCOMYCIN     Patient is admitted as inpatient status because of co-morbidities listed above, severity of signs and symptoms as outlined, requirement for current medical therapies and most importantly because of direct risk to patient if care not provided in a
sodium 132 will observe  I personally reviewed the CT scan and films of the ankle  Podiatry surgery input noted plan for surgery today  DVT prophylaxis will defer to the surgeon    Sep 28  Post op  Plan for debridement washout and graft placement Friday morning  Iv abx  Cx proteus and gram postive cocci likey staph  Gram stain shows multiple gram-negative rods and gram-positive cocci  Status post middle toe amputation and extensive debridement  Clinda vanco, cefpime  Id input noted  Pt pain free  Good diabetes control 122 -140 blood sugar random    9/29  Right foot diabetic infection, with gas gangrene  Underwent IND with debridement on 9/25  Cultures growing multiple organisms-sensitivity reviewed currently patient is on Ancef and clindamycin  ID following  Due for repeat irrigation and debridement with podiatry today  Vascular surgery was consulted and has signed off  No fevers overnight  Patient ready for procedure  Anticipate discharge ready later today after surgery-will need antibiotic recommendations from ID.   Added lipitor to meds- pt agreeable     9/30/23    Right foot diabetic infection with gas gangrene  Necrotizing fasciitis  Had surgery yesterday by podiatry     Procedure:  Excision of 3rd metatarsal bone; right foot  Preparation of wound bed; right foot  Application of Integra Bilayer graft right foot  Application of wound vac; right foot     Afebrile vital stable  Recent Labs     09/30/23  0632 09/30/23  1053 09/30/23  1623 09/30/23  2022 10/01/23  0637   POCGLU 112* 125* 109 119* 102     Blood sugar control fair  WBC count down to 10,800  Patient is on IV cefazolin and clindamycin             10/1/23    Right foot diabetic infection with gas gangrene  Necrotizing fasciitis  Had surgery yesterday by podiatry     Procedure:  Excision of 3rd metatarsal bone; right foot  Preparation of wound bed; right foot  Application of Integra Bilayer graft right foot  Application of wound vac; right foot
today    Consultations:   IP CONSULT TO PODIATRY  IP CONSULT TO INTERNAL MEDICINE  IP CONSULT TO INFECTIOUS DISEASES  IP CONSULT TO VASCULAR SURGERY     Patient is admitted as inpatient status because of co-morbidities listed above, severity of signs and symptoms as outlined, requirement for current medical therapies and most importantly because of direct risk to patient if care not provided in a hospital setting. Expected length of stay > 48 hours. Colt Willis MD  10/2/2023  10:09 AM    Copy sent to Dr. Elliott Richter MD    Please note that this chart was generated using voice recognition Dragon dictation software. Although every effort was made to ensure the accuracy of this automated transcription, some errors in transcription may have occurred.

## 2023-10-03 NOTE — TELEPHONE ENCOUNTER
Patient scheduled for 10/26/2023    ----- Message from Marily Ceron MD sent at 9/26/2023  6:11 PM EDT -----  2-3 week follow up from hospital for right foot infection  No testing

## 2023-10-03 NOTE — DISCHARGE SUMMARY
[address] [facility] Internal Medicine    Discharge Summary     Patient ID: Kathy Powell  :  1970   MRN: [id number]     ACCOUNT:  [de-identified]   Patient's PCP: Abbe Villalobos MD  Admit Date: 2023   Discharge Date: 10/2/23  Length of Stay: 8  Code Status:  Prior  Admitting Physician: Elvi Rey MD  Discharge Physician: Smita White MD     Active Discharge Diagnoses:     Primary Problem  Gas gangrene [facility])      [address] Problems    Diagnosis Date Noted    PAD (peripheral artery disease) ([address]) [I73.9] 2023    Necrotizing fasciitis of ankle and foot ([address]) [M72.6] 2023    Type 2 diabetes mellitus with right diabetic foot infection ([address]) [E45.794, L08.9] 2023    Gas gangrene ([address]) [A48.0] 2023       Admission Condition:  fair     Discharged Condition: fair    Hospital Stay:     Hospital Course:  Kathy Powlel is a 46 y.o. male who was admitted for the management of Gas gangrene ([address]) , presented to ER with Foot Injury      50-year-old male history of T2DM uncontrolled, HTN, marijuana use presenting with right foot wound x-ray showing gas under the skin CT necrotizing fasciitis admitted started on IV antibiotics, cefazolin and clindamycin, underwent urgent incision drainage debridement right foot with third digit right amputation on  with repeat irrigation debridement on   Intra-Op cultures growing Proteus and MSSA  Patient was seen by ID  Vascular surgery evaluation was obtained-no intervention planned-angiogram recommended as outpatient  Patient was discharged on p.o.  Keflex and Flagyl through 10/14  Hyponatremia mild improved    Prevena wound VAC was placed on 10/2    Significant therapeutic interventions: As above    Significant Diagnostic Studies:   Labs / Micro:    Lab Results   Component Value Date    WBC 7.9 10/01/2023    HGB 12.1 (L) 10/01/2023    HCT 35.8 (L) 10/01/2023    MCV 93.7 10/01/2023     10/01/2023

## 2023-10-08 NOTE — DISCHARGE INSTRUCTIONS
[facility] HYPERBARIC TREATMENT  CENTER      Visit  Discharge Instructions / Physician Orders  DATE: 10/10/23     Home Care:NONE     SUPPLIES ORDERED THRU:     NONE                DATE LAST SUPPLIED     Wound Location:  Right Foot     Cleanse with: Liquid antibacterial soap and water, rinse well      Dressing Orders:  Primary dressing Betadine to wet area  adaptic over silicone layer, apply folded 4x4 wrap with Kerlix then wrap with ace wrap     Frequency:  Keep dry and intact     Additional Orders: Increase protein to diet (meat, cheese, eggs, fish, peanut butter, nuts and beans)  Multivitamin daily    OFFLOADING [x] YES  TYPE:                  [] NA    Weekly wound care visits until determined otherwise. Antibiotic therapy-wound care related YES [x] NO [] NA[]    MY CHART []     Smart Device  []     HYPERBARIC TREATMENT-                TREATMENT #                          Your next appointment with the [address] [place] [address] is in 1 week                                                                                                   (Please note your next appointment above and if you are unable to keep, kindly give a 24 hour notice. Thank you.)  If more than 15 min late we cannot guarantee you will be seen due to clinician schedule  Per Policy, Excessive cancellation will call for dismissal from program.  If you experience any of the following, please call the [address] [place] [address] during business hours:  [phone]     * Increase in Pain  * Temperature over 101  * Increase in drainage from your wound  * Drainage with a foul odor  * Bleeding  * Increase in swelling  * Need for compression bandage changes due to slippage, breakthrough drainage. If you need medical attention outside of the business hours of the [address] [place] [address] please contact your PCP or go to the nearest emergency room.      The information contained in the After Visit Summary has been reviewed with me, the patient and/or responsible

## 2023-10-10 ENCOUNTER — HOSPITAL ENCOUNTER (OUTPATIENT)
Dept: WOUND CARE | Age: 53
Discharge: HOME | End: 2023-10-10
Payer: COMMERCIAL

## 2023-10-10 VITALS
HEIGHT: 78 IN | BODY MASS INDEX: 24.3 KG/M2 | TEMPERATURE: 96.3 F | DIASTOLIC BLOOD PRESSURE: 85 MMHG | HEART RATE: 87 BPM | SYSTOLIC BLOOD PRESSURE: 124 MMHG | WEIGHT: 210 LBS | RESPIRATION RATE: 18 BRPM

## 2023-10-10 DIAGNOSIS — A48.0 GAS GANGRENE (HCC): ICD-10-CM

## 2023-10-10 DIAGNOSIS — I73.9 PAD (PERIPHERAL ARTERY DISEASE) (HCC): Primary | ICD-10-CM

## 2023-10-10 DIAGNOSIS — M72.6 NECROTIZING FASCIITIS OF ANKLE AND FOOT (HCC): ICD-10-CM

## 2023-10-10 DIAGNOSIS — E10.621 TYPE 1 DIABETES MELLITUS WITH FOOT ULCER (HCC): ICD-10-CM

## 2023-10-10 DIAGNOSIS — L08.9 TYPE 2 DIABETES MELLITUS WITH RIGHT DIABETIC FOOT INFECTION (HCC): ICD-10-CM

## 2023-10-10 DIAGNOSIS — E11.628 TYPE 2 DIABETES MELLITUS WITH RIGHT DIABETIC FOOT INFECTION (HCC): ICD-10-CM

## 2023-10-10 DIAGNOSIS — L97.513 ULCERATED, FOOT, RIGHT, WITH NECROSIS OF MUSCLE (HCC): ICD-10-CM

## 2023-10-10 DIAGNOSIS — L97.509 TYPE 1 DIABETES MELLITUS WITH FOOT ULCER (HCC): ICD-10-CM

## 2023-10-10 PROCEDURE — 99214 OFFICE O/P EST MOD 30 MIN: CPT | Performed by: PODIATRIST

## 2023-10-10 PROCEDURE — 99214 OFFICE O/P EST MOD 30 MIN: CPT

## 2023-10-10 RX ORDER — LIDOCAINE HYDROCHLORIDE 40 MG/ML
SOLUTION TOPICAL ONCE
OUTPATIENT
Start: 2023-10-10 | End: 2023-10-10

## 2023-10-10 RX ORDER — LIDOCAINE HYDROCHLORIDE 20 MG/ML
JELLY TOPICAL ONCE
OUTPATIENT
Start: 2023-10-10 | End: 2023-10-10

## 2023-10-10 RX ORDER — GINSENG 100 MG
CAPSULE ORAL ONCE
OUTPATIENT
Start: 2023-10-10 | End: 2023-10-10

## 2023-10-10 RX ORDER — LIDOCAINE 50 MG/G
OINTMENT TOPICAL ONCE
OUTPATIENT
Start: 2023-10-10 | End: 2023-10-10

## 2023-10-10 RX ORDER — TRIAMCINOLONE ACETONIDE 1 MG/G
OINTMENT TOPICAL ONCE
OUTPATIENT
Start: 2023-10-10 | End: 2023-10-10

## 2023-10-10 RX ORDER — LIDOCAINE 40 MG/G
CREAM TOPICAL ONCE
OUTPATIENT
Start: 2023-10-10 | End: 2023-10-10

## 2023-10-10 RX ORDER — SODIUM CHLOR/HYPOCHLOROUS ACID 0.033 %
SOLUTION, IRRIGATION IRRIGATION ONCE
OUTPATIENT
Start: 2023-10-10 | End: 2023-10-10

## 2023-10-10 RX ORDER — BACITRACIN ZINC AND POLYMYXIN B SULFATE 500; 1000 [USP'U]/G; [USP'U]/G
OINTMENT TOPICAL ONCE
OUTPATIENT
Start: 2023-10-10 | End: 2023-10-10

## 2023-10-10 RX ORDER — BETAMETHASONE DIPROPIONATE 0.05 %
OINTMENT (GRAM) TOPICAL ONCE
OUTPATIENT
Start: 2023-10-10 | End: 2023-10-10

## 2023-10-10 RX ORDER — IBUPROFEN 200 MG
TABLET ORAL ONCE
OUTPATIENT
Start: 2023-10-10 | End: 2023-10-10

## 2023-10-10 RX ORDER — GENTAMICIN SULFATE 1 MG/G
OINTMENT TOPICAL ONCE
OUTPATIENT
Start: 2023-10-10 | End: 2023-10-10

## 2023-10-10 RX ORDER — CLOBETASOL PROPIONATE 0.5 MG/G
OINTMENT TOPICAL ONCE
OUTPATIENT
Start: 2023-10-10 | End: 2023-10-10

## 2023-10-10 ASSESSMENT — PAIN DESCRIPTION - PAIN TYPE: TYPE: CHRONIC PAIN

## 2023-10-10 ASSESSMENT — PAIN - FUNCTIONAL ASSESSMENT: PAIN_FUNCTIONAL_ASSESSMENT: PREVENTS OR INTERFERES SOME ACTIVE ACTIVITIES AND ADLS

## 2023-10-10 ASSESSMENT — PAIN DESCRIPTION - ORIENTATION: ORIENTATION: RIGHT;DISTAL

## 2023-10-10 ASSESSMENT — PAIN SCALES - GENERAL: PAINLEVEL_OUTOF10: 6

## 2023-10-10 ASSESSMENT — PAIN DESCRIPTION - FREQUENCY: FREQUENCY: INTERMITTENT

## 2023-10-10 ASSESSMENT — PAIN DESCRIPTION - DESCRIPTORS: DESCRIPTORS: THROBBING

## 2023-10-10 ASSESSMENT — PAIN DESCRIPTION - LOCATION: LOCATION: FOOT

## 2023-10-10 ASSESSMENT — PAIN DESCRIPTION - ONSET: ONSET: ON-GOING

## 2023-10-10 NOTE — PROGRESS NOTES
Serosanguinous; Yellow 10/10/23 0926   Odor Mild 10/10/23 0926   Glo-wound Assessment Blanchable erythema;Fragile; Maceration; Hyperkeratosis (callous) 10/10/23 0926   Margins Undefined edges 10/10/23 0926   Number of days: 0             Vascular:  DP/PT pulses palpable 2/4, Bilateral.    CFT <3 seconds to digits 1-5, Bilateral .   Hair growth present to level of digits, Right. Edema absent, Right. Erythema absent, Right. Neurological:  Sensation absent to light touch to level of digits, Bilateral.    Musculoskeletal:  Muscle strength 5/5 all LE groups tested, Bilateral.         Assessment:           1. PAD (peripheral artery disease) ([address])    2. Type 2 diabetes mellitus with right diabetic foot infection ([address])    3. Necrotizing fasciitis of ankle and foot ([address])    4. Ulcerated, foot, right, with necrosis of muscle ([address])    5. Type 1 diabetes mellitus with foot ulcer ([address])        Patient Active Problem List   Diagnosis    Diabetes mellitus ([address])    Depression    Erectile dysfunction due to diseases classified elsewhere    Back pain    Anxiety and depression    Nephrolithiasis    Essential hypertension    Mixed hyperlipidemia    Major depressive disorder with single episode, in partial remission (HCC)    Impetigo    Cellulitis    Diabetic foot infection ([address])    Osteomyelitis of third toe of left foot (HCC)    Pain of toe of left foot    Diabetic infection of left foot (HCC)    Acute osteomyelitis of metatarsal bone of left foot (HCC)    Left foot pain    Gas gangrene (HCC)    Necrotizing fasciitis of ankle and foot (HCC)    Type 2 diabetes mellitus with right diabetic foot infection (HCC)    PAD (peripheral artery disease) (HCC)    Ulcerated, foot, right, with necrosis of muscle ([address])         Plan:   Pt was evaluated and examined. Patient was given personalized discharge instructions. Diagnosis was discussed with the pt and all of their questions were answered in detail.  Proper foot hygiene and care was

## 2023-10-12 ENCOUNTER — TELEPHONE (OUTPATIENT)
Dept: PODIATRY | Age: 53
End: 2023-10-12

## 2023-10-13 ENCOUNTER — HOSPITAL ENCOUNTER (EMERGENCY)
Age: 53
Discharge: HOME | End: 2023-10-13
Attending: EMERGENCY MEDICINE
Payer: COMMERCIAL

## 2023-10-13 VITALS
OXYGEN SATURATION: 98 % | TEMPERATURE: 98 F | BODY MASS INDEX: 21.96 KG/M2 | SYSTOLIC BLOOD PRESSURE: 138 MMHG | WEIGHT: 210 LBS | HEART RATE: 77 BPM | RESPIRATION RATE: 16 BRPM | DIASTOLIC BLOOD PRESSURE: 88 MMHG

## 2023-10-13 DIAGNOSIS — Z48.89 ENCOUNTER FOR POST SURGICAL WOUND CHECK: Primary | ICD-10-CM

## 2023-10-13 LAB
ANION GAP SERPL CALCULATED.3IONS-SCNC: 11 MMOL/L (ref 9–17)
BASOPHILS # BLD: 0.1 K/UL (ref 0–0.2)
BASOPHILS NFR BLD: 1 % (ref 0–2)
BUN SERPL-MCNC: 35 MG/DL (ref 6–20)
CALCIUM SERPL-MCNC: 9.3 MG/DL (ref 8.6–10.4)
CHLORIDE SERPL-SCNC: 99 MMOL/L (ref 98–107)
CO2 SERPL-SCNC: 25 MMOL/L (ref 20–31)
CREAT SERPL-MCNC: 0.9 MG/DL (ref 0.7–1.2)
EOSINOPHIL # BLD: 0.1 K/UL (ref 0–0.4)
EOSINOPHILS RELATIVE PERCENT: 1 % (ref 0–4)
ERYTHROCYTE [DISTWIDTH] IN BLOOD BY AUTOMATED COUNT: 12.7 % (ref 11.5–14.9)
GFR SERPL CREATININE-BSD FRML MDRD: >60 ML/MIN/1.73M2
GLUCOSE SERPL-MCNC: 195 MG/DL (ref 70–99)
HCT VFR BLD AUTO: 41.4 % (ref 41–53)
HGB BLD-MCNC: 14.1 G/DL (ref 13.5–17.5)
LYMPHOCYTES NFR BLD: 1.8 K/UL (ref 1–4.8)
LYMPHOCYTES RELATIVE PERCENT: 22 % (ref 24–44)
MAGNESIUM SERPL-MCNC: 1.9 MG/DL (ref 1.6–2.6)
MCH RBC QN AUTO: 31.4 PG (ref 26–34)
MCHC RBC AUTO-ENTMCNC: 34 G/DL (ref 31–37)
MCV RBC AUTO: 92.4 FL (ref 80–100)
MONOCYTES NFR BLD: 0.8 K/UL (ref 0.1–1.3)
MONOCYTES NFR BLD: 10 % (ref 1–7)
NEUTROPHILS NFR BLD: 66 % (ref 36–66)
NEUTS SEG NFR BLD: 5.5 K/UL (ref 1.3–9.1)
PLATELET # BLD AUTO: 265 K/UL (ref 150–450)
PMV BLD AUTO: 8.3 FL (ref 6–12)
POTASSIUM SERPL-SCNC: 4.4 MMOL/L (ref 3.7–5.3)
RBC # BLD AUTO: 4.48 M/UL (ref 4.5–5.9)
SODIUM SERPL-SCNC: 135 MMOL/L (ref 135–144)
WBC OTHER # BLD: 8.4 K/UL (ref 3.5–11)

## 2023-10-13 PROCEDURE — 6370000000 HC RX 637 (ALT 250 FOR IP): Performed by: EMERGENCY MEDICINE

## 2023-10-13 PROCEDURE — 83735 ASSAY OF MAGNESIUM: CPT

## 2023-10-13 PROCEDURE — 6360000002 HC RX W HCPCS: Performed by: EMERGENCY MEDICINE

## 2023-10-13 PROCEDURE — 85025 COMPLETE CBC W/AUTO DIFF WBC: CPT

## 2023-10-13 PROCEDURE — 99284 EMERGENCY DEPT VISIT MOD MDM: CPT

## 2023-10-13 PROCEDURE — 96374 THER/PROPH/DIAG INJ IV PUSH: CPT

## 2023-10-13 PROCEDURE — 36415 COLL VENOUS BLD VENIPUNCTURE: CPT

## 2023-10-13 PROCEDURE — 80048 BASIC METABOLIC PNL TOTAL CA: CPT

## 2023-10-13 RX ORDER — GABAPENTIN 300 MG/1
300 CAPSULE ORAL 3 TIMES DAILY
Qty: 9 CAPSULE | Refills: 0 | Status: SHIPPED | OUTPATIENT
Start: 2023-10-13 | End: 2023-10-16

## 2023-10-13 RX ORDER — KETOROLAC TROMETHAMINE 30 MG/ML
15 INJECTION, SOLUTION INTRAMUSCULAR; INTRAVENOUS ONCE
Status: COMPLETED | OUTPATIENT
Start: 2023-10-13 | End: 2023-10-13

## 2023-10-13 RX ORDER — GABAPENTIN 300 MG/1
300 CAPSULE ORAL ONCE
Status: COMPLETED | OUTPATIENT
Start: 2023-10-13 | End: 2023-10-13

## 2023-10-13 RX ORDER — OXYCODONE HYDROCHLORIDE AND ACETAMINOPHEN 5; 325 MG/1; MG/1
1 TABLET ORAL EVERY 6 HOURS PRN
Qty: 12 TABLET | Refills: 0 | Status: SHIPPED | OUTPATIENT
Start: 2023-10-13 | End: 2023-10-16

## 2023-10-13 RX ORDER — OXYCODONE HYDROCHLORIDE AND ACETAMINOPHEN 5; 325 MG/1; MG/1
1 TABLET ORAL ONCE
Status: COMPLETED | OUTPATIENT
Start: 2023-10-13 | End: 2023-10-13

## 2023-10-13 RX ORDER — METHOCARBAMOL 750 MG/1
750 TABLET, FILM COATED ORAL ONCE
Status: COMPLETED | OUTPATIENT
Start: 2023-10-13 | End: 2023-10-13

## 2023-10-13 RX ORDER — METHOCARBAMOL 750 MG/1
750 TABLET, FILM COATED ORAL 4 TIMES DAILY
Qty: 12 TABLET | Refills: 0 | Status: SHIPPED | OUTPATIENT
Start: 2023-10-13 | End: 2023-10-16

## 2023-10-13 RX ADMIN — KETOROLAC TROMETHAMINE 15 MG: 30 INJECTION, SOLUTION INTRAMUSCULAR; INTRAVENOUS at 21:41

## 2023-10-13 RX ADMIN — GABAPENTIN 300 MG: 300 CAPSULE ORAL at 22:14

## 2023-10-13 RX ADMIN — OXYCODONE AND ACETAMINOPHEN 1 TABLET: 5; 325 TABLET ORAL at 21:06

## 2023-10-13 RX ADMIN — METHOCARBAMOL 750 MG: 750 TABLET ORAL at 21:41

## 2023-10-13 ASSESSMENT — PAIN DESCRIPTION - PAIN TYPE: TYPE: ACUTE PAIN

## 2023-10-13 ASSESSMENT — PAIN - FUNCTIONAL ASSESSMENT: PAIN_FUNCTIONAL_ASSESSMENT: 0-10

## 2023-10-13 ASSESSMENT — PAIN SCALES - GENERAL
PAINLEVEL_OUTOF10: 7
PAINLEVEL_OUTOF10: 10
PAINLEVEL_OUTOF10: 6
PAINLEVEL_OUTOF10: 10

## 2023-10-13 ASSESSMENT — PAIN DESCRIPTION - DESCRIPTORS
DESCRIPTORS: SHARP
DESCRIPTORS: THROBBING

## 2023-10-13 ASSESSMENT — PAIN DESCRIPTION - LOCATION
LOCATION: FOOT
LOCATION: FOOT
LOCATION: TOE (COMMENT WHICH ONE)

## 2023-10-13 ASSESSMENT — PAIN DESCRIPTION - FREQUENCY: FREQUENCY: CONTINUOUS

## 2023-10-13 ASSESSMENT — PAIN DESCRIPTION - ORIENTATION: ORIENTATION: RIGHT

## 2023-10-14 ASSESSMENT — ENCOUNTER SYMPTOMS
SHORTNESS OF BREATH: 0
VOMITING: 0
NAUSEA: 0
ABDOMINAL PAIN: 0

## 2023-10-14 NOTE — DISCHARGE INSTRUCTIONS
You were seen here for postop wound check. Spoke with podiatry regarding her wound, no interventions needed at this time. Podiatry recommends that you elevate the right foot, this will help with pain and reduce swelling. You were given a prescription for Percocet, Robaxin, and gabapentin. Take this medication as prescribed, this will help with your pain. You cannot drive or operate heavy machinery while taking these medications. You need to attend your follow-up appointment with podiatry on Tuesday. You need to contact a follow-up appointment with your primary care provider for soon as possible. Return to the emergency department medially if you experience worsening symptoms, develop any new symptoms, or if you have any other concerns.

## 2023-10-17 ENCOUNTER — HOSPITAL ENCOUNTER (OUTPATIENT)
Age: 53
Discharge: HOME | End: 2023-10-17
Payer: COMMERCIAL

## 2023-10-17 ENCOUNTER — HOSPITAL ENCOUNTER (OUTPATIENT)
Dept: WOUND CARE | Age: 53
Discharge: HOME | End: 2023-10-17
Payer: COMMERCIAL

## 2023-10-17 VITALS
RESPIRATION RATE: 16 BRPM | DIASTOLIC BLOOD PRESSURE: 63 MMHG | TEMPERATURE: 97.6 F | BODY MASS INDEX: 26.95 KG/M2 | HEART RATE: 83 BPM | HEIGHT: 74 IN | WEIGHT: 210 LBS | SYSTOLIC BLOOD PRESSURE: 91 MMHG

## 2023-10-17 DIAGNOSIS — L08.9 TYPE 2 DIABETES MELLITUS WITH RIGHT DIABETIC FOOT INFECTION (HCC): ICD-10-CM

## 2023-10-17 DIAGNOSIS — I73.9 PAD (PERIPHERAL ARTERY DISEASE) (HCC): ICD-10-CM

## 2023-10-17 DIAGNOSIS — E10.621 TYPE 1 DIABETES MELLITUS WITH FOOT ULCER (HCC): Primary | ICD-10-CM

## 2023-10-17 DIAGNOSIS — E11.628 DIABETIC FOOT INFECTION (HCC): ICD-10-CM

## 2023-10-17 DIAGNOSIS — L97.513 ULCERATED, FOOT, RIGHT, WITH NECROSIS OF MUSCLE (HCC): ICD-10-CM

## 2023-10-17 DIAGNOSIS — A48.0 GAS GANGRENE (HCC): ICD-10-CM

## 2023-10-17 DIAGNOSIS — L08.9 DIABETIC FOOT INFECTION (HCC): ICD-10-CM

## 2023-10-17 DIAGNOSIS — E11.628 TYPE 2 DIABETES MELLITUS WITH RIGHT DIABETIC FOOT INFECTION (HCC): ICD-10-CM

## 2023-10-17 DIAGNOSIS — L97.509 TYPE 1 DIABETES MELLITUS WITH FOOT ULCER (HCC): Primary | ICD-10-CM

## 2023-10-17 DIAGNOSIS — M72.6 NECROTIZING FASCIITIS OF ANKLE AND FOOT (HCC): ICD-10-CM

## 2023-10-17 LAB
BASOPHILS # BLD: 0.1 K/UL (ref 0–0.2)
BASOPHILS NFR BLD: 1 % (ref 0–2)
CRP SERPL HS-MCNC: <3 MG/L (ref 0–5)
EOSINOPHIL # BLD: 0.1 K/UL (ref 0–0.4)
EOSINOPHILS RELATIVE PERCENT: 2 % (ref 0–4)
ERYTHROCYTE [DISTWIDTH] IN BLOOD BY AUTOMATED COUNT: 12.9 % (ref 11.5–14.9)
ERYTHROCYTE [SEDIMENTATION RATE] IN BLOOD BY PHOTOMETRIC METHOD: 15 MM/HR (ref 0–20)
HCT VFR BLD AUTO: 45.2 % (ref 41–53)
HGB BLD-MCNC: 14.8 G/DL (ref 13.5–17.5)
LYMPHOCYTES NFR BLD: 1.6 K/UL (ref 1–4.8)
LYMPHOCYTES RELATIVE PERCENT: 20 % (ref 24–44)
MCH RBC QN AUTO: 30.9 PG (ref 26–34)
MCHC RBC AUTO-ENTMCNC: 32.8 G/DL (ref 31–37)
MCV RBC AUTO: 94.3 FL (ref 80–100)
MONOCYTES NFR BLD: 0.6 K/UL (ref 0.1–1.3)
MONOCYTES NFR BLD: 8 % (ref 1–7)
NEUTROPHILS NFR BLD: 69 % (ref 36–66)
NEUTS SEG NFR BLD: 5.7 K/UL (ref 1.3–9.1)
PLATELET # BLD AUTO: 273 K/UL (ref 150–450)
PMV BLD AUTO: 8.5 FL (ref 6–12)
RBC # BLD AUTO: 4.79 M/UL (ref 4.5–5.9)
WBC OTHER # BLD: 8.2 K/UL (ref 3.5–11)

## 2023-10-17 PROCEDURE — 11043 DBRDMT MUSC&/FSCA 1ST 20/<: CPT | Performed by: PODIATRIST

## 2023-10-17 PROCEDURE — 11043 DBRDMT MUSC&/FSCA 1ST 20/<: CPT

## 2023-10-17 PROCEDURE — 85025 COMPLETE CBC W/AUTO DIFF WBC: CPT

## 2023-10-17 PROCEDURE — 85652 RBC SED RATE AUTOMATED: CPT

## 2023-10-17 PROCEDURE — 86140 C-REACTIVE PROTEIN: CPT

## 2023-10-17 PROCEDURE — 36415 COLL VENOUS BLD VENIPUNCTURE: CPT

## 2023-10-17 RX ORDER — SODIUM CHLOR/HYPOCHLOROUS ACID 0.033 %
SOLUTION, IRRIGATION IRRIGATION ONCE
OUTPATIENT
Start: 2023-10-17 | End: 2023-10-17

## 2023-10-17 RX ORDER — LIDOCAINE HYDROCHLORIDE 20 MG/ML
JELLY TOPICAL ONCE
OUTPATIENT
Start: 2023-10-17 | End: 2023-10-17

## 2023-10-17 RX ORDER — CLOBETASOL PROPIONATE 0.5 MG/G
OINTMENT TOPICAL ONCE
OUTPATIENT
Start: 2023-10-17 | End: 2023-10-17

## 2023-10-17 RX ORDER — LIDOCAINE 50 MG/G
OINTMENT TOPICAL ONCE
OUTPATIENT
Start: 2023-10-17 | End: 2023-10-17

## 2023-10-17 RX ORDER — IBUPROFEN 200 MG
TABLET ORAL ONCE
OUTPATIENT
Start: 2023-10-17 | End: 2023-10-17

## 2023-10-17 RX ORDER — GENTAMICIN SULFATE 1 MG/G
OINTMENT TOPICAL ONCE
OUTPATIENT
Start: 2023-10-17 | End: 2023-10-17

## 2023-10-17 RX ORDER — TRIAMCINOLONE ACETONIDE 1 MG/G
OINTMENT TOPICAL ONCE
OUTPATIENT
Start: 2023-10-17 | End: 2023-10-17

## 2023-10-17 RX ORDER — GINSENG 100 MG
CAPSULE ORAL ONCE
OUTPATIENT
Start: 2023-10-17 | End: 2023-10-17

## 2023-10-17 RX ORDER — BETAMETHASONE DIPROPIONATE 0.05 %
OINTMENT (GRAM) TOPICAL ONCE
OUTPATIENT
Start: 2023-10-17 | End: 2023-10-17

## 2023-10-17 RX ORDER — LIDOCAINE 40 MG/G
CREAM TOPICAL ONCE
OUTPATIENT
Start: 2023-10-17 | End: 2023-10-17

## 2023-10-17 RX ORDER — BACITRACIN ZINC AND POLYMYXIN B SULFATE 500; 1000 [USP'U]/G; [USP'U]/G
OINTMENT TOPICAL ONCE
OUTPATIENT
Start: 2023-10-17 | End: 2023-10-17

## 2023-10-17 RX ORDER — LIDOCAINE HYDROCHLORIDE 40 MG/ML
SOLUTION TOPICAL ONCE
Status: DISCONTINUED | OUTPATIENT
Start: 2023-10-17 | End: 2023-10-18 | Stop reason: HOSPADM

## 2023-10-17 RX ORDER — LIDOCAINE HYDROCHLORIDE 40 MG/ML
SOLUTION TOPICAL ONCE
OUTPATIENT
Start: 2023-10-17 | End: 2023-10-17

## 2023-10-17 ASSESSMENT — PAIN DESCRIPTION - ORIENTATION: ORIENTATION: RIGHT

## 2023-10-17 ASSESSMENT — PAIN DESCRIPTION - FREQUENCY: FREQUENCY: INTERMITTENT

## 2023-10-17 ASSESSMENT — PAIN - FUNCTIONAL ASSESSMENT: PAIN_FUNCTIONAL_ASSESSMENT: PREVENTS OR INTERFERES SOME ACTIVE ACTIVITIES AND ADLS

## 2023-10-17 ASSESSMENT — PAIN SCALES - GENERAL: PAINLEVEL_OUTOF10: 8

## 2023-10-17 ASSESSMENT — PAIN DESCRIPTION - DESCRIPTORS: DESCRIPTORS: THROBBING

## 2023-10-17 ASSESSMENT — PAIN DESCRIPTION - LOCATION: LOCATION: FOOT

## 2023-10-17 ASSESSMENT — PAIN DESCRIPTION - PAIN TYPE: TYPE: CHRONIC PAIN

## 2023-10-17 ASSESSMENT — PAIN DESCRIPTION - ONSET: ONSET: ON-GOING

## 2023-10-17 NOTE — DISCHARGE INSTRUCTIONS
[facility] and HYPERBARIC TREATMENT  CENTER                            Visit  Discharge Instructions / Physician Orders  DATE: 10/17/23     Home Care:NONE     SUPPLIES ORDERED THRU:     NONE                DATE LAST SUPPLIED     Wound Location:  Right Foot     Cleanse with: Keep dry and intact     Dressing Orders:  Endoform Antimicrobial, adaptic, Folded 4x4, then wrap with kerlix and ace wrap     Frequency:  Keep dry and intact     Additional Orders: Increase protein to diet (meat, cheese, eggs, fish, peanut butter, nuts and beans)  Multivitamin daily     OFFLOADING [x] YES  TYPE:                  [] NA     Weekly wound care visits until determined otherwise. Antibiotic therapy-wound care related YES [x] NO [] NA[]   Finish Antibiotics  Get Lab work done prior to next appoint  [employer] CHART []     Smart Device  []      HYPERBARIC TREATMENT-                TREATMENT #                          Your next appointment with the [address] [place], [place] [address]i [place] is in 1 week with Dr. Kristal Nunez                                                                                                    2 weeks  with                                                                                                    (Please note your next appointment above and if you are unable to keep, kindly give a 24 hour notice. Thank you.)  If more than 15 min late we cannot guarantee you will be seen due to clinician schedule  Per Policy, Excessive cancellation will call for dismissal from program.  If you experience any of the following, please call the [address] [place] [address] during business hours:  [phone]     * Increase in Pain  * Temperature over 101  * Increase in drainage from your wound  * Drainage with a foul odor  * Bleeding  * Increase in swelling  * Need for compression bandage changes due to slippage, breakthrough drainage.      If you need medical attention outside of the business hours of the [address] [place] [address] please contact your PCP

## 2023-10-17 NOTE — PROGRESS NOTES
down through and including fascial and tendon. Percent of Wound Debrided: 100%    Total Surface Area Debrided:  11 sq cm     Bleeding: Minimal    Post Debridement Measurements:  Wound/Ulcer Descriptions are Pre Debridement except measurements:    Wound 10/10/23 Foot Right wound #1 right third toe amp site (Active)   Wound Image   10/10/23 0926   Wound Etiology Surgical 10/17/23 1046   Dressing Status New drainage noted; Old drainage noted 10/17/23 1046   Wound Cleansed Not Cleansed 10/17/23 1046   Dressing/Treatment Other (comment) 10/10/23 1014   Wound Length (cm) 9.5 cm 10/17/23 1046   Wound Width (cm) 1.2 cm 10/17/23 1046   Wound Depth (cm) 0.1 cm 10/17/23 1046   Wound Surface Area (cm^2) 11.4 cm^2 10/17/23 1046   Change in Wound Size % (l*w) -71.43 10/17/23 1046   Wound Volume (cm^3) 1.14 cm^3 10/17/23 1046   Wound Healing % -71 10/17/23 1046   Post-Procedure Length (cm) 9.5 cm 10/17/23 1046   Post-Procedure Width (cm) 1.2 cm 10/17/23 1046   Post-Procedure Depth (cm) 0.1 cm 10/17/23 1046   Post-Procedure Surface Area (cm^2) 11.4 cm^2 10/17/23 1046   Post-Procedure Volume (cm^3) 1.14 cm^3 10/17/23 1046   Wound Assessment Pink/red; Other (Comment) 10/17/23 1046   Drainage Amount Moderate (25-50%) 10/17/23 1046   Drainage Description Serosanguinous; Yellow 10/17/23 1046   Odor None 10/17/23 1046   Glo-wound Assessment Blanchable erythema;Fragile; Maceration; Hyperkeratosis (callous) 10/17/23 1046   Margins Undefined edges 10/17/23 1046   Wound Thickness Description not for Pressure Injury Full thickness 10/17/23 1046   Number of days: 7           Plan for wound  Dress per physician order  Treatment: endoform, DSD  Leave intact  Finish antibiotics  See PCP soon, he was instructed to call today regarding BP, dizziness  Labs ordered    Medical Necessity Skin Graft Substitute  The ulceration needs epithelialization and we need more advanced modalities to heal this ulceration.  Therefore, I feel that a Epifix is

## 2023-10-22 NOTE — DISCHARGE INSTRUCTIONS
[facility] and HYPERBARIC TREATMENT  CENTER                            Visit  Discharge Instructions / Physician Orders  DATE: 10/24/23     Home Care:NONE     SUPPLIES ORDERED THRU:     NONE                DATE LAST SUPPLIED     Wound Location:  Right Foot     Cleanse with: Keep dry and intact     Dressing Orders:  Endoform Antimicrobial, adaptic, Folded 4x4, then wrap with kerlix and ace wrap     Frequency:  Keep dry and intact     Additional Orders: Increase protein to diet (meat, cheese, eggs, fish, peanut butter, nuts and beans)  Multivitamin daily     OFFLOADING [x] YES  TYPE:                  [] NA     Weekly wound care visits until determined otherwise. Antibiotic therapy-wound care related YES [x] NO [] NA[]    MY CHART []     Smart Device  []      HYPERBARIC TREATMENT-                TREATMENT #                          Your next appointment with the [facility] is in 1 week with Dr. Roz Avitia                                                                                                                                                                                              (Please note your next appointment above and if you are unable to keep, kindly give a 24 hour notice. Thank you.)  If more than 15 min late we cannot guarantee you will be seen due to clinician schedule  Per Policy, Excessive cancellation will call for dismissal from program.  If you experience any of the following, please call the [facility] during business hours:  [phone]     * Increase in Pain  * Temperature over 101  * Increase in drainage from your wound  * Drainage with a foul odor  * Bleeding  * Increase in swelling  * Need for compression bandage changes due to slippage, breakthrough drainage. If you need medical attention outside of the business hours of the [address] [place], [place] [address] [place] please contact your PCP or go to the nearest emergency room.      The information contained in the After Visit

## 2023-10-24 ENCOUNTER — HOSPITAL ENCOUNTER (OUTPATIENT)
Dept: WOUND CARE | Age: 53
Discharge: HOME | End: 2023-10-24
Payer: COMMERCIAL

## 2023-10-24 VITALS
HEIGHT: 74 IN | RESPIRATION RATE: 16 BRPM | DIASTOLIC BLOOD PRESSURE: 79 MMHG | SYSTOLIC BLOOD PRESSURE: 121 MMHG | TEMPERATURE: 97.3 F | WEIGHT: 210 LBS | HEART RATE: 74 BPM | BODY MASS INDEX: 26.95 KG/M2

## 2023-10-24 DIAGNOSIS — E10.621 TYPE 1 DIABETES MELLITUS WITH FOOT ULCER (HCC): Primary | ICD-10-CM

## 2023-10-24 DIAGNOSIS — L97.509 TYPE 1 DIABETES MELLITUS WITH FOOT ULCER (HCC): Primary | ICD-10-CM

## 2023-10-24 DIAGNOSIS — L97.513 ULCERATED, FOOT, RIGHT, WITH NECROSIS OF MUSCLE (HCC): ICD-10-CM

## 2023-10-24 DIAGNOSIS — L08.9 TYPE 2 DIABETES MELLITUS WITH RIGHT DIABETIC FOOT INFECTION (HCC): ICD-10-CM

## 2023-10-24 DIAGNOSIS — A48.0 GAS GANGRENE (HCC): ICD-10-CM

## 2023-10-24 DIAGNOSIS — I73.9 PAD (PERIPHERAL ARTERY DISEASE) (HCC): ICD-10-CM

## 2023-10-24 DIAGNOSIS — E11.628 TYPE 2 DIABETES MELLITUS WITH RIGHT DIABETIC FOOT INFECTION (HCC): ICD-10-CM

## 2023-10-24 DIAGNOSIS — M72.6 NECROTIZING FASCIITIS OF ANKLE AND FOOT (HCC): ICD-10-CM

## 2023-10-24 PROCEDURE — 11043 DBRDMT MUSC&/FSCA 1ST 20/<: CPT | Performed by: PODIATRIST

## 2023-10-24 PROCEDURE — 11043 DBRDMT MUSC&/FSCA 1ST 20/<: CPT

## 2023-10-24 RX ORDER — LIDOCAINE HYDROCHLORIDE 40 MG/ML
SOLUTION TOPICAL ONCE
Status: COMPLETED | OUTPATIENT
Start: 2023-10-24 | End: 2023-10-24

## 2023-10-24 RX ORDER — LIDOCAINE HYDROCHLORIDE 40 MG/ML
SOLUTION TOPICAL ONCE
OUTPATIENT
Start: 2023-10-24 | End: 2023-10-24

## 2023-10-24 RX ORDER — BETAMETHASONE DIPROPIONATE 0.05 %
OINTMENT (GRAM) TOPICAL ONCE
OUTPATIENT
Start: 2023-10-24 | End: 2023-10-24

## 2023-10-24 RX ORDER — BACITRACIN ZINC AND POLYMYXIN B SULFATE 500; 1000 [USP'U]/G; [USP'U]/G
OINTMENT TOPICAL ONCE
OUTPATIENT
Start: 2023-10-24 | End: 2023-10-24

## 2023-10-24 RX ORDER — LIDOCAINE 50 MG/G
OINTMENT TOPICAL ONCE
OUTPATIENT
Start: 2023-10-24 | End: 2023-10-24

## 2023-10-24 RX ORDER — LIDOCAINE HYDROCHLORIDE 20 MG/ML
JELLY TOPICAL ONCE
OUTPATIENT
Start: 2023-10-24 | End: 2023-10-24

## 2023-10-24 RX ORDER — LIDOCAINE 40 MG/G
CREAM TOPICAL ONCE
OUTPATIENT
Start: 2023-10-24 | End: 2023-10-24

## 2023-10-24 RX ORDER — IBUPROFEN 200 MG
TABLET ORAL ONCE
OUTPATIENT
Start: 2023-10-24 | End: 2023-10-24

## 2023-10-24 RX ORDER — GENTAMICIN SULFATE 1 MG/G
OINTMENT TOPICAL ONCE
OUTPATIENT
Start: 2023-10-24 | End: 2023-10-24

## 2023-10-24 RX ORDER — TRIAMCINOLONE ACETONIDE 1 MG/G
OINTMENT TOPICAL ONCE
OUTPATIENT
Start: 2023-10-24 | End: 2023-10-24

## 2023-10-24 RX ORDER — GINSENG 100 MG
CAPSULE ORAL ONCE
OUTPATIENT
Start: 2023-10-24 | End: 2023-10-24

## 2023-10-24 RX ORDER — SODIUM CHLOR/HYPOCHLOROUS ACID 0.033 %
SOLUTION, IRRIGATION IRRIGATION ONCE
OUTPATIENT
Start: 2023-10-24 | End: 2023-10-24

## 2023-10-24 RX ORDER — CLOBETASOL PROPIONATE 0.5 MG/G
OINTMENT TOPICAL ONCE
OUTPATIENT
Start: 2023-10-24 | End: 2023-10-24

## 2023-10-24 RX ADMIN — LIDOCAINE HYDROCHLORIDE 5 ML: 40 SOLUTION TOPICAL at 12:18

## 2023-10-24 ASSESSMENT — PAIN SCALES - GENERAL: PAINLEVEL_OUTOF10: 0

## 2023-10-24 NOTE — PROGRESS NOTES
1046   Post-Procedure Volume (cm^3) 1.14 cm^3 10/17/23 1046   Wound Assessment Pink/red; Exposed structure muscle; Exposed structure tendon 10/24/23 1208   Drainage Amount Moderate (25-50%) 10/24/23 1208   Drainage Description Serosanguinous; Yellow 10/24/23 1208   Odor None 10/24/23 1208   Glo-wound Assessment Blanchable erythema;Fragile; Maceration; Hyperkeratosis (callous) 10/24/23 1208   Margins Defined edges 10/24/23 1208   Wound Thickness Description not for Pressure Injury Full thickness 10/24/23 1208   Number of days: 14             Vascular:  DP/PT pulses palpable 2/4, Bilateral.    CFT <3 seconds to digits 1-5, Bilateral .   Hair growth present to level of digits, Right. Edema absent, Right. Erythema absent, Right. Neurological:  Sensation absent to light touch to level of digits, Bilateral.    Musculoskeletal:  Muscle strength 5/5 all LE groups tested, Bilateral.         Assessment:           1. Type 1 diabetes mellitus with foot ulcer ([address])    2. Ulcerated, foot, right, with necrosis of muscle ([address])    3. PAD (peripheral artery disease) ([address])    4. Type 2 diabetes mellitus with right diabetic foot infection ([address])    5. Necrotizing fasciitis of ankle and foot ([address])    6.  Gas gangrene [facility])        Patient Active Problem List   Diagnosis    Diabetes mellitus ([address])    Depression    Erectile dysfunction due to diseases classified elsewhere    Back pain    Anxiety and depression    Nephrolithiasis    Essential hypertension    Mixed hyperlipidemia    Major depressive disorder with single episode, in partial remission (HCC)    Impetigo    Cellulitis    Diabetic foot infection ([address])    Osteomyelitis of third toe of left foot (HCC)    Pain of toe of left foot    Diabetic infection of left foot (HCC)    Acute osteomyelitis of metatarsal bone of left foot (HCC)    Left foot pain    Gas gangrene (HCC)    Necrotizing fasciitis of ankle and foot ([address])    Type 2 diabetes mellitus with right diabetic foot infection

## 2023-10-29 NOTE — DISCHARGE INSTRUCTIONS
[facility] HYPERBARIC TREATMENT  CENTER                            Visit  Discharge Instructions / Physician Orders  DATE: 10/31/23     Home Care:NONE     SUPPLIES ORDERED THRU:     NONE                DATE LAST SUPPLIED     Wound Location:  Right Foot     Cleanse with: Keep dry and intact     Dressing Orders:  Epifix #1 Folded 4x4, then wrap with kerlix and ace wrap     Frequency:  Keep dry and intact   if outer dressing needs to be changed do not go past the silvercel and the tape  Additional Orders: Increase protein to diet (meat, cheese, eggs, fish, peanut butter, nuts and beans)  Multivitamin daily     OFFLOADING [x] YES  TYPE:                  [] NA     Weekly wound care visits until determined otherwise. Antibiotic therapy-wound care related YES [] NO [x] NA[]   Completed  MY CHART []     Smart Device  []      HYPERBARIC TREATMENT-                TREATMENT #                          Your next appointment with the [address] [place], [place] [address] [place] is in 1 week with Dr. Darrick Antoine                                                                                                    2 weeks with                                                                                                     3 weeks with Dr. Darrick Antoine                                                                                                                                                                                              (Please note your next appointment above and if you are unable to keep, kindly give a 24 hour notice.  Thank you.)  If more than 15 min late we cannot guarantee you will be seen due to clinician schedule  Per Policy, Excessive cancellation will call for dismissal from program.  If you experience any of the following, please call the [address] [place] [address] during business hours:  [phone]     * Increase in Pain  * Temperature over 101  * Increase in drainage from your wound  * Drainage with a foul odor  *

## 2023-10-31 ENCOUNTER — HOSPITAL ENCOUNTER (OUTPATIENT)
Dept: WOUND CARE | Age: 53
Discharge: HOME | End: 2023-10-31
Payer: COMMERCIAL

## 2023-10-31 VITALS
BODY MASS INDEX: 26.95 KG/M2 | DIASTOLIC BLOOD PRESSURE: 72 MMHG | HEART RATE: 80 BPM | WEIGHT: 210 LBS | SYSTOLIC BLOOD PRESSURE: 108 MMHG | HEIGHT: 74 IN | RESPIRATION RATE: 16 BRPM | TEMPERATURE: 97.2 F

## 2023-10-31 DIAGNOSIS — E11.628 TYPE 2 DIABETES MELLITUS WITH RIGHT DIABETIC FOOT INFECTION (HCC): ICD-10-CM

## 2023-10-31 DIAGNOSIS — E10.621 TYPE 1 DIABETES MELLITUS WITH FOOT ULCER (HCC): ICD-10-CM

## 2023-10-31 DIAGNOSIS — A48.0 GAS GANGRENE (HCC): ICD-10-CM

## 2023-10-31 DIAGNOSIS — M72.6 NECROTIZING FASCIITIS OF ANKLE AND FOOT (HCC): ICD-10-CM

## 2023-10-31 DIAGNOSIS — I73.9 PAD (PERIPHERAL ARTERY DISEASE) (HCC): ICD-10-CM

## 2023-10-31 DIAGNOSIS — L97.513 ULCERATED, FOOT, RIGHT, WITH NECROSIS OF MUSCLE (HCC): Primary | ICD-10-CM

## 2023-10-31 DIAGNOSIS — L97.509 TYPE 1 DIABETES MELLITUS WITH FOOT ULCER (HCC): ICD-10-CM

## 2023-10-31 DIAGNOSIS — L08.9 TYPE 2 DIABETES MELLITUS WITH RIGHT DIABETIC FOOT INFECTION (HCC): ICD-10-CM

## 2023-10-31 PROCEDURE — 15275 SKIN SUB GRAFT FACE/NK/HF/G: CPT | Performed by: PODIATRIST

## 2023-10-31 PROCEDURE — 15275 SKIN SUB GRAFT FACE/NK/HF/G: CPT

## 2023-10-31 PROCEDURE — 15004 WOUND PREP F/N/HF/G: CPT | Performed by: PODIATRIST

## 2023-10-31 RX ORDER — LIDOCAINE 40 MG/G
CREAM TOPICAL ONCE
OUTPATIENT
Start: 2023-10-31 | End: 2023-10-31

## 2023-10-31 RX ORDER — CLOBETASOL PROPIONATE 0.5 MG/G
OINTMENT TOPICAL ONCE
OUTPATIENT
Start: 2023-10-31 | End: 2023-10-31

## 2023-10-31 RX ORDER — LIDOCAINE 50 MG/G
OINTMENT TOPICAL ONCE
OUTPATIENT
Start: 2023-10-31 | End: 2023-10-31

## 2023-10-31 RX ORDER — LIDOCAINE HYDROCHLORIDE 40 MG/ML
SOLUTION TOPICAL ONCE
OUTPATIENT
Start: 2023-10-31 | End: 2023-10-31

## 2023-10-31 RX ORDER — BACITRACIN ZINC AND POLYMYXIN B SULFATE 500; 1000 [USP'U]/G; [USP'U]/G
OINTMENT TOPICAL ONCE
OUTPATIENT
Start: 2023-10-31 | End: 2023-10-31

## 2023-10-31 RX ORDER — SODIUM CHLOR/HYPOCHLOROUS ACID 0.033 %
SOLUTION, IRRIGATION IRRIGATION ONCE
OUTPATIENT
Start: 2023-10-31 | End: 2023-10-31

## 2023-10-31 RX ORDER — BETAMETHASONE DIPROPIONATE 0.05 %
OINTMENT (GRAM) TOPICAL ONCE
OUTPATIENT
Start: 2023-10-31 | End: 2023-10-31

## 2023-10-31 RX ORDER — GINSENG 100 MG
CAPSULE ORAL ONCE
OUTPATIENT
Start: 2023-10-31 | End: 2023-10-31

## 2023-10-31 RX ORDER — LIDOCAINE HYDROCHLORIDE 20 MG/ML
JELLY TOPICAL ONCE
OUTPATIENT
Start: 2023-10-31 | End: 2023-10-31

## 2023-10-31 RX ORDER — LIDOCAINE HYDROCHLORIDE 40 MG/ML
SOLUTION TOPICAL ONCE
Status: COMPLETED | OUTPATIENT
Start: 2023-10-31 | End: 2023-10-31

## 2023-10-31 RX ORDER — TRIAMCINOLONE ACETONIDE 1 MG/G
OINTMENT TOPICAL ONCE
OUTPATIENT
Start: 2023-10-31 | End: 2023-10-31

## 2023-10-31 RX ORDER — GENTAMICIN SULFATE 1 MG/G
OINTMENT TOPICAL ONCE
OUTPATIENT
Start: 2023-10-31 | End: 2023-10-31

## 2023-10-31 RX ORDER — IBUPROFEN 200 MG
TABLET ORAL ONCE
OUTPATIENT
Start: 2023-10-31 | End: 2023-10-31

## 2023-10-31 RX ADMIN — LIDOCAINE HYDROCHLORIDE 7.5 ML: 40 SOLUTION TOPICAL at 11:32

## 2023-10-31 ASSESSMENT — PAIN SCALES - GENERAL: PAINLEVEL_OUTOF10: 0

## 2023-10-31 NOTE — PLAN OF CARE
Problem: Chronic Conditions and Co-morbidities  Goal: Patient's chronic conditions and co-morbidity symptoms are monitored and maintained or improved  Outcome: Progressing     Problem: Pain  Goal: Verbalizes/displays adequate comfort level or baseline comfort level  Outcome: Progressing     Problem: Falls - Risk of:  Goal: Will remain free from falls  Description: Will remain free from falls  Outcome: Progressing

## 2023-11-04 NOTE — DISCHARGE INSTRUCTIONS
Bleeding  * Increase in swelling  * Need for compression bandage changes due to slippage, breakthrough drainage. If you need medical attention outside of the business hours of the [address] [place] [address] please contact your PCP or go to the nearest emergency room. The information contained in the After Visit Summary has been reviewed with me, the patient and/or responsible adult, by my health care provider(s). I had the opportunity to ask questions regarding this information.  I have elected to receive;      []After Visit Summary  [x]Comprehensive Discharge Instruction        Patient signature______________________________________Date:________

## 2023-11-06 ENCOUNTER — OFFICE VISIT (OUTPATIENT)
Dept: FAMILY MEDICINE CLINIC | Age: 53
End: 2023-11-06
Payer: COMMERCIAL

## 2023-11-06 ENCOUNTER — HOSPITAL ENCOUNTER (INPATIENT)
Age: 53
LOS: 1 days | Discharge: HOME | DRG: 420 | End: 2023-11-08
Attending: STUDENT IN AN ORGANIZED HEALTH CARE EDUCATION/TRAINING PROGRAM | Admitting: INTERNAL MEDICINE
Payer: COMMERCIAL

## 2023-11-06 VITALS
HEIGHT: 74 IN | WEIGHT: 204 LBS | OXYGEN SATURATION: 98 % | BODY MASS INDEX: 26.18 KG/M2 | SYSTOLIC BLOOD PRESSURE: 120 MMHG | DIASTOLIC BLOOD PRESSURE: 86 MMHG | HEART RATE: 90 BPM

## 2023-11-06 DIAGNOSIS — R42 LIGHTHEADEDNESS: ICD-10-CM

## 2023-11-06 DIAGNOSIS — L08.9 WOUND INFECTION: Primary | ICD-10-CM

## 2023-11-06 DIAGNOSIS — Z11.59 ENCOUNTER FOR SCREENING FOR OTHER VIRAL DISEASES: ICD-10-CM

## 2023-11-06 DIAGNOSIS — M54.50 ACUTE MIDLINE LOW BACK PAIN, UNSPECIFIED WHETHER SCIATICA PRESENT: ICD-10-CM

## 2023-11-06 DIAGNOSIS — E11.628 TYPE 2 DIABETES MELLITUS WITH RIGHT DIABETIC FOOT INFECTION (HCC): Primary | ICD-10-CM

## 2023-11-06 DIAGNOSIS — L08.9 TYPE 2 DIABETES MELLITUS WITH RIGHT DIABETIC FOOT INFECTION (HCC): Primary | ICD-10-CM

## 2023-11-06 DIAGNOSIS — M86.9 OSTEOMYELITIS OF THIRD TOE OF LEFT FOOT (HCC): ICD-10-CM

## 2023-11-06 DIAGNOSIS — I10 ESSENTIAL HYPERTENSION: ICD-10-CM

## 2023-11-06 DIAGNOSIS — Z12.5 PROSTATE CANCER SCREENING: ICD-10-CM

## 2023-11-06 DIAGNOSIS — Z23 ENCOUNTER FOR IMMUNIZATION: ICD-10-CM

## 2023-11-06 DIAGNOSIS — T14.8XXA WOUND INFECTION: Primary | ICD-10-CM

## 2023-11-06 DIAGNOSIS — E78.2 MIXED HYPERLIPIDEMIA: ICD-10-CM

## 2023-11-06 DIAGNOSIS — Z12.11 COLON CANCER SCREENING: ICD-10-CM

## 2023-11-06 DIAGNOSIS — I73.9 PAD (PERIPHERAL ARTERY DISEASE) (HCC): ICD-10-CM

## 2023-11-06 DIAGNOSIS — E55.9 VITAMIN D DEFICIENCY: ICD-10-CM

## 2023-11-06 PROBLEM — M79.675 PAIN OF TOE OF LEFT FOOT: Status: RESOLVED | Noted: 2023-02-27 | Resolved: 2023-11-06

## 2023-11-06 PROBLEM — M79.672 LEFT FOOT PAIN: Status: RESOLVED | Noted: 2023-02-27 | Resolved: 2023-11-06

## 2023-11-06 PROCEDURE — 1036F TOBACCO NON-USER: CPT | Performed by: FAMILY MEDICINE

## 2023-11-06 PROCEDURE — 99214 OFFICE O/P EST MOD 30 MIN: CPT | Performed by: FAMILY MEDICINE

## 2023-11-06 PROCEDURE — 96374 THER/PROPH/DIAG INJ IV PUSH: CPT

## 2023-11-06 PROCEDURE — G8427 DOCREV CUR MEDS BY ELIG CLIN: HCPCS | Performed by: FAMILY MEDICINE

## 2023-11-06 PROCEDURE — 3044F HG A1C LEVEL LT 7.0%: CPT | Performed by: FAMILY MEDICINE

## 2023-11-06 PROCEDURE — G8419 CALC BMI OUT NRM PARAM NOF/U: HCPCS | Performed by: FAMILY MEDICINE

## 2023-11-06 PROCEDURE — G8484 FLU IMMUNIZE NO ADMIN: HCPCS | Performed by: FAMILY MEDICINE

## 2023-11-06 PROCEDURE — 3017F COLORECTAL CA SCREEN DOC REV: CPT | Performed by: FAMILY MEDICINE

## 2023-11-06 PROCEDURE — 3074F SYST BP LT 130 MM HG: CPT | Performed by: FAMILY MEDICINE

## 2023-11-06 PROCEDURE — 3079F DIAST BP 80-89 MM HG: CPT | Performed by: FAMILY MEDICINE

## 2023-11-06 PROCEDURE — 99285 EMERGENCY DEPT VISIT HI MDM: CPT

## 2023-11-06 PROCEDURE — 2022F DILAT RTA XM EVC RTNOPTHY: CPT | Performed by: FAMILY MEDICINE

## 2023-11-06 RX ORDER — GLUCOSAMINE HCL/CHONDROITIN SU 500-400 MG
CAPSULE ORAL
Qty: 100 STRIP | Refills: 3 | Status: SHIPPED | OUTPATIENT
Start: 2023-11-06

## 2023-11-06 RX ORDER — LISINOPRIL 10 MG/1
10 TABLET ORAL DAILY
Qty: 90 TABLET | Refills: 1 | Status: SHIPPED | OUTPATIENT
Start: 2023-11-06

## 2023-11-06 RX ORDER — LANCETS 30 GAUGE
EACH MISCELLANEOUS
Qty: 100 EACH | Refills: 3 | Status: SHIPPED | OUTPATIENT
Start: 2023-11-06

## 2023-11-06 RX ORDER — MELOXICAM 7.5 MG/1
7.5 TABLET ORAL 2 TIMES DAILY PRN
Qty: 60 TABLET | Refills: 0 | Status: SHIPPED | OUTPATIENT
Start: 2023-11-06

## 2023-11-06 SDOH — ECONOMIC STABILITY: FOOD INSECURITY: WITHIN THE PAST 12 MONTHS, THE FOOD YOU BOUGHT JUST DIDN'T LAST AND YOU DIDN'T HAVE MONEY TO GET MORE.: NEVER TRUE

## 2023-11-06 SDOH — ECONOMIC STABILITY: FOOD INSECURITY: WITHIN THE PAST 12 MONTHS, YOU WORRIED THAT YOUR FOOD WOULD RUN OUT BEFORE YOU GOT MONEY TO BUY MORE.: NEVER TRUE

## 2023-11-06 SDOH — ECONOMIC STABILITY: INCOME INSECURITY: HOW HARD IS IT FOR YOU TO PAY FOR THE VERY BASICS LIKE FOOD, HOUSING, MEDICAL CARE, AND HEATING?: NOT HARD AT ALL

## 2023-11-06 ASSESSMENT — ENCOUNTER SYMPTOMS
EYE REDNESS: 0
TROUBLE SWALLOWING: 0
RHINORRHEA: 0
DIARRHEA: 0
WHEEZING: 0
ABDOMINAL DISTENTION: 0
BACK PAIN: 1
SINUS PRESSURE: 0
STRIDOR: 0
ABDOMINAL PAIN: 0
BLOOD IN STOOL: 0
CONSTIPATION: 0
SHORTNESS OF BREATH: 0
RECTAL PAIN: 0
COUGH: 0
VOMITING: 0
SORE THROAT: 0
NAUSEA: 0
COLOR CHANGE: 0
CHEST TIGHTNESS: 0

## 2023-11-06 ASSESSMENT — PATIENT HEALTH QUESTIONNAIRE - PHQ9
SUM OF ALL RESPONSES TO PHQ QUESTIONS 1-9: 0
3. TROUBLE FALLING OR STAYING ASLEEP: 0
7. TROUBLE CONCENTRATING ON THINGS, SUCH AS READING THE NEWSPAPER OR WATCHING TELEVISION: 0
8. MOVING OR SPEAKING SO SLOWLY THAT OTHER PEOPLE COULD HAVE NOTICED. OR THE OPPOSITE, BEING SO FIGETY OR RESTLESS THAT YOU HAVE BEEN MOVING AROUND A LOT MORE THAN USUAL: 0
1. LITTLE INTEREST OR PLEASURE IN DOING THINGS: 0
9. THOUGHTS THAT YOU WOULD BE BETTER OFF DEAD, OR OF HURTING YOURSELF: 0
SUM OF ALL RESPONSES TO PHQ9 QUESTIONS 1 & 2: 0
SUM OF ALL RESPONSES TO PHQ QUESTIONS 1-9: 0
5. POOR APPETITE OR OVEREATING: 0
SUM OF ALL RESPONSES TO PHQ QUESTIONS 1-9: 0
6. FEELING BAD ABOUT YOURSELF - OR THAT YOU ARE A FAILURE OR HAVE LET YOURSELF OR YOUR FAMILY DOWN: 0
2. FEELING DOWN, DEPRESSED OR HOPELESS: 0
SUM OF ALL RESPONSES TO PHQ QUESTIONS 1-9: 0
4. FEELING TIRED OR HAVING LITTLE ENERGY: 0
10. IF YOU CHECKED OFF ANY PROBLEMS, HOW DIFFICULT HAVE THESE PROBLEMS MADE IT FOR YOU TO DO YOUR WORK, TAKE CARE OF THINGS AT HOME, OR GET ALONG WITH OTHER PEOPLE: 0

## 2023-11-06 ASSESSMENT — PAIN SCALES - GENERAL: PAINLEVEL_OUTOF10: 8

## 2023-11-06 ASSESSMENT — COLUMBIA-SUICIDE SEVERITY RATING SCALE - C-SSRS
7. DID THIS OCCUR IN THE LAST THREE MONTHS: NO
5. HAVE YOU STARTED TO WORK OUT OR WORKED OUT THE DETAILS OF HOW TO KILL YOURSELF? DO YOU INTEND TO CARRY OUT THIS PLAN?: NO
4. HAVE YOU HAD THESE THOUGHTS AND HAD SOME INTENTION OF ACTING ON THEM?: NO
3. HAVE YOU BEEN THINKING ABOUT HOW YOU MIGHT KILL YOURSELF?: NO

## 2023-11-06 ASSESSMENT — PAIN DESCRIPTION - LOCATION: LOCATION: TOE (COMMENT WHICH ONE)

## 2023-11-06 ASSESSMENT — PAIN DESCRIPTION - ORIENTATION: ORIENTATION: RIGHT

## 2023-11-06 ASSESSMENT — PAIN - FUNCTIONAL ASSESSMENT: PAIN_FUNCTIONAL_ASSESSMENT: 0-10

## 2023-11-06 ASSESSMENT — PAIN DESCRIPTION - DESCRIPTORS: DESCRIPTORS: ACHING;PRESSURE

## 2023-11-06 ASSESSMENT — PAIN DESCRIPTION - PAIN TYPE: TYPE: ACUTE PAIN

## 2023-11-06 NOTE — PROGRESS NOTES
Visit Information    Have you changed or started any medications since your last visit including any over-the-counter medicines, vitamins, or herbal medicines? no   Are you having any side effects from any of your medications? -  no  Have you stopped taking any of your medications? Is so, why? -  no    Have you seen any other physician or provider since your last visit? Yes - Records Obtained  Have you had any other diagnostic tests since your last visit? Yes - Records Obtained  Have you been seen in the emergency room and/or had an admission to a hospital since we last saw you? Yes - Records Obtained  Have you had your routine dental cleaning in the past 6 months? yes     Have you activated your [employer] account? If not, what are your barriers?  Yes     Patient Care Team:  Judi Robison MD as PCP - General (Family Medicine)  Judi Robison MD as PCP - Empaneled Provider    Medical History Review  Past Medical, Family, and Social History reviewed and does contribute to the patient presenting condition    Health Maintenance   Topic Date Due    Hepatitis B vaccine (1 of 3 - 3-dose series) Never done    COVID-19 Vaccine (1) Never done    HIV screen  Never done    Diabetic Alb to Cr ratio (uACR) test  Never done    Diabetic retinal exam  Never done    Hepatitis C screen  Never done    Colorectal Cancer Screen  Never done    Pneumococcal 0-64 years Vaccine (2 - PCV) 03/13/2019    Shingles vaccine (1 of 2) Never done    Lipids  10/25/2022    Flu vaccine (1) Never done    Depression Monitoring  01/30/2024    Diabetic foot exam  09/24/2024    A1C test (Diabetic or Prediabetic)  09/29/2024    GFR test (Diabetes, CKD 3-4, OR last GFR 15-59)  10/13/2024    DTaP/Tdap/Td vaccine (3 - Td or Tdap) 01/10/2033    Hepatitis A vaccine  Aged Out    Hib vaccine  Aged Out    Meningococcal (ACWY) vaccine  Aged Out
10/17/2023    HGB 14.8 10/17/2023    HCT 45.2 10/17/2023    MCV 94.3 10/17/2023     10/17/2023       @BRIEFLAB(NA,K,CL,CO2,BUN,CREATININE,GLUCOSE,CALCIUM)@     Lab Results   Component Value Date    ALT 13 09/24/2023    AST 13 09/24/2023    ALKPHOS 93 09/24/2023    BILITOT 1.1 09/24/2023       Lab Results   Component Value Date    TSH 1.44 05/29/2020       Lab Results   Component Value Date    CHOL 152 10/25/2021    CHOL 125 11/16/2015    CHOL 212 (H) 09/24/2013     Lab Results   Component Value Date    TRIG 68 10/25/2021    TRIG 87 11/16/2015    TRIG 63 09/24/2013     Lab Results   Component Value Date    HDL 62 10/25/2021    HDL 32 (L) 11/16/2015    HDL 54 09/24/2013     Lab Results   Component Value Date    LDLCHOLESTEROL 76 10/25/2021    LDLCHOLESTEROL 76 11/16/2015    LDLCHOLESTEROL 145 (H) 09/24/2013     Lab Results   Component Value Date    VLDL NOT REPORTED 10/25/2021    VLDL NOT REPORTED 11/16/2015    VLDL NOT REPORTED 09/24/2013     Lab Results   Component Value Date    CHOLHDLRATIO 2.5 10/25/2021    CHOLHDLRATIO 3.9 11/16/2015    CHOLHDLRATIO 3.9 09/24/2013       Lab Results   Component Value Date    LABA1C 5.9 09/29/2023       No results found for: \"IPODNCQL98\"    No results found for: \"FOLATE\"    No results found for: \"IRON\", \"TIBC\", \"FERRITIN\"    No results found for: \"VITD25\"          Current Outpatient Medications   Medication Sig Dispense Refill    lisinopril (PRINIVIL;ZESTRIL) 10 MG tablet Take 1 tablet by mouth daily 90 tablet 1    meloxicam (MOBIC) 7.5 MG tablet Take 1 tablet by mouth 2 times daily as needed for Pain 60 tablet 0    blood glucose monitor strips Testing once a day. Please dispense according to patients insurance. 100 strip 3    Lancets MISC Testing once a day. Please dispense according to patients insurance.  100 each 3    insulin glargine (LANTUS SOLOSTAR) 100 UNIT/ML injection pen Inject 35 Units into the skin daily (with breakfast) 5 Adjustable Dose Pre-filled Pen Syringe

## 2023-11-06 NOTE — PATIENT INSTRUCTIONS
Thank you for choosing [facility]) as your healthcare provider as your care is important to us. Please arrive at your appointment on time. If you are unable to make your appointment, please call our office as soon as possible so that we may reschedule your appointment. Missing 3 appointments in a calendar year without notifying the office may lead to dismissal from the practice. We appreciate you calling at least 24 hours in advance, when possible. Thank you. New Updates for [facility]    Thank you for choosing [facility] to give you the best care! [facility]) is always trying to think of new ways to help their patients. We are asking all patients to try out the new digital registration that is now available through your [facility] account Via the clary you're now able to update your personal and registration information prior to your upcoming appointment. This will save you time once you arrive at the office to check-in, not to mention your information remains safe!! Many other perks come from signing up for an account, such as:  Requesting refills  Scheduling an appointment  Completing an E-Visit  Sending a message to the office/provider  Having access to your medication list  Paying your bill/copay prior to your appointment  Scheduling your yearly mammogram  Review your test results    If you are not familiar with [facility] please ask one of us and we will be happy to answer any questions or help you set-up your account.       Your [employer],

## 2023-11-07 ENCOUNTER — HOSPITAL ENCOUNTER (OUTPATIENT)
Dept: WOUND CARE | Age: 53
Discharge: HOME | End: 2023-11-07

## 2023-11-07 ENCOUNTER — TELEPHONE (OUTPATIENT)
Dept: WOUND CARE | Age: 53
End: 2023-11-07

## 2023-11-07 ENCOUNTER — APPOINTMENT (OUTPATIENT)
Dept: GENERAL RADIOLOGY | Age: 53
DRG: 420 | End: 2023-11-07
Payer: COMMERCIAL

## 2023-11-07 LAB
ANION GAP SERPL CALCULATED.3IONS-SCNC: 5 MMOL/L (ref 9–17)
ANION GAP SERPL CALCULATED.3IONS-SCNC: 8 MMOL/L (ref 9–17)
BASOPHILS # BLD: 0.1 K/UL (ref 0–0.2)
BASOPHILS NFR BLD: 1 % (ref 0–2)
BUN SERPL-MCNC: 32 MG/DL (ref 6–20)
BUN SERPL-MCNC: 34 MG/DL (ref 6–20)
CALCIUM SERPL-MCNC: 8.9 MG/DL (ref 8.6–10.4)
CALCIUM SERPL-MCNC: 9.6 MG/DL (ref 8.6–10.4)
CHLORIDE SERPL-SCNC: 103 MMOL/L (ref 98–107)
CHLORIDE SERPL-SCNC: 98 MMOL/L (ref 98–107)
CO2 SERPL-SCNC: 28 MMOL/L (ref 20–31)
CO2 SERPL-SCNC: 31 MMOL/L (ref 20–31)
CREAT SERPL-MCNC: 0.9 MG/DL (ref 0.7–1.2)
CREAT SERPL-MCNC: 1 MG/DL (ref 0.7–1.2)
CRP SERPL HS-MCNC: <3 MG/L (ref 0–5)
EOSINOPHIL # BLD: 0.2 K/UL (ref 0–0.4)
EOSINOPHILS RELATIVE PERCENT: 3 % (ref 0–4)
ERYTHROCYTE [DISTWIDTH] IN BLOOD BY AUTOMATED COUNT: 12.7 % (ref 11.5–14.9)
ERYTHROCYTE [DISTWIDTH] IN BLOOD BY AUTOMATED COUNT: 12.7 % (ref 11.5–14.9)
ERYTHROCYTE [SEDIMENTATION RATE] IN BLOOD BY PHOTOMETRIC METHOD: 25 MM/HR (ref 0–20)
GFR SERPL CREATININE-BSD FRML MDRD: >60 ML/MIN/1.73M2
GFR SERPL CREATININE-BSD FRML MDRD: >60 ML/MIN/1.73M2
GLUCOSE BLD-MCNC: 134 MG/DL (ref 75–110)
GLUCOSE BLD-MCNC: 161 MG/DL (ref 75–110)
GLUCOSE BLD-MCNC: 174 MG/DL (ref 75–110)
GLUCOSE BLD-MCNC: 90 MG/DL (ref 75–110)
GLUCOSE SERPL-MCNC: 135 MG/DL (ref 70–99)
GLUCOSE SERPL-MCNC: 201 MG/DL (ref 70–99)
HCT VFR BLD AUTO: 35.7 % (ref 41–53)
HCT VFR BLD AUTO: 40 % (ref 41–53)
HGB BLD-MCNC: 12.3 G/DL (ref 13.5–17.5)
HGB BLD-MCNC: 13.5 G/DL (ref 13.5–17.5)
LYMPHOCYTES NFR BLD: 2.5 K/UL (ref 1–4.8)
LYMPHOCYTES RELATIVE PERCENT: 33 % (ref 24–44)
MCH RBC QN AUTO: 30.8 PG (ref 26–34)
MCH RBC QN AUTO: 31.8 PG (ref 26–34)
MCHC RBC AUTO-ENTMCNC: 33.8 G/DL (ref 31–37)
MCHC RBC AUTO-ENTMCNC: 34.4 G/DL (ref 31–37)
MCV RBC AUTO: 91.1 FL (ref 80–100)
MCV RBC AUTO: 92.5 FL (ref 80–100)
MONOCYTES NFR BLD: 0.6 K/UL (ref 0.1–1.3)
MONOCYTES NFR BLD: 8 % (ref 1–7)
NEUTROPHILS NFR BLD: 55 % (ref 36–66)
NEUTS SEG NFR BLD: 4.2 K/UL (ref 1.3–9.1)
PLATELET # BLD AUTO: 185 K/UL (ref 150–450)
PLATELET # BLD AUTO: 226 K/UL (ref 150–450)
PMV BLD AUTO: 8 FL (ref 6–12)
PMV BLD AUTO: 8.4 FL (ref 6–12)
POTASSIUM SERPL-SCNC: 4.4 MMOL/L (ref 3.7–5.3)
POTASSIUM SERPL-SCNC: 4.7 MMOL/L (ref 3.7–5.3)
RBC # BLD AUTO: 3.86 M/UL (ref 4.5–5.9)
RBC # BLD AUTO: 4.39 M/UL (ref 4.5–5.9)
SODIUM SERPL-SCNC: 134 MMOL/L (ref 135–144)
SODIUM SERPL-SCNC: 139 MMOL/L (ref 135–144)
WBC OTHER # BLD: 5.5 K/UL (ref 3.5–11)
WBC OTHER # BLD: 7.6 K/UL (ref 3.5–11)

## 2023-11-07 PROCEDURE — 6370000000 HC RX 637 (ALT 250 FOR IP): Performed by: NURSE PRACTITIONER

## 2023-11-07 PROCEDURE — 99223 1ST HOSP IP/OBS HIGH 75: CPT | Performed by: INTERNAL MEDICINE

## 2023-11-07 PROCEDURE — 87070 CULTURE OTHR SPECIMN AEROBIC: CPT

## 2023-11-07 PROCEDURE — 2580000003 HC RX 258

## 2023-11-07 PROCEDURE — 1200000000 HC SEMI PRIVATE

## 2023-11-07 PROCEDURE — 36415 COLL VENOUS BLD VENIPUNCTURE: CPT

## 2023-11-07 PROCEDURE — 85027 COMPLETE CBC AUTOMATED: CPT

## 2023-11-07 PROCEDURE — 80048 BASIC METABOLIC PNL TOTAL CA: CPT

## 2023-11-07 PROCEDURE — 86403 PARTICLE AGGLUT ANTBDY SCRN: CPT

## 2023-11-07 PROCEDURE — 87075 CULTR BACTERIA EXCEPT BLOOD: CPT

## 2023-11-07 PROCEDURE — 73630 X-RAY EXAM OF FOOT: CPT

## 2023-11-07 PROCEDURE — 82947 ASSAY GLUCOSE BLOOD QUANT: CPT

## 2023-11-07 PROCEDURE — 6360000002 HC RX W HCPCS

## 2023-11-07 PROCEDURE — 2580000003 HC RX 258: Performed by: NURSE PRACTITIONER

## 2023-11-07 PROCEDURE — 87205 SMEAR GRAM STAIN: CPT

## 2023-11-07 PROCEDURE — 87077 CULTURE AEROBIC IDENTIFY: CPT

## 2023-11-07 PROCEDURE — 85025 COMPLETE CBC W/AUTO DIFF WBC: CPT

## 2023-11-07 PROCEDURE — 6360000002 HC RX W HCPCS: Performed by: NURSE PRACTITIONER

## 2023-11-07 PROCEDURE — 87186 SC STD MICRODIL/AGAR DIL: CPT

## 2023-11-07 PROCEDURE — 86140 C-REACTIVE PROTEIN: CPT

## 2023-11-07 PROCEDURE — 85652 RBC SED RATE AUTOMATED: CPT

## 2023-11-07 RX ORDER — ACETAMINOPHEN 650 MG/1
650 SUPPOSITORY RECTAL EVERY 6 HOURS PRN
Status: DISCONTINUED | OUTPATIENT
Start: 2023-11-07 | End: 2023-11-08 | Stop reason: HOSPADM

## 2023-11-07 RX ORDER — ACETAMINOPHEN 325 MG/1
650 TABLET ORAL EVERY 6 HOURS PRN
Status: DISCONTINUED | OUTPATIENT
Start: 2023-11-07 | End: 2023-11-08 | Stop reason: HOSPADM

## 2023-11-07 RX ORDER — ONDANSETRON 4 MG/1
4 TABLET, ORALLY DISINTEGRATING ORAL EVERY 8 HOURS PRN
Status: DISCONTINUED | OUTPATIENT
Start: 2023-11-07 | End: 2023-11-08 | Stop reason: HOSPADM

## 2023-11-07 RX ORDER — SODIUM CHLORIDE 0.9 % (FLUSH) 0.9 %
5-40 SYRINGE (ML) INJECTION EVERY 12 HOURS SCHEDULED
Status: DISCONTINUED | OUTPATIENT
Start: 2023-11-07 | End: 2023-11-08 | Stop reason: HOSPADM

## 2023-11-07 RX ORDER — MELOXICAM 7.5 MG/1
7.5 TABLET ORAL 2 TIMES DAILY PRN
Status: DISCONTINUED | OUTPATIENT
Start: 2023-11-07 | End: 2023-11-08

## 2023-11-07 RX ORDER — KETOROLAC TROMETHAMINE 30 MG/ML
15 INJECTION, SOLUTION INTRAMUSCULAR; INTRAVENOUS ONCE
Status: DISCONTINUED | OUTPATIENT
Start: 2023-11-07 | End: 2023-11-07

## 2023-11-07 RX ORDER — ENOXAPARIN SODIUM 100 MG/ML
40 INJECTION SUBCUTANEOUS DAILY
Status: DISCONTINUED | OUTPATIENT
Start: 2023-11-07 | End: 2023-11-08 | Stop reason: HOSPADM

## 2023-11-07 RX ORDER — DEXTROSE MONOHYDRATE 100 MG/ML
INJECTION, SOLUTION INTRAVENOUS CONTINUOUS PRN
Status: DISCONTINUED | OUTPATIENT
Start: 2023-11-07 | End: 2023-11-08 | Stop reason: HOSPADM

## 2023-11-07 RX ORDER — MAGNESIUM SULFATE 1 G/100ML
1000 INJECTION INTRAVENOUS PRN
Status: DISCONTINUED | OUTPATIENT
Start: 2023-11-07 | End: 2023-11-08 | Stop reason: HOSPADM

## 2023-11-07 RX ORDER — ALOGLIPTIN 12.5 MG/1
12.5 TABLET, FILM COATED ORAL DAILY
Status: DISCONTINUED | OUTPATIENT
Start: 2023-11-07 | End: 2023-11-08 | Stop reason: HOSPADM

## 2023-11-07 RX ORDER — POTASSIUM CHLORIDE 20 MEQ/1
40 TABLET, EXTENDED RELEASE ORAL PRN
Status: DISCONTINUED | OUTPATIENT
Start: 2023-11-07 | End: 2023-11-08 | Stop reason: HOSPADM

## 2023-11-07 RX ORDER — KETOROLAC TROMETHAMINE 30 MG/ML
15 INJECTION, SOLUTION INTRAMUSCULAR; INTRAVENOUS ONCE
Status: COMPLETED | OUTPATIENT
Start: 2023-11-07 | End: 2023-11-07

## 2023-11-07 RX ORDER — HYDROCODONE BITARTRATE AND ACETAMINOPHEN 5; 325 MG/1; MG/1
1 TABLET ORAL EVERY 6 HOURS PRN
Status: DISCONTINUED | OUTPATIENT
Start: 2023-11-07 | End: 2023-11-08 | Stop reason: HOSPADM

## 2023-11-07 RX ORDER — SODIUM CHLORIDE 0.9 % (FLUSH) 0.9 %
10 SYRINGE (ML) INJECTION PRN
Status: DISCONTINUED | OUTPATIENT
Start: 2023-11-07 | End: 2023-11-08 | Stop reason: HOSPADM

## 2023-11-07 RX ORDER — POTASSIUM CHLORIDE 7.45 MG/ML
10 INJECTION INTRAVENOUS PRN
Status: DISCONTINUED | OUTPATIENT
Start: 2023-11-07 | End: 2023-11-08 | Stop reason: HOSPADM

## 2023-11-07 RX ORDER — ONDANSETRON 2 MG/ML
4 INJECTION INTRAMUSCULAR; INTRAVENOUS EVERY 6 HOURS PRN
Status: DISCONTINUED | OUTPATIENT
Start: 2023-11-07 | End: 2023-11-08 | Stop reason: HOSPADM

## 2023-11-07 RX ORDER — DOXYCYCLINE HYCLATE 100 MG
100 TABLET ORAL 2 TIMES DAILY
Qty: 28 TABLET | Refills: 0 | Status: SHIPPED | OUTPATIENT
Start: 2023-11-07 | End: 2023-11-21

## 2023-11-07 RX ORDER — SODIUM CHLORIDE 9 MG/ML
INJECTION, SOLUTION INTRAVENOUS CONTINUOUS
Status: DISCONTINUED | OUTPATIENT
Start: 2023-11-07 | End: 2023-11-08 | Stop reason: HOSPADM

## 2023-11-07 RX ORDER — INSULIN GLARGINE 100 [IU]/ML
35 INJECTION, SOLUTION SUBCUTANEOUS
Status: DISCONTINUED | OUTPATIENT
Start: 2023-11-07 | End: 2023-11-08 | Stop reason: HOSPADM

## 2023-11-07 RX ORDER — SODIUM CHLORIDE 9 MG/ML
INJECTION, SOLUTION INTRAVENOUS PRN
Status: DISCONTINUED | OUTPATIENT
Start: 2023-11-07 | End: 2023-11-08 | Stop reason: HOSPADM

## 2023-11-07 RX ORDER — INSULIN LISPRO 100 [IU]/ML
0-8 INJECTION, SOLUTION INTRAVENOUS; SUBCUTANEOUS
Status: DISCONTINUED | OUTPATIENT
Start: 2023-11-07 | End: 2023-11-08 | Stop reason: HOSPADM

## 2023-11-07 RX ORDER — LISINOPRIL 10 MG/1
10 TABLET ORAL DAILY
Status: DISCONTINUED | OUTPATIENT
Start: 2023-11-07 | End: 2023-11-08 | Stop reason: HOSPADM

## 2023-11-07 RX ORDER — POLYETHYLENE GLYCOL 3350 17 G/17G
17 POWDER, FOR SOLUTION ORAL DAILY PRN
Status: DISCONTINUED | OUTPATIENT
Start: 2023-11-07 | End: 2023-11-08 | Stop reason: HOSPADM

## 2023-11-07 RX ORDER — INSULIN LISPRO 100 [IU]/ML
0-4 INJECTION, SOLUTION INTRAVENOUS; SUBCUTANEOUS NIGHTLY
Status: DISCONTINUED | OUTPATIENT
Start: 2023-11-07 | End: 2023-11-08 | Stop reason: HOSPADM

## 2023-11-07 RX ORDER — ATORVASTATIN CALCIUM 20 MG/1
20 TABLET, FILM COATED ORAL DAILY
Status: DISCONTINUED | OUTPATIENT
Start: 2023-11-07 | End: 2023-11-08 | Stop reason: HOSPADM

## 2023-11-07 RX ADMIN — ALOGLIPTIN 12.5 MG: 12.5 TABLET, FILM COATED ORAL at 08:27

## 2023-11-07 RX ADMIN — ENOXAPARIN SODIUM 40 MG: 100 INJECTION SUBCUTANEOUS at 08:28

## 2023-11-07 RX ADMIN — VANCOMYCIN HYDROCHLORIDE 2250 MG: 1 INJECTION, POWDER, LYOPHILIZED, FOR SOLUTION INTRAVENOUS at 02:33

## 2023-11-07 RX ADMIN — LISINOPRIL 10 MG: 10 TABLET ORAL at 08:27

## 2023-11-07 RX ADMIN — MELOXICAM 7.5 MG: 7.5 TABLET ORAL at 21:13

## 2023-11-07 RX ADMIN — HYDROCODONE BITARTRATE AND ACETAMINOPHEN 1 TABLET: 5; 325 TABLET ORAL at 12:19

## 2023-11-07 RX ADMIN — CEFEPIME 2000 MG: 2 INJECTION, POWDER, FOR SOLUTION INTRAVENOUS at 01:45

## 2023-11-07 RX ADMIN — SODIUM CHLORIDE 75 ML/HR: 9 INJECTION, SOLUTION INTRAVENOUS at 03:02

## 2023-11-07 RX ADMIN — KETOROLAC TROMETHAMINE 15 MG: 30 INJECTION, SOLUTION INTRAMUSCULAR; INTRAVENOUS at 01:41

## 2023-11-07 RX ADMIN — HYDROCODONE BITARTRATE AND ACETAMINOPHEN 1 TABLET: 5; 325 TABLET ORAL at 04:12

## 2023-11-07 RX ADMIN — INSULIN GLARGINE 35 UNITS: 100 INJECTION, SOLUTION SUBCUTANEOUS at 08:28

## 2023-11-07 RX ADMIN — ATORVASTATIN CALCIUM 20 MG: 20 TABLET, FILM COATED ORAL at 08:27

## 2023-11-07 RX ADMIN — CEFEPIME 2000 MG: 2 INJECTION, POWDER, FOR SOLUTION INTRAVENOUS at 18:30

## 2023-11-07 RX ADMIN — CEFEPIME 2000 MG: 2 INJECTION, POWDER, FOR SOLUTION INTRAVENOUS at 08:40

## 2023-11-07 RX ADMIN — VANCOMYCIN HYDROCHLORIDE 1000 MG: 1 INJECTION, POWDER, LYOPHILIZED, FOR SOLUTION INTRAVENOUS at 15:57

## 2023-11-07 RX ADMIN — HYDROCODONE BITARTRATE AND ACETAMINOPHEN 1 TABLET: 5; 325 TABLET ORAL at 18:31

## 2023-11-07 ASSESSMENT — PAIN DESCRIPTION - PAIN TYPE: TYPE: ACUTE PAIN

## 2023-11-07 ASSESSMENT — PAIN SCALES - GENERAL
PAINLEVEL_OUTOF10: 8
PAINLEVEL_OUTOF10: 8
PAINLEVEL_OUTOF10: 6
PAINLEVEL_OUTOF10: 7
PAINLEVEL_OUTOF10: 6

## 2023-11-07 ASSESSMENT — PAIN DESCRIPTION - ORIENTATION
ORIENTATION: RIGHT

## 2023-11-07 ASSESSMENT — ENCOUNTER SYMPTOMS
VOMITING: 0
BACK PAIN: 0
SHORTNESS OF BREATH: 0
DIARRHEA: 0
COUGH: 0
COLOR CHANGE: 1
NAUSEA: 0

## 2023-11-07 ASSESSMENT — PAIN DESCRIPTION - LOCATION
LOCATION: FOOT

## 2023-11-07 ASSESSMENT — PAIN DESCRIPTION - ONSET: ONSET: ON-GOING

## 2023-11-07 ASSESSMENT — PAIN DESCRIPTION - DIRECTION: RADIATING_TOWARDS: UP LEG

## 2023-11-07 ASSESSMENT — PAIN DESCRIPTION - DESCRIPTORS
DESCRIPTORS: ACHING
DESCRIPTORS: THROBBING
DESCRIPTORS: THROBBING

## 2023-11-07 ASSESSMENT — PAIN DESCRIPTION - FREQUENCY: FREQUENCY: INTERMITTENT

## 2023-11-07 ASSESSMENT — PAIN - FUNCTIONAL ASSESSMENT: PAIN_FUNCTIONAL_ASSESSMENT: ACTIVITIES ARE NOT PREVENTED

## 2023-11-07 NOTE — PLAN OF CARE
Problem: Discharge Planning  Goal: Discharge to home or other facility with appropriate resources  Outcome: Progressing  Flowsheets (Taken 11/7/2023 0340)  Discharge to home or other facility with appropriate resources:   Identify barriers to discharge with patient and caregiver   Arrange for needed discharge resources and transportation as appropriate   Identify discharge learning needs (meds, wound care, etc)   Arrange for interpreters to assist at discharge as needed     Problem: Pain  Goal: Verbalizes/displays adequate comfort level or baseline comfort level  Outcome: Progressing     Problem: Safety - Adult  Goal: Free from fall injury  Outcome: Progressing     Problem: ABCDS Injury Assessment  Goal: Absence of physical injury  Outcome: Progressing  Flowsheets (Taken 11/7/2023 0340)  Absence of Physical Injury: Implement safety measures based on patient assessment

## 2023-11-07 NOTE — DISCHARGE INSTRUCTIONS
Podiatry:  -Please make an attended follow-up visit with Dr. Merari Babcock outpatient 1 week after discharge  -Please continue performing daily dressing changes and evaluations as instructed by the wound care center.  -Please keep your lower extremity wounds dry clean and covered at all times until follow-up visit  -Please take all prescribed medications as instructed  -Please restart all home medications as prescribed  -Please return to the hospital if any of the following local signs of infection arise: Increased/streaking redness, pain, swelling, drainage or malodor  -Please return to the hospital for any the following systemic signs of infection arise: Nausea, vomiting, fever, chills, diarrhea, shortness of breath or chest pain Has The Growth Been Previously Biopsied?: has been previously biopsied Body Location Override (Optional): right ulnar dorsal hand

## 2023-11-07 NOTE — ED PROVIDER NOTES
[facility],6Th Floor ED  Emergency Department Encounter  Emergency Medicine Resident     Pt Alida Brandon  MRN: [id number]  [address] [facility] [place] 1970  Date of evaluation: 11/7/23  PCP:  Estefani Kamara MD  Note Started: 12:05 AM EST      CHIEF COMPLAINT       Chief Complaint   Patient presents with    Foot Pain    Leg Pain     3rd toe amputation of right foot 6 weeks ago here at [address], today increased pain and believes the wound is open after putting pressure on the foot. HISTORY OF PRESENT ILLNESS  (Location/Symptom, Timing/Onset, Context/Setting, Quality, Duration, Modifying Factors, Severity.)      Asiya Fuentes is a 46 y.o. male with a history of diabetes with amputation of the right third toe, peripheral artery disease who presents with worsening pain, drainage from his foot amputation site. Patient had his toe amputated 6 weeks ago and has been attending wound care since then. States today he stepped on his right foot had some onset pain and increased drainage from the site. He is concerned it may be reinfected or he damaged the surgical site. Increased amount of pain radiating up the patient's shin. Denies fevers, chills, numbness, weakness    Epifix skin substitue placed by wound care 10/31    PAST MEDICAL / SURGICAL / SOCIAL / FAMILY HISTORY      has a past medical history of Anxiety and depression, Chronic back pain, HLD (hyperlipidemia), Hypertension, Marijuana abuse, Nephrolithiasis, and Type II or unspecified type diabetes mellitus without mention of complication, not stated as uncontrolled. has a past surgical history that includes Hand surgery (Right, 10/25/2021); Foot Debridement (Left, 2/8/2023); Foot surgery (2/8/2023); Toe amputation (N/A, 9/25/2023); Foot Debridement (Right, 9/25/2023); Foot Debridement (Right, 9/29/2023); and Toe amputation (Right, 9/29/2023).       Social History     Socioeconomic History    Marital status:      Spouse name: Not on file    Number of

## 2023-11-07 NOTE — CARE COORDINATION
Case Management Assessment  Initial Evaluation    Date/Time of Evaluation: 11/7/2023 12:32 PM  Assessment Completed by: Khloe Leos RN    If patient is discharged prior to next notation, then this note serves as note for discharge by case management. Patient Name: Dina Tan                   YOB: 1970  Diagnosis: Cellulitis [L03.90]  Wound infection [T14. 8XXA, L08.9]                   Date / Time: 11/6/2023 11:07 PM    Patient Admission Status: Inpatient   Readmission Risk (Low < 19, Mod (19-27), High > 27): Readmission Risk Score: 9.8    Current PCP: Elliott Richter MD  PCP verified by CM? Yes    Chart Reviewed: Yes      History Provided by: Patient  Patient Orientation: Alert and Oriented    Patient Cognition: Alert    Hospitalization in the last 30 days (Readmission):  No    If yes, Readmission Assessment in CM Navigator will be completed. Advance Directives:      Code Status: Full Code   Patient's Primary Decision Maker is:        Discharge Planning:    Patient lives with: Parent Type of Home: House  Primary Care Giver: Self  Patient Support Systems include: Parent   Current Financial resources: Medicaid  Current community resources: None  Current services prior to admission: Durable Medical Equipment            Current DME: Other (Comment) (Knee Scooter, BP Cuff, Glucometer)            Type of Home Care services:  None    ADLS  Prior functional level: Independent in ADLs/IADLs  Current functional level: Independent in ADLs/IADLs    PT AM-PAC:   /24  OT AM-PAC:   /24    Family can provide assistance at DC: Yes  Would you like Case Management to discuss the discharge plan with any other family members/significant others, and if so, who?  No  Plans to Return to Present Housing: Yes  Other Identified Issues/Barriers to RETURNING to current housing: None  Potential Assistance needed at discharge: N/A            Potential DME:    Patient expects to discharge to: [address] [place] [address]

## 2023-11-07 NOTE — PROGRESS NOTES
11/07/23 1025   Encounter Summary   Encounter Overview/Reason   Encounter   Service Provided For: Patient   Referral/Consult From: [facility]ata   Last Encounter  11/07/23   Complexity of Encounter Low   Spiritual/Emotional needs   Type Spiritual Support   Rituals, Rites and Sacraments   Type Sacrament of Sick; Anointing

## 2023-11-07 NOTE — H&P
20 - 31 mmol/L    Anion Gap 8 (L) 9 - 17 mmol/L    Glucose 201 (H) 70 - 99 mg/dL    BUN 32 (H) 6 - 20 mg/dL    Creatinine 1.0 0.7 - 1.2 mg/dL    Est, Glom Filt Rate >60 >60 mL/min/1.73m2    Calcium 9.6 8.6 - 10.4 mg/dL   POC Glucose Fingerstick    Collection Time: 11/07/23  7:36 AM   Result Value Ref Range    POC Glucose 134 (H) 75 - 110 mg/dL   Basic Metabolic Panel w/ Reflex to MG    Collection Time: 11/07/23  8:56 AM   Result Value Ref Range    Sodium 139 135 - 144 mmol/L    Potassium 4.4 3.7 - 5.3 mmol/L    Chloride 103 98 - 107 mmol/L    CO2 31 20 - 31 mmol/L    Anion Gap 5 (L) 9 - 17 mmol/L    Glucose 135 (H) 70 - 99 mg/dL    BUN 34 (H) 6 - 20 mg/dL    Creatinine 0.9 0.7 - 1.2 mg/dL    Est, Glom Filt Rate >60 >60 mL/min/1.73m2    Calcium 8.9 8.6 - 10.4 mg/dL   CBC    Collection Time: 11/07/23  8:56 AM   Result Value Ref Range    WBC 5.5 3.5 - 11.0 k/uL    RBC 3.86 (L) 4.5 - 5.9 m/uL    Hemoglobin 12.3 (L) 13.5 - 17.5 g/dL    Hematocrit 35.7 (L) 41 - 53 %    MCV 92.5 80 - 100 fL    MCH 31.8 26 - 34 pg    MCHC 34.4 31 - 37 g/dL    RDW 12.7 11.5 - 14.9 %    Platelets 716 680 - 783 k/uL    MPV 8.0 6.0 - 12.0 fL   POC Glucose Fingerstick    Collection Time: 11/07/23 10:50 AM   Result Value Ref Range    POC Glucose 161 (H) 75 - 110 mg/dL   POC Glucose Fingerstick    Collection Time: 11/07/23  3:26 PM   Result Value Ref Range    POC Glucose 90 75 - 110 mg/dL       Imaging/Diagnostics:  XR FOOT RIGHT (MIN 3 VIEWS)    Result Date: 11/7/2023  Additional interval amputation 3rd metatarsal and dorsal subcutaneous gas. Assessment :      Hospital Problems             Last Modified POA    * (Principal) Cellulitis 11/7/2023 Yes       Plan:     Patient status inpatient in the Med/Surge    1.   Right foot cellulitis, continue Vanco and cefepime, podiatry on board, right middle toe amputated,  Cultures pending, sed rate and CRP checked,  White count 7.6,  Keep the foot raised,  Wound culture and Gram stain

## 2023-11-07 NOTE — PROGRESS NOTES
Adelaide Haines is a 46 y.o.  /  male who presents with Foot Pain and Leg Pain (3rd toe amputation of right foot 6 weeks ago here at [facility], today increased pain and believes the wound is open after putting pressure on the foot. )   and is admitted to the hospital for the management of Cellulitis. According to patient, he had his right third toe amputated about 6 weeks ago for treatment of necrotizing fasciitis. He has been following up with wound care. States that yesterday he accidentally put mild pressure on his right foot and noted increased amount of drainage from wound. States that his entire foot felt like it did just prior to his amputation. Symptoms are associated with foul odor of wound drainage and occasional episodes of lightheadedness. Denies fever, chills, chest pain, cough, abdominal pain, nausea, vomiting, diarrhea, and urinary symptoms. There are no aggravating or alleviating factors. Symptoms are reported as constant and moderate; sudden worsening earlier today. Patient status inpatient in the [facility] Problems             Last Modified POA    * (Principal) Cellulitis 11/7/2023 Yes       Cellulitis of right foot  -Right foot x-ray obtained in ED  --Additional interval amputation third metatarsal; dorsal subcutaneous gas  -IV cefepime and vancomycin initiated in ED  --Continue on admission to unit  -Sed rate 25; CRP less than 3  --WBC 7.6  -Wound culture/gram stain  -Elevate affected limb  -IV Normal Saline  -Pain control  -Podiatry consulted in ED  --Will see in a.m.     Diabetes Mellitus  -Continue home dose insulin  -POCT ac and hs with sliding scale coverage     Disposition 4 days      Consultations:   IP CONSULT TO PODIATRY  PHARMACY TO DOSE VANCOMYCIN  IP CONSULT TO INTERNAL MEDICINE    Patient is admitted as inpatient status because of co-morbidities listed above, severity of signs and symptoms as outlined, requirement for current medical therapies

## 2023-11-07 NOTE — DISCHARGE INSTR - COC
Continuity of Care Form    Patient Name: Lyndon Lawson   :  1970  MRN:  [id number]    Admit date:  2023  Discharge date:  ***    Code Status Order: Full Code   Advance Directives:     Admitting Physician:  Shashi Day MD  PCP: Licha Moon MD    Discharging Nurse: [facility] Unit/Room#: 7/-01  Discharging Unit Phone Number: ***    Emergency Contact:   Extended Emergency Contact Information  Primary Emergency Contact: Hali Julio  Address: [address], [address] [address] [place] Phone: [phone]  Relation: Parent  Secondary Emergency Contact: EvanGisellaTiffany  Address: [address], [address] [place] [address]  Home Phone: [phone]  Relation: Other    Past Surgical History:  Past Surgical History:   Procedure Laterality Date    FOOT DEBRIDEMENT Left 2023    Incision and drainage left foot performed by Elzbieta Levin DPM at [facility] Right 2023    Incision and drainage down to bone/subfascial, right foot performed by Kirsty Chu DPM at [facility] Right 2023    FOOT DEBRIDEMENT INCISION AND DRAINAGE WITH APPLICATION [facility] AND INTEGRA BILAYER GRAFT RIGHT performed by Kirsty Chu DPM at [address] [address]  2023    Excision of 3rd metatarsal head performed by Elzbieta Levin DPM at [address] [facility] Right 10/25/2021    HAND INCISION AND DRAINAGE performed by Ludivina Lainez MD at [address] [place] [address] N/A 2023    TOE 3RD RIGHT AMPUTATION performed by Kirsty Chu DPM at [address] [place] [address] Right 2023    PARTIAL METATARSAL AMPUTAION THIRD DIGIT OF THE RIGHT FOOT performed by Kirsty Chu DPM at [facility] OR       Immunization History:   Immunization History   Administered Date(s) Administered    Pneumococcal, PPSV23, PNEUMOVAX 21, (age 2y+), SC/IM, 0.5mL 2018    TDaP, ADACEL (age 6y-58y), BOOSTRIX (age 10y+),

## 2023-11-07 NOTE — PROGRESS NOTES
Vancomycin Dosing by Pharmacy - Initial Note   TODAY'S DATE:  11/7/2023  Patient name, age:  Keily Bhardwaj, 46 y.o. Indication: SSTI, MRSA suspected. Additional antimicrobials:  cefepime    Allergies:  Patient has no known allergies. Actual Weight:    Wt Readings from Last 1 Encounters:   11/06/23 92.5 kg (204 lb)     Labs/Ancillary Data  Estimated Creatinine Clearance: 98 mL/min (based on SCr of 1 mg/dL). Recent Labs     11/07/23  0002   CREATININE 1.0   BUN 32*   WBC 7.6     Procalcitonin   Date Value Ref Range Status   09/24/2023 0.12 (H) <0.09 ng/mL Final     Comment:           Suspected Sepsis:  <0.50 ng/mL     Low likelihood of sepsis. 0.50-2.00 ng/mL     Increased likelihood of sepsis. Antibiotics encouraged. >2.00 ng/mL     High risk of sepsis/shock. Antibiotics strongly encouraged. Suspected Lower Resp Tract Infections:  <0.24 ng/mL     Low likelihood of bacterial infection. >0.24 ng/mL     Increased likelihood of bacterial infection. Antibiotics encouraged. With successful antibiotic therapy, PCT levels should decrease rapidly. (Half-life of 24 to   36 hours.)        Procalcitonin values from samples collected within the first 6 hours of systemic infection   may still be low. Retesting may be indicated. Values from day 1 and day 4 can be entered into the Change in Procalcitonin Calculator   (www.[facility]-pct-calculator. com) to determine the patient's Mortality Risk Prognosis        In healthy neonates, plasma Procalcitonin (PCT) concentrations increase gradually after   birth, reaching peak values at about 24 hours of age then decrease to normal values below   0.5 ng/mL by 48-72 hours of age. No intake or output data in the 24 hours ending 11/07/23 0143  Temp: 98.2    Recent vancomycin administrations        No vancomycin IV orders with administrations found.             Orders not given:            vancomycin (VANCOCIN) intermittent dosing (placeholder)    vancomycin (VANCOCIN)

## 2023-11-08 ENCOUNTER — HOSPITAL ENCOUNTER (EMERGENCY)
Age: 53
Discharge: LEFT BEFORE DISCHARGE | End: 2023-11-08

## 2023-11-08 ENCOUNTER — TELEPHONE (OUTPATIENT)
Dept: FAMILY MEDICINE CLINIC | Age: 53
End: 2023-11-08

## 2023-11-08 VITALS
BODY MASS INDEX: 26.18 KG/M2 | OXYGEN SATURATION: 96 % | DIASTOLIC BLOOD PRESSURE: 72 MMHG | WEIGHT: 204 LBS | TEMPERATURE: 97.8 F | RESPIRATION RATE: 16 BRPM | HEART RATE: 86 BPM | HEIGHT: 74 IN | SYSTOLIC BLOOD PRESSURE: 118 MMHG

## 2023-11-08 VITALS
HEART RATE: 85 BPM | DIASTOLIC BLOOD PRESSURE: 84 MMHG | OXYGEN SATURATION: 97 % | TEMPERATURE: 98.7 F | RESPIRATION RATE: 18 BRPM | SYSTOLIC BLOOD PRESSURE: 157 MMHG

## 2023-11-08 LAB
ANION GAP SERPL CALCULATED.3IONS-SCNC: 6 MMOL/L (ref 9–17)
BUN SERPL-MCNC: 21 MG/DL (ref 6–20)
CALCIUM SERPL-MCNC: 8.4 MG/DL (ref 8.6–10.4)
CHLORIDE SERPL-SCNC: 104 MMOL/L (ref 98–107)
CO2 SERPL-SCNC: 26 MMOL/L (ref 20–31)
CREAT SERPL-MCNC: 0.8 MG/DL (ref 0.7–1.2)
CRP SERPL HS-MCNC: <3 MG/L (ref 0–5)
DATE LAST DOSE: NORMAL
ERYTHROCYTE [DISTWIDTH] IN BLOOD BY AUTOMATED COUNT: 12.5 % (ref 11.5–14.9)
ERYTHROCYTE [SEDIMENTATION RATE] IN BLOOD BY PHOTOMETRIC METHOD: 10 MM/HR (ref 0–20)
GFR SERPL CREATININE-BSD FRML MDRD: >60 ML/MIN/1.73M2
GLUCOSE BLD-MCNC: 156 MG/DL (ref 75–110)
GLUCOSE BLD-MCNC: 164 MG/DL (ref 75–110)
GLUCOSE SERPL-MCNC: 170 MG/DL (ref 70–99)
HCT VFR BLD AUTO: 36.6 % (ref 41–53)
HGB BLD-MCNC: 12.3 G/DL (ref 13.5–17.5)
MCH RBC QN AUTO: 31.2 PG (ref 26–34)
MCHC RBC AUTO-ENTMCNC: 33.6 G/DL (ref 31–37)
MCV RBC AUTO: 93 FL (ref 80–100)
PLATELET # BLD AUTO: 186 K/UL (ref 150–450)
PMV BLD AUTO: 8.1 FL (ref 6–12)
POTASSIUM SERPL-SCNC: 4.1 MMOL/L (ref 3.7–5.3)
RBC # BLD AUTO: 3.93 M/UL (ref 4.5–5.9)
SODIUM SERPL-SCNC: 136 MMOL/L (ref 135–144)
TME LAST DOSE: 442 H
VANCOMYCIN DOSE: 1000 MG
VANCOMYCIN SERPL-MCNC: 22.9 UG/ML
WBC OTHER # BLD: 6.1 K/UL (ref 3.5–11)

## 2023-11-08 PROCEDURE — 82947 ASSAY GLUCOSE BLOOD QUANT: CPT

## 2023-11-08 PROCEDURE — 6370000000 HC RX 637 (ALT 250 FOR IP): Performed by: NURSE PRACTITIONER

## 2023-11-08 PROCEDURE — 86140 C-REACTIVE PROTEIN: CPT

## 2023-11-08 PROCEDURE — 99239 HOSP IP/OBS DSCHRG MGMT >30: CPT | Performed by: INTERNAL MEDICINE

## 2023-11-08 PROCEDURE — 2580000003 HC RX 258

## 2023-11-08 PROCEDURE — 6360000002 HC RX W HCPCS

## 2023-11-08 PROCEDURE — 2580000003 HC RX 258: Performed by: NURSE PRACTITIONER

## 2023-11-08 PROCEDURE — 6360000002 HC RX W HCPCS: Performed by: NURSE PRACTITIONER

## 2023-11-08 PROCEDURE — 80202 ASSAY OF VANCOMYCIN: CPT

## 2023-11-08 PROCEDURE — 80048 BASIC METABOLIC PNL TOTAL CA: CPT

## 2023-11-08 PROCEDURE — 36415 COLL VENOUS BLD VENIPUNCTURE: CPT

## 2023-11-08 PROCEDURE — 85652 RBC SED RATE AUTOMATED: CPT

## 2023-11-08 PROCEDURE — 85027 COMPLETE CBC AUTOMATED: CPT

## 2023-11-08 RX ORDER — MORPHINE SULFATE 4 MG/ML
4 INJECTION, SOLUTION INTRAMUSCULAR; INTRAVENOUS ONCE
Status: COMPLETED | OUTPATIENT
Start: 2023-11-08 | End: 2023-11-08

## 2023-11-08 RX ORDER — KETOROLAC TROMETHAMINE 30 MG/ML
30 INJECTION, SOLUTION INTRAMUSCULAR; INTRAVENOUS EVERY 6 HOURS PRN
Status: DISCONTINUED | OUTPATIENT
Start: 2023-11-08 | End: 2023-11-08 | Stop reason: HOSPADM

## 2023-11-08 RX ORDER — HYDROCODONE BITARTRATE AND ACETAMINOPHEN 5; 325 MG/1; MG/1
1 TABLET ORAL EVERY 6 HOURS PRN
Qty: 20 TABLET | Refills: 0 | Status: SHIPPED | OUTPATIENT
Start: 2023-11-08 | End: 2023-11-13

## 2023-11-08 RX ADMIN — MORPHINE SULFATE 4 MG: 4 INJECTION, SOLUTION INTRAMUSCULAR; INTRAVENOUS at 00:33

## 2023-11-08 RX ADMIN — ATORVASTATIN CALCIUM 20 MG: 20 TABLET, FILM COATED ORAL at 08:38

## 2023-11-08 RX ADMIN — CEFEPIME 2000 MG: 2 INJECTION, POWDER, FOR SOLUTION INTRAVENOUS at 00:35

## 2023-11-08 RX ADMIN — LISINOPRIL 10 MG: 10 TABLET ORAL at 08:38

## 2023-11-08 RX ADMIN — SODIUM CHLORIDE, PRESERVATIVE FREE 10 ML: 5 INJECTION INTRAVENOUS at 08:38

## 2023-11-08 RX ADMIN — CEFEPIME 2000 MG: 2 INJECTION, POWDER, FOR SOLUTION INTRAVENOUS at 08:47

## 2023-11-08 RX ADMIN — HYDROCODONE BITARTRATE AND ACETAMINOPHEN 1 TABLET: 5; 325 TABLET ORAL at 04:38

## 2023-11-08 RX ADMIN — KETOROLAC TROMETHAMINE 30 MG: 30 INJECTION, SOLUTION INTRAMUSCULAR; INTRAVENOUS at 08:37

## 2023-11-08 RX ADMIN — ALOGLIPTIN 12.5 MG: 12.5 TABLET, FILM COATED ORAL at 08:38

## 2023-11-08 RX ADMIN — VANCOMYCIN HYDROCHLORIDE 1000 MG: 1 INJECTION, POWDER, LYOPHILIZED, FOR SOLUTION INTRAVENOUS at 04:42

## 2023-11-08 RX ADMIN — INSULIN GLARGINE 35 UNITS: 100 INJECTION, SOLUTION SUBCUTANEOUS at 08:38

## 2023-11-08 ASSESSMENT — PAIN - FUNCTIONAL ASSESSMENT
PAIN_FUNCTIONAL_ASSESSMENT: ACTIVITIES ARE NOT PREVENTED

## 2023-11-08 ASSESSMENT — PAIN DESCRIPTION - LOCATION
LOCATION: FOOT
LOCATION: FOOT;LEG
LOCATION: FOOT

## 2023-11-08 ASSESSMENT — PAIN DESCRIPTION - DESCRIPTORS
DESCRIPTORS: THROBBING

## 2023-11-08 ASSESSMENT — PAIN DESCRIPTION - FREQUENCY
FREQUENCY: INTERMITTENT

## 2023-11-08 ASSESSMENT — PAIN DESCRIPTION - ORIENTATION
ORIENTATION: RIGHT

## 2023-11-08 ASSESSMENT — PAIN DESCRIPTION - PAIN TYPE
TYPE: ACUTE PAIN

## 2023-11-08 ASSESSMENT — PAIN DESCRIPTION - ONSET
ONSET: ON-GOING

## 2023-11-08 ASSESSMENT — PAIN SCALES - GENERAL
PAINLEVEL_OUTOF10: 6
PAINLEVEL_OUTOF10: 4
PAINLEVEL_OUTOF10: 7
PAINLEVEL_OUTOF10: 5
PAINLEVEL_OUTOF10: 7

## 2023-11-08 ASSESSMENT — PAIN DESCRIPTION - DIRECTION
RADIATING_TOWARDS: UP LEG
RADIATING_TOWARDS: UP LEG

## 2023-11-08 NOTE — TELEPHONE ENCOUNTER
[address] [facility],[address] Transitions Initial Follow Up Call    Outreach made within 2 business days of discharge: Yes    Patient: Natalie Garcia Patient : 1970   MRN: [id number]  Reason for Admission: There are no discharge diagnoses documented for the most recent discharge. Discharge Date: 23       Spoke with: Mayank Barton    If Virtual visit made for hospital follow up, advise patient to make sure to have family member present to help assist with visit. Please make sure mobile number is updated in patient chart. Discharge department/facility: [facility] Interactive Patient Contact:  Was patient able to fill all prescriptions: NO/NOT READY YET  Was patient instructed to bring all medications to the follow-up visit: Yes  Is patient taking all medications as directed in the discharge summary?  Yes  Does patient understand their discharge instructions: Yes  Does patient have questions or concerns that need addressed prior to 7-14 day follow up office visit: no    Scheduled appointment with PCP within 7-14 days/PATIENT DECLINED APPOINTMENT TO BE SEEN    Follow Up  Future Appointments   Date Time Provider [address] [place] [address]   2023  9:45 AM STEFFANIE Bernardo   2023  9:30 AM STEFFANIE Bernardo CAR Amada   2024  2:30 PM MD aaron Cardenas MA

## 2023-11-08 NOTE — PROGRESS NOTES
Vancomycin Dosing by Pharmacy - Daily Note   Vancomycin Therapy Day:  2  Indication: cellulitis     Allergies:  Patient has no known allergies. Actual Weight:    Wt Readings from Last 1 Encounters:   11/06/23 92.5 kg (204 lb)       Labs/Ancillary Data  Estimated Creatinine Clearance: 123 mL/min (based on SCr of 0.8 mg/dL). Recent Labs     11/07/23  0002 11/07/23  0856 11/08/23  0634   CREATININE 1.0 0.9 0.8   BUN 32* 34* 21*   WBC 7.6 5.5 6.1     Procalcitonin   Date Value Ref Range Status   09/24/2023 0.12 (H) <0.09 ng/mL Final     Comment:           Suspected Sepsis:  <0.50 ng/mL     Low likelihood of sepsis. 0.50-2.00 ng/mL     Increased likelihood of sepsis. Antibiotics encouraged. >2.00 ng/mL     High risk of sepsis/shock. Antibiotics strongly encouraged. Suspected Lower Resp Tract Infections:  <0.24 ng/mL     Low likelihood of bacterial infection. >0.24 ng/mL     Increased likelihood of bacterial infection. Antibiotics encouraged. With successful antibiotic therapy, PCT levels should decrease rapidly. (Half-life of 24 to   36 hours.)        Procalcitonin values from samples collected within the first 6 hours of systemic infection   may still be low. Retesting may be indicated. Values from day 1 and day 4 can be entered into the Change in Procalcitonin Calculator   (www.[facility]-pct-calculator. ShopReply) to determine the patient's Mortality Risk Prognosis        In healthy neonates, plasma Procalcitonin (PCT) concentrations increase gradually after   birth, reaching peak values at about 24 hours of age then decrease to normal values below   0.5 ng/mL by 48-72 hours of age.          Intake/Output Summary (Last 24 hours) at 11/8/2023 0742  Last data filed at 11/7/2023 1339  Gross per 24 hour   Intake 480 ml   Output --   Net 480 ml     Temp: 97.    Culture Date / Source  /  Results  See micro   Recent vancomycin administrations                     vancomycin (VANCOCIN) 1,000 mg in sodium chloride 0.9

## 2023-11-08 NOTE — DISCHARGE SUMMARY
[facility] Internal Medicine  Jeffry Hannah MD; Delfino Hunt MD; Ulises Mireles MD; MD Mazin Bhatia MD; Pat Marroquin MD      [facility] Internal Medicine  [address]    Discharge Summary     Patient ID: Papa Bryan  :  1970   MRN: [id number]     ACCOUNT:  [de-identified]   Patient's PCP: Jonny Mills MD  Admit Date: 2023   Discharge Date: 2023     Length of Stay: 1  Code Status:  Full Code  Admitting Physician: Balaji Montes MD  Discharge Physician: Pat Marroquin MD     Active Discharge Diagnoses:     Hospital Problem Lists:  Principal Problem:    Cellulitis  Resolved Problems:    * No resolved hospital problems. *      Admission Condition:  serious     Discharged Condition: stable    Hospital Stay:     Hospital Course:  Papa Bryan is a 46 y.o. male who was admitted for the management of   Cellulitis , presented to ER with Foot Pain and Leg Pain (3rd toe amputation of right foot 6 weeks ago here at [address], today increased pain and believes the wound is open after putting pressure on the foot. )    According to patient, he had his right third toe amputated about 6 weeks ago for treatment of necrotizing fasciitis. He has been following up with wound care. States that yesterday he accidentally put mild pressure on his right foot and noted increased amount of drainage from wound. States that his entire foot felt like it did just prior to his amputation. Symptoms are associated with foul odor of wound drainage and occasional episodes of lightheadedness. Denies fever, chills, chest pain, cough, abdominal pain, nausea, vomiting, diarrhea, and urinary symptoms. There are no aggravating or alleviating factors. Symptoms are reported as constant and moderate; sudden worsening earlier today.    Podiatry seen and examined   Okay to DC   Started on doxy after twao days of cefepime/vanc  Wound looking clean   Follow

## 2023-11-08 NOTE — CARE COORDINATION
ONGOING DISCHARGE PLAN:    Patient is alert and oriented x4. Spoke with patient regarding discharge plan and patient confirms that plan is still home without needs. Declined VNS. Pt will be discharged home today on PO Doxy. Will continue to follow for additional discharge needs. If patient is discharged prior to next notation, then this note serves as note for discharge by case management.     Electronically signed by Betty Momin RN on 11/8/2023 at 11:33 AM

## 2023-11-08 NOTE — PROGRESS NOTES
CLINICAL PHARMACY NOTE: MEDS TO BEDS    Total # of Prescriptions Filled: 1   The following medications were delivered to the patient:  Hydrocodone-acetaminophen5-325mg    Additional Documentation: Delivered Medication to Patients Room Patient is Eligible to Utilize Meds To Beds for Their Discharge Medications-1230 11/08/23

## 2023-11-09 NOTE — ED TRIAGE NOTES
Mode of arrival (squad #, walk in, police, etc) : Walk in        Chief complaint(s): Hypertension        Arrival Note (brief scenario, treatment PTA, etc). : Pt was discharged from [facility] earlier today, and purchased a BP kit from [facility]. Pt then checked his BP frequently throughout the day and after one high BP reading, pt became worrisome and drove himself to ED. After writer took a couple BP readings at pt request, pt was satisfied and left ED. C= \"Have you ever felt that you should Cut down on your drinking? \"  No  A= \"Have people Annoyed you by criticizing your drinking? \"  No  G= \"Have you ever felt bad or Guilty about your drinking? \"  No  E= \"Have you ever had a drink as an Eye-opener first thing in the morning to steady your nerves or to help a hangover? \"  No      Deferred []      Reason for deferring: N/A    *If yes to two or more: probable alcohol abuse. *

## 2023-11-09 NOTE — ED NOTES
Pt reports that his blood pressure isn't as high as he thought it was when he checked in. Pt reports that he no longer wants to be seen in the ED.      Keyona Velasquez RN  11/08/23 3997

## 2023-11-10 ENCOUNTER — TELEPHONE (OUTPATIENT)
Dept: FAMILY MEDICINE CLINIC | Age: 53
End: 2023-11-10

## 2023-11-10 LAB
MICROORGANISM SPEC CULT: ABNORMAL
MICROORGANISM/AGENT SPEC: ABNORMAL
SPECIMEN DESCRIPTION: ABNORMAL

## 2023-11-10 NOTE — TELEPHONE ENCOUNTER
Call received requesting last ED notes to be faxed due to them investigating the claim. Writer provided caller phone #, fax #, and email address to medical records.

## 2023-11-12 NOTE — DISCHARGE INSTRUCTIONS
odor  * Bleeding  * Increase in swelling  * Need for compression bandage changes due to slippage, breakthrough drainage. If you need medical attention outside of the business hours of the [address] [place] [address] please contact your PCP or go to the nearest emergency room. The information contained in the After Visit Summary has been reviewed with me, the patient and/or responsible adult, by my health care provider(s). I had the opportunity to ask questions regarding this information.  I have elected to receive;      []After Visit Summary  [x]Comprehensive Discharge Instruction        Patient signature______________________________________Date:________  Electronically signed by Nanette Ramos RN on 11/14/2023 at 10:54 AM    Electronically signed by Chester Lerner DPM on 11/14/2023 at 10:36 AM

## 2023-11-14 ENCOUNTER — HOSPITAL ENCOUNTER (OUTPATIENT)
Dept: WOUND CARE | Age: 53
Discharge: HOME | End: 2023-11-14
Payer: COMMERCIAL

## 2023-11-14 VITALS
HEART RATE: 89 BPM | TEMPERATURE: 97.5 F | RESPIRATION RATE: 18 BRPM | HEIGHT: 72 IN | WEIGHT: 204 LBS | SYSTOLIC BLOOD PRESSURE: 138 MMHG | DIASTOLIC BLOOD PRESSURE: 87 MMHG | BODY MASS INDEX: 27.63 KG/M2

## 2023-11-14 DIAGNOSIS — L08.9 TYPE 2 DIABETES MELLITUS WITH RIGHT DIABETIC FOOT INFECTION (HCC): ICD-10-CM

## 2023-11-14 DIAGNOSIS — L97.513 ULCERATED, FOOT, RIGHT, WITH NECROSIS OF MUSCLE (HCC): Primary | ICD-10-CM

## 2023-11-14 DIAGNOSIS — A48.0 GAS GANGRENE (HCC): ICD-10-CM

## 2023-11-14 DIAGNOSIS — E11.628 TYPE 2 DIABETES MELLITUS WITH RIGHT DIABETIC FOOT INFECTION (HCC): ICD-10-CM

## 2023-11-14 DIAGNOSIS — I73.9 PAD (PERIPHERAL ARTERY DISEASE) (HCC): ICD-10-CM

## 2023-11-14 DIAGNOSIS — L97.509 TYPE 1 DIABETES MELLITUS WITH FOOT ULCER (HCC): ICD-10-CM

## 2023-11-14 DIAGNOSIS — E10.621 TYPE 1 DIABETES MELLITUS WITH FOOT ULCER (HCC): ICD-10-CM

## 2023-11-14 DIAGNOSIS — M72.6 NECROTIZING FASCIITIS OF ANKLE AND FOOT (HCC): ICD-10-CM

## 2023-11-14 PROCEDURE — 15275 SKIN SUB GRAFT FACE/NK/HF/G: CPT

## 2023-11-14 PROCEDURE — 15275 SKIN SUB GRAFT FACE/NK/HF/G: CPT | Performed by: PODIATRIST

## 2023-11-14 RX ORDER — LIDOCAINE HYDROCHLORIDE 40 MG/ML
SOLUTION TOPICAL ONCE
Status: COMPLETED | OUTPATIENT
Start: 2023-11-14 | End: 2023-11-14

## 2023-11-14 RX ORDER — LIDOCAINE HYDROCHLORIDE 20 MG/ML
JELLY TOPICAL ONCE
OUTPATIENT
Start: 2023-11-14 | End: 2023-11-14

## 2023-11-14 RX ORDER — BETAMETHASONE DIPROPIONATE 0.05 %
OINTMENT (GRAM) TOPICAL ONCE
OUTPATIENT
Start: 2023-11-14 | End: 2023-11-14

## 2023-11-14 RX ORDER — IBUPROFEN 200 MG
TABLET ORAL ONCE
OUTPATIENT
Start: 2023-11-14 | End: 2023-11-14

## 2023-11-14 RX ORDER — SODIUM CHLOR/HYPOCHLOROUS ACID 0.033 %
SOLUTION, IRRIGATION IRRIGATION ONCE
OUTPATIENT
Start: 2023-11-14 | End: 2023-11-14

## 2023-11-14 RX ORDER — CLOBETASOL PROPIONATE 0.5 MG/G
OINTMENT TOPICAL ONCE
OUTPATIENT
Start: 2023-11-14 | End: 2023-11-14

## 2023-11-14 RX ORDER — LIDOCAINE 50 MG/G
OINTMENT TOPICAL ONCE
OUTPATIENT
Start: 2023-11-14 | End: 2023-11-14

## 2023-11-14 RX ORDER — LIDOCAINE HYDROCHLORIDE 40 MG/ML
SOLUTION TOPICAL ONCE
OUTPATIENT
Start: 2023-11-14 | End: 2023-11-14

## 2023-11-14 RX ORDER — BACITRACIN ZINC AND POLYMYXIN B SULFATE 500; 1000 [USP'U]/G; [USP'U]/G
OINTMENT TOPICAL ONCE
OUTPATIENT
Start: 2023-11-14 | End: 2023-11-14

## 2023-11-14 RX ORDER — GENTAMICIN SULFATE 1 MG/G
OINTMENT TOPICAL ONCE
OUTPATIENT
Start: 2023-11-14 | End: 2023-11-14

## 2023-11-14 RX ORDER — LIDOCAINE 40 MG/G
CREAM TOPICAL ONCE
OUTPATIENT
Start: 2023-11-14 | End: 2023-11-14

## 2023-11-14 RX ORDER — TRIAMCINOLONE ACETONIDE 1 MG/G
OINTMENT TOPICAL ONCE
OUTPATIENT
Start: 2023-11-14 | End: 2023-11-14

## 2023-11-14 RX ORDER — GINSENG 100 MG
CAPSULE ORAL ONCE
OUTPATIENT
Start: 2023-11-14 | End: 2023-11-14

## 2023-11-14 RX ADMIN — LIDOCAINE HYDROCHLORIDE 5 ML: 40 SOLUTION TOPICAL at 10:24

## 2023-11-14 ASSESSMENT — PAIN DESCRIPTION - FREQUENCY: FREQUENCY: INTERMITTENT

## 2023-11-14 ASSESSMENT — PAIN DESCRIPTION - PAIN TYPE: TYPE: CHRONIC PAIN

## 2023-11-14 ASSESSMENT — PAIN SCALES - GENERAL: PAINLEVEL_OUTOF10: 0

## 2023-11-14 ASSESSMENT — PAIN DESCRIPTION - ONSET: ONSET: ON-GOING

## 2023-11-14 NOTE — PROGRESS NOTES
[facility]   Patient History and Physical      Nimisha Garcia  AGE: 46 y.o. GENDER: male  : 1970  TODAY'S DATE:  2023      Chief Complaint:   Chief Complaint   Patient presents with    Wound Check     Right foot         History of the Present Illness       Nimisha Garcia is a 46 y.o. male who presents today for evaluation and treatment for a diabetic and non-healing surgical wound which is located on the right. He went to the ER last week because he stepped on his foot and had pain. Wound looked good. S/P Epifix #1      History of Wound: Gas in right foot; S/p right foot I&D and 3rd digit amputation (DOS: 23); status post I&D with application of Integra bilayer and Prevena wound VAC (DOS: 2023)  Wound Type:diabetic and non-healing surgical  Wound Location:right foot  Modifying factors:diabetes    Pain Level: 0   Pain Assessment: 0-10     Abx :Yes   Cultures :wereobtained  Pain : 6/10    PAST MEDICAL HISTORY        Diagnosis Date    Anxiety and depression     Chronic back pain     HLD (hyperlipidemia) 11/15/2015    Hypertension     Marijuana abuse 11/15/2015    Nephrolithiasis 2015    Type II or unspecified type diabetes mellitus without mention of complication, not stated as uncontrolled        MEDICATIONS    Current Outpatient Medications on File Prior to Encounter   Medication Sig Dispense Refill    doxycycline hyclate (VIBRA-TABS) 100 MG tablet Take 1 tablet by mouth 2 times daily for 14 days 28 tablet 0    lisinopril (PRINIVIL;ZESTRIL) 10 MG tablet Take 1 tablet by mouth daily 90 tablet 1    meloxicam (MOBIC) 7.5 MG tablet Take 1 tablet by mouth 2 times daily as needed for Pain 60 tablet 0    blood glucose monitor strips Testing once a day. Please dispense according to patients insurance. 100 strip 3    Lancets MISC Testing once a day. Please dispense according to patients insurance.  100 each 3    insulin glargine (LANTUS SOLOSTAR) 100 UNIT/ML injection pen Inject 35 Pt unsure if he wants to be discharged with leg bag.

## 2023-11-14 NOTE — PROGRESS NOTES
EpiFix Treatment Note    NAME:  Bandar Phillips OF BIRTH:  1970  MEDICAL RECORD NUMBER:  [id number]  DATE:  11/14/2023    Goal:  Patient will receive safe and proper application of skin substitute. Patient will comply with caring for dressing, offloading and reporting complications. [x]Expiration date checked immediately prior to use  [x] Package intact prior to use and no damage noted  [x] Transport temperature controlled and acceptable(ROOM AIR)    [x]  EpiFix was removed from protective sterile packaging by physician and applied to prepared ulcer bed. [x] EpiFix was placed using embossment letting as a guide. (UP)    [] Epifix was hydrated with sterile normal saline per physician(IF NEEDED)        [x] EpiFix was applied to location Right foot and affixed with steri-strips by the physician. [x] EpiFix was covered with non-adherent ulcer dressing per physician  [x] Applied Non adherent silvercel  [x] Applied dry gauze and/or roll gauze. Coban or ace wrap was applied to secure graft and decrease e[]ama. [] Patient/caregiver was instructed not to remove dressing and to keep it clean and dry. [x] Pt/family/caregiver was instructed on signs and symptoms of complications to report such as draining through dressing, dressing falling down/slipping, getting wet,or  severe   pain and tingling in toes  [x] Pt/family/caregiver was instructed on need for offloading and elevation of affected extremity and on use of prescribed offloading device. [x] Record info in tissue log( sticker with patient label). Picture epifix sticker with patient label for that specific date in . Please add epifix application number to both stickers. [x]  Guidelines followed  [x] EpiFix may be applied a total of 10 times per wound over a 12 week period. Additionally EpiFix may only be used every 12 months per wound.     Date of first application of EpiFix for this current wound is October

## 2023-11-19 NOTE — DISCHARGE INSTRUCTIONS
[facility] and HYPERBARIC TREATMENT  CENTER                            Visit  Discharge Instructions / Physician Orders  DATE: 11/21/23     Home Care:NONE     SUPPLIES ORDERED THRU:     NONE                DATE LAST SUPPLIED     Wound Location:  Right Foot     Cleanse with: Keep dry and intact     Dressing Orders:  Epifix #3 Folded 2x4,  abd pad then wrap with kerlix and ace wrap     Frequency:  Keep dry and intact   if outer dressing needs to be changed do not go past the silvercel and the tape  Additional Orders: Increase protein to diet (meat, cheese, eggs, fish, peanut butter, nuts and beans)  Multivitamin daily     OFFLOADING [x] YES  TYPE:                  [] NA     Weekly wound care visits until determined otherwise. Antibiotic therapy-wound care related YES [] NO [x] NA[]   Completed  MY CHART []     Smart Device  []      HYPERBARIC TREATMENT-                TREATMENT #                          Your next appointment with the [address] [place] [address] is in 1 week with Dr. Racheal Mueller                                                                                                    2 weeks with                                                                                                     3 weeks with Dr. Racheal Mueller                                                                                                                                                                                              (Please note your next appointment above and if you are unable to keep, kindly give a 24 hour notice.  Thank you.)  If more than 15 min late we cannot guarantee you will be seen due to clinician schedule  Per Policy, Excessive cancellation will call for dismissal from program.  If you experience any of the following, please call the [address] [place] [address] during business hours:  [phone]     * Increase in Pain  * Temperature over 101  * Increase in drainage from your wound  * Drainage with a foul

## 2023-11-21 ENCOUNTER — HOSPITAL ENCOUNTER (OUTPATIENT)
Dept: WOUND CARE | Age: 53
Discharge: HOME | End: 2023-11-21
Payer: COMMERCIAL

## 2023-11-21 VITALS
HEART RATE: 77 BPM | WEIGHT: 204 LBS | SYSTOLIC BLOOD PRESSURE: 128 MMHG | HEIGHT: 72 IN | RESPIRATION RATE: 18 BRPM | TEMPERATURE: 96 F | DIASTOLIC BLOOD PRESSURE: 76 MMHG | BODY MASS INDEX: 27.63 KG/M2

## 2023-11-21 DIAGNOSIS — L97.513 ULCERATED, FOOT, RIGHT, WITH NECROSIS OF MUSCLE (HCC): ICD-10-CM

## 2023-11-21 DIAGNOSIS — I73.9 PAD (PERIPHERAL ARTERY DISEASE) (HCC): ICD-10-CM

## 2023-11-21 DIAGNOSIS — E11.628 TYPE 2 DIABETES MELLITUS WITH RIGHT DIABETIC FOOT INFECTION (HCC): ICD-10-CM

## 2023-11-21 DIAGNOSIS — L97.509 TYPE 1 DIABETES MELLITUS WITH FOOT ULCER (HCC): Primary | ICD-10-CM

## 2023-11-21 DIAGNOSIS — E10.621 TYPE 1 DIABETES MELLITUS WITH FOOT ULCER (HCC): Primary | ICD-10-CM

## 2023-11-21 DIAGNOSIS — A48.0 GAS GANGRENE (HCC): ICD-10-CM

## 2023-11-21 DIAGNOSIS — L08.9 TYPE 2 DIABETES MELLITUS WITH RIGHT DIABETIC FOOT INFECTION (HCC): ICD-10-CM

## 2023-11-21 DIAGNOSIS — M72.6 NECROTIZING FASCIITIS OF ANKLE AND FOOT (HCC): ICD-10-CM

## 2023-11-21 PROCEDURE — 15275 SKIN SUB GRAFT FACE/NK/HF/G: CPT

## 2023-11-21 PROCEDURE — 15275 SKIN SUB GRAFT FACE/NK/HF/G: CPT | Performed by: PODIATRIST

## 2023-11-21 RX ORDER — LIDOCAINE HYDROCHLORIDE 20 MG/ML
JELLY TOPICAL ONCE
OUTPATIENT
Start: 2023-11-21 | End: 2023-11-21

## 2023-11-21 RX ORDER — LIDOCAINE 50 MG/G
OINTMENT TOPICAL ONCE
OUTPATIENT
Start: 2023-11-21 | End: 2023-11-21

## 2023-11-21 RX ORDER — GENTAMICIN SULFATE 1 MG/G
OINTMENT TOPICAL ONCE
OUTPATIENT
Start: 2023-11-21 | End: 2023-11-21

## 2023-11-21 RX ORDER — SODIUM CHLOR/HYPOCHLOROUS ACID 0.033 %
SOLUTION, IRRIGATION IRRIGATION ONCE
OUTPATIENT
Start: 2023-11-21 | End: 2023-11-21

## 2023-11-21 RX ORDER — TRIAMCINOLONE ACETONIDE 1 MG/G
OINTMENT TOPICAL ONCE
OUTPATIENT
Start: 2023-11-21 | End: 2023-11-21

## 2023-11-21 RX ORDER — LIDOCAINE HYDROCHLORIDE 40 MG/ML
SOLUTION TOPICAL ONCE
Status: COMPLETED | OUTPATIENT
Start: 2023-11-21 | End: 2023-11-21

## 2023-11-21 RX ORDER — LIDOCAINE HYDROCHLORIDE 40 MG/ML
SOLUTION TOPICAL ONCE
OUTPATIENT
Start: 2023-11-21 | End: 2023-11-21

## 2023-11-21 RX ORDER — LIDOCAINE 40 MG/G
CREAM TOPICAL ONCE
OUTPATIENT
Start: 2023-11-21 | End: 2023-11-21

## 2023-11-21 RX ORDER — GINSENG 100 MG
CAPSULE ORAL ONCE
OUTPATIENT
Start: 2023-11-21 | End: 2023-11-21

## 2023-11-21 RX ORDER — BETAMETHASONE DIPROPIONATE 0.05 %
OINTMENT (GRAM) TOPICAL ONCE
OUTPATIENT
Start: 2023-11-21 | End: 2023-11-21

## 2023-11-21 RX ORDER — CLOBETASOL PROPIONATE 0.5 MG/G
OINTMENT TOPICAL ONCE
OUTPATIENT
Start: 2023-11-21 | End: 2023-11-21

## 2023-11-21 RX ORDER — IBUPROFEN 200 MG
TABLET ORAL ONCE
OUTPATIENT
Start: 2023-11-21 | End: 2023-11-21

## 2023-11-21 RX ORDER — BACITRACIN ZINC AND POLYMYXIN B SULFATE 500; 1000 [USP'U]/G; [USP'U]/G
OINTMENT TOPICAL ONCE
OUTPATIENT
Start: 2023-11-21 | End: 2023-11-21

## 2023-11-21 RX ADMIN — LIDOCAINE HYDROCHLORIDE 5 ML: 40 SOLUTION TOPICAL at 10:14

## 2023-11-21 ASSESSMENT — PAIN DESCRIPTION - PAIN TYPE: TYPE: CHRONIC PAIN

## 2023-11-21 ASSESSMENT — PAIN DESCRIPTION - ONSET: ONSET: ON-GOING

## 2023-11-21 ASSESSMENT — PAIN DESCRIPTION - FREQUENCY: FREQUENCY: INTERMITTENT

## 2023-11-21 ASSESSMENT — PAIN - FUNCTIONAL ASSESSMENT: PAIN_FUNCTIONAL_ASSESSMENT: PREVENTS OR INTERFERES SOME ACTIVE ACTIVITIES AND ADLS

## 2023-11-21 ASSESSMENT — PAIN SCALES - GENERAL: PAINLEVEL_OUTOF10: 0

## 2023-11-21 NOTE — PROGRESS NOTES
EpiFix Treatment Note    NAME:  Gypsy Harrison OF BIRTH:  1970  MEDICAL RECORD NUMBER:  [id number]  DATE:  11/21/2023    Goal:  Patient will receive safe and proper application of skin substitute. Patient will comply with caring for dressing, offloading and reporting complications. [x]Expiration date checked immediately prior to use  [x] Package intact prior to use and no damage noted  [x] Transport temperature controlled and acceptable(ROOM AIR)     [x]  EpiFix was removed from protective sterile packaging by physician and applied to prepared ulcer bed. [x] EpiFix was placed using embossment letting as a guide. (UP)    [] Epifix was hydrated with sterile normal saline per physician(IF NEEDED)        [x] EpiFix was applied to location Right foot and affixed with steri-strips by the physician. [x] EpiFix was covered with non-adherent ulcer dressing per physician  [x] Applied Non adherent silvercel  [x] Applied dry gauze and/or roll gauze. Coban or ace wrap was applied to secure graft and decrease e[x]ama. [x] Patient/caregiver was instructed not to remove dressing and to keep it clean and dry. [x] Pt/family/caregiver was instructed on signs and symptoms of complications to report such as draining through dressing, dressing falling down/slipping, getting wet,or  severe   pain and tingling in toes  [x] Pt/family/caregiver was instructed on need for offloading and elevation of affected extremity and on use of prescribed offloading device. [x] Record info in tissue log( sticker with patient label). Picture epifix sticker with patient label for that specific date in . Please add epifix application number to both stickers. [x]  Guidelines followed  [x] EpiFix may be applied a total of 10 times per wound over a 12 week period. Additionally EpiFix may only be used every 12 months per wound.      Date of first application of EpiFix for this current wound is
Post-Procedure Volume (cm^3) 6.72 cm^3 11/14/23 1016   Wound Assessment Pink/red;Slough 11/21/23 1012   Drainage Amount Moderate (25-50%) 11/21/23 1012   Drainage Description Serosanguinous; Yellow 11/21/23 1012   Odor None 11/21/23 1012   Glo-wound Assessment Blanchable erythema;Fragile; Maceration 11/21/23 1012   Margins Undefined edges 11/21/23 1012   Wound Thickness Description not for Pressure Injury Full thickness 11/21/23 1012   Number of days: 42           The wound needs epithelialization and we are present today to perform Skin substitute skin grafting. Due to wound size, the procedure may need to be staged. Procedure Note:  Skin Substitute Skin Graft   Type:  Epifix 2x4, #3  Performed by:  Corbin Garza DPM  Assited by:  none     Patient was brought into the treatment room  The Right was prepped and draped in the usual sterile manner. Ulcerations noted as stated above. No signs of infection seen. No purulence, erythema, or necrotic tissue. No anesthesia was needed since the patient is neuropathic. The ulceration(s) were prepped by debridement with a currette, tissue nipper, and a #15 blade. Debridement was performed through and including subcutaneous tissue. Bleeding was controlled with pressure. The wound bed was flushed with normal saline. Next, the entire 100% skin substitute - Epifix was applied to the wound, none was discarded. The graft was fixated into place with nonadherent layer, steri-strips, dry kerlix 4x4's and kerlix roll, cast padding and Coban. Anesthesia: Local    Bleeding: Minimal    Hemostasis:   by pressure    Response to treatment:  Well tolerated by patient. Patient was given post-procedural instruction not to remove the dressings until returning to the clinic. Instructed to call if any questions. Follow up:  One week for evaluation of skin grafts and dressing change      Electronically signed by Corbin Garza DPM on 11/21/2023 at 10:29 AM

## 2023-11-26 NOTE — DISCHARGE INSTRUCTIONS
[facility] HY[facility]BAR[facility] TREATMENT  CENTER                            Visit  Discharge Instructions / Physician Orders  DATE: 11/28/23     Home Care:NONE     SUPPLIES ORDERED THRU:     NONE                DATE LAST SUPPLIED     Wound Location:  Right Foot     Cleanse with: Keep dry and intact     Dressing Orders:  Epifix #3 Folded 2x4,  abd pad then wrap with kerlix and ace wrap     Frequency:  Keep dry and intact   if outer dressing needs to be changed do not go past the silvercel and the tape  Additional Orders: Increase protein to diet (meat, cheese, eggs, fish, peanut butter, nuts and beans)  Multivitamin daily     OFFLOADING [x] YES  TYPE:                  [] NA     Weekly wound care visits until determined otherwise. Antibiotic therapy-wound care related YES [] NO [x] NA[]   Completed  MY CHART []     Smart Device  []      HYPERBARIC TREATMENT-                TREATMENT #                          Your next appointment with the [address] [place] [address] is in 1 week with Dr. Roz Avitia                                                                                                    2 weeks with                                                                                                     3 weeks with Dr. Roz Avitia                                                                                                                                                                                              (Please note your next appointment above and if you are unable to keep, kindly give a 24 hour notice.  Thank you.)  If more than 15 min late we cannot guarantee you will be seen due to clinician schedule  Per Policy, Excessive cancellation will call for dismissal from program.  If you experience any of the following, please call the [address] [place] [address] during business hours:  [phone]     * Increase in Pain  * Temperature over 101  * Increase in drainage from your wound  * Drainage with a foul

## 2023-11-28 ENCOUNTER — HOSPITAL ENCOUNTER (OUTPATIENT)
Dept: WOUND CARE | Age: 53
Discharge: HOME | End: 2023-11-28
Payer: COMMERCIAL

## 2023-11-28 VITALS
HEIGHT: 72 IN | HEART RATE: 65 BPM | SYSTOLIC BLOOD PRESSURE: 127 MMHG | RESPIRATION RATE: 18 BRPM | BODY MASS INDEX: 27.63 KG/M2 | WEIGHT: 204 LBS | DIASTOLIC BLOOD PRESSURE: 78 MMHG | TEMPERATURE: 96.9 F

## 2023-11-28 DIAGNOSIS — E11.628 TYPE 2 DIABETES MELLITUS WITH RIGHT DIABETIC FOOT INFECTION (HCC): ICD-10-CM

## 2023-11-28 DIAGNOSIS — I73.9 PAD (PERIPHERAL ARTERY DISEASE) (HCC): ICD-10-CM

## 2023-11-28 DIAGNOSIS — L08.9 TYPE 2 DIABETES MELLITUS WITH RIGHT DIABETIC FOOT INFECTION (HCC): ICD-10-CM

## 2023-11-28 DIAGNOSIS — L97.513 ULCERATED, FOOT, RIGHT, WITH NECROSIS OF MUSCLE (HCC): Primary | ICD-10-CM

## 2023-11-28 DIAGNOSIS — A48.0 GAS GANGRENE (HCC): ICD-10-CM

## 2023-11-28 DIAGNOSIS — E10.621 TYPE 1 DIABETES MELLITUS WITH FOOT ULCER (HCC): ICD-10-CM

## 2023-11-28 DIAGNOSIS — L97.509 TYPE 1 DIABETES MELLITUS WITH FOOT ULCER (HCC): ICD-10-CM

## 2023-11-28 DIAGNOSIS — M72.6 NECROTIZING FASCIITIS OF ANKLE AND FOOT (HCC): ICD-10-CM

## 2023-11-28 PROCEDURE — 15275 SKIN SUB GRAFT FACE/NK/HF/G: CPT | Performed by: PODIATRIST

## 2023-11-28 PROCEDURE — 15275 SKIN SUB GRAFT FACE/NK/HF/G: CPT

## 2023-11-28 RX ORDER — CLOBETASOL PROPIONATE 0.5 MG/G
OINTMENT TOPICAL ONCE
OUTPATIENT
Start: 2023-11-28 | End: 2023-11-28

## 2023-11-28 RX ORDER — BETAMETHASONE DIPROPIONATE 0.05 %
OINTMENT (GRAM) TOPICAL ONCE
OUTPATIENT
Start: 2023-11-28 | End: 2023-11-28

## 2023-11-28 RX ORDER — TRIAMCINOLONE ACETONIDE 1 MG/G
OINTMENT TOPICAL ONCE
OUTPATIENT
Start: 2023-11-28 | End: 2023-11-28

## 2023-11-28 RX ORDER — LIDOCAINE 50 MG/G
OINTMENT TOPICAL ONCE
OUTPATIENT
Start: 2023-11-28 | End: 2023-11-28

## 2023-11-28 RX ORDER — SODIUM CHLOR/HYPOCHLOROUS ACID 0.033 %
SOLUTION, IRRIGATION IRRIGATION ONCE
OUTPATIENT
Start: 2023-11-28 | End: 2023-11-28

## 2023-11-28 RX ORDER — GENTAMICIN SULFATE 1 MG/G
OINTMENT TOPICAL ONCE
OUTPATIENT
Start: 2023-11-28 | End: 2023-11-28

## 2023-11-28 RX ORDER — GINSENG 100 MG
CAPSULE ORAL ONCE
OUTPATIENT
Start: 2023-11-28 | End: 2023-11-28

## 2023-11-28 RX ORDER — LIDOCAINE 40 MG/G
CREAM TOPICAL ONCE
OUTPATIENT
Start: 2023-11-28 | End: 2023-11-28

## 2023-11-28 RX ORDER — LIDOCAINE HYDROCHLORIDE 20 MG/ML
JELLY TOPICAL ONCE
OUTPATIENT
Start: 2023-11-28 | End: 2023-11-28

## 2023-11-28 RX ORDER — BACITRACIN ZINC AND POLYMYXIN B SULFATE 500; 1000 [USP'U]/G; [USP'U]/G
OINTMENT TOPICAL ONCE
OUTPATIENT
Start: 2023-11-28 | End: 2023-11-28

## 2023-11-28 RX ORDER — LIDOCAINE HYDROCHLORIDE 40 MG/ML
SOLUTION TOPICAL ONCE
Status: COMPLETED | OUTPATIENT
Start: 2023-11-28 | End: 2023-11-28

## 2023-11-28 RX ORDER — LIDOCAINE HYDROCHLORIDE 40 MG/ML
SOLUTION TOPICAL ONCE
OUTPATIENT
Start: 2023-11-28 | End: 2023-11-28

## 2023-11-28 RX ORDER — IBUPROFEN 200 MG
TABLET ORAL ONCE
OUTPATIENT
Start: 2023-11-28 | End: 2023-11-28

## 2023-11-28 RX ADMIN — LIDOCAINE HYDROCHLORIDE 5 ML: 40 SOLUTION TOPICAL at 11:18

## 2023-11-28 ASSESSMENT — PAIN SCALES - GENERAL: PAINLEVEL_OUTOF10: 0

## 2023-11-28 NOTE — PROGRESS NOTES
EpiFix Treatment Note    NAME:  Joan Ho OF BIRTH:  1970  MEDICAL RECORD NUMBER:  [id number]  DATE:  11/28/2023    Goal:  Patient will receive safe and proper application of skin substitute. Patient will comply with caring for dressing, offloading and reporting complications. [x]Expiration date checked immediately prior to use  [x] Package intact prior to use and no damage noted  [x] Transport temperature controlled and acceptable(ROOM AIR)     [x]  EpiFix was removed from protective sterile packaging by physician and applied to prepared ulcer bed. [x] EpiFix was placed using embossment letting as a guide. (UP)    [] Epifix was hydrated with sterile normal saline per physician(IF NEEDED)        [x] EpiFix was applied to location Right foot and affixed with steri-strips by the physician. [x] EpiFix was covered with non-adherent ulcer dressing per physician  [x] Applied Non adherent silvercel  [x] Applied dry gauze and/or roll gauze. Coban or ace wrap was applied to secure graft and decrease e[x]ama. [x] Patient/caregiver was instructed not to remove dressing and to keep it clean and dry. [x] Pt/family/caregiver was instructed on signs and symptoms of complications to report such as draining through dressing, dressing falling down/slipping, getting wet,or  severe   pain and tingling in toes  [x] Pt/family/caregiver was instructed on need for offloading and elevation of affected extremity and on use of prescribed offloading device. [x] Record info in tissue log( sticker with patient label). Picture epifix sticker with patient label for that specific date in . Please add epifix application number to both stickers. [x]  Guidelines followed  [x] EpiFix may be applied a total of 10 times per wound over a 12 week period. Additionally EpiFix may only be used every 12 months per wound.      Date of first application of EpiFix for this current wound is

## 2023-11-28 NOTE — PROGRESS NOTES
[facility]   Patient History and Physical      Segun Noriega  AGE: 46 y.o. GENDER: male  : 1970  TODAY'S DATE:  2023      Chief Complaint:   Chief Complaint   Patient presents with    Wound Check     Right foot         History of the Present Illness       Segun Noriega is a 46 y.o. male who presents today for evaluation and treatment for a diabetic and non-healing surgical wound which is located on the right. S/P Epifix #3      History of Wound: Gas in right foot; S/p right foot I&D and 3rd digit amputation (DOS: 23); status post I&D with application of Integra bilayer and Prevena wound VAC (DOS: 2023)  Wound Type:diabetic and non-healing surgical  Wound Location:right foot  Modifying factors:diabetes    Pain Level: 0   Pain Assessment: 0-10     Abx :Yes   Cultures :wereobtained  Pain : 6/10    PAST MEDICAL HISTORY        Diagnosis Date    Anxiety and depression     Chronic back pain     HLD (hyperlipidemia) 11/15/2015    Hypertension     Marijuana abuse 11/15/2015    Nephrolithiasis 2015    Type II or unspecified type diabetes mellitus without mention of complication, not stated as uncontrolled        MEDICATIONS    Current Outpatient Medications on File Prior to Encounter   Medication Sig Dispense Refill    lisinopril (PRINIVIL;ZESTRIL) 10 MG tablet Take 1 tablet by mouth daily 90 tablet 1    meloxicam (MOBIC) 7.5 MG tablet Take 1 tablet by mouth 2 times daily as needed for Pain 60 tablet 0    blood glucose monitor strips Testing once a day. Please dispense according to patients insurance. 100 strip 3    Lancets MISC Testing once a day. Please dispense according to patients insurance.  100 each 3    insulin glargine (LANTUS SOLOSTAR) 100 UNIT/ML injection pen Inject 35 Units into the skin daily (with breakfast) 5 Adjustable Dose Pre-filled Pen Syringe 3    SITagliptin (JANUVIA) 50 MG tablet Take 1 tablet by mouth daily 90 tablet 1    blood glucose monitor kit and

## 2023-12-05 ENCOUNTER — HOSPITAL ENCOUNTER (OUTPATIENT)
Dept: WOUND CARE | Age: 53
Discharge: HOME | End: 2023-12-05
Payer: COMMERCIAL

## 2023-12-05 VITALS
WEIGHT: 204 LBS | DIASTOLIC BLOOD PRESSURE: 69 MMHG | RESPIRATION RATE: 18 BRPM | BODY MASS INDEX: 27.63 KG/M2 | HEART RATE: 74 BPM | TEMPERATURE: 96.8 F | SYSTOLIC BLOOD PRESSURE: 101 MMHG | HEIGHT: 72 IN

## 2023-12-05 DIAGNOSIS — M72.6 NECROTIZING FASCIITIS OF ANKLE AND FOOT (HCC): ICD-10-CM

## 2023-12-05 DIAGNOSIS — E11.628 TYPE 2 DIABETES MELLITUS WITH RIGHT DIABETIC FOOT INFECTION (HCC): ICD-10-CM

## 2023-12-05 DIAGNOSIS — I73.9 PAD (PERIPHERAL ARTERY DISEASE) (HCC): ICD-10-CM

## 2023-12-05 DIAGNOSIS — L97.513 ULCERATED, FOOT, RIGHT, WITH NECROSIS OF MUSCLE (HCC): Primary | ICD-10-CM

## 2023-12-05 DIAGNOSIS — L08.9 TYPE 2 DIABETES MELLITUS WITH RIGHT DIABETIC FOOT INFECTION (HCC): ICD-10-CM

## 2023-12-05 DIAGNOSIS — L97.509 TYPE 1 DIABETES MELLITUS WITH FOOT ULCER (HCC): ICD-10-CM

## 2023-12-05 DIAGNOSIS — E10.621 TYPE 1 DIABETES MELLITUS WITH FOOT ULCER (HCC): ICD-10-CM

## 2023-12-05 DIAGNOSIS — A48.0 GAS GANGRENE (HCC): ICD-10-CM

## 2023-12-05 PROCEDURE — 15275 SKIN SUB GRAFT FACE/NK/HF/G: CPT

## 2023-12-05 PROCEDURE — 15275 SKIN SUB GRAFT FACE/NK/HF/G: CPT | Performed by: PODIATRIST

## 2023-12-05 RX ORDER — LIDOCAINE HYDROCHLORIDE 20 MG/ML
JELLY TOPICAL ONCE
OUTPATIENT
Start: 2023-12-05 | End: 2023-12-05

## 2023-12-05 RX ORDER — BACITRACIN ZINC AND POLYMYXIN B SULFATE 500; 1000 [USP'U]/G; [USP'U]/G
OINTMENT TOPICAL ONCE
OUTPATIENT
Start: 2023-12-05 | End: 2023-12-05

## 2023-12-05 RX ORDER — CLOBETASOL PROPIONATE 0.5 MG/G
OINTMENT TOPICAL ONCE
OUTPATIENT
Start: 2023-12-05 | End: 2023-12-05

## 2023-12-05 RX ORDER — GENTAMICIN SULFATE 1 MG/G
OINTMENT TOPICAL ONCE
OUTPATIENT
Start: 2023-12-05 | End: 2023-12-05

## 2023-12-05 RX ORDER — IBUPROFEN 200 MG
TABLET ORAL ONCE
OUTPATIENT
Start: 2023-12-05 | End: 2023-12-05

## 2023-12-05 RX ORDER — SODIUM CHLOR/HYPOCHLOROUS ACID 0.033 %
SOLUTION, IRRIGATION IRRIGATION ONCE
OUTPATIENT
Start: 2023-12-05 | End: 2023-12-05

## 2023-12-05 RX ORDER — LIDOCAINE 50 MG/G
OINTMENT TOPICAL ONCE
OUTPATIENT
Start: 2023-12-05 | End: 2023-12-05

## 2023-12-05 RX ORDER — LIDOCAINE 40 MG/G
CREAM TOPICAL ONCE
OUTPATIENT
Start: 2023-12-05 | End: 2023-12-05

## 2023-12-05 RX ORDER — BETAMETHASONE DIPROPIONATE 0.05 %
OINTMENT (GRAM) TOPICAL ONCE
OUTPATIENT
Start: 2023-12-05 | End: 2023-12-05

## 2023-12-05 RX ORDER — TRIAMCINOLONE ACETONIDE 1 MG/G
OINTMENT TOPICAL ONCE
OUTPATIENT
Start: 2023-12-05 | End: 2023-12-05

## 2023-12-05 RX ORDER — LIDOCAINE HYDROCHLORIDE 40 MG/ML
SOLUTION TOPICAL ONCE
OUTPATIENT
Start: 2023-12-05 | End: 2023-12-05

## 2023-12-05 RX ORDER — GINSENG 100 MG
CAPSULE ORAL ONCE
OUTPATIENT
Start: 2023-12-05 | End: 2023-12-05

## 2023-12-05 RX ORDER — LIDOCAINE HYDROCHLORIDE 40 MG/ML
SOLUTION TOPICAL ONCE
Status: COMPLETED | OUTPATIENT
Start: 2023-12-05 | End: 2023-12-05

## 2023-12-05 RX ADMIN — LIDOCAINE HYDROCHLORIDE 7.5 ML: 40 SOLUTION TOPICAL at 11:13

## 2023-12-05 ASSESSMENT — PAIN SCALES - GENERAL: PAINLEVEL_OUTOF10: 0

## 2023-12-05 NOTE — PROGRESS NOTES
EpiFix Treatment Note    NAME:  Ladonna Treadwell OF BIRTH:  1970  MEDICAL RECORD NUMBER:  [id number]  DATE:  12/5/2023        EpiFix Treatment Note     NAME:  Ladonna Treadwell OF BIRTH:  1970  MEDICAL RECORD NUMBER:  [id number]  DATE:  11/28/2023     Goal:  Patient will receive safe and proper application of skin substitute. Patient will comply with caring for dressing, offloading and reporting complications. [x]Expiration date checked immediately prior to use  [x] Package intact prior to use and no damage noted  [x] Transport temperature controlled and acceptable(ROOM AIR)     [x]  EpiFix was removed from protective sterile packaging by physician and applied to prepared ulcer bed. [x] EpiFix was placed using embossment letting as a guide. (UP)    [] Epifix was hydrated with sterile normal saline per physician(IF NEEDED)        [x] EpiFix was applied to location Right foot and affixed with steri-strips by the physician. [x] EpiFix was covered with non-adherent ulcer dressing per physician  [x] Applied Non adherent silvercel  [x] Applied dry gauze and/or roll gauze. Coban or ace wrap was applied to secure graft and decrease e[x]ama. [x] Patient/caregiver was instructed not to remove dressing and to keep it clean and dry. [x] Pt/family/caregiver was instructed on signs and symptoms of complications to report such as draining through dressing, dressing falling down/slipping, getting wet,or  severe   pain and tingling in toes  [x] Pt/family/caregiver was instructed on need for offloading and elevation of affected extremity and on use of prescribed offloading device. [x] Record info in tissue log( sticker with patient label). Picture epifix sticker with patient label for that specific date in . Please add epifix application number to both stickers. [x]  Guidelines followed  [x] EpiFix may be applied a total of 10 times per wound over a 12 week period.

## 2023-12-05 NOTE — PROGRESS NOTES
[address] [facility]   Patient History and Physical      King Martinez  AGE: 46 y.o. GENDER: male  : 1970  TODAY'S DATE:  2023      Chief Complaint:   Chief Complaint   Patient presents with    Wound Check     Right foot         History of the Present Illness       King Martinez is a 46 y.o. male who presents today for evaluation and treatment for a diabetic and non-healing surgical wound which is located on the right. S/P Epifix #4      History of Wound: Gas in right foot; S/p right foot I&D and 3rd digit amputation (DOS: 23); status post I&D with application of Integra bilayer and Prevena wound VAC (DOS: 2023)  Wound Type:diabetic and non-healing surgical  Wound Location:right foot  Modifying factors:diabetes    Pain Level: 0   Pain Assessment: 0-10     Abx :Yes   Cultures :wereobtained  Pain : 6/10    PAST MEDICAL HISTORY        Diagnosis Date    Anxiety and depression     Chronic back pain     HLD (hyperlipidemia) 11/15/2015    Hypertension     Marijuana abuse 11/15/2015    Nephrolithiasis 2015    Type II or unspecified type diabetes mellitus without mention of complication, not stated as uncontrolled        MEDICATIONS    Current Outpatient Medications on File Prior to Encounter   Medication Sig Dispense Refill    lisinopril (PRINIVIL;ZESTRIL) 10 MG tablet Take 1 tablet by mouth daily 90 tablet 1    meloxicam (MOBIC) 7.5 MG tablet Take 1 tablet by mouth 2 times daily as needed for Pain 60 tablet 0    blood glucose monitor strips Testing once a day. Please dispense according to patients insurance. 100 strip 3    Lancets MISC Testing once a day. Please dispense according to patients insurance.  100 each 3    insulin glargine (LANTUS SOLOSTAR) 100 UNIT/ML injection pen Inject 35 Units into the skin daily (with breakfast) 5 Adjustable Dose Pre-filled Pen Syringe 3    SITagliptin (JANUVIA) 50 MG tablet Take 1 tablet by mouth daily 90 tablet 1    blood glucose monitor kit and

## 2023-12-10 NOTE — DISCHARGE INSTRUCTIONS
odor  * Bleeding  * Increase in swelling  * Need for compression bandage changes due to slippage, breakthrough drainage. If you need medical attention outside of the business hours of the [address] [place] [address] please contact your PCP or go to the nearest emergency room. The information contained in the After Visit Summary has been reviewed with me, the patient and/or responsible adult, by my health care provider(s). I had the opportunity to ask questions regarding this information.  I have elected to receive;      []After Visit Summary  [x]Comprehensive Discharge Instruction        Patient signature______________________________________Date:____  Electronically signed by Venecia Balderrama RN on 12/12/2023 at 11:41 AM     Electronically signed by Devaughn Enriquez DPM on 12/12/2023 at 11:59 AM

## 2023-12-12 ENCOUNTER — HOSPITAL ENCOUNTER (OUTPATIENT)
Dept: WOUND CARE | Age: 53
Discharge: HOME | End: 2023-12-12
Payer: COMMERCIAL

## 2023-12-12 VITALS
TEMPERATURE: 97.6 F | HEART RATE: 65 BPM | SYSTOLIC BLOOD PRESSURE: 115 MMHG | RESPIRATION RATE: 16 BRPM | DIASTOLIC BLOOD PRESSURE: 68 MMHG

## 2023-12-12 DIAGNOSIS — E10.621 TYPE 1 DIABETES MELLITUS WITH FOOT ULCER (HCC): ICD-10-CM

## 2023-12-12 DIAGNOSIS — L08.9 TYPE 2 DIABETES MELLITUS WITH RIGHT DIABETIC FOOT INFECTION (HCC): ICD-10-CM

## 2023-12-12 DIAGNOSIS — A48.0 GAS GANGRENE (HCC): Primary | ICD-10-CM

## 2023-12-12 DIAGNOSIS — L97.509 TYPE 1 DIABETES MELLITUS WITH FOOT ULCER (HCC): ICD-10-CM

## 2023-12-12 DIAGNOSIS — M72.6 NECROTIZING FASCIITIS OF ANKLE AND FOOT (HCC): ICD-10-CM

## 2023-12-12 DIAGNOSIS — L97.513 ULCERATED, FOOT, RIGHT, WITH NECROSIS OF MUSCLE (HCC): ICD-10-CM

## 2023-12-12 DIAGNOSIS — I73.9 PAD (PERIPHERAL ARTERY DISEASE) (HCC): ICD-10-CM

## 2023-12-12 DIAGNOSIS — E11.628 TYPE 2 DIABETES MELLITUS WITH RIGHT DIABETIC FOOT INFECTION (HCC): ICD-10-CM

## 2023-12-12 PROCEDURE — 15275 SKIN SUB GRAFT FACE/NK/HF/G: CPT | Performed by: PODIATRIST

## 2023-12-12 PROCEDURE — 15275 SKIN SUB GRAFT FACE/NK/HF/G: CPT

## 2023-12-12 RX ORDER — IBUPROFEN 200 MG
TABLET ORAL ONCE
OUTPATIENT
Start: 2023-12-12 | End: 2023-12-12

## 2023-12-12 RX ORDER — CLOBETASOL PROPIONATE 0.5 MG/G
OINTMENT TOPICAL ONCE
OUTPATIENT
Start: 2023-12-12 | End: 2023-12-12

## 2023-12-12 RX ORDER — SODIUM CHLOR/HYPOCHLOROUS ACID 0.033 %
SOLUTION, IRRIGATION IRRIGATION ONCE
OUTPATIENT
Start: 2023-12-12 | End: 2023-12-12

## 2023-12-12 RX ORDER — TRIAMCINOLONE ACETONIDE 1 MG/G
OINTMENT TOPICAL ONCE
OUTPATIENT
Start: 2023-12-12 | End: 2023-12-12

## 2023-12-12 RX ORDER — LIDOCAINE HYDROCHLORIDE 40 MG/ML
SOLUTION TOPICAL ONCE
Status: COMPLETED | OUTPATIENT
Start: 2023-12-12 | End: 2023-12-12

## 2023-12-12 RX ORDER — BETAMETHASONE DIPROPIONATE 0.05 %
OINTMENT (GRAM) TOPICAL ONCE
OUTPATIENT
Start: 2023-12-12 | End: 2023-12-12

## 2023-12-12 RX ORDER — LIDOCAINE HYDROCHLORIDE 20 MG/ML
JELLY TOPICAL ONCE
OUTPATIENT
Start: 2023-12-12 | End: 2023-12-12

## 2023-12-12 RX ORDER — GENTAMICIN SULFATE 1 MG/G
OINTMENT TOPICAL ONCE
OUTPATIENT
Start: 2023-12-12 | End: 2023-12-12

## 2023-12-12 RX ORDER — LIDOCAINE 50 MG/G
OINTMENT TOPICAL ONCE
OUTPATIENT
Start: 2023-12-12 | End: 2023-12-12

## 2023-12-12 RX ORDER — LIDOCAINE 40 MG/G
CREAM TOPICAL ONCE
OUTPATIENT
Start: 2023-12-12 | End: 2023-12-12

## 2023-12-12 RX ORDER — GINSENG 100 MG
CAPSULE ORAL ONCE
OUTPATIENT
Start: 2023-12-12 | End: 2023-12-12

## 2023-12-12 RX ORDER — BACITRACIN ZINC AND POLYMYXIN B SULFATE 500; 1000 [USP'U]/G; [USP'U]/G
OINTMENT TOPICAL ONCE
OUTPATIENT
Start: 2023-12-12 | End: 2023-12-12

## 2023-12-12 RX ORDER — LIDOCAINE HYDROCHLORIDE 40 MG/ML
SOLUTION TOPICAL ONCE
OUTPATIENT
Start: 2023-12-12 | End: 2023-12-12

## 2023-12-12 RX ADMIN — LIDOCAINE HYDROCHLORIDE 10 ML: 40 SOLUTION TOPICAL at 11:08

## 2023-12-12 ASSESSMENT — PAIN SCALES - GENERAL: PAINLEVEL_OUTOF10: 0

## 2023-12-12 NOTE — PLAN OF CARE
Problem: Chronic Conditions and Co-morbidities  Goal: Patient's chronic conditions and co-morbidity symptoms are monitored and maintained or improved  Outcome: Progressing     Problem: Falls - Risk of:  Goal: Will remain free from falls  Description: Will remain free from falls  Outcome: Progressing

## 2023-12-13 NOTE — PROGRESS NOTES
EpiFix Treatment Note    NAME:  Kit Finley OF BIRTH:  1970  MEDICAL RECORD NUMBER:  [id number]  DATE:  12/12/2023    Goal:  Patient will receive safe and proper application of skin substitute. Patient will comply with caring for dressing, offloading and reporting complications. [x]Expiration date checked immediately prior to use  [x] Package intact prior to use and no damage noted  [x] Transport temperature controlled and acceptable(ROOM AIR)    [x]  EpiFix was removed from protective sterile packaging by physician and applied to prepared ulcer bed. [x] EpiFix was placed using embossment letting as a guide. (UP)    [x] Epifix was hydrated with sterile normal saline per physician(IF NEEDED)        [x] EpiFix was applied to location right foot and affixed with steri-strips by the physician. [x] EpiFix was covered with non-adherent ulcer dressing per physician  [x] Applied dry gauze ABD, kerlix ace ( to stay in place) over non-adherent. [x] Applied dry gauze and/or roll gauze. [x] Coban or ace wrap was applied to secure graft and decrease edema. [x] Patient/caregiver was instructed not to remove dressing and to keep it clean and dry. [x] Pt/family/caregiver was instructed on signs and symptoms of complications to report such as draining through dressing, dressing falling down/slipping, getting wet,or  severe   pain and tingling in toes  [x] Pt/family/caregiver was instructed on need for offloading and elevation of affected extremity and on use of prescribed offloading device. [x] Record info in tissue log( sticker with patient label). Picture epifix sticker with patient label for that specific date in . Please add epifix application number to both stickers. [x]  Guidelines followed  [x] EpiFix may be applied a total of 10 times per wound over a 12 week period. Additionally EpiFix may only be used every 12 months per wound.     Date of first application
1    blood glucose monitor kit and supplies Dispense sufficient amount for indicated testing frequency plus additional to accommodate PRN testing needs. Dispense all needed supplies to include: monitor, strips, lancing device, lancets, control solutions, alcohol swabs. 1 kit 0    blood glucose monitor strips Test 3 times a day & as needed for symptoms of irregular blood glucose. Dispense sufficient amount for indicated testing frequency plus additional to accommodate PRN testing needs. 300 strip 0    Insulin Pen Needle 32G X 4 MM MISC 1 each by Does not apply route daily 100 each 3    Lancets MISC 1 each by Does not apply route daily 100 each 5    Alcohol Swabs PADS 100 each by Does not apply route 3 times daily 100 each 1    atorvastatin (LIPITOR) 20 MG tablet Take 1 tablet by mouth daily 30 tablet 3    Insulin Pen Needle 29G X 12.7MM MISC 1 each by Does not apply route daily To use with insulin 120 each 1    acetaminophen (TYLENOL) 500 MG tablet Take 1 tablet by mouth 4 times daily as needed for Pain 25 tablet 1    Blood Pressure Monitor MISC Use daily to check BP 1 each 0     No current facility-administered medications on file prior to encounter.        ALLERGIES    No Known Allergies    PAST SURGICAL HISTORY    Past Surgical History:   Procedure Laterality Date    FOOT DEBRIDEMENT Left 2/8/2023    Incision and drainage left foot performed by Ellie Morley DPM at [facility] Right 9/25/2023    Incision and drainage down to bone/subfascial, right foot performed by Herminio López DPM at [facility] Right 9/29/2023    FOOT DEBRIDEMENT INCISION AND DRAINAGE WITH APPLICATION PREVINA WOUND VAC AND INTEGRA BILAYER GRAFT RIGHT performed by Hermniio López DPM at [address] [address]  2/8/2023    Excision of 3rd metatarsal head performed by Ellie Morley DPM at [address] Right 10/25/2021    HAND INCISION AND DRAINAGE performed by Frank Amaral MD at
followed  [x] EpiFix may be applied a total of 10 times per wound over a 12 week period. Additionally EpiFix may only be used every 12 months per wound. Date of first application of EpiFix for this current wound is October 31, 2023.         Electronically signed by Philipp Rao RN on 12/13/2023 at 2:08 PM

## 2023-12-20 NOTE — DISCHARGE INSTRUCTIONS
[facility] HYPERBAR[facility] TREATMENT  CENTER                            Visit  Discharge Instructions / Physician Orders  DATE: 12/27/23     Home Care:NONE     SUPPLIES ORDERED THRU:     NONE                DATE LAST SUPPLIED     Wound Location:  Right Foot     Cleanse with: Keep dry and intact     Dressing Orders:  Epifix #8 Folded 2x3,  abd pad then wrap with kerlix and ace wrap     Frequency:  Keep dry and intact   if outer dressing needs to be changed do not go past the silvercel and the tape  Additional Orders: Increase protein to diet (meat, cheese, eggs, fish, peanut butter, nuts and beans)  Multivitamin daily     OFFLOADING [x] YES  TYPE:                  [] NA     Weekly wound care visits until determined otherwise. Antibiotic therapy-wound care related YES [] NO [x] NA[]   Completed  MY CHART []     Smart Device  []      HYPERBARIC TREATMENT-                TREATMENT #                          Your next appointment with the [address] [place] [address] is in                                                                                                        January 2 with Dr. Kristal Nunez at 9:30am                                                                                                                                                                                                                                                                                                                                                                                                   (Please note your next appointment above and if you are unable to keep, kindly give a 24 hour notice.  Thank you.)  If more than 15 min late we cannot guarantee you will be seen due to clinician schedule  Per Policy, Excessive cancellation will call for dismissal from program.  If you experience any of the following, please call the [address] [place] [address] during business hours:  [phone]     * Increase in Pain  * Temperature over

## 2023-12-27 ENCOUNTER — HOSPITAL ENCOUNTER (OUTPATIENT)
Dept: WOUND CARE | Age: 53
Discharge: HOME | End: 2023-12-27
Payer: COMMERCIAL

## 2023-12-27 VITALS
TEMPERATURE: 96.1 F | WEIGHT: 204 LBS | HEART RATE: 79 BPM | DIASTOLIC BLOOD PRESSURE: 61 MMHG | SYSTOLIC BLOOD PRESSURE: 100 MMHG | BODY MASS INDEX: 27.63 KG/M2 | RESPIRATION RATE: 18 BRPM | HEIGHT: 72 IN

## 2023-12-27 DIAGNOSIS — L97.509 TYPE 1 DIABETES MELLITUS WITH FOOT ULCER (HCC): ICD-10-CM

## 2023-12-27 DIAGNOSIS — E10.621 TYPE 1 DIABETES MELLITUS WITH FOOT ULCER (HCC): ICD-10-CM

## 2023-12-27 DIAGNOSIS — L97.513 ULCERATED, FOOT, RIGHT, WITH NECROSIS OF MUSCLE (HCC): Primary | ICD-10-CM

## 2023-12-27 DIAGNOSIS — L08.9 TYPE 2 DIABETES MELLITUS WITH RIGHT DIABETIC FOOT INFECTION (HCC): ICD-10-CM

## 2023-12-27 DIAGNOSIS — I73.9 PAD (PERIPHERAL ARTERY DISEASE) (HCC): ICD-10-CM

## 2023-12-27 DIAGNOSIS — E11.628 TYPE 2 DIABETES MELLITUS WITH RIGHT DIABETIC FOOT INFECTION (HCC): ICD-10-CM

## 2023-12-27 PROCEDURE — 15275 SKIN SUB GRAFT FACE/NK/HF/G: CPT

## 2023-12-27 PROCEDURE — 15275 SKIN SUB GRAFT FACE/NK/HF/G: CPT | Performed by: NURSE PRACTITIONER

## 2023-12-27 RX ORDER — CLOBETASOL PROPIONATE 0.5 MG/G
OINTMENT TOPICAL ONCE
OUTPATIENT
Start: 2023-12-27 | End: 2023-12-27

## 2023-12-27 RX ORDER — IBUPROFEN 200 MG
TABLET ORAL ONCE
OUTPATIENT
Start: 2023-12-27 | End: 2023-12-27

## 2023-12-27 RX ORDER — LIDOCAINE 40 MG/G
CREAM TOPICAL ONCE
OUTPATIENT
Start: 2023-12-27 | End: 2023-12-27

## 2023-12-27 RX ORDER — GENTAMICIN SULFATE 1 MG/G
OINTMENT TOPICAL ONCE
OUTPATIENT
Start: 2023-12-27 | End: 2023-12-27

## 2023-12-27 RX ORDER — LIDOCAINE HYDROCHLORIDE 20 MG/ML
JELLY TOPICAL ONCE
OUTPATIENT
Start: 2023-12-27 | End: 2023-12-27

## 2023-12-27 RX ORDER — LIDOCAINE HYDROCHLORIDE 40 MG/ML
SOLUTION TOPICAL ONCE
OUTPATIENT
Start: 2023-12-27 | End: 2023-12-27

## 2023-12-27 RX ORDER — BETAMETHASONE DIPROPIONATE 0.05 %
OINTMENT (GRAM) TOPICAL ONCE
OUTPATIENT
Start: 2023-12-27 | End: 2023-12-27

## 2023-12-27 RX ORDER — TRIAMCINOLONE ACETONIDE 1 MG/G
OINTMENT TOPICAL ONCE
OUTPATIENT
Start: 2023-12-27 | End: 2023-12-27

## 2023-12-27 RX ORDER — GINSENG 100 MG
CAPSULE ORAL ONCE
OUTPATIENT
Start: 2023-12-27 | End: 2023-12-27

## 2023-12-27 RX ORDER — BACITRACIN ZINC AND POLYMYXIN B SULFATE 500; 1000 [USP'U]/G; [USP'U]/G
OINTMENT TOPICAL ONCE
OUTPATIENT
Start: 2023-12-27 | End: 2023-12-27

## 2023-12-27 RX ORDER — LIDOCAINE 50 MG/G
OINTMENT TOPICAL ONCE
OUTPATIENT
Start: 2023-12-27 | End: 2023-12-27

## 2023-12-27 RX ORDER — SODIUM CHLOR/HYPOCHLOROUS ACID 0.033 %
SOLUTION, IRRIGATION IRRIGATION ONCE
OUTPATIENT
Start: 2023-12-27 | End: 2023-12-27

## 2023-12-27 RX ORDER — LIDOCAINE HYDROCHLORIDE 40 MG/ML
SOLUTION TOPICAL ONCE
Status: COMPLETED | OUTPATIENT
Start: 2023-12-27 | End: 2023-12-27

## 2023-12-27 RX ADMIN — LIDOCAINE HYDROCHLORIDE 5 ML: 40 SOLUTION TOPICAL at 11:17

## 2023-12-27 NOTE — PROGRESS NOTES
12/27/23 1114   Wound Cleansed Soap and water 12/27/23 1114   Dressing/Treatment Other (comment) 12/12/23 1201   Offloading for Diabetic Foot Ulcers Offloading ordered;Post op shoe 12/27/23 1114   Wound Length (cm) 4 cm 12/27/23 1114   Wound Width (cm) 0.3 cm 12/27/23 1114   Wound Depth (cm) 0.4 cm 12/27/23 1114   Wound Surface Area (cm^2) 1.2 cm^2 12/27/23 1114   Change in Wound Size % (l*w) 81.95 12/27/23 1114   Wound Volume (cm^3) 0.48 cm^3 12/27/23 1114   Wound Healing % 28 12/27/23 1114   Post-Procedure Length (cm) 4 cm 12/27/23 1114   Post-Procedure Width (cm) 0.3 cm 12/27/23 1114   Post-Procedure Depth (cm) 0.4 cm 12/27/23 1114   Post-Procedure Surface Area (cm^2) 1.2 cm^2 12/27/23 1114   Post-Procedure Volume (cm^3) 0.48 cm^3 12/27/23 1114   Wound Assessment Pink/red;Slough 12/27/23 1114   Drainage Amount Moderate (25-50%) 12/27/23 1114   Drainage Description Serosanguinous; Yellow 12/27/23 1114   Odor Mild 12/27/23 1114   Glo-wound Assessment Blanchable erythema; Maceration 12/27/23 1114   Margins Undefined edges 12/27/23 1114   Wound Thickness Description not for Pressure Injury Full thickness 12/27/23 1114   Number of days: 78            The wound needs epithelialization and we are present today to perform Skin substitute skin grafting. Due to wound size, the procedure may need to be staged. Procedure Note:  Skin Substitute Skin Graft   Type:  Epifix #8, 2 cm x 3 cm     Performed by:  NORMA Phillips CNP  Assited by: none    Patient was brought into the treatment room  The Right foot was prepped and draped in the usual sterile manner. Ulcerations noted as stated above. No signs of infection seen. No purulence, erythema, or necrotic tissue. The ulceration(s) were prepped by debridement with a curette. Debridement was performed through and including subcutaneous tissue. Bleeding was controlled with pressure. The wound bed was flushed with normal saline.   Next, the entire 100% skin

## 2024-01-02 ENCOUNTER — HOSPITAL ENCOUNTER (OUTPATIENT)
Dept: WOUND CARE | Age: 54
Discharge: HOME | End: 2024-01-02
Payer: COMMERCIAL

## 2024-01-02 VITALS
BODY MASS INDEX: 27.63 KG/M2 | TEMPERATURE: 96.8 F | RESPIRATION RATE: 18 BRPM | HEIGHT: 72 IN | DIASTOLIC BLOOD PRESSURE: 98 MMHG | HEART RATE: 76 BPM | SYSTOLIC BLOOD PRESSURE: 170 MMHG | WEIGHT: 204 LBS

## 2024-01-02 DIAGNOSIS — M72.6 NECROTIZING FASCIITIS OF ANKLE AND FOOT (HCC): ICD-10-CM

## 2024-01-02 DIAGNOSIS — L08.9 TYPE 2 DIABETES MELLITUS WITH RIGHT DIABETIC FOOT INFECTION (HCC): ICD-10-CM

## 2024-01-02 DIAGNOSIS — E11.628 TYPE 2 DIABETES MELLITUS WITH RIGHT DIABETIC FOOT INFECTION (HCC): ICD-10-CM

## 2024-01-02 DIAGNOSIS — L97.509 TYPE 1 DIABETES MELLITUS WITH FOOT ULCER (HCC): ICD-10-CM

## 2024-01-02 DIAGNOSIS — A48.0 GAS GANGRENE (HCC): Primary | ICD-10-CM

## 2024-01-02 DIAGNOSIS — I73.9 PAD (PERIPHERAL ARTERY DISEASE) (HCC): ICD-10-CM

## 2024-01-02 DIAGNOSIS — L97.513 ULCERATED, FOOT, RIGHT, WITH NECROSIS OF MUSCLE (HCC): ICD-10-CM

## 2024-01-02 DIAGNOSIS — E10.621 TYPE 1 DIABETES MELLITUS WITH FOOT ULCER (HCC): ICD-10-CM

## 2024-01-02 PROCEDURE — 15275 SKIN SUB GRAFT FACE/NK/HF/G: CPT

## 2024-01-02 PROCEDURE — 15275 SKIN SUB GRAFT FACE/NK/HF/G: CPT | Performed by: PODIATRIST

## 2024-01-02 RX ORDER — SODIUM CHLOR/HYPOCHLOROUS ACID 0.033 %
SOLUTION, IRRIGATION IRRIGATION ONCE
OUTPATIENT
Start: 2024-01-02 | End: 2024-01-02

## 2024-01-02 RX ORDER — BETAMETHASONE DIPROPIONATE 0.05 %
OINTMENT (GRAM) TOPICAL ONCE
OUTPATIENT
Start: 2024-01-02 | End: 2024-01-02

## 2024-01-02 RX ORDER — GENTAMICIN SULFATE 1 MG/G
OINTMENT TOPICAL ONCE
OUTPATIENT
Start: 2024-01-02 | End: 2024-01-02

## 2024-01-02 RX ORDER — LIDOCAINE 40 MG/G
CREAM TOPICAL ONCE
OUTPATIENT
Start: 2024-01-02 | End: 2024-01-02

## 2024-01-02 RX ORDER — IBUPROFEN 200 MG
TABLET ORAL ONCE
OUTPATIENT
Start: 2024-01-02 | End: 2024-01-02

## 2024-01-02 RX ORDER — LIDOCAINE HYDROCHLORIDE 40 MG/ML
SOLUTION TOPICAL ONCE
Status: COMPLETED | OUTPATIENT
Start: 2024-01-02 | End: 2024-01-02

## 2024-01-02 RX ORDER — LIDOCAINE HYDROCHLORIDE 20 MG/ML
JELLY TOPICAL ONCE
OUTPATIENT
Start: 2024-01-02 | End: 2024-01-02

## 2024-01-02 RX ORDER — CLOBETASOL PROPIONATE 0.5 MG/G
OINTMENT TOPICAL ONCE
OUTPATIENT
Start: 2024-01-02 | End: 2024-01-02

## 2024-01-02 RX ORDER — TRIAMCINOLONE ACETONIDE 1 MG/G
OINTMENT TOPICAL ONCE
OUTPATIENT
Start: 2024-01-02 | End: 2024-01-02

## 2024-01-02 RX ORDER — BACITRACIN ZINC AND POLYMYXIN B SULFATE 500; 1000 [USP'U]/G; [USP'U]/G
OINTMENT TOPICAL ONCE
OUTPATIENT
Start: 2024-01-02 | End: 2024-01-02

## 2024-01-02 RX ORDER — LIDOCAINE HYDROCHLORIDE 40 MG/ML
SOLUTION TOPICAL ONCE
OUTPATIENT
Start: 2024-01-02 | End: 2024-01-02

## 2024-01-02 RX ORDER — GINSENG 100 MG
CAPSULE ORAL ONCE
OUTPATIENT
Start: 2024-01-02 | End: 2024-01-02

## 2024-01-02 RX ORDER — LIDOCAINE 50 MG/G
OINTMENT TOPICAL ONCE
OUTPATIENT
Start: 2024-01-02 | End: 2024-01-02

## 2024-01-02 RX ADMIN — LIDOCAINE HYDROCHLORIDE 5 ML: 40 SOLUTION TOPICAL at 09:53

## 2024-01-02 ASSESSMENT — PAIN SCALES - GENERAL: PAINLEVEL_OUTOF10: 0

## 2024-01-02 NOTE — DISCHARGE INSTRUCTIONS
of the following, please call the Wound Care Center during business hours:  [phone]     * Increase in Pain  * Temperature over 101  * Increase in drainage from your wound  * Drainage with a foul odor  * Bleeding  * Increase in swelling  * Need for compression bandage changes due to slippage, breakthrough drainage.     If you need medical attention outside of the business hours of the Wound Care Centers please contact your PCP or go to the nearest emergency room.     The information contained in the After Visit Summary has been reviewed with me, the patient and/or responsible adult, by my health care provider(s). I had the opportunity to ask questions regarding this information. I have elected to receive;      []After Visit Summary  [x]Comprehensive Discharge Instruction        Patient signature______________________________________Date:_  Electronically signed by Maria Esther Plaza RN on 1/2/2024 at 10:29 AM    Electronically signed by Saadia Mckeon DPM on 1/2/2024 at 9:46 AM

## 2024-01-02 NOTE — PROGRESS NOTES
Moderate (25-50%) 01/02/24 0943   Drainage Description Serosanguinous;Yellow 01/02/24 0943   Odor Mild 01/02/24 0943   Glo-wound Assessment Blanchable erythema;Maceration 01/02/24 0943   Margins Defined edges 01/02/24 0943   Wound Thickness Description not for Pressure Injury Full thickness 01/02/24 0943   Number of days: 84             Vascular:  DP/PT pulses palpable 2/4, Bilateral.    CFT <3 seconds to digits 1-5, Bilateral .   Hair growth present to level of digits, Right.  Edema absent, Right.  Erythema absent, Right.     Neurological:  Sensation absent to light touch to level of digits, Bilateral.    Musculoskeletal:  Muscle strength 5/5 all LE groups tested, Bilateral.         Assessment:           1. Gas gangrene (HCC)    2. Necrotizing fasciitis of ankle and foot ([facility])    3. Type 2 diabetes mellitus with right diabetic foot infection ([facility])    4. PAD (peripheral artery disease) ([facility])    5. Ulcerated, foot, right, with necrosis of muscle ([facility])    6. Type 1 diabetes mellitus with foot ulcer ([facility])            Patient Active Problem List   Diagnosis    Diabetes mellitus (HCC)    Depression    Erectile dysfunction due to diseases classified elsewhere    Back pain    Anxiety and depression    Nephrolithiasis    Essential hypertension    Mixed hyperlipidemia    Major depressive disorder with single episode, in partial remission (HCC)    Impetigo    Cellulitis    Diabetic foot infection (HCC)    Osteomyelitis of third toe of left foot (HCC)    Diabetic infection of left foot (HCC)    Acute osteomyelitis of metatarsal bone of left foot (HCC)    Gas gangrene (HCC)    Necrotizing fasciitis of ankle and foot (HCC)    Type 2 diabetes mellitus with right diabetic foot infection (HCC)    PAD (peripheral artery disease) (HCC)    Ulcerated, foot, right, with necrosis of muscle (HCC)    Type 1 diabetes mellitus with foot ulcer ([facility])         Plan:   Pt was evaluated and examined. Patient was given personalized discharge

## 2024-01-09 ENCOUNTER — HOSPITAL ENCOUNTER (OUTPATIENT)
Dept: WOUND CARE | Age: 54
Discharge: HOME | End: 2024-01-09
Payer: COMMERCIAL

## 2024-01-09 VITALS
SYSTOLIC BLOOD PRESSURE: 146 MMHG | TEMPERATURE: 96.8 F | RESPIRATION RATE: 17 BRPM | BODY MASS INDEX: 27.63 KG/M2 | WEIGHT: 204 LBS | DIASTOLIC BLOOD PRESSURE: 85 MMHG | HEIGHT: 72 IN | HEART RATE: 83 BPM

## 2024-01-09 DIAGNOSIS — E10.621 TYPE 1 DIABETES MELLITUS WITH FOOT ULCER (HCC): ICD-10-CM

## 2024-01-09 DIAGNOSIS — A48.0 GAS GANGRENE (HCC): Primary | ICD-10-CM

## 2024-01-09 DIAGNOSIS — L97.513 ULCERATED, FOOT, RIGHT, WITH NECROSIS OF MUSCLE (HCC): ICD-10-CM

## 2024-01-09 DIAGNOSIS — E11.628 TYPE 2 DIABETES MELLITUS WITH RIGHT DIABETIC FOOT INFECTION (HCC): ICD-10-CM

## 2024-01-09 DIAGNOSIS — M72.6 NECROTIZING FASCIITIS OF ANKLE AND FOOT (HCC): ICD-10-CM

## 2024-01-09 DIAGNOSIS — L08.9 TYPE 2 DIABETES MELLITUS WITH RIGHT DIABETIC FOOT INFECTION (HCC): ICD-10-CM

## 2024-01-09 DIAGNOSIS — L97.509 TYPE 1 DIABETES MELLITUS WITH FOOT ULCER (HCC): ICD-10-CM

## 2024-01-09 DIAGNOSIS — I73.9 PAD (PERIPHERAL ARTERY DISEASE) (HCC): ICD-10-CM

## 2024-01-09 PROCEDURE — 15275 SKIN SUB GRAFT FACE/NK/HF/G: CPT | Performed by: PODIATRIST

## 2024-01-09 PROCEDURE — 15275 SKIN SUB GRAFT FACE/NK/HF/G: CPT

## 2024-01-09 RX ORDER — BETAMETHASONE DIPROPIONATE 0.05 %
OINTMENT (GRAM) TOPICAL ONCE
OUTPATIENT
Start: 2024-01-09 | End: 2024-01-09

## 2024-01-09 RX ORDER — GINSENG 100 MG
CAPSULE ORAL ONCE
OUTPATIENT
Start: 2024-01-09 | End: 2024-01-09

## 2024-01-09 RX ORDER — LIDOCAINE HYDROCHLORIDE 40 MG/ML
SOLUTION TOPICAL ONCE
OUTPATIENT
Start: 2024-01-09 | End: 2024-01-09

## 2024-01-09 RX ORDER — IBUPROFEN 200 MG
TABLET ORAL ONCE
OUTPATIENT
Start: 2024-01-09 | End: 2024-01-09

## 2024-01-09 RX ORDER — LIDOCAINE 50 MG/G
OINTMENT TOPICAL ONCE
OUTPATIENT
Start: 2024-01-09 | End: 2024-01-09

## 2024-01-09 RX ORDER — GENTAMICIN SULFATE 1 MG/G
OINTMENT TOPICAL ONCE
OUTPATIENT
Start: 2024-01-09 | End: 2024-01-09

## 2024-01-09 RX ORDER — LIDOCAINE HYDROCHLORIDE 20 MG/ML
JELLY TOPICAL ONCE
OUTPATIENT
Start: 2024-01-09 | End: 2024-01-09

## 2024-01-09 RX ORDER — LIDOCAINE 40 MG/G
CREAM TOPICAL ONCE
OUTPATIENT
Start: 2024-01-09 | End: 2024-01-09

## 2024-01-09 RX ORDER — LIDOCAINE HYDROCHLORIDE 40 MG/ML
SOLUTION TOPICAL ONCE
Status: COMPLETED | OUTPATIENT
Start: 2024-01-09 | End: 2024-01-09

## 2024-01-09 RX ORDER — CLOBETASOL PROPIONATE 0.5 MG/G
OINTMENT TOPICAL ONCE
OUTPATIENT
Start: 2024-01-09 | End: 2024-01-09

## 2024-01-09 RX ORDER — SODIUM CHLOR/HYPOCHLOROUS ACID 0.033 %
SOLUTION, IRRIGATION IRRIGATION ONCE
OUTPATIENT
Start: 2024-01-09 | End: 2024-01-09

## 2024-01-09 RX ORDER — BACITRACIN ZINC AND POLYMYXIN B SULFATE 500; 1000 [USP'U]/G; [USP'U]/G
OINTMENT TOPICAL ONCE
OUTPATIENT
Start: 2024-01-09 | End: 2024-01-09

## 2024-01-09 RX ORDER — TRIAMCINOLONE ACETONIDE 1 MG/G
OINTMENT TOPICAL ONCE
OUTPATIENT
Start: 2024-01-09 | End: 2024-01-09

## 2024-01-09 RX ADMIN — LIDOCAINE HYDROCHLORIDE 5 ML: 40 SOLUTION TOPICAL at 09:05

## 2024-01-09 ASSESSMENT — PAIN SCALES - GENERAL: PAINLEVEL_OUTOF10: 0

## 2024-01-09 NOTE — DISCHARGE INSTRUCTIONS
[facility]OUND [facility]PERBAR[facility]REATMENT  CENTER                            Visit  Discharge Instructions / Physician Orders  DATE: 1/9/24     Home Care:NONE     SUPPLIES ORDERED THRU:     NONE                DATE LAST SUPPLIED     Wound Location:  Right Foot     Cleanse with: Keep dry and intact     Dressing Orders:  Epifix #9 Folded 18mm, then folded 4x4 abd pad then wrap with kerlix and ace wrap     Frequency:  Keep dry and intact   if outer dressing needs to be changed do not go past the silvercel and the tape  Additional Orders: Increase protein to diet (meat, cheese, eggs, fish, peanut butter, nuts and beans)  Multivitamin daily     OFFLOADING [x] YES  TYPE:                  [] NA     Weekly wound care visits until determined otherwise.     Antibiotic therapy-wound care related YES [] NO [x] NA[]   Completed  MY CHART []     Smart Device  []      HYPERBARIC TREATMENT-                TREATMENT #                          Your next appointment with the Wound Care Center is in                                                                                                          January 16 with                                                                                                      January                                                                                                                                                                                                                                                                                                                                                                                                                                                                                                         (Please note your next appointment above and if you are unable to keep, kindly give a 24 hour notice. Thank you.)  If more than 15 min late we cannot guarantee you will be seen due to clinician schedule  Per Policy,

## 2024-01-09 NOTE — PROGRESS NOTES
[facility]   Patient History and Physical      Pierre Gordon  AGE: 53 y.o.   GENDER: male  : 1970  TODAY'S DATE:  2024      Chief Complaint:   Chief Complaint   Patient presents with    Wound Check     Right Foot 3rd Toe Amp Site         History of the Present Illness       Pierre Gordon is a 53 y.o. male who presents today for evaluation and treatment for a diabetic and non-healing surgical wound which is located on the right.   S/P Epifix #9      History of Wound: Gas in right foot; S/p right foot I&D and 3rd digit amputation (DOS: 23); status post I&D with application of Integra bilayer and Prevena wound VAC (DOS: 2023)  Wound Type:diabetic and non-healing surgical  Wound Location:right foot  Modifying factors:diabetes    Pain Level: 0   Pain Assessment: None - Denies Pain     Abx :Yes   Cultures :wereobtained  Pain : 6/10    PAST MEDICAL HISTORY        Diagnosis Date    Anxiety and depression     Chronic back pain     HLD (hyperlipidemia) 11/15/2015    Hypertension     Marijuana abuse 11/15/2015    Nephrolithiasis 2015    Type II or unspecified type diabetes mellitus without mention of complication, not stated as uncontrolled        MEDICATIONS    Current Outpatient Medications on File Prior to Encounter   Medication Sig Dispense Refill    LANTUS SOLOSTAR 100 UNIT/ML injection pen INJECT 35 UNITS SUBCUTANEOUSLY EVERY MORNING BEFORE BREAKFAST 60 mL 0    Insulin Pen Needle (BD ULTRA-FINE PEN NEEDLES) 29G X 12.7MM MISC use daily with insulin 100 each 1    lisinopril (PRINIVIL;ZESTRIL) 10 MG tablet Take 1 tablet by mouth daily 90 tablet 1    meloxicam (MOBIC) 7.5 MG tablet Take 1 tablet by mouth 2 times daily as needed for Pain 60 tablet 0    blood glucose monitor strips Testing once a day.  Please dispense according to patients insurance. 100 strip 3    Lancets MISC Testing once a day.  Please dispense according to patients insurance. 100 each 3    SITagliptin (JANUVIA) 50 MG

## 2024-01-13 NOTE — DISCHARGE INSTRUCTIONS
breakthrough drainage.     If you need medical attention outside of the business hours of the Wound Care Centers please contact your PCP or go to the nearest emergency room.     The information contained in the After Visit Summary has been reviewed with me, the patient and/or responsible adult, by my health care provider(s). I had the opportunity to ask questions regarding this information. I have elected to receive;      []After Visit Summary  [x]Comprehensive Discharge Instruction        Patient signature______________________________________Date:___  Electronically signed by Maria Esther Plaza RN on 1/16/2024 at 10:34 AM    Electronically signed by Saadia Mckeon DPM on 1/16/2024 at 10:12 AM

## 2024-01-16 ENCOUNTER — HOSPITAL ENCOUNTER (OUTPATIENT)
Dept: WOUND CARE | Age: 54
Discharge: HOME | End: 2024-01-16
Payer: COMMERCIAL

## 2024-01-16 VITALS
RESPIRATION RATE: 16 BRPM | HEART RATE: 63 BPM | SYSTOLIC BLOOD PRESSURE: 118 MMHG | DIASTOLIC BLOOD PRESSURE: 73 MMHG | TEMPERATURE: 97 F

## 2024-01-16 DIAGNOSIS — A48.0 GAS GANGRENE (HCC): Primary | ICD-10-CM

## 2024-01-16 DIAGNOSIS — I73.9 PAD (PERIPHERAL ARTERY DISEASE) (HCC): ICD-10-CM

## 2024-01-16 DIAGNOSIS — E11.628 TYPE 2 DIABETES MELLITUS WITH RIGHT DIABETIC FOOT INFECTION (HCC): ICD-10-CM

## 2024-01-16 DIAGNOSIS — L97.509 TYPE 1 DIABETES MELLITUS WITH FOOT ULCER (HCC): ICD-10-CM

## 2024-01-16 DIAGNOSIS — E10.621 TYPE 1 DIABETES MELLITUS WITH FOOT ULCER (HCC): ICD-10-CM

## 2024-01-16 DIAGNOSIS — M72.6 NECROTIZING FASCIITIS OF ANKLE AND FOOT (HCC): ICD-10-CM

## 2024-01-16 DIAGNOSIS — L97.513 ULCERATED, FOOT, RIGHT, WITH NECROSIS OF MUSCLE (HCC): ICD-10-CM

## 2024-01-16 DIAGNOSIS — L08.9 TYPE 2 DIABETES MELLITUS WITH RIGHT DIABETIC FOOT INFECTION (HCC): ICD-10-CM

## 2024-01-16 PROCEDURE — 11042 DBRDMT SUBQ TIS 1ST 20SQCM/<: CPT | Performed by: PODIATRIST

## 2024-01-16 PROCEDURE — 11042 DBRDMT SUBQ TIS 1ST 20SQCM/<: CPT

## 2024-01-16 RX ORDER — BACITRACIN ZINC AND POLYMYXIN B SULFATE 500; 1000 [USP'U]/G; [USP'U]/G
OINTMENT TOPICAL ONCE
OUTPATIENT
Start: 2024-01-16 | End: 2024-01-16

## 2024-01-16 RX ORDER — GENTAMICIN SULFATE 1 MG/G
OINTMENT TOPICAL ONCE
OUTPATIENT
Start: 2024-01-16 | End: 2024-01-16

## 2024-01-16 RX ORDER — LIDOCAINE HYDROCHLORIDE 20 MG/ML
JELLY TOPICAL ONCE
OUTPATIENT
Start: 2024-01-16 | End: 2024-01-16

## 2024-01-16 RX ORDER — LIDOCAINE HYDROCHLORIDE 40 MG/ML
SOLUTION TOPICAL ONCE
Status: COMPLETED | OUTPATIENT
Start: 2024-01-16 | End: 2024-01-16

## 2024-01-16 RX ORDER — TRIAMCINOLONE ACETONIDE 1 MG/G
OINTMENT TOPICAL ONCE
OUTPATIENT
Start: 2024-01-16 | End: 2024-01-16

## 2024-01-16 RX ORDER — IBUPROFEN 200 MG
TABLET ORAL ONCE
OUTPATIENT
Start: 2024-01-16 | End: 2024-01-16

## 2024-01-16 RX ORDER — BETAMETHASONE DIPROPIONATE 0.05 %
OINTMENT (GRAM) TOPICAL ONCE
OUTPATIENT
Start: 2024-01-16 | End: 2024-01-16

## 2024-01-16 RX ORDER — SODIUM CHLOR/HYPOCHLOROUS ACID 0.033 %
SOLUTION, IRRIGATION IRRIGATION ONCE
OUTPATIENT
Start: 2024-01-16 | End: 2024-01-16

## 2024-01-16 RX ORDER — LIDOCAINE 50 MG/G
OINTMENT TOPICAL ONCE
OUTPATIENT
Start: 2024-01-16 | End: 2024-01-16

## 2024-01-16 RX ORDER — GINSENG 100 MG
CAPSULE ORAL ONCE
OUTPATIENT
Start: 2024-01-16 | End: 2024-01-16

## 2024-01-16 RX ORDER — LIDOCAINE 40 MG/G
CREAM TOPICAL ONCE
OUTPATIENT
Start: 2024-01-16 | End: 2024-01-16

## 2024-01-16 RX ORDER — CLOBETASOL PROPIONATE 0.5 MG/G
OINTMENT TOPICAL ONCE
OUTPATIENT
Start: 2024-01-16 | End: 2024-01-16

## 2024-01-16 RX ORDER — LIDOCAINE HYDROCHLORIDE 40 MG/ML
SOLUTION TOPICAL ONCE
OUTPATIENT
Start: 2024-01-16 | End: 2024-01-16

## 2024-01-16 RX ADMIN — LIDOCAINE HYDROCHLORIDE 10 ML: 40 SOLUTION TOPICAL at 09:58

## 2024-01-16 NOTE — PROGRESS NOTES
[facility]   Patient History and Physical      Pierre Gordon  AGE: 53 y.o.   GENDER: male  : 1970  TODAY'S DATE:  2024      Chief Complaint:   Chief Complaint   Patient presents with    Wound Check     Right foot         History of the Present Illness       Pierre Gordon is a 53 y.o. male who presents today for evaluation and treatment for a diabetic and non-healing surgical wound which is located on the right.   S/P Epifix #9      History of Wound: Gas in right foot; S/p right foot I&D and 3rd digit amputation (DOS: 23); status post I&D with application of Integra bilayer and Prevena wound VAC (DOS: 2023)  Wound Type:diabetic and non-healing surgical  Wound Location:right foot  Modifying factors:diabetes    Pain Level: 0   Pain Assessment: None - Denies Pain     Abx :Yes   Cultures :wereobtained  Pain : /10    PAST MEDICAL HISTORY        Diagnosis Date    Anxiety and depression     Chronic back pain     HLD (hyperlipidemia) 11/15/2015    Hypertension     Marijuana abuse 11/15/2015    Nephrolithiasis 2015    Type II or unspecified type diabetes mellitus without mention of complication, not stated as uncontrolled        MEDICATIONS    Current Outpatient Medications on File Prior to Encounter   Medication Sig Dispense Refill    LANTUS SOLOSTAR 100 UNIT/ML injection pen INJECT 35 UNITS SUBCUTANEOUSLY EVERY MORNING BEFORE BREAKFAST 60 mL 0    Insulin Pen Needle (BD ULTRA-FINE PEN NEEDLES) 29G X 12.7MM MISC use daily with insulin 100 each 1    lisinopril (PRINIVIL;ZESTRIL) 10 MG tablet Take 1 tablet by mouth daily 90 tablet 1    meloxicam (MOBIC) 7.5 MG tablet Take 1 tablet by mouth 2 times daily as needed for Pain 60 tablet 0    blood glucose monitor strips Testing once a day.  Please dispense according to patients insurance. 100 strip 3    Lancets MISC Testing once a day.  Please dispense according to patients insurance. 100 each 3    SITagliptin (JANUVIA) 50 MG tablet Take 1

## 2024-01-16 NOTE — PROGRESS NOTES
1000 patient arrives for appointment at wound care today and states \"stepped in puddle on Friday and got foot wet\" Patient verbalized changed dressing.

## 2024-01-16 NOTE — PROGRESS NOTES
Wound Care Supplies      Supply Company:     [employer]    Ordering Center:     [facility]  [address]  [facility] [address]  [phone]  [phone]     Patient Information:      Eli Desai  [address]  [facility] [address]   [phone]   : 1970  AGE: 53 y.o.     GENDER: male   EPISODE DATE: 2024    Insurance:      PRIMARY INSURANCE:  Plan: [employer]  Coverage: [employer]  Effective Date: 2016  Group Number: [unfilled]  Subscriber Number: [id number] - (Medicaid Managed)    Payer/Plan Subscr  Sex Relation Sub. Ins. ID Effective Group Num   1. FIORDALIZA DAVIS* ELI DESAI 1970 Male Self [id number]                                    [address]       Patient Wound Information:      Problem List Items Addressed This Visit          Circulatory    PAD (peripheral artery disease) (HCC)    Relevant Orders    Initiate Outpatient Wound Care Protocol       Endocrine    Type 2 diabetes mellitus with right diabetic foot infection (HCC)    Relevant Orders    Initiate Outpatient Wound Care Protocol    Type 1 diabetes mellitus with foot ulcer (HCC)    Relevant Orders    Initiate Outpatient Wound Care Protocol       Other    Gas gangrene (HCC) - Primary    Relevant Orders    Initiate Outpatient Wound Care Protocol    Necrotizing fasciitis of ankle and foot (HCC)    Relevant Orders    Initiate Outpatient Wound Care Protocol    Ulcerated, foot, right, with necrosis of muscle (HCC)    Relevant Orders    Initiate Outpatient Wound Care Protocol       WOUNDS REQUIRING DRESSING SUPPLIES:     Wound 10/10/23 Foot Right wound #1 right third toe amp site (Active)   Wound Image   24 0901   Wound Etiology Surgical 24 0947   Dressing Status New drainage noted;Old drainage noted 24 0947   Wound Cleansed Soap and water 24 0947   Dressing/Treatment Other (comment)

## 2024-01-21 NOTE — DISCHARGE INSTRUCTIONS
[facility] WOUND and HYPERBARIC TREATMENT  CENTER                            Visit  Discharge Instructions / Physician Orders  DATE: 1/23/24     Home Care:NONE     SUPPLIES ORDERED THRU:   [employer]               DATE LAST SUPPLIED 1/16/24     Wound Location:  Right Foot     Cleanse with: Liquid antibacterial soap and water,rinse well        Dressing Orders:  Fibracol to wound  then folded 4x4 abd pad then wrap with kerlix and ace wrap     Frequency:  Daily       if outer dressing needs to be changed do not go past the silvercel and the tape  Additional Orders: Increase protein to diet (meat, cheese, eggs, fish, peanut butter, nuts and beans)  Multivitamin daily     OFFLOADING [x] YES  TYPE:                  [] NA     Weekly wound care visits until determined otherwise.     Antibiotic therapy-wound care related YES [] NO [x] NA[]   Completed  MY CHART []     Smart Device  []      HYPERBARIC TREATMENT-                TREATMENT #                          Your next appointment with the Wound Care Center is in   January 23,202                                                                                                                                                                                                                                                                                                       (Please note your next appointment above and if you are unable to keep, kindly give a 24 hour notice. Thank you.)  If more than 15 min late we cannot guarantee you will be seen due to clinician schedule  Per Policy, Excessive cancellation will call for dismissal from program.  If you experience any of the following, please call the Wound Care Center during business hours:  [phone]     * Increase in Pain  * Temperature over 101  * Increase in drainage from your wound  * Drainage with a foul odor  * Bleeding  * Increase in swelling  * Need for compression bandage changes due to slippage,

## 2024-01-23 ENCOUNTER — HOSPITAL ENCOUNTER (OUTPATIENT)
Dept: WOUND CARE | Age: 54
Discharge: HOME | End: 2024-01-23

## 2024-01-23 ENCOUNTER — TELEPHONE (OUTPATIENT)
Dept: WOUND CARE | Age: 54
End: 2024-01-23

## 2024-01-23 NOTE — TELEPHONE ENCOUNTER
Patient calling to cancel his [facility] wound care appointment today due to the icy conditions. He was rescheduled for 1-31-24

## 2024-01-28 NOTE — DISCHARGE INSTRUCTIONS
[facility] WOUND and HYPERBARIC TREATMENT  CENTER                            Visit  Discharge Instructions / Physician Orders  DATE: 1/31/24     Home Care:NONE     SUPPLIES ORDERED THRU:   [employer]               DATE LAST SUPPLIED 1/16/24     Wound Location:  Right Foot     Cleanse with: Liquid antibacterial soap and water,rinse well        Dressing Orders: betadine (today in wound care) merritt wound, silvercel into wound,  then folded 4x4 abd pad then wrap with kerlix and ace wrap     Frequency:  Daily       if outer dressing needs to be changed do not go past the silvercel and the tape  Additional Orders: Increase protein to diet (meat, cheese, eggs, fish, peanut butter, nuts and beans)  Multivitamin daily     OFFLOADING [x] YES  TYPE:                  [] NA     Weekly wound care visits until determined otherwise.     Antibiotic therapy-wound care related YES [] NO [x] NA[]   Completed  MY CHART []     Smart Device  []      HYPERBARIC TREATMENT-                TREATMENT #                          Your next appointment with the Wound Care Center is in   1 week, with Shayla, 2 weeks with Dr. Mckeon.                                                                                                                                                                                                                                                                                                      (Please note your next appointment above and if you are unable to keep, kindly give a 24 hour notice. Thank you.)  If more than 15 min late we cannot guarantee you will be seen due to clinician schedule  Per Policy, Excessive cancellation will call for dismissal from program.  If you experience any of the following, please call the Wound Care Center during business hours:  [phone]     * Increase in Pain  * Temperature over 101  * Increase in drainage from your wound  * Drainage with a foul odor  * Bleeding  *

## 2024-01-31 ENCOUNTER — HOSPITAL ENCOUNTER (OUTPATIENT)
Dept: WOUND CARE | Age: 54
Discharge: HOME | End: 2024-01-31
Payer: COMMERCIAL

## 2024-01-31 VITALS
BODY MASS INDEX: 27.63 KG/M2 | HEIGHT: 72 IN | HEART RATE: 69 BPM | DIASTOLIC BLOOD PRESSURE: 77 MMHG | RESPIRATION RATE: 16 BRPM | WEIGHT: 204 LBS | TEMPERATURE: 96.6 F | SYSTOLIC BLOOD PRESSURE: 123 MMHG

## 2024-01-31 DIAGNOSIS — I73.9 PAD (PERIPHERAL ARTERY DISEASE) (HCC): ICD-10-CM

## 2024-01-31 DIAGNOSIS — L97.513 ULCERATED, FOOT, RIGHT, WITH NECROSIS OF MUSCLE (HCC): Primary | ICD-10-CM

## 2024-01-31 DIAGNOSIS — A48.0 GAS GANGRENE (HCC): ICD-10-CM

## 2024-01-31 DIAGNOSIS — E11.628 TYPE 2 DIABETES MELLITUS WITH RIGHT DIABETIC FOOT INFECTION (HCC): ICD-10-CM

## 2024-01-31 DIAGNOSIS — E10.621 TYPE 1 DIABETES MELLITUS WITH FOOT ULCER (HCC): ICD-10-CM

## 2024-01-31 DIAGNOSIS — M72.6 NECROTIZING FASCIITIS OF ANKLE AND FOOT (HCC): ICD-10-CM

## 2024-01-31 DIAGNOSIS — L08.9 TYPE 2 DIABETES MELLITUS WITH RIGHT DIABETIC FOOT INFECTION (HCC): ICD-10-CM

## 2024-01-31 DIAGNOSIS — L97.509 TYPE 1 DIABETES MELLITUS WITH FOOT ULCER (HCC): ICD-10-CM

## 2024-01-31 PROCEDURE — 11042 DBRDMT SUBQ TIS 1ST 20SQCM/<: CPT

## 2024-01-31 PROCEDURE — 11042 DBRDMT SUBQ TIS 1ST 20SQCM/<: CPT | Performed by: NURSE PRACTITIONER

## 2024-01-31 RX ORDER — LIDOCAINE HYDROCHLORIDE 40 MG/ML
SOLUTION TOPICAL ONCE
Status: COMPLETED | OUTPATIENT
Start: 2024-01-31 | End: 2024-01-31

## 2024-01-31 RX ORDER — LIDOCAINE HYDROCHLORIDE 20 MG/ML
JELLY TOPICAL ONCE
Status: CANCELLED | OUTPATIENT
Start: 2024-01-31 | End: 2024-01-31

## 2024-01-31 RX ORDER — LIDOCAINE 40 MG/G
CREAM TOPICAL ONCE
Status: CANCELLED | OUTPATIENT
Start: 2024-01-31 | End: 2024-01-31

## 2024-01-31 RX ORDER — IBUPROFEN 200 MG
TABLET ORAL ONCE
Status: CANCELLED | OUTPATIENT
Start: 2024-01-31 | End: 2024-01-31

## 2024-01-31 RX ORDER — GENTAMICIN SULFATE 1 MG/G
OINTMENT TOPICAL ONCE
Status: CANCELLED | OUTPATIENT
Start: 2024-01-31 | End: 2024-01-31

## 2024-01-31 RX ORDER — GINSENG 100 MG
CAPSULE ORAL ONCE
Status: CANCELLED | OUTPATIENT
Start: 2024-01-31 | End: 2024-01-31

## 2024-01-31 RX ORDER — TRIAMCINOLONE ACETONIDE 1 MG/G
OINTMENT TOPICAL ONCE
Status: CANCELLED | OUTPATIENT
Start: 2024-01-31 | End: 2024-01-31

## 2024-01-31 RX ORDER — LIDOCAINE 50 MG/G
OINTMENT TOPICAL ONCE
Status: CANCELLED | OUTPATIENT
Start: 2024-01-31 | End: 2024-01-31

## 2024-01-31 RX ORDER — BETAMETHASONE DIPROPIONATE 0.05 %
OINTMENT (GRAM) TOPICAL ONCE
Status: CANCELLED | OUTPATIENT
Start: 2024-01-31 | End: 2024-01-31

## 2024-01-31 RX ORDER — LIDOCAINE HYDROCHLORIDE 20 MG/ML
JELLY TOPICAL ONCE
OUTPATIENT
Start: 2024-01-31 | End: 2024-01-31

## 2024-01-31 RX ORDER — SODIUM CHLOR/HYPOCHLOROUS ACID 0.033 %
SOLUTION, IRRIGATION IRRIGATION ONCE
Status: CANCELLED | OUTPATIENT
Start: 2024-01-31 | End: 2024-01-31

## 2024-01-31 RX ORDER — LIDOCAINE HYDROCHLORIDE 40 MG/ML
SOLUTION TOPICAL ONCE
OUTPATIENT
Start: 2024-01-31 | End: 2024-01-31

## 2024-01-31 RX ORDER — BACITRACIN ZINC AND POLYMYXIN B SULFATE 500; 1000 [USP'U]/G; [USP'U]/G
OINTMENT TOPICAL ONCE
Status: CANCELLED | OUTPATIENT
Start: 2024-01-31 | End: 2024-01-31

## 2024-01-31 RX ORDER — CLOBETASOL PROPIONATE 0.5 MG/G
OINTMENT TOPICAL ONCE
Status: CANCELLED | OUTPATIENT
Start: 2024-01-31 | End: 2024-01-31

## 2024-01-31 RX ADMIN — LIDOCAINE HYDROCHLORIDE 5 ML: 40 SOLUTION TOPICAL at 11:13

## 2024-01-31 ASSESSMENT — ENCOUNTER SYMPTOMS
VOMITING: 0
NAUSEA: 0
DIARRHEA: 0
COUGH: 0
SHORTNESS OF BREATH: 0
RHINORRHEA: 0

## 2024-01-31 ASSESSMENT — PAIN SCALES - GENERAL: PAINLEVEL_OUTOF10: 0

## 2024-01-31 NOTE — PROGRESS NOTES
[facility]   Progress Note and Procedure Note      Pierre Gordon  MEDICAL RECORD NUMBER:  [id number]  AGE: 53 y.o.   GENDER: male  : 1970  EPISODE DATE:  2024    Subjective:     Chief Complaint   Patient presents with    Wound Check     Right foot         HISTORY of PRESENT ILLNESS HPI     Pierre Gordon is a 53 y.o. male who presents today for wound/ulcer evaluation.   History of Wound Context: presents in follow up on right 3rd toe amputation site wound. History of diabetes. Finished Epifix applications. Has some maceration surrounding wound - will paint edges with betadine and change to silvercel.   Wound/Ulcer Pain Timing/Severity: none  Quality of pain: N/A  Severity:  0 / 10   Modifying Factors: None  Associated Signs/Symptoms: none    Ulcer Identification:  Ulcer Type: diabetic and non-healing surgical  Contributing Factors: diabetes and poor glucose control         PAST MEDICAL HISTORY        Diagnosis Date    Anxiety and depression     Chronic back pain     HLD (hyperlipidemia) 11/15/2015    Hypertension     Marijuana abuse 11/15/2015    Nephrolithiasis 2015    Type II or unspecified type diabetes mellitus without mention of complication, not stated as uncontrolled        PAST SURGICAL HISTORY    Past Surgical History:   Procedure Laterality Date    FOOT DEBRIDEMENT Left 2023    Incision and drainage left foot performed by Lit Lucero DPM at [facility] OR    FOOT DEBRIDEMENT Right 2023    Incision and drainage down to bone/subfascial, right foot performed by Saadia Mckeon DPM at [facility] OR    FOOT DEBRIDEMENT Right 2023    FOOT DEBRIDEMENT INCISION AND DRAINAGE WITH APPLICATION PREVINA WOUND VAC AND INTEGRA BILAYER GRAFT RIGHT performed by Saadia Mckeon DPM at [facility] OR    FOOT SURGERY  2023    Excision of 3rd metatarsal head performed by Lit Lucero DPM at [facility] OR    HAND SURGERY Right 10/25/2021    HAND INCISION AND DRAINAGE

## 2024-02-06 NOTE — DISCHARGE INSTRUCTIONS
[facility] WOUND and HYPERBARIC TREATMENT  CENTER                            Visit  Discharge Instructions / Physician Orders  DATE: 2/7/24     Home Care:NONE     SUPPLIES ORDERED THRU:   [employer]               DATE LAST SUPPLIED 1/16/24     Wound Location:  Right Foot     Cleanse with: Liquid antibacterial soap and water,rinse well        Dressing Orders: Tuck endoform, silvercel into wound,  then folded 4x4 abd pad then wrap with kerlix and ace wrap     Frequency:   Silvercel and outer dressing Daily       if outer dressing needs to be changed do not go past the silvercel and the tape  Additional Orders: Increase protein to diet (meat, cheese, eggs, fish, peanut butter, nuts and beans)  Multivitamin daily     OFFLOADING [x] YES  TYPE:                  [] NA     Weekly wound care visits until determined otherwise.     Antibiotic therapy-wound care related YES [] NO [x] NA[]   Completed  MY CHART []     Smart Device  []      HYPERBARIC TREATMENT-                TREATMENT #                          Your next appointment with the Wound Care Center is in   1 week with Dr. Mckeon at 9:15                                                                                                                                                                                                                                                                                                   (Please note your next appointment above and if you are unable to keep, kindly give a 24 hour notice. Thank you.)  If more than 15 min late we cannot guarantee you will be seen due to clinician schedule  Per Policy, Excessive cancellation will call for dismissal from program.  If you experience any of the following, please call the Wound Care Center during business hours:  [phone]     * Increase in Pain  * Temperature over 101  * Increase in drainage from your wound  * Drainage with a foul odor  * Bleeding  * Increase in swelling  *

## 2024-02-07 ENCOUNTER — HOSPITAL ENCOUNTER (OUTPATIENT)
Dept: WOUND CARE | Age: 54
Discharge: HOME | End: 2024-02-07
Payer: COMMERCIAL

## 2024-02-07 ENCOUNTER — OFFICE VISIT (OUTPATIENT)
Dept: FAMILY MEDICINE CLINIC | Age: 54
End: 2024-02-07
Payer: COMMERCIAL

## 2024-02-07 VITALS
WEIGHT: 206 LBS | HEIGHT: 72 IN | OXYGEN SATURATION: 98 % | BODY MASS INDEX: 27.9 KG/M2 | SYSTOLIC BLOOD PRESSURE: 118 MMHG | DIASTOLIC BLOOD PRESSURE: 80 MMHG | HEART RATE: 71 BPM

## 2024-02-07 VITALS
TEMPERATURE: 96.3 F | RESPIRATION RATE: 18 BRPM | HEIGHT: 72 IN | BODY MASS INDEX: 27.63 KG/M2 | SYSTOLIC BLOOD PRESSURE: 124 MMHG | WEIGHT: 204 LBS | DIASTOLIC BLOOD PRESSURE: 85 MMHG | HEART RATE: 71 BPM

## 2024-02-07 DIAGNOSIS — F32.A ANXIETY AND DEPRESSION: ICD-10-CM

## 2024-02-07 DIAGNOSIS — E11.628 TYPE 2 DIABETES MELLITUS WITH RIGHT DIABETIC FOOT INFECTION (HCC): ICD-10-CM

## 2024-02-07 DIAGNOSIS — D64.9 ANEMIA, UNSPECIFIED TYPE: ICD-10-CM

## 2024-02-07 DIAGNOSIS — N52.1 ERECTILE DYSFUNCTION DUE TO DISEASES CLASSIFIED ELSEWHERE: ICD-10-CM

## 2024-02-07 DIAGNOSIS — E78.2 MIXED HYPERLIPIDEMIA: ICD-10-CM

## 2024-02-07 DIAGNOSIS — I10 ESSENTIAL HYPERTENSION: ICD-10-CM

## 2024-02-07 DIAGNOSIS — L97.509 TYPE 1 DIABETES MELLITUS WITH FOOT ULCER (HCC): ICD-10-CM

## 2024-02-07 DIAGNOSIS — E10.621 TYPE 1 DIABETES MELLITUS WITH FOOT ULCER (HCC): ICD-10-CM

## 2024-02-07 DIAGNOSIS — M86.9 OSTEOMYELITIS OF THIRD TOE OF LEFT FOOT (HCC): ICD-10-CM

## 2024-02-07 DIAGNOSIS — L08.9 TYPE 2 DIABETES MELLITUS WITH RIGHT DIABETIC FOOT INFECTION (HCC): ICD-10-CM

## 2024-02-07 DIAGNOSIS — E11.628 TYPE 2 DIABETES MELLITUS WITH RIGHT DIABETIC FOOT INFECTION (HCC): Primary | ICD-10-CM

## 2024-02-07 DIAGNOSIS — L08.9 TYPE 2 DIABETES MELLITUS WITH RIGHT DIABETIC FOOT INFECTION (HCC): Primary | ICD-10-CM

## 2024-02-07 DIAGNOSIS — I73.9 PAD (PERIPHERAL ARTERY DISEASE) (HCC): Primary | ICD-10-CM

## 2024-02-07 DIAGNOSIS — Z11.59 ENCOUNTER FOR SCREENING FOR OTHER VIRAL DISEASES: ICD-10-CM

## 2024-02-07 DIAGNOSIS — L97.513 ULCERATED, FOOT, RIGHT, WITH NECROSIS OF MUSCLE (HCC): ICD-10-CM

## 2024-02-07 DIAGNOSIS — F41.9 ANXIETY AND DEPRESSION: ICD-10-CM

## 2024-02-07 DIAGNOSIS — M54.50 ACUTE MIDLINE LOW BACK PAIN, UNSPECIFIED WHETHER SCIATICA PRESENT: ICD-10-CM

## 2024-02-07 PROBLEM — L03.90 CELLULITIS: Status: RESOLVED | Noted: 2023-01-10 | Resolved: 2024-02-07

## 2024-02-07 PROBLEM — M86.172 ACUTE OSTEOMYELITIS OF METATARSAL BONE OF LEFT FOOT (HCC): Status: RESOLVED | Noted: 2023-02-27 | Resolved: 2024-02-07

## 2024-02-07 LAB — HBA1C MFR BLD: 6.9 %

## 2024-02-07 PROCEDURE — G8419 CALC BMI OUT NRM PARAM NOF/U: HCPCS | Performed by: FAMILY MEDICINE

## 2024-02-07 PROCEDURE — 99214 OFFICE O/P EST MOD 30 MIN: CPT | Performed by: FAMILY MEDICINE

## 2024-02-07 PROCEDURE — 3017F COLORECTAL CA SCREEN DOC REV: CPT | Performed by: FAMILY MEDICINE

## 2024-02-07 PROCEDURE — 11042 DBRDMT SUBQ TIS 1ST 20SQCM/<: CPT

## 2024-02-07 PROCEDURE — 83036 HEMOGLOBIN GLYCOSYLATED A1C: CPT | Performed by: FAMILY MEDICINE

## 2024-02-07 PROCEDURE — 3079F DIAST BP 80-89 MM HG: CPT | Performed by: FAMILY MEDICINE

## 2024-02-07 PROCEDURE — 1036F TOBACCO NON-USER: CPT | Performed by: FAMILY MEDICINE

## 2024-02-07 PROCEDURE — 3074F SYST BP LT 130 MM HG: CPT | Performed by: FAMILY MEDICINE

## 2024-02-07 PROCEDURE — 11042 DBRDMT SUBQ TIS 1ST 20SQCM/<: CPT | Performed by: NURSE PRACTITIONER

## 2024-02-07 PROCEDURE — 3044F HG A1C LEVEL LT 7.0%: CPT | Performed by: FAMILY MEDICINE

## 2024-02-07 PROCEDURE — G8484 FLU IMMUNIZE NO ADMIN: HCPCS | Performed by: FAMILY MEDICINE

## 2024-02-07 PROCEDURE — 2022F DILAT RTA XM EVC RTNOPTHY: CPT | Performed by: FAMILY MEDICINE

## 2024-02-07 PROCEDURE — G8427 DOCREV CUR MEDS BY ELIG CLIN: HCPCS | Performed by: FAMILY MEDICINE

## 2024-02-07 RX ORDER — LIDOCAINE HYDROCHLORIDE 40 MG/ML
SOLUTION TOPICAL ONCE
OUTPATIENT
Start: 2024-02-07 | End: 2024-02-07

## 2024-02-07 RX ORDER — LIDOCAINE HYDROCHLORIDE 20 MG/ML
JELLY TOPICAL ONCE
OUTPATIENT
Start: 2024-02-07 | End: 2024-02-07

## 2024-02-07 RX ORDER — LIDOCAINE HYDROCHLORIDE 40 MG/ML
SOLUTION TOPICAL ONCE
Status: COMPLETED | OUTPATIENT
Start: 2024-02-07 | End: 2024-02-07

## 2024-02-07 RX ORDER — SILDENAFIL 50 MG/1
100 TABLET, FILM COATED ORAL DAILY PRN
Qty: 30 TABLET | Refills: 0 | Status: SHIPPED | OUTPATIENT
Start: 2024-02-07

## 2024-02-07 RX ADMIN — LIDOCAINE HYDROCHLORIDE 5 ML: 40 SOLUTION TOPICAL at 10:00

## 2024-02-07 SDOH — ECONOMIC STABILITY: INCOME INSECURITY: HOW HARD IS IT FOR YOU TO PAY FOR THE VERY BASICS LIKE FOOD, HOUSING, MEDICAL CARE, AND HEATING?: NOT HARD AT ALL

## 2024-02-07 SDOH — ECONOMIC STABILITY: FOOD INSECURITY: WITHIN THE PAST 12 MONTHS, YOU WORRIED THAT YOUR FOOD WOULD RUN OUT BEFORE YOU GOT MONEY TO BUY MORE.: NEVER TRUE

## 2024-02-07 SDOH — ECONOMIC STABILITY: FOOD INSECURITY: WITHIN THE PAST 12 MONTHS, THE FOOD YOU BOUGHT JUST DIDN'T LAST AND YOU DIDN'T HAVE MONEY TO GET MORE.: NEVER TRUE

## 2024-02-07 ASSESSMENT — PATIENT HEALTH QUESTIONNAIRE - PHQ9
SUM OF ALL RESPONSES TO PHQ QUESTIONS 1-9: 0
8. MOVING OR SPEAKING SO SLOWLY THAT OTHER PEOPLE COULD HAVE NOTICED. OR THE OPPOSITE, BEING SO FIGETY OR RESTLESS THAT YOU HAVE BEEN MOVING AROUND A LOT MORE THAN USUAL: 0
10. IF YOU CHECKED OFF ANY PROBLEMS, HOW DIFFICULT HAVE THESE PROBLEMS MADE IT FOR YOU TO DO YOUR WORK, TAKE CARE OF THINGS AT HOME, OR GET ALONG WITH OTHER PEOPLE: 0
2. FEELING DOWN, DEPRESSED OR HOPELESS: 0
SUM OF ALL RESPONSES TO PHQ9 QUESTIONS 1 & 2: 0
6. FEELING BAD ABOUT YOURSELF - OR THAT YOU ARE A FAILURE OR HAVE LET YOURSELF OR YOUR FAMILY DOWN: 0
SUM OF ALL RESPONSES TO PHQ QUESTIONS 1-9: 0
SUM OF ALL RESPONSES TO PHQ QUESTIONS 1-9: 0
7. TROUBLE CONCENTRATING ON THINGS, SUCH AS READING THE NEWSPAPER OR WATCHING TELEVISION: 0
4. FEELING TIRED OR HAVING LITTLE ENERGY: 0
SUM OF ALL RESPONSES TO PHQ QUESTIONS 1-9: 0
5. POOR APPETITE OR OVEREATING: 0
1. LITTLE INTEREST OR PLEASURE IN DOING THINGS: 0
9. THOUGHTS THAT YOU WOULD BE BETTER OFF DEAD, OR OF HURTING YOURSELF: 0
3. TROUBLE FALLING OR STAYING ASLEEP: 0

## 2024-02-07 ASSESSMENT — ENCOUNTER SYMPTOMS
CHEST TIGHTNESS: 0
RHINORRHEA: 0
SHORTNESS OF BREATH: 0
BLOOD IN STOOL: 0
RHINORRHEA: 0
SORE THROAT: 0
CONSTIPATION: 0
COLOR CHANGE: 0
WHEEZING: 0
NAUSEA: 0
ABDOMINAL PAIN: 0
EYE REDNESS: 0
STRIDOR: 0
NAUSEA: 0
SINUS PRESSURE: 0
COUGH: 0
DIARRHEA: 0
VOMITING: 0
BACK PAIN: 1
VOMITING: 0
SHORTNESS OF BREATH: 0
ABDOMINAL DISTENTION: 0
RECTAL PAIN: 0
DIARRHEA: 0
COUGH: 0
TROUBLE SWALLOWING: 0

## 2024-02-07 ASSESSMENT — PAIN SCALES - GENERAL: PAINLEVEL_OUTOF10: 0

## 2024-02-07 NOTE — PROGRESS NOTES
Visit Information    Have you changed or started any medications since your last visit including any over-the-counter medicines, vitamins, or herbal medicines? no   Are you having any side effects from any of your medications? -  no  Have you stopped taking any of your medications? Is so, why? -  no    Have you seen any other physician or provider since your last visit? No  Have you had any other diagnostic tests since your last visit? No  Have you been seen in the emergency room and/or had an admission to a hospital since we last saw you? No  Have you had your routine dental cleaning in the past 6 months? yes     Have you activated your [employer] account? If not, what are your barriers? Yes     Patient Care Team:  Kimberly Jessica MD as PCP - General (Family Medicine)  Kimberly Jessica MD as PCP - Empaneled Provider    Medical History Review  Past Medical, Family, and Social History reviewed and does contribute to the patient presenting condition    Health Maintenance   Topic Date Due    Hepatitis B vaccine (1 of 3 - 3-dose series) Never done    COVID-19 Vaccine (1) Never done    HIV screen  Never done    Diabetic Alb to Cr ratio (uACR) test  Never done    Diabetic retinal exam  Never done    Hepatitis C screen  Never done    Colorectal Cancer Screen  Never done    Pneumococcal 0-64 years Vaccine (2 - PCV) 03/13/2019    Shingles vaccine (1 of 2) Never done    Lipids  10/25/2022    Flu vaccine (1) Never done    A1C test (Diabetic or Prediabetic)  09/29/2024    Depression Monitoring  11/06/2024    Diabetic foot exam  11/07/2024    GFR test (Diabetes, CKD 3-4, OR last GFR 15-59)  11/08/2024    DTaP/Tdap/Td vaccine (3 - Td or Tdap) 01/10/2033    Hepatitis A vaccine  Aged Out    Hib vaccine  Aged Out    Polio vaccine  Aged Out    Meningococcal (ACWY) vaccine  Aged Out

## 2024-02-07 NOTE — PROGRESS NOTES
[facility]   Progress Note and Procedure Note      Pierre Gordon  MEDICAL RECORD NUMBER:  [id number]  AGE: 53 y.o.   GENDER: male  : 1970  EPISODE DATE:  2024    Subjective:     Chief Complaint   Patient presents with    Wound Check     Right foot         HISTORY of PRESENT ILLNESS HPI     Pierre Gordon is a 53 y.o. male who presents today for wound/ulcer evaluation.   History of Wound Context: presents in follow up on right 3rd toe amputation site to wound. History of diabetes. Wound is less macerated.   Wound/Ulcer Pain Timing/Severity: none  Quality of pain: N/A  Severity:  0 / 10   Modifying Factors: None  Associated Signs/Symptoms: none    Ulcer Identification:  Ulcer Type: diabetic and non-healing surgical  Contributing Factors: diabetes and poor glucose control         PAST MEDICAL HISTORY        Diagnosis Date    Anxiety and depression     Chronic back pain     HLD (hyperlipidemia) 11/15/2015    Hypertension     Marijuana abuse 11/15/2015    Nephrolithiasis 2015    Type II or unspecified type diabetes mellitus without mention of complication, not stated as uncontrolled        PAST SURGICAL HISTORY    Past Surgical History:   Procedure Laterality Date    FOOT DEBRIDEMENT Left 2023    Incision and drainage left foot performed by Lit Lucero DPM at [facility] OR    FOOT DEBRIDEMENT Right 2023    Incision and drainage down to bone/subfascial, right foot performed by Saadia Mckeon DPM at [facility] OR    FOOT DEBRIDEMENT Right 2023    FOOT DEBRIDEMENT INCISION AND DRAINAGE WITH APPLICATION PREVINA WOUND VAC AND INTEGRA BILAYER GRAFT RIGHT performed by Saadia Mckeon DPM at [facility] OR    FOOT SURGERY  2023    Excision of 3rd metatarsal head performed by Lit Lucero DPM at [facility] OR    HAND SURGERY Right 10/25/2021    HAND INCISION AND DRAINAGE performed by Errol Zambrano MD at [facility] OR    TOE AMPUTATION N/A 2023    TOE 3RD RIGHT

## 2024-02-07 NOTE — PROGRESS NOTES
Chief Complaint   Patient presents with    Diabetes         Pierre BACA Evan  here today for follow up on chronic medical problems, go over labs and/or diagnostic studies, and medication refills. Diabetes      HPI: Patient is scheduled for follow-up on chronic medical conditions and to discuss.  Patient has not done blood work which was ordered on previous appointment.    Type 2 diabetes, fairly controlled but A1c has increased to 6.9 from 5.9.  Patient is currently on long-acting insulin, and also on Januvia.  Patient reports compliance with medications except skips sometimes insulin or takes low-dose.  He is on 35 units of long-acting insulin.  Patient reports symptoms of blood sugars runs low.  Patient was referred to ophthalmologist has not scheduled appointment reports he will call today to schedule appointment.    Diabetic foot infection, and osteomyelitis completed antibiotics.  Patient reports the wound is healing, follows with podiatrist and wound clinic.      Hyperlipidemia, no recent blood work.  Patient is currently on statins Lipitor 20 mg.  Discussed with patient to do blood work all labs reprinted.      Hypertension controlled is currently on lisinopril 10 mg, blood pressure is running normal patient denies any lightheadedness.  Dose was decreased from previous appointment.    Patient has not done Cologuard which was ordered on previous appointment encourage patient to do that.      Mild anemia multifactorial, likely due to chronic medical problems, all labs reprinted.  Discussed with patient to do blood work.      Patient was complaining of back pain on previous appointment, reports the pain has improved.  He was started on meloxicam, reports he is not taking that.  We discussed about the long-term side effects we will discontinue.  Patient reports he wants to hold on any x-rays if the pain gets worse he will call back.      Erectile dysfunction takes Viagra only as needed.      Anxiety and depression

## 2024-02-12 NOTE — DISCHARGE INSTRUCTIONS
[facility]MENT  CENTER                            Visit  Discharge Instructions / Physician Orders  DATE: 2/13/24     Home Care:NONE     SUPPLIES ORDERED THRU:   [employer]               DATE LAST SUPPLIED 1/16/24     Wound Location:  Right Foot     Cleanse with: Liquid antibacterial soap and water,rinse well        Dressing Orders: Tuck endoform, silvercel into wound,  then folded 4x4 abd pad then wrap with kerlix and ace wrap     Frequency:   Silvercel and outer dressing Daily       if outer dressing needs to be changed do not go past the silvercel and the tape  Additional Orders: Increase protein to diet (meat, cheese, eggs, fish, peanut butter, nuts and beans)  Multivitamin daily     OFFLOADING [x] YES  TYPE:                  [] NA     Weekly wound care visits until determined otherwise.     Antibiotic therapy-wound care related YES [] NO [x] NA[]   Completed  MY CHART []     Smart Device  []      HYPERBARIC TREATMENT-                TREATMENT #                          Your next appointment with the Wound Care Center is in   1 week with Dr. Mckeon at 9:15                                                                                                                                                                                                                                                                                                   (Please note your next appointment above and if you are unable to keep, kindly give a 24 hour notice. Thank you.)  If more than 15 min late we cannot guarantee you will be seen due to clinician schedule  Per Policy, Excessive cancellation will call for dismissal from program.  If you experience any of the following, please call the Wound Care Center during business hours:  [phone]     * Increase in Pain  * Temperature over 101  * Increase in drainage from your wound  * Drainage with a foul odor  * Bleeding  * Increase in

## 2024-02-13 ENCOUNTER — HOSPITAL ENCOUNTER (OUTPATIENT)
Dept: WOUND CARE | Age: 54
Discharge: HOME | End: 2024-02-13

## 2024-02-20 NOTE — DISCHARGE INSTRUCTIONS
[facility] WOUND and HYPERBAR[facility] TREATMENT  CENTER                            Visit  Discharge Instructions / Physician Orders  DATE: 2/21/24     Home Care:NONE     SUPPLIES ORDERED THRU:   [employer]               DATE LAST SUPPLIED 1/16/24     Wound Location:  Right Foot     Cleanse with: Liquid antibacterial soap and water,rinse well        Dressing Orders: Tuck fibracol into wound,  then folded 4x4 paper tape,  ace wrap     Frequency:   Daily      Additional Orders: Increase protein to diet (meat, cheese, eggs, fish, peanut butter, nuts and beans)  Multivitamin daily     OFFLOADING [x] YES  TYPE:                  [] NA     Weekly wound care visits until determined otherwise.     Antibiotic therapy-wound care related YES [] NO [x] NA[]   Completed  MY CHART []     Smart Device  []      HYPERBARIC TREATMENT-                TREATMENT #                          Your next appointment with the Wound Care Center is in   1 week with Shayla and 2 weeks with Dr. Mckeon                                                                                                                                                                                                                                                                                           (Please note your next appointment above and if you are unable to keep, kindly give a 24 hour notice. Thank you.)  If more than 15 min late we cannot guarantee you will be seen due to clinician schedule  Per Policy, Excessive cancellation will call for dismissal from program.  If you experience any of the following, please call the Wound Care Center during business hours:  [phone]     * Increase in Pain  * Temperature over 101  * Increase in drainage from your wound  * Drainage with a foul odor  * Bleeding  * Increase in swelling  * Need for compression bandage changes due to slippage, breakthrough drainage.     If you need medical attention outside of the

## 2024-02-21 ENCOUNTER — HOSPITAL ENCOUNTER (OUTPATIENT)
Dept: WOUND CARE | Age: 54
Discharge: HOME | End: 2024-02-21
Payer: COMMERCIAL

## 2024-02-21 VITALS
TEMPERATURE: 95.7 F | DIASTOLIC BLOOD PRESSURE: 94 MMHG | HEIGHT: 72 IN | WEIGHT: 206 LBS | HEART RATE: 77 BPM | RESPIRATION RATE: 17 BRPM | BODY MASS INDEX: 27.9 KG/M2 | SYSTOLIC BLOOD PRESSURE: 146 MMHG

## 2024-02-21 DIAGNOSIS — I73.9 PAD (PERIPHERAL ARTERY DISEASE) (HCC): Primary | ICD-10-CM

## 2024-02-21 DIAGNOSIS — E11.628 TYPE 2 DIABETES MELLITUS WITH RIGHT DIABETIC FOOT INFECTION (HCC): ICD-10-CM

## 2024-02-21 DIAGNOSIS — L97.513 ULCERATED, FOOT, RIGHT, WITH NECROSIS OF MUSCLE (HCC): ICD-10-CM

## 2024-02-21 DIAGNOSIS — L97.512 ULCER OF RIGHT FOOT WITH FAT LAYER EXPOSED (HCC): ICD-10-CM

## 2024-02-21 DIAGNOSIS — L08.9 TYPE 2 DIABETES MELLITUS WITH RIGHT DIABETIC FOOT INFECTION (HCC): ICD-10-CM

## 2024-02-21 PROCEDURE — 11042 DBRDMT SUBQ TIS 1ST 20SQCM/<: CPT | Performed by: NURSE PRACTITIONER

## 2024-02-21 PROCEDURE — 11042 DBRDMT SUBQ TIS 1ST 20SQCM/<: CPT

## 2024-02-21 RX ORDER — LIDOCAINE HYDROCHLORIDE 40 MG/ML
SOLUTION TOPICAL ONCE
OUTPATIENT
Start: 2024-02-21 | End: 2024-02-21

## 2024-02-21 RX ORDER — LIDOCAINE HYDROCHLORIDE 20 MG/ML
JELLY TOPICAL ONCE
OUTPATIENT
Start: 2024-02-21 | End: 2024-02-21

## 2024-02-21 RX ORDER — LIDOCAINE HYDROCHLORIDE 40 MG/ML
SOLUTION TOPICAL ONCE
Status: COMPLETED | OUTPATIENT
Start: 2024-02-21 | End: 2024-02-21

## 2024-02-21 RX ADMIN — LIDOCAINE HYDROCHLORIDE 5 ML: 40 SOLUTION TOPICAL at 10:08

## 2024-02-21 ASSESSMENT — ENCOUNTER SYMPTOMS
RHINORRHEA: 0
VOMITING: 0
COUGH: 0
NAUSEA: 0
SHORTNESS OF BREATH: 0
DIARRHEA: 0

## 2024-02-21 ASSESSMENT — PAIN SCALES - GENERAL: PAINLEVEL_OUTOF10: 0

## 2024-02-21 NOTE — PROGRESS NOTES
[facility]   Progress Note and Procedure Note      Pierre Gordon  MEDICAL RECORD NUMBER:  [id number]  AGE: 53 y.o.   GENDER: male  : 1970  EPISODE DATE:  2024    Subjective:     Chief Complaint   Patient presents with    Wound Check     Right Foot         HISTORY of PRESENT ILLNESS HPI     Pierre Gordon is a 53 y.o. male who presents today for wound/ulcer evaluation.   History of Wound Context: here to follow up on right foot wound at 3rd toe amputation site. History of diabetes. Has large amount of callus surrounding wound.   Wound/Ulcer Pain Timing/Severity: none  Quality of pain: N/A  Severity:  0 / 10   Modifying Factors: None  Associated Signs/Symptoms: none    Ulcer Identification:  Ulcer Type: diabetic and non-healing surgical  Contributing Factors: diabetes and poor glucose control         PAST MEDICAL HISTORY        Diagnosis Date    Anxiety and depression     Chronic back pain     HLD (hyperlipidemia) 11/15/2015    Hypertension     Marijuana abuse 11/15/2015    Nephrolithiasis 2015    Type II or unspecified type diabetes mellitus without mention of complication, not stated as uncontrolled        PAST SURGICAL HISTORY    Past Surgical History:   Procedure Laterality Date    FOOT DEBRIDEMENT Left 2023    Incision and drainage left foot performed by Lit Lucero DPM at [facility] OR    FOOT DEBRIDEMENT Right 2023    Incision and drainage down to bone/subfascial, right foot performed by Saadia Mckeon DPM at [facility] OR    FOOT DEBRIDEMENT Right 2023    FOOT DEBRIDEMENT INCISION AND DRAINAGE WITH APPLICATION PREVINA WOUND VAC AND INTEGRA BILAYER GRAFT RIGHT performed by Saadia Mckeon DPM at [facility] OR    FOOT SURGERY  2023    Excision of 3rd metatarsal head performed by Lit Lucero DPM at [facility] OR    HAND SURGERY Right 10/25/2021    HAND INCISION AND DRAINAGE performed by Errol Zambrano MD at [facility] OR    TOE AMPUTATION N/A 2023

## 2024-02-26 NOTE — DISCHARGE INSTRUCTIONS
[facility] WOUND and HYPERBARIC TREATMENT  CENTER                            Visit  Discharge Instructions / Physician Orders  DATE: 2/27/24     Home Care:NONE     SUPPLIES ORDERED THRU:   [employer]               DATE LAST SUPPLIED 1/16/24     Wound Location:  Right Foot     Cleanse with: Liquid antibacterial soap and water,rinse well        Dressing Orders: Tuck fibracol into wound,  then folded 4x4 paper tape,  ace wrap     Frequency:   Daily      Additional Orders: Increase protein to diet (meat, cheese, eggs, fish, peanut butter, nuts and beans)  Multivitamin daily     OFFLOADING [x] YES  TYPE:                  [] NA     Weekly wound care visits until determined otherwise.     Antibiotic therapy-wound care related YES [] NO [x] NA[]   Completed  MY CHART []     Smart Device  []      HYPERBARIC TREATMENT-                TREATMENT #                          Your next appointment with the Wound Care Center is in   2 weeks with Dr. Mckeon                                                                                                                                                                                                                                                                                   (Please note your next appointment above and if you are unable to keep, kindly give a 24 hour notice. Thank you.)  If more than 15 min late we cannot guarantee you will be seen due to clinician schedule  Per Policy, Excessive cancellation will call for dismissal from program.  If you experience any of the following, please call the Wound Care Center during business hours:  [phone]     * Increase in Pain  * Temperature over 101  * Increase in drainage from your wound  * Drainage with a foul odor  * Bleeding  * Increase in swelling  * Need for compression bandage changes due to slippage, breakthrough drainage.     If you need medical attention outside of the business hours of the Wound Care

## 2024-02-27 ENCOUNTER — HOSPITAL ENCOUNTER (OUTPATIENT)
Dept: WOUND CARE | Age: 54
Discharge: HOME | End: 2024-02-27
Payer: COMMERCIAL

## 2024-02-27 VITALS
RESPIRATION RATE: 18 BRPM | BODY MASS INDEX: 27.9 KG/M2 | TEMPERATURE: 95.7 F | WEIGHT: 206 LBS | DIASTOLIC BLOOD PRESSURE: 77 MMHG | HEIGHT: 72 IN | HEART RATE: 79 BPM | SYSTOLIC BLOOD PRESSURE: 113 MMHG

## 2024-02-27 DIAGNOSIS — I73.9 PAD (PERIPHERAL ARTERY DISEASE) (HCC): Primary | ICD-10-CM

## 2024-02-27 DIAGNOSIS — E11.628 TYPE 2 DIABETES MELLITUS WITH RIGHT DIABETIC FOOT INFECTION (HCC): ICD-10-CM

## 2024-02-27 DIAGNOSIS — L08.9 TYPE 2 DIABETES MELLITUS WITH RIGHT DIABETIC FOOT INFECTION (HCC): ICD-10-CM

## 2024-02-27 DIAGNOSIS — L97.512 ULCER OF RIGHT FOOT WITH FAT LAYER EXPOSED (HCC): ICD-10-CM

## 2024-02-27 PROCEDURE — 99213 OFFICE O/P EST LOW 20 MIN: CPT | Performed by: PODIATRIST

## 2024-02-27 PROCEDURE — 99213 OFFICE O/P EST LOW 20 MIN: CPT

## 2024-02-27 RX ORDER — LIDOCAINE HYDROCHLORIDE 40 MG/ML
SOLUTION TOPICAL ONCE
OUTPATIENT
Start: 2024-02-27 | End: 2024-02-27

## 2024-02-27 RX ORDER — LIDOCAINE HYDROCHLORIDE 40 MG/ML
SOLUTION TOPICAL ONCE
Status: COMPLETED | OUTPATIENT
Start: 2024-02-27 | End: 2024-02-27

## 2024-02-27 RX ORDER — LIDOCAINE HYDROCHLORIDE 20 MG/ML
JELLY TOPICAL ONCE
OUTPATIENT
Start: 2024-02-27 | End: 2024-02-27

## 2024-02-27 RX ADMIN — LIDOCAINE HYDROCHLORIDE 5 ML: 40 SOLUTION TOPICAL at 08:39

## 2024-02-27 ASSESSMENT — PAIN SCALES - GENERAL: PAINLEVEL_OUTOF10: 0

## 2024-02-27 NOTE — PROGRESS NOTES
[facility]   Patient History and Physical      Pierre Gordon  AGE: 53 y.o.   GENDER: male  : 1970  TODAY'S DATE:  2024      Chief Complaint:   Chief Complaint   Patient presents with    Wound Check     Right foot         History of the Present Illness       Pierre Gordon is a 53 y.o. male who presents today for evaluation and treatment for a diabetic and non-healing surgical wound which is located on the right. Minimal drainage. In a regular shoe now. Doing well.   S/P Epifix #9      History of Wound: Gas in right foot; S/p right foot I&D and 3rd digit amputation (DOS: 23); status post I&D with application of Integra bilayer and Prevena wound VAC (DOS: 2023)  Wound Type:diabetic and non-healing surgical  Wound Location:right foot  Modifying factors:diabetes    Pain Level: 0   Pain Assessment: 0-10     Abx :Yes   Cultures :wereobtained  Pain : 6/10    PAST MEDICAL HISTORY        Diagnosis Date    Anxiety and depression     Chronic back pain     HLD (hyperlipidemia) 11/15/2015    Hypertension     Marijuana abuse 11/15/2015    Nephrolithiasis 2015    Type II or unspecified type diabetes mellitus without mention of complication, not stated as uncontrolled        MEDICATIONS    Current Outpatient Medications on File Prior to Encounter   Medication Sig Dispense Refill    sildenafil (VIAGRA) 50 MG tablet Take 2 tablets by mouth daily as needed for Erectile Dysfunction 30 tablet 0    LANTUS SOLOSTAR 100 UNIT/ML injection pen INJECT 35 UNITS SUBCUTANEOUSLY EVERY MORNING BEFORE BREAKFAST 60 mL 0    Insulin Pen Needle (BD ULTRA-FINE PEN NEEDLES) 29G X 12.7MM MISC use daily with insulin 100 each 1    lisinopril (PRINIVIL;ZESTRIL) 10 MG tablet Take 1 tablet by mouth daily 90 tablet 1    blood glucose monitor strips Testing once a day.  Please dispense according to patients insurance. 100 strip 3    Lancets MISC Testing once a day.  Please dispense according to patients insurance. 100 each

## 2024-03-12 ENCOUNTER — HOSPITAL ENCOUNTER (OUTPATIENT)
Dept: WOUND CARE | Age: 54
Discharge: HOME | End: 2024-03-12
Payer: COMMERCIAL

## 2024-03-12 VITALS
WEIGHT: 206 LBS | RESPIRATION RATE: 18 BRPM | BODY MASS INDEX: 27.9 KG/M2 | SYSTOLIC BLOOD PRESSURE: 134 MMHG | HEIGHT: 72 IN | DIASTOLIC BLOOD PRESSURE: 81 MMHG | HEART RATE: 64 BPM | TEMPERATURE: 96.5 F

## 2024-03-12 DIAGNOSIS — I73.9 PAD (PERIPHERAL ARTERY DISEASE) (HCC): ICD-10-CM

## 2024-03-12 DIAGNOSIS — E11.628 TYPE 2 DIABETES MELLITUS WITH RIGHT DIABETIC FOOT INFECTION (HCC): ICD-10-CM

## 2024-03-12 DIAGNOSIS — L08.9 TYPE 2 DIABETES MELLITUS WITH RIGHT DIABETIC FOOT INFECTION (HCC): ICD-10-CM

## 2024-03-12 DIAGNOSIS — L97.512 ULCER OF RIGHT FOOT WITH FAT LAYER EXPOSED (HCC): Primary | ICD-10-CM

## 2024-03-12 PROCEDURE — 99212 OFFICE O/P EST SF 10 MIN: CPT

## 2024-03-12 PROCEDURE — 99213 OFFICE O/P EST LOW 20 MIN: CPT | Performed by: PODIATRIST

## 2024-03-12 RX ORDER — LIDOCAINE HYDROCHLORIDE 40 MG/ML
SOLUTION TOPICAL ONCE
OUTPATIENT
Start: 2024-03-12 | End: 2024-03-12

## 2024-03-12 RX ORDER — LIDOCAINE HYDROCHLORIDE 40 MG/ML
SOLUTION TOPICAL ONCE
Status: COMPLETED | OUTPATIENT
Start: 2024-03-12 | End: 2024-03-12

## 2024-03-12 RX ORDER — LIDOCAINE HYDROCHLORIDE 20 MG/ML
JELLY TOPICAL ONCE
OUTPATIENT
Start: 2024-03-12 | End: 2024-03-12

## 2024-03-12 RX ADMIN — LIDOCAINE HYDROCHLORIDE 5 ML: 40 SOLUTION TOPICAL at 10:15

## 2024-03-12 ASSESSMENT — PAIN SCALES - GENERAL: PAINLEVEL_OUTOF10: 0

## 2024-03-12 NOTE — DISCHARGE INSTRUCTIONS
[facility] WOUND and HYPERBAR[facility]RE[facility]MENT  CENTER                            Visit  Discharge Instructions / Physician Orders  DATE: 3/12/24     Home Care:NONE     SUPPLIES ORDERED THRU:   [employer]               DATE LAST SUPPLIED 1/16/24     Wound Location:  Right Foot     Cleanse with: Liquid antibacterial soap and water,rinse well        Dressing Orders: Tuck fibracol into wound,  then folded 4x4 paper tape,  ace wrap     Frequency:   Daily      Additional Orders: Increase protein to diet (meat, cheese, eggs, fish, peanut butter, nuts and beans)  Multivitamin daily     OFFLOADING [x] YES  TYPE:                  [] NA     Weekly wound care visits until determined otherwise.     Antibiotic therapy-wound care related YES [] NO [x] NA[]   Completed  MY CHART []     Smart Device  []      HYPERBARIC TREATMENT-                TREATMENT #                          Your next appointment with the Wound Care Center is in   4 weeks with Dr. Mckeon                                                                                                                                                                                                                                                                                   (Please note your next appointment above and if you are unable to keep, kindly give a 24 hour notice. Thank you.)  If more than 15 min late we cannot guarantee you will be seen due to clinician schedule  Per Policy, Excessive cancellation will call for dismissal from program.  If you experience any of the following, please call the Wound Care Center during business hours:  [phone]     * Increase in Pain  * Temperature over 101  * Increase in drainage from your wound  * Drainage with a foul odor  * Bleeding  * Increase in swelling  * Need for compression bandage changes due to slippage, breakthrough drainage.     If you need medical attention outside of the business hours of the Wound Care

## 2024-03-12 NOTE — PROGRESS NOTES
[facility]   Patient History and Physical      Pierre Gordon  AGE: 53 y.o.   GENDER: male  : 1970  TODAY'S DATE:  3/12/2024      Chief Complaint:   Chief Complaint   Patient presents with    Wound Check     Right foot         History of the Present Illness       Pierre Gordon is a 53 y.o. male who presents today for evaluation and treatment for a diabetic and non-healing surgical wound which is located on the right. Minimal drainage. In a regular shoe now. Doing well.   S/P Epifix #9      History of Wound: Gas in right foot; S/p right foot I&D and 3rd digit amputation (DOS: 23); status post I&D with application of Integra bilayer and Prevena wound VAC (DOS: 2023)  Wound Type:diabetic and non-healing surgical  Wound Location:right foot  Modifying factors:diabetes    Pain Level: 0   Pain Assessment: None - Denies Pain     Abx :Yes   Cultures :wereobtained  Pain : 6/10    PAST MEDICAL HISTORY        Diagnosis Date    Anxiety and depression     Chronic back pain     HLD (hyperlipidemia) 11/15/2015    Hypertension     Marijuana abuse 11/15/2015    Nephrolithiasis 2015    Type II or unspecified type diabetes mellitus without mention of complication, not stated as uncontrolled        MEDICATIONS    Current Outpatient Medications on File Prior to Encounter   Medication Sig Dispense Refill    sildenafil (VIAGRA) 50 MG tablet Take 2 tablets by mouth daily as needed for Erectile Dysfunction 30 tablet 0    LANTUS SOLOSTAR 100 UNIT/ML injection pen INJECT 35 UNITS SUBCUTANEOUSLY EVERY MORNING BEFORE BREAKFAST 60 mL 0    Insulin Pen Needle (BD ULTRA-FINE PEN NEEDLES) 29G X 12.7MM MISC use daily with insulin 100 each 1    lisinopril (PRINIVIL;ZESTRIL) 10 MG tablet Take 1 tablet by mouth daily 90 tablet 1    blood glucose monitor strips Testing once a day.  Please dispense according to patients insurance. 100 strip 3    Lancets MISC Testing once a day.  Please dispense according to patients

## 2024-03-12 NOTE — PLAN OF CARE
Problem: Chronic Conditions and Co-morbidities  Goal: Patient's chronic conditions and co-morbidity symptoms are monitored and maintained or improved  Outcome: Progressing     Problem: ABCDS Injury Assessment  Goal: Absence of physical injury  Outcome: Progressing  Flowsheets (Taken 3/12/2024 1011 by Dina Gregory, RN)  Absence of Physical Injury: Implement safety measures based on patient assessment     Problem: Falls - Risk of:  Goal: Will remain free from falls  Description: Will remain free from falls  Outcome: Progressing

## 2024-04-01 RX ORDER — UBIQUINOL 100 MG
CAPSULE ORAL
Qty: 100 EACH | Refills: 1 | Status: SHIPPED | OUTPATIENT
Start: 2024-04-01

## 2024-04-02 NOTE — ED PROVIDER NOTES
sensory deficit. Motor: No abnormal muscle tone. Coordination: Coordination normal.      Deep Tendon Reflexes: Reflexes normal.   Psychiatric:         Behavior: Behavior normal.         Thought Content: Thought content normal.         Judgment: Judgment normal.         DIAGNOSTIC RESULTS     RADIOLOGY:All plain film, CT,MRI, and formal ultrasound images (except ED bedside ultrasound) are read by the radiologist and the interpretations are directly viewed by the emergency physician. No results found. LABS: All lab results were reviewed by myself, and all abnormals are listed below. Labs Reviewed   CBC WITH AUTO DIFFERENTIAL - Abnormal; Notable for the following components:       Result Value    Seg Neutrophils 72 (*)     Lymphocytes 18 (*)     All other components within normal limits   COMPREHENSIVE METABOLIC PANEL - Abnormal; Notable for the following components:    Glucose 611 (*)     BUN 24 (*)     Sodium 130 (*)     Chloride 91 (*)     All other components within normal limits   BETA-HYDROXYBUTYRATE - Abnormal; Notable for the following components:    Beta-Hydroxybutyrate 0.28 (*)     All other components within normal limits   POC GLUCOSE FINGERSTICK - Abnormal; Notable for the following components:    POC Glucose 523 (*)     All other components within normal limits   POC GLUCOSE FINGERSTICK - Abnormal; Notable for the following components:    POC Glucose 492 (*)     All other components within normal limits   POC GLUCOSE FINGERSTICK - Abnormal; Notable for the following components:    POC Glucose 326 (*)     All other components within normal limits         MEDICAL DECISION MAKING:     Patient will need to have a sugar taken care of with insulin and we will check some basic labs but it does not appear that he would be in DKA at this time. I believe the patient will need to be placed on more medication but will leave that to the PCP.       EMERGENCY DEPARTMENT COURSE:   Vitals:    Vitals: [facility]

## 2024-06-10 ENCOUNTER — OFFICE VISIT (OUTPATIENT)
Dept: FAMILY MEDICINE CLINIC | Age: 54
End: 2024-06-10
Payer: COMMERCIAL

## 2024-06-10 VITALS
OXYGEN SATURATION: 95 % | HEART RATE: 63 BPM | DIASTOLIC BLOOD PRESSURE: 88 MMHG | WEIGHT: 209.6 LBS | SYSTOLIC BLOOD PRESSURE: 116 MMHG | BODY MASS INDEX: 28.39 KG/M2 | HEIGHT: 72 IN

## 2024-06-10 DIAGNOSIS — F41.9 ANXIETY AND DEPRESSION: ICD-10-CM

## 2024-06-10 DIAGNOSIS — E78.2 MIXED HYPERLIPIDEMIA: ICD-10-CM

## 2024-06-10 DIAGNOSIS — I10 ESSENTIAL HYPERTENSION: ICD-10-CM

## 2024-06-10 DIAGNOSIS — F32.A ANXIETY AND DEPRESSION: ICD-10-CM

## 2024-06-10 DIAGNOSIS — Z12.11 COLON CANCER SCREENING: ICD-10-CM

## 2024-06-10 DIAGNOSIS — L08.9 TYPE 2 DIABETES MELLITUS WITH RIGHT DIABETIC FOOT INFECTION (HCC): Primary | ICD-10-CM

## 2024-06-10 DIAGNOSIS — E11.628 TYPE 2 DIABETES MELLITUS WITH RIGHT DIABETIC FOOT INFECTION (HCC): Primary | ICD-10-CM

## 2024-06-10 DIAGNOSIS — D64.9 ANEMIA, UNSPECIFIED TYPE: ICD-10-CM

## 2024-06-10 DIAGNOSIS — N52.1 ERECTILE DYSFUNCTION DUE TO DISEASES CLASSIFIED ELSEWHERE: ICD-10-CM

## 2024-06-10 PROBLEM — M86.9 OSTEOMYELITIS OF THIRD TOE OF LEFT FOOT (HCC): Status: RESOLVED | Noted: 2023-02-27 | Resolved: 2024-06-10

## 2024-06-10 PROCEDURE — G8427 DOCREV CUR MEDS BY ELIG CLIN: HCPCS | Performed by: FAMILY MEDICINE

## 2024-06-10 PROCEDURE — 1036F TOBACCO NON-USER: CPT | Performed by: FAMILY MEDICINE

## 2024-06-10 PROCEDURE — 3079F DIAST BP 80-89 MM HG: CPT | Performed by: FAMILY MEDICINE

## 2024-06-10 PROCEDURE — 3074F SYST BP LT 130 MM HG: CPT | Performed by: FAMILY MEDICINE

## 2024-06-10 PROCEDURE — 99214 OFFICE O/P EST MOD 30 MIN: CPT | Performed by: FAMILY MEDICINE

## 2024-06-10 PROCEDURE — 2022F DILAT RTA XM EVC RTNOPTHY: CPT | Performed by: FAMILY MEDICINE

## 2024-06-10 PROCEDURE — 3017F COLORECTAL CA SCREEN DOC REV: CPT | Performed by: FAMILY MEDICINE

## 2024-06-10 PROCEDURE — 3044F HG A1C LEVEL LT 7.0%: CPT | Performed by: FAMILY MEDICINE

## 2024-06-10 PROCEDURE — G8419 CALC BMI OUT NRM PARAM NOF/U: HCPCS | Performed by: FAMILY MEDICINE

## 2024-06-10 RX ORDER — SILDENAFIL 50 MG/1
100 TABLET, FILM COATED ORAL DAILY PRN
Qty: 30 TABLET | Refills: 0 | Status: SHIPPED | OUTPATIENT
Start: 2024-06-10

## 2024-06-10 RX ORDER — PEN NEEDLE, DIABETIC 29 G X1/2"
NEEDLE, DISPOSABLE MISCELLANEOUS
Qty: 100 EACH | Refills: 1 | Status: SHIPPED | OUTPATIENT
Start: 2024-06-10

## 2024-06-10 RX ORDER — UBIQUINOL 100 MG
CAPSULE ORAL
Qty: 100 EACH | Refills: 1 | Status: SHIPPED | OUTPATIENT
Start: 2024-06-10

## 2024-06-10 RX ORDER — INSULIN GLARGINE 100 [IU]/ML
INJECTION, SOLUTION SUBCUTANEOUS
Qty: 60 ML | Refills: 2 | Status: SHIPPED | OUTPATIENT
Start: 2024-06-10

## 2024-06-10 RX ORDER — LISINOPRIL 10 MG/1
10 TABLET ORAL DAILY
Qty: 90 TABLET | Refills: 1 | Status: SHIPPED | OUTPATIENT
Start: 2024-06-10

## 2024-06-10 ASSESSMENT — ENCOUNTER SYMPTOMS
EYE REDNESS: 0
ABDOMINAL PAIN: 0
SINUS PRESSURE: 0
CHEST TIGHTNESS: 0
SHORTNESS OF BREATH: 0
RHINORRHEA: 0
COUGH: 0
VOMITING: 0
SORE THROAT: 0
ABDOMINAL DISTENTION: 0
RECTAL PAIN: 0
DIARRHEA: 0
BACK PAIN: 0
STRIDOR: 0
BLOOD IN STOOL: 0
CONSTIPATION: 0
NAUSEA: 0
WHEEZING: 0
TROUBLE SWALLOWING: 0
COLOR CHANGE: 0

## 2024-06-10 NOTE — PROGRESS NOTES
Only takes metformin once and a while due to diarrhea.   
inadvertent computerized transcription errors may be present. Althoughevery effort was made to ensure accuracy, no guarantees can be provided that every mistake has been identified and corrected by editing.  Electronically signed by Kimberly Jessica MD on 6/10/2024  12:50 PM

## 2024-06-10 NOTE — TELEPHONE ENCOUNTER
Please Approve or Refuse.  Send to Pharmacy per Pt's Request:      Next Visit Date:  6/10/2024   Last Visit Date: 2/7/2024    Hemoglobin A1C (%)   Date Value   02/07/2024 6.9   09/29/2023 5.9   01/30/2023 5.6             ( goal A1C is < 7)   BP Readings from Last 3 Encounters:   03/12/24 134/81   02/27/24 113/77   02/21/24 (!) 146/94          (goal 120/80)  BUN   Date Value Ref Range Status   11/08/2023 21 (H) 6 - 20 mg/dL Final     Creatinine   Date Value Ref Range Status   11/08/2023 0.8 0.7 - 1.2 mg/dL Final     Potassium   Date Value Ref Range Status   11/08/2023 4.1 3.7 - 5.3 mmol/L Final

## 2024-06-10 NOTE — PATIENT INSTRUCTIONS
Thank you for choosing [facility].  We know you have options when it comes to your healthcare; we appreciate that you chose us. Our goal is to provide exceptional  service and world class care to every patient.  You will be receiving a survey via email or text message asking for your feedback.  Please take a few minutes to share your thoughts about your recent visit. Your comments help us understand what we do well and ways we can improve.  Thank you in advance for your valuable feedback.

## 2024-07-16 NOTE — DISCHARGE INSTRUCTIONS
Colonoscopy/EGD scheduled 7/1924    Quiana from  Cardiology called, Per Dr. Sousa, patients Plavix should NOT be stopped.  If it must be, he needs to be placed on aspirin which has a higher risk of bleeding.  Quiana will fax to office as well.     Podiatry:  -Please make an attended follow-up visit with Dr. Julien Jones outpatient 1 week after discharge  -Please keep your wound VAC dry clean and intact at all times  -Please keep your lower extremity wounds dry clean and covered at all times until follow-up visit  -Please take all prescribed medications as instructed  -Please restart all home medications as prescribed  -Please return to the hospital if any of the following local signs of infection arise: Increased/streaking redness, pain, swelling, drainage or malodor  -Please return to the hospital for any the following systemic signs of infection arise: Nausea, vomiting, fever, chills, diarrhea, shortness of breath or chest pain

## 2024-09-12 DIAGNOSIS — E11.628 TYPE 2 DIABETES MELLITUS WITH RIGHT DIABETIC FOOT INFECTION (HCC): ICD-10-CM

## 2024-09-12 DIAGNOSIS — L08.9 TYPE 2 DIABETES MELLITUS WITH RIGHT DIABETIC FOOT INFECTION (HCC): ICD-10-CM

## 2024-09-12 RX ORDER — LANCETS 28 GAUGE
EACH MISCELLANEOUS
Qty: 100 EACH | Refills: 3 | Status: SHIPPED | OUTPATIENT
Start: 2024-09-12

## 2024-09-12 RX ORDER — GLUCOSAMINE HCL/CHONDROITIN SU 500-400 MG
CAPSULE ORAL
Qty: 100 STRIP | Refills: 3 | Status: SHIPPED | OUTPATIENT
Start: 2024-09-12

## 2024-10-16 ENCOUNTER — OFFICE VISIT (OUTPATIENT)
Dept: FAMILY MEDICINE CLINIC | Age: 54
End: 2024-10-16
Payer: COMMERCIAL

## 2024-10-16 ENCOUNTER — HOSPITAL ENCOUNTER (OUTPATIENT)
Age: 54
Discharge: HOME | End: 2024-10-16
Payer: COMMERCIAL

## 2024-10-16 VITALS
OXYGEN SATURATION: 98 % | BODY MASS INDEX: 28.99 KG/M2 | HEART RATE: 76 BPM | WEIGHT: 214 LBS | DIASTOLIC BLOOD PRESSURE: 80 MMHG | SYSTOLIC BLOOD PRESSURE: 120 MMHG | HEIGHT: 72 IN

## 2024-10-16 DIAGNOSIS — I10 ESSENTIAL HYPERTENSION: ICD-10-CM

## 2024-10-16 DIAGNOSIS — E78.2 MIXED HYPERLIPIDEMIA: ICD-10-CM

## 2024-10-16 DIAGNOSIS — N52.1 ERECTILE DYSFUNCTION DUE TO DISEASES CLASSIFIED ELSEWHERE: ICD-10-CM

## 2024-10-16 DIAGNOSIS — Z12.11 COLON CANCER SCREENING: ICD-10-CM

## 2024-10-16 DIAGNOSIS — L08.9 TYPE 2 DIABETES MELLITUS WITH RIGHT DIABETIC FOOT INFECTION (HCC): ICD-10-CM

## 2024-10-16 DIAGNOSIS — E55.9 VITAMIN D DEFICIENCY: ICD-10-CM

## 2024-10-16 DIAGNOSIS — Z12.5 PROSTATE CANCER SCREENING: ICD-10-CM

## 2024-10-16 DIAGNOSIS — E11.628 TYPE 2 DIABETES MELLITUS WITH RIGHT DIABETIC FOOT INFECTION (HCC): ICD-10-CM

## 2024-10-16 DIAGNOSIS — I73.9 PAD (PERIPHERAL ARTERY DISEASE) (HCC): ICD-10-CM

## 2024-10-16 DIAGNOSIS — L08.9 TYPE 2 DIABETES MELLITUS WITH RIGHT DIABETIC FOOT INFECTION (HCC): Primary | ICD-10-CM

## 2024-10-16 DIAGNOSIS — E11.628 TYPE 2 DIABETES MELLITUS WITH RIGHT DIABETIC FOOT INFECTION (HCC): Primary | ICD-10-CM

## 2024-10-16 DIAGNOSIS — Z11.59 ENCOUNTER FOR SCREENING FOR OTHER VIRAL DISEASES: ICD-10-CM

## 2024-10-16 DIAGNOSIS — F32.4 MAJOR DEPRESSIVE DISORDER WITH SINGLE EPISODE, IN PARTIAL REMISSION (HCC): ICD-10-CM

## 2024-10-16 LAB
CREAT UR-MCNC: 176 MG/DL (ref 39–259)
EST. AVERAGE GLUCOSE BLD GHB EST-MCNC: 120 MG/DL
HBA1C MFR BLD: 5.8 % (ref 4–6)
HBV SURFACE AB SERPL IA-ACNC: >1000 MIU/ML
MICROALBUMIN UR-MCNC: 15 MG/L (ref 0–20)
MICROALBUMIN/CREAT UR-RTO: 9 MCG/MG CREAT (ref 0–17)

## 2024-10-16 PROCEDURE — 2022F DILAT RTA XM EVC RTNOPTHY: CPT | Performed by: FAMILY MEDICINE

## 2024-10-16 PROCEDURE — G8419 CALC BMI OUT NRM PARAM NOF/U: HCPCS | Performed by: FAMILY MEDICINE

## 2024-10-16 PROCEDURE — 86317 IMMUNOASSAY INFECTIOUS AGENT: CPT

## 2024-10-16 PROCEDURE — 3079F DIAST BP 80-89 MM HG: CPT | Performed by: FAMILY MEDICINE

## 2024-10-16 PROCEDURE — 82570 ASSAY OF URINE CREATININE: CPT

## 2024-10-16 PROCEDURE — 3017F COLORECTAL CA SCREEN DOC REV: CPT | Performed by: FAMILY MEDICINE

## 2024-10-16 PROCEDURE — 36415 COLL VENOUS BLD VENIPUNCTURE: CPT

## 2024-10-16 PROCEDURE — 3044F HG A1C LEVEL LT 7.0%: CPT | Performed by: FAMILY MEDICINE

## 2024-10-16 PROCEDURE — 82043 UR ALBUMIN QUANTITATIVE: CPT

## 2024-10-16 PROCEDURE — 1036F TOBACCO NON-USER: CPT | Performed by: FAMILY MEDICINE

## 2024-10-16 PROCEDURE — 83036 HEMOGLOBIN GLYCOSYLATED A1C: CPT

## 2024-10-16 PROCEDURE — G8427 DOCREV CUR MEDS BY ELIG CLIN: HCPCS | Performed by: FAMILY MEDICINE

## 2024-10-16 PROCEDURE — 99214 OFFICE O/P EST MOD 30 MIN: CPT | Performed by: FAMILY MEDICINE

## 2024-10-16 PROCEDURE — G8484 FLU IMMUNIZE NO ADMIN: HCPCS | Performed by: FAMILY MEDICINE

## 2024-10-16 PROCEDURE — 3074F SYST BP LT 130 MM HG: CPT | Performed by: FAMILY MEDICINE

## 2024-10-16 RX ORDER — ATORVASTATIN CALCIUM 20 MG/1
20 TABLET, FILM COATED ORAL DAILY
Qty: 90 TABLET | Refills: 1 | Status: SHIPPED | OUTPATIENT
Start: 2024-10-16

## 2024-10-16 RX ORDER — SILDENAFIL 100 MG/1
50 TABLET, FILM COATED ORAL PRN
Qty: 30 TABLET | Refills: 0 | Status: SHIPPED | OUTPATIENT
Start: 2024-10-16

## 2024-10-16 RX ORDER — UBIQUINOL 100 MG
CAPSULE ORAL
Qty: 100 EACH | Refills: 1 | Status: SHIPPED | OUTPATIENT
Start: 2024-10-16

## 2024-10-16 ASSESSMENT — ENCOUNTER SYMPTOMS
ABDOMINAL PAIN: 0
NAUSEA: 0
RECTAL PAIN: 0
SORE THROAT: 0
WHEEZING: 0
COLOR CHANGE: 0
SHORTNESS OF BREATH: 0
SINUS PRESSURE: 0
ABDOMINAL DISTENTION: 0
DIARRHEA: 0
COUGH: 0
EYE REDNESS: 0
CONSTIPATION: 0
BLOOD IN STOOL: 0
STRIDOR: 0
CHEST TIGHTNESS: 0
VOMITING: 0
TROUBLE SWALLOWING: 0
RHINORRHEA: 0

## 2024-10-16 NOTE — PROGRESS NOTES
Visit Information    Have you changed or started any medications since your last visit including any over-the-counter medicines, vitamins, or herbal medicines? no   Are you having any side effects from any of your medications? -  no  Have you stopped taking any of your medications? Is so, why? -  no    Have you seen any other physician or provider since your last visit? No  Have you had any other diagnostic tests since your last visit? No  Have you been seen in the emergency room and/or had an admission to a hospital since we last saw you? No  Have you had your routine dental cleaning in the past 6 months? yes     Have you activated your [email or web address] account? If not, what are your barriers? Yes     Patient Care Team:  Kimberly Jessica MD as PCP - General (Family Medicine)  Kimberly Jessica MD as PCP - Empaneled Provider    Medical History Review  Past Medical, Family, and Social History reviewed and does contribute to the patient presenting condition    Health Maintenance   Topic Date Due    HIV screen  Never done    Diabetic retinal exam  Never done    Hepatitis C screen  Never done    Hepatitis B vaccine (1 of 3 - 19+ 3-dose series) Never done    Colorectal Cancer Screen  Never done    Pneumococcal 0-64 years Vaccine (2 of 2 - PCV) 03/13/2019    Shingles vaccine (1 of 2) Never done    Lipids  10/25/2022    Flu vaccine (1) Never done    COVID-19 Vaccine (1 - 2023-24 season) Never done    Diabetic foot exam  11/07/2024    GFR test (Diabetes, CKD 3-4, OR last GFR 15-59)  11/08/2024    Depression Monitoring  02/07/2025    A1C test (Diabetic or Prediabetic)  10/16/2025    Diabetic Alb to Cr ratio (uACR) test  10/16/2025    DTaP/Tdap/Td vaccine (3 - Td or Tdap) 01/10/2033    Hepatitis A vaccine  Aged Out    Hib vaccine  Aged Out    Polio vaccine  Aged Out    Meningococcal (ACWY) vaccine  Aged Out     
Essential hypertension  Controlled, continue current treatment.  Watch for side effects    3. Mixed hyperlipidemia  No recent blood work labs reprinted statins refilled.  - Lipid Panel; Future    4. Major depressive disorder with single episode, in partial remission (HCC)  Stable off of medications continue to monitor    5. PAD (peripheral artery disease) (HCC)  Stable, continue to work on smoking.    6. Prostate cancer screening    - PSA Screening; Future    7. Colon cancer screening    - POCT Fecal Immunochemical Test (FIT); Future    8. Vitamin D deficiency    - Vitamin D 25 Hydroxy; Future    9. Erectile dysfunction due to diseases classified elsewhere    - sildenafil (VIAGRA) 100 MG tablet; Take 0.5 tablets by mouth as needed for Erectile Dysfunction  Dispense: 30 tablet; Refill: 0      Orders Placed This Encounter   Procedures    CBC with Auto Differential     Standing Status:   Future     Standing Expiration Date:   10/17/2025    Comprehensive Metabolic Panel     Fasting 8 hrs     Standing Status:   Future     Standing Expiration Date:   10/16/2025    Lipid Panel     Standing Status:   Future     Standing Expiration Date:   10/16/2025     Order Specific Question:   Is Patient Fasting?/# of Hours     Answer:   yes, 8-10 hours    Magnesium     Standing Status:   Future     Standing Expiration Date:   10/16/2025    PSA Screening     Standing Status:   Future     Standing Expiration Date:   10/16/2025    Uric Acid     Standing Status:   Future     Standing Expiration Date:   10/16/2025    TSH     Standing Status:   Future     Standing Expiration Date:   10/16/2025    Vitamin D 25 Hydroxy     Standing Status:   Future     Standing Expiration Date:   10/16/2025    Vitamin B12 & Folate     Standing Status:   Future     Standing Expiration Date:   10/16/2025    [facility] - Rangel Sotelo MD, Ophthalmology, [place]     Referral Priority:   Routine     Referral Type:   Eval and Treat     Referral Reason:   Specialty Services

## 2024-10-25 DIAGNOSIS — L08.9 TYPE 2 DIABETES MELLITUS WITH RIGHT DIABETIC FOOT INFECTION (HCC): ICD-10-CM

## 2024-10-25 DIAGNOSIS — E11.628 TYPE 2 DIABETES MELLITUS WITH RIGHT DIABETIC FOOT INFECTION (HCC): ICD-10-CM

## 2024-10-25 RX ORDER — LANCETS 30 GAUGE
1 EACH MISCELLANEOUS DAILY
Qty: 100 EACH | Refills: 5 | Status: SHIPPED | OUTPATIENT
Start: 2024-10-25

## 2024-10-25 RX ORDER — UBIQUINOL 100 MG
CAPSULE ORAL
Qty: 100 EACH | Refills: 1 | Status: SHIPPED | OUTPATIENT
Start: 2024-10-25

## 2024-10-25 RX ORDER — GLUCOSAMINE HCL/CHONDROITIN SU 500-400 MG
CAPSULE ORAL
Qty: 100 STRIP | Refills: 3 | Status: SHIPPED | OUTPATIENT
Start: 2024-10-25

## 2024-10-25 NOTE — TELEPHONE ENCOUNTER
Please Approve or Refuse.  Send to Pharmacy per Pt's Request: Needed an updated script for DM supplies     Next Visit Date:  2/17/2025   Last Visit Date: 10/16/2024    Hemoglobin A1C (%)   Date Value   10/16/2024 5.8   02/07/2024 6.9   09/29/2023 5.9             ( goal A1C is < 7)   BP Readings from Last 3 Encounters:   10/16/24 120/80   06/10/24 116/88   03/12/24 134/81          (goal 120/80)  BUN   Date Value Ref Range Status   11/08/2023 21 (H) 6 - 20 mg/dL Final     Creatinine   Date Value Ref Range Status   11/08/2023 0.8 0.7 - 1.2 mg/dL Final     Potassium   Date Value Ref Range Status   11/08/2023 4.1 3.7 - 5.3 mmol/L Final

## 2024-12-30 DIAGNOSIS — N52.1 ERECTILE DYSFUNCTION DUE TO DISEASES CLASSIFIED ELSEWHERE: ICD-10-CM

## 2024-12-30 DIAGNOSIS — L08.9 TYPE 2 DIABETES MELLITUS WITH RIGHT DIABETIC FOOT INFECTION (HCC): ICD-10-CM

## 2024-12-30 DIAGNOSIS — E11.628 TYPE 2 DIABETES MELLITUS WITH RIGHT DIABETIC FOOT INFECTION (HCC): ICD-10-CM

## 2024-12-30 RX ORDER — INSULIN GLARGINE 100 [IU]/ML
INJECTION, SOLUTION SUBCUTANEOUS
Qty: 60 ML | Refills: 2 | Status: SHIPPED | OUTPATIENT
Start: 2024-12-30

## 2024-12-30 RX ORDER — PEN NEEDLE, DIABETIC 29 G X1/2"
NEEDLE, DISPOSABLE MISCELLANEOUS
Qty: 100 EACH | Refills: 1 | Status: SHIPPED | OUTPATIENT
Start: 2024-12-30

## 2024-12-30 RX ORDER — SILDENAFIL 100 MG/1
50 TABLET, FILM COATED ORAL PRN
Qty: 30 TABLET | Refills: 0 | Status: SHIPPED | OUTPATIENT
Start: 2024-12-30

## 2024-12-30 NOTE — TELEPHONE ENCOUNTER
Please Approve or Refuse.  Send to Pharmacy per Pt's Request:      Next Visit Date:  2/17/2025   Last Visit Date: 10/16/2024    Hemoglobin A1C (%)   Date Value   10/16/2024 5.8   02/07/2024 6.9   09/29/2023 5.9             ( goal A1C is < 7)   BP Readings from Last 3 Encounters:   10/16/24 120/80   06/10/24 116/88   03/12/24 134/81          (goal 120/80)  BUN   Date Value Ref Range Status   11/08/2023 21 (H) 6 - 20 mg/dL Final     Creatinine   Date Value Ref Range Status   11/08/2023 0.8 0.7 - 1.2 mg/dL Final     Potassium   Date Value Ref Range Status   11/08/2023 4.1 3.7 - 5.3 mmol/L Final

## 2025-02-17 ENCOUNTER — OFFICE VISIT (OUTPATIENT)
Dept: FAMILY MEDICINE CLINIC | Age: 55
End: 2025-02-17
Payer: COMMERCIAL

## 2025-02-17 ENCOUNTER — TELEPHONE (OUTPATIENT)
Dept: PODIATRY | Age: 55
End: 2025-02-17

## 2025-02-17 VITALS
DIASTOLIC BLOOD PRESSURE: 80 MMHG | SYSTOLIC BLOOD PRESSURE: 138 MMHG | WEIGHT: 204 LBS | HEART RATE: 71 BPM | OXYGEN SATURATION: 98 % | HEIGHT: 72 IN | BODY MASS INDEX: 27.63 KG/M2

## 2025-02-17 DIAGNOSIS — Z11.59 ENCOUNTER FOR SCREENING FOR OTHER VIRAL DISEASES: ICD-10-CM

## 2025-02-17 DIAGNOSIS — I10 ESSENTIAL HYPERTENSION: ICD-10-CM

## 2025-02-17 DIAGNOSIS — A48.0 GAS GANGRENE (HCC): ICD-10-CM

## 2025-02-17 DIAGNOSIS — E78.2 MIXED HYPERLIPIDEMIA: ICD-10-CM

## 2025-02-17 DIAGNOSIS — E11.628 TYPE 2 DIABETES MELLITUS WITH RIGHT DIABETIC FOOT INFECTION (HCC): Primary | ICD-10-CM

## 2025-02-17 DIAGNOSIS — L08.9 TYPE 2 DIABETES MELLITUS WITH RIGHT DIABETIC FOOT INFECTION (HCC): Primary | ICD-10-CM

## 2025-02-17 PROBLEM — D64.9 ANEMIA: Status: RESOLVED | Noted: 2024-02-07 | Resolved: 2025-02-17

## 2025-02-17 LAB — HBA1C MFR BLD: 5.8 %

## 2025-02-17 PROCEDURE — 3017F COLORECTAL CA SCREEN DOC REV: CPT | Performed by: FAMILY MEDICINE

## 2025-02-17 PROCEDURE — 99214 OFFICE O/P EST MOD 30 MIN: CPT | Performed by: FAMILY MEDICINE

## 2025-02-17 PROCEDURE — 3044F HG A1C LEVEL LT 7.0%: CPT | Performed by: FAMILY MEDICINE

## 2025-02-17 PROCEDURE — 3079F DIAST BP 80-89 MM HG: CPT | Performed by: FAMILY MEDICINE

## 2025-02-17 PROCEDURE — G8427 DOCREV CUR MEDS BY ELIG CLIN: HCPCS | Performed by: FAMILY MEDICINE

## 2025-02-17 PROCEDURE — 2022F DILAT RTA XM EVC RTNOPTHY: CPT | Performed by: FAMILY MEDICINE

## 2025-02-17 PROCEDURE — 1036F TOBACCO NON-USER: CPT | Performed by: FAMILY MEDICINE

## 2025-02-17 PROCEDURE — 83036 HEMOGLOBIN GLYCOSYLATED A1C: CPT | Performed by: FAMILY MEDICINE

## 2025-02-17 PROCEDURE — 3075F SYST BP GE 130 - 139MM HG: CPT | Performed by: FAMILY MEDICINE

## 2025-02-17 PROCEDURE — G8419 CALC BMI OUT NRM PARAM NOF/U: HCPCS | Performed by: FAMILY MEDICINE

## 2025-02-17 RX ORDER — UBIQUINOL 100 MG
CAPSULE ORAL
Qty: 100 EACH | Refills: 1 | Status: SHIPPED | OUTPATIENT
Start: 2025-02-17

## 2025-02-17 RX ORDER — INSULIN GLARGINE 100 [IU]/ML
INJECTION, SOLUTION SUBCUTANEOUS
Qty: 60 ML | Refills: 2 | Status: SHIPPED | OUTPATIENT
Start: 2025-02-17

## 2025-02-17 RX ORDER — ATORVASTATIN CALCIUM 20 MG/1
20 TABLET, FILM COATED ORAL DAILY
Qty: 90 TABLET | Refills: 1 | Status: SHIPPED | OUTPATIENT
Start: 2025-02-17

## 2025-02-17 RX ORDER — LISINOPRIL 5 MG/1
5 TABLET ORAL DAILY
Qty: 90 TABLET | Refills: 1 | Status: SHIPPED | OUTPATIENT
Start: 2025-02-17

## 2025-02-17 SDOH — ECONOMIC STABILITY: FOOD INSECURITY: WITHIN THE PAST 12 MONTHS, YOU WORRIED THAT YOUR FOOD WOULD RUN OUT BEFORE YOU GOT MONEY TO BUY MORE.: NEVER TRUE

## 2025-02-17 SDOH — ECONOMIC STABILITY: FOOD INSECURITY: WITHIN THE PAST 12 MONTHS, THE FOOD YOU BOUGHT JUST DIDN'T LAST AND YOU DIDN'T HAVE MONEY TO GET MORE.: NEVER TRUE

## 2025-02-17 ASSESSMENT — ENCOUNTER SYMPTOMS
STRIDOR: 0
ABDOMINAL PAIN: 0
SINUS PRESSURE: 0
ABDOMINAL DISTENTION: 0
RECTAL PAIN: 0
SHORTNESS OF BREATH: 0
WHEEZING: 0
BLOOD IN STOOL: 0
CONSTIPATION: 0
DIARRHEA: 0
CHEST TIGHTNESS: 0
VOMITING: 0
COLOR CHANGE: 0
EYE REDNESS: 0
COUGH: 0
BACK PAIN: 0
NAUSEA: 0
SORE THROAT: 0
TROUBLE SWALLOWING: 0
RHINORRHEA: 0

## 2025-02-17 ASSESSMENT — PATIENT HEALTH QUESTIONNAIRE - PHQ9
SUM OF ALL RESPONSES TO PHQ QUESTIONS 1-9: 0
SUM OF ALL RESPONSES TO PHQ QUESTIONS 1-9: 0
10. IF YOU CHECKED OFF ANY PROBLEMS, HOW DIFFICULT HAVE THESE PROBLEMS MADE IT FOR YOU TO DO YOUR WORK, TAKE CARE OF THINGS AT HOME, OR GET ALONG WITH OTHER PEOPLE: NOT DIFFICULT AT ALL
7. TROUBLE CONCENTRATING ON THINGS, SUCH AS READING THE NEWSPAPER OR WATCHING TELEVISION: NOT AT ALL
3. TROUBLE FALLING OR STAYING ASLEEP: NOT AT ALL
6. FEELING BAD ABOUT YOURSELF - OR THAT YOU ARE A FAILURE OR HAVE LET YOURSELF OR YOUR FAMILY DOWN: NOT AT ALL
9. THOUGHTS THAT YOU WOULD BE BETTER OFF DEAD, OR OF HURTING YOURSELF: NOT AT ALL
4. FEELING TIRED OR HAVING LITTLE ENERGY: NOT AT ALL
8. MOVING OR SPEAKING SO SLOWLY THAT OTHER PEOPLE COULD HAVE NOTICED. OR THE OPPOSITE, BEING SO FIGETY OR RESTLESS THAT YOU HAVE BEEN MOVING AROUND A LOT MORE THAN USUAL: NOT AT ALL
5. POOR APPETITE OR OVEREATING: NOT AT ALL
2. FEELING DOWN, DEPRESSED OR HOPELESS: NOT AT ALL
SUM OF ALL RESPONSES TO PHQ QUESTIONS 1-9: 0
SUM OF ALL RESPONSES TO PHQ QUESTIONS 1-9: 0
1. LITTLE INTEREST OR PLEASURE IN DOING THINGS: NOT AT ALL
SUM OF ALL RESPONSES TO PHQ9 QUESTIONS 1 & 2: 0

## 2025-02-17 NOTE — PROGRESS NOTES
Chief Complaint   Patient presents with    Diabetes       The patient (or guardian, if applicable) and other individuals in attendance with the patient were advised that Artificial Intelligence will be utilized during this visit to record, process the conversation to generate a clinical note, and support improvement of the AI technology. The patient (or guardian, if applicable) and other individuals in attendance at the appointment consented to the use of AI, including the recording.                  Diabetes      History of Present Illness  The patient presents for a follow-up on diabetes and chronic medical problems.    He reports a resolution of his foot infection, with a scheduled checkup on Thursday. He has been managing his eye health, receiving injections in both eyes. His blood glucose levels have been well-controlled. He has not undergone the blood work ordered in October 2024. He has not undergone screening for hepatitis C or HIV. He declines the pneumonia vaccine due to a previous adverse reaction to the influenza vaccine. He is currently on Lantus, administering 28 to 30 units, and reports satisfactory blood sugar control.  Type 2 diabetes A1c is 5.8 which is stable as previous.     Patient has history of foot infection, which has resolved.      Patient has blood work ordered he has not done that.  Encourage patient to do blood work reports he has been recently busy and taking care of his mom.      He has not been taking his antihypertensive medication as his blood pressure readings have been within the normal range, typically around 120 to 125 systolic, although today's reading was slightly elevated at 138.    He has not been adhering to his cholesterol medication regimen.    Supplemental Information  He is not allowed to drive due to legal issues but will get his license back in May 2025.    MEDICATIONS  Lantus, lisinopril, atorvastatin    IMMUNIZATIONS  He declines the pneumonia vaccine due to a

## 2025-02-17 NOTE — PROGRESS NOTES
Visit Information    Have you changed or started any medications since your last visit including any over-the-counter medicines, vitamins, or herbal medicines? no   Are you having any side effects from any of your medications? -  no  Have you stopped taking any of your medications? Is so, why? -  no    Have you seen any other physician or provider since your last visit? No  Have you had any other diagnostic tests since your last visit? No  Have you been seen in the emergency room and/or had an admission to a hospital since we last saw you? No  Have you had your routine dental cleaning in the past 6 months? no    Have you activated your [employer] account? If not, what are your barriers? Yes     Patient Care Team:  Kimberly Jessica MD as PCP - General (Family Medicine)  Kimberly Jessica MD as PCP - Empaneled Provider    Medical History Review  Past Medical, Family, and Social History reviewed and does contribute to the patient presenting condition    Health Maintenance   Topic Date Due    HIV screen  Never done    Hepatitis C screen  Never done    Colorectal Cancer Screen  Never done    Pneumococcal 50+ years Vaccine (2 of 2 - PCV) 03/13/2019    Shingles vaccine (1 of 2) Never done    Lipids  10/25/2022    Flu vaccine (1) Never done    COVID-19 Vaccine (1 - 2024-25 season) Never done    GFR test (Diabetes, CKD 3-4, OR last GFR 15-59)  11/08/2024    Depression Monitoring  02/07/2025    Diabetic foot exam  10/16/2025    A1C test (Diabetic or Prediabetic)  10/16/2025    Diabetic Alb to Cr ratio (uACR) test  10/16/2025    Diabetic retinal exam  01/23/2026    DTaP/Tdap/Td vaccine (3 - Td or Tdap) 01/10/2033    Hepatitis A vaccine  Aged Out    Hib vaccine  Aged Out    Polio vaccine  Aged Out    Meningococcal (ACWY) vaccine  Aged Out    Hepatitis B vaccine  Discontinued    Pneumococcal 0-49 years Vaccine  Discontinued

## 2025-02-17 NOTE — TELEPHONE ENCOUNTER
Patient has appointment with Dr. Lucero 02/20/2025. Needs to r/s . Patient requesting to see Dr. Mckeon, please call patient at [phone] [facility]  
Spoke with patient and told him to call wound care to schedule   
Pt afebrile and hemodynamically stable. No joint effusion, warmth or erythema. He has FROM without pain. TTP over medial joint line and pain with valgus maneuver. Suspect knee sprain vs MCL sprain vs meniscal injury. XR without evidence of acute bony abnormality. Discussed with pt. Counseled on supportive care. ACE wrap applied in ED. Will refer to Ortho for follow up. Strict return precautions given.

## 2025-03-06 ENCOUNTER — TELEPHONE (OUTPATIENT)
Dept: WOUND CARE | Age: 55
End: 2025-03-06

## 2025-03-06 ENCOUNTER — HOSPITAL ENCOUNTER (OUTPATIENT)
Dept: WOUND CARE | Age: 55
Discharge: HOME | End: 2025-03-06

## 2025-03-06 NOTE — DISCHARGE INSTRUCTIONS
[facility] WOUND and HYPERBAR[facility] TREATMENT  CENTER      Visit  Discharge Instructions / Physician Orders  DATE:     Home Care:     SUPPLIES ORDERED THRU:                     DATE LAST SUPPLIED     Wound Location:       Cleanse with: Liquid antibacterial soap and water, rinse well      Dressing Orders:  Primary dressing                       Secondary dressing                           secure with           x 30days     Frequency:       Additional Orders: Increase protein to diet (meat, cheese, eggs, fish, peanut butter, nuts and beans)  Multivitamin daily  Elevate legs if swollen or wearing compression when sitting  OFFLOADING [] YES  TYPE:                  [] NA    Weekly wound care visits until determined otherwise.    Antibiotic therapy-wound care related YES [] NO [] NA[]  DRUG-                                                             Duration-             Script sent to-                      on (date)  MY CHART []     Smart Device  []     HYPERBARIC TREATMENT-                TREATMENT #                          Your next appointment with the Wound Care Center is in 1 week                                                                                                   (Please note your next appointment above and if you are unable to keep, kindly give a 24 hour notice. Thank you.)  If more than 15 min late we cannot guarantee you will be seen due to clinician schedule    Per Policy,  3 or more cancellations or no show may result in dismissal from program  If you experience any of the following, please call the Wound Care Center during business hours:  [phone]     * Increase in Pain  * Temperature over 101  * Increase in drainage from your wound  * Drainage with a foul odor  * Bleeding  * Increase in swelling  * Need for compression bandage changes due to slippage, breakthrough drainage.     If you need medical attention outside of the business hours of the Wound Care Centers please contact your PCP or

## 2025-03-06 NOTE — TELEPHONE ENCOUNTER
Patient canceled his [facility] wound care apt today says that he has some other things to do, he was rescheduled for 3-12-25

## 2025-03-08 NOTE — DISCHARGE INSTRUCTIONS
[facility] WOUND and HYPERBARIC TREATMENT  CENTER      Visit  Discharge Instructions / Physician Orders  DATE:     Home Care:NA     SUPPLIES ORDERED THRU:   NA                  DATE LAST SUPPLIED     Wound Location:  Left foot-no open wound      Cleanse with: Liquid antibacterial soap and water, rinse well      Dressing Orders:  Apply diabetic foot cream ( like gold bond) to callused areas NOT BETWEEN TOES     Frequency:  DAILY AT NIGHT     Additional Orders: Increase protein to diet (meat, cheese, eggs, fish, peanut butter, nuts and beans)  Multivitamin daily  Elevate legs if swollen or wearing compression when sitting  OFFLOADING [] YES  TYPE:                  [] NA    Weekly wound care visits until determined otherwise.    Antibiotic therapy-wound care related YES [] NO [] NA[x]  DRUG-                                                             Duration-             Script sent to-                      on (date)  MY CHART []     Smart Device  []     HYPERBARIC TREATMENT-                TREATMENT #                          Your next appointment with the Wound Care Center -CALL IF NEEDED CONTACT PODIATRIST FOR APPOINTMENT ( DIABETIC FOOT CHECK, CALLUS CHECK)  (Please note your next appointment above and if you are unable to keep, kindly give a 24 hour notice. Thank you.)  If more than 15 min late we cannot guarantee you will be seen due to clinician schedule    Per Policy,  3 or more cancellations or no show may result in dismissal from program  If you experience any of the following, please call the Wound Care Center during business hours:  [phone]     * Increase in Pain  * Temperature over 101  * Increase in drainage from your wound  * Drainage with a foul odor  * Bleeding  * Increase in swelling  * Need for compression bandage changes due to slippage, breakthrough drainage.     If you need medical attention outside of the business hours of the Wound Care Centers please contact your PCP or go to the

## 2025-03-12 ENCOUNTER — HOSPITAL ENCOUNTER (OUTPATIENT)
Dept: WOUND CARE | Age: 55
Discharge: HOME | End: 2025-03-12
Payer: COMMERCIAL

## 2025-03-12 VITALS
RESPIRATION RATE: 18 BRPM | TEMPERATURE: 96.9 F | DIASTOLIC BLOOD PRESSURE: 97 MMHG | WEIGHT: 204 LBS | HEART RATE: 76 BPM | HEIGHT: 72 IN | BODY MASS INDEX: 27.63 KG/M2 | SYSTOLIC BLOOD PRESSURE: 148 MMHG

## 2025-03-12 DIAGNOSIS — L84 CALLUS OF FOOT: Primary | ICD-10-CM

## 2025-03-12 PROBLEM — L08.9 DIABETIC INFECTION OF LEFT FOOT (HCC): Status: RESOLVED | Noted: 2023-02-27 | Resolved: 2025-03-12

## 2025-03-12 PROBLEM — E11.628 DIABETIC INFECTION OF LEFT FOOT (HCC): Status: RESOLVED | Noted: 2023-02-27 | Resolved: 2025-03-12

## 2025-03-12 PROBLEM — L08.9 TYPE 2 DIABETES MELLITUS WITH RIGHT DIABETIC FOOT INFECTION (HCC): Status: RESOLVED | Noted: 2023-09-25 | Resolved: 2025-03-12

## 2025-03-12 PROBLEM — M72.6 NECROTIZING FASCIITIS OF ANKLE AND FOOT (HCC): Status: RESOLVED | Noted: 2023-09-25 | Resolved: 2025-03-12

## 2025-03-12 PROBLEM — L97.512 ULCER OF RIGHT FOOT WITH FAT LAYER EXPOSED (HCC): Status: RESOLVED | Noted: 2023-10-10 | Resolved: 2025-03-12

## 2025-03-12 PROBLEM — E11.628 TYPE 2 DIABETES MELLITUS WITH RIGHT DIABETIC FOOT INFECTION (HCC): Status: RESOLVED | Noted: 2023-09-25 | Resolved: 2025-03-12

## 2025-03-12 PROBLEM — A48.0 GAS GANGRENE (HCC): Status: RESOLVED | Noted: 2023-09-24 | Resolved: 2025-03-12

## 2025-03-12 PROCEDURE — 99212 OFFICE O/P EST SF 10 MIN: CPT | Performed by: NURSE PRACTITIONER

## 2025-03-12 PROCEDURE — 99212 OFFICE O/P EST SF 10 MIN: CPT

## 2025-03-12 ASSESSMENT — ENCOUNTER SYMPTOMS
DIARRHEA: 0
COUGH: 0
SHORTNESS OF BREATH: 0
NAUSEA: 0
VOMITING: 0
RHINORRHEA: 0

## 2025-03-12 ASSESSMENT — PAIN SCALES - GENERAL: PAINLEVEL_OUTOF10: 0

## 2025-03-12 NOTE — PROGRESS NOTES
[facility]   Progress Note and Procedure Note      Pierre Gordon  MEDICAL RECORD NUMBER:  [id number]  AGE: 54 y.o.   GENDER: male  : 1970  EPISODE DATE:  3/12/2025    Subjective:     Chief Complaint   Patient presents with    Wound Check     Bilateral Feet         HISTORY of PRESENT ILLNESS HPI     Pierre Gordon is a 54 y.o. male who presents today for wound/ulcer evaluation.   History of Wound Context: here to follow up on bilateral feet for check. No open wounds. Has callus on left foot.   Wound/Ulcer Pain Timing/Severity: none  Quality of pain: N/A  Severity:  0 / 10   Modifying Factors: None  Associated Signs/Symptoms: none    Ulcer Identification:  Ulcer Type:  no open wound          PAST MEDICAL HISTORY        Diagnosis Date    Anxiety and depression     Chronic back pain     HLD (hyperlipidemia) 11/15/2015    Hypertension     Marijuana abuse 11/15/2015    Nephrolithiasis 2015    Type II or unspecified type diabetes mellitus without mention of complication, not stated as uncontrolled        PAST SURGICAL HISTORY    Past Surgical History:   Procedure Laterality Date    FOOT DEBRIDEMENT Left 2023    Incision and drainage left foot performed by Lit Lucero DPM at [facility]    FOOT DEBRIDEMENT Right 2023    Incision and drainage down to bone/subfascial, right foot performed by Saadia Mckeon DPM at [facility]    FOOT DEBRIDEMENT Right 2023    FOOT DEBRIDEMENT INCISION AND DRAINAGE WITH APPLICATION PREVINA WOUND VAC AND INTEGRA BILAYER GRAFT RIGHT performed by Saadia Mckeon DPM at [facility] OR    FOOT SURGERY  2023    Excision of 3rd metatarsal head performed by Lit Lucero DPM at [facility]    HAND SURGERY Right 10/25/2021    HAND INCISION AND DRAINAGE performed by Errol Zambrano MD at [facility] OR    TOE AMPUTATION N/A 2023    TOE 3RD RIGHT AMPUTATION performed by Saadia Mckeon DPM at [facility] OR    TOE AMPUTATION Right 2023

## 2025-05-19 ENCOUNTER — HOSPITAL ENCOUNTER (OUTPATIENT)
Age: 55
Discharge: HOME | End: 2025-05-19
Payer: COMMERCIAL

## 2025-05-19 ENCOUNTER — RESULTS FOLLOW-UP (OUTPATIENT)
Dept: FAMILY MEDICINE CLINIC | Age: 55
End: 2025-05-19

## 2025-05-19 ENCOUNTER — OFFICE VISIT (OUTPATIENT)
Dept: FAMILY MEDICINE CLINIC | Age: 55
End: 2025-05-19
Payer: COMMERCIAL

## 2025-05-19 VITALS
OXYGEN SATURATION: 98 % | HEART RATE: 68 BPM | BODY MASS INDEX: 25.87 KG/M2 | HEIGHT: 72 IN | SYSTOLIC BLOOD PRESSURE: 126 MMHG | DIASTOLIC BLOOD PRESSURE: 80 MMHG | WEIGHT: 191 LBS

## 2025-05-19 DIAGNOSIS — F32.A ANXIETY AND DEPRESSION: ICD-10-CM

## 2025-05-19 DIAGNOSIS — L08.9 TYPE 2 DIABETES MELLITUS WITH RIGHT DIABETIC FOOT INFECTION (HCC): ICD-10-CM

## 2025-05-19 DIAGNOSIS — I10 ESSENTIAL HYPERTENSION: ICD-10-CM

## 2025-05-19 DIAGNOSIS — N52.1 ERECTILE DYSFUNCTION DUE TO DISEASES CLASSIFIED ELSEWHERE: ICD-10-CM

## 2025-05-19 DIAGNOSIS — F41.9 ANXIETY AND DEPRESSION: ICD-10-CM

## 2025-05-19 DIAGNOSIS — Z12.5 PROSTATE CANCER SCREENING: ICD-10-CM

## 2025-05-19 DIAGNOSIS — Z12.11 COLON CANCER SCREENING: ICD-10-CM

## 2025-05-19 DIAGNOSIS — F32.4 MAJOR DEPRESSIVE DISORDER WITH SINGLE EPISODE, IN PARTIAL REMISSION: ICD-10-CM

## 2025-05-19 DIAGNOSIS — E11.628 TYPE 2 DIABETES MELLITUS WITH RIGHT DIABETIC FOOT INFECTION (HCC): ICD-10-CM

## 2025-05-19 DIAGNOSIS — Z11.59 ENCOUNTER FOR SCREENING FOR OTHER VIRAL DISEASES: ICD-10-CM

## 2025-05-19 DIAGNOSIS — E11.65 TYPE 2 DIABETES MELLITUS WITH HYPERGLYCEMIA, WITH LONG-TERM CURRENT USE OF INSULIN (HCC): Primary | ICD-10-CM

## 2025-05-19 DIAGNOSIS — Z79.4 TYPE 2 DIABETES MELLITUS WITH HYPERGLYCEMIA, WITH LONG-TERM CURRENT USE OF INSULIN (HCC): Primary | ICD-10-CM

## 2025-05-19 DIAGNOSIS — E55.9 VITAMIN D DEFICIENCY: ICD-10-CM

## 2025-05-19 DIAGNOSIS — I73.9 PAD (PERIPHERAL ARTERY DISEASE): ICD-10-CM

## 2025-05-19 LAB
25(OH)D3 SERPL-MCNC: 9.2 NG/ML (ref 30–100)
ALBUMIN SERPL-MCNC: 3.9 G/DL (ref 3.5–5.2)
ALP SERPL-CCNC: 110 U/L (ref 40–129)
ALT SERPL-CCNC: 23 U/L (ref 10–50)
ANION GAP SERPL CALCULATED.3IONS-SCNC: 7 MMOL/L (ref 9–16)
AST SERPL-CCNC: 26 U/L (ref 10–50)
BASOPHILS # BLD: 0.05 K/UL (ref 0–0.2)
BASOPHILS NFR BLD: 1 % (ref 0–2)
BILIRUB SERPL-MCNC: 0.4 MG/DL (ref 0–1.2)
BUN SERPL-MCNC: 17 MG/DL (ref 6–20)
CALCIUM SERPL-MCNC: 9.3 MG/DL (ref 8.6–10.4)
CHLORIDE SERPL-SCNC: 103 MMOL/L (ref 98–107)
CO2 SERPL-SCNC: 29 MMOL/L (ref 20–31)
CREAT SERPL-MCNC: 0.9 MG/DL (ref 0.7–1.2)
EOSINOPHIL # BLD: 0.24 K/UL (ref 0–0.44)
EOSINOPHILS RELATIVE PERCENT: 4 % (ref 0–4)
ERYTHROCYTE [DISTWIDTH] IN BLOOD BY AUTOMATED COUNT: 12.1 % (ref 11.5–14.9)
FOLATE SERPL-MCNC: 7.2 NG/ML (ref 4.8–24.2)
GFR, ESTIMATED: >90 ML/MIN/1.73M2
GLUCOSE SERPL-MCNC: 122 MG/DL (ref 74–99)
HBA1C MFR BLD: 6.2 %
HCT VFR BLD AUTO: 40 % (ref 41–53)
HCV AB SERPL QL IA: NONREACTIVE
HGB BLD-MCNC: 14.2 G/DL (ref 13.5–17.5)
HIV 1+2 AB+HIV1 P24 AG SERPL QL IA: NONREACTIVE
IMM GRANULOCYTES # BLD AUTO: 0.03 K/UL (ref 0–0.3)
IMM GRANULOCYTES NFR BLD: 1 %
LYMPHOCYTES NFR BLD: 1.93 K/UL (ref 1.1–3.7)
LYMPHOCYTES RELATIVE PERCENT: 34 % (ref 24–44)
MAGNESIUM SERPL-MCNC: 2.2 MG/DL (ref 1.6–2.6)
MCH RBC QN AUTO: 32.4 PG (ref 26–34)
MCHC RBC AUTO-ENTMCNC: 35.5 G/DL (ref 31–37)
MCV RBC AUTO: 91.3 FL (ref 80–100)
MONOCYTES NFR BLD: 0.53 K/UL (ref 0.1–1.2)
MONOCYTES NFR BLD: 9 % (ref 3–12)
NEUTROPHILS NFR BLD: 51 % (ref 36–66)
NEUTS SEG NFR BLD: 2.96 K/UL (ref 1.5–8.1)
NRBC BLD-RTO: 0 PER 100 WBC
PLATELET # BLD AUTO: 234 K/UL (ref 150–450)
PMV BLD AUTO: 9.4 FL (ref 8–13.5)
POTASSIUM SERPL-SCNC: 4.4 MMOL/L (ref 3.7–5.3)
PROT SERPL-MCNC: 7.2 G/DL (ref 6.6–8.7)
PSA SERPL-MCNC: 0.34 NG/ML (ref 0–4)
RBC # BLD AUTO: 4.38 M/UL (ref 4.21–5.77)
SODIUM SERPL-SCNC: 139 MMOL/L (ref 136–145)
TSH SERPL DL<=0.05 MIU/L-ACNC: 1.08 UIU/ML (ref 0.27–4.2)
URATE SERPL-MCNC: 6.3 MG/DL (ref 3.4–7)
VIT B12 SERPL-MCNC: 367 PG/ML (ref 232–1245)
WBC OTHER # BLD: 5.7 K/UL (ref 3.5–11)

## 2025-05-19 PROCEDURE — 82306 VITAMIN D 25 HYDROXY: CPT

## 2025-05-19 PROCEDURE — 82746 ASSAY OF FOLIC ACID SERUM: CPT

## 2025-05-19 PROCEDURE — 1036F TOBACCO NON-USER: CPT | Performed by: FAMILY MEDICINE

## 2025-05-19 PROCEDURE — 84550 ASSAY OF BLOOD/URIC ACID: CPT

## 2025-05-19 PROCEDURE — G0103 PSA SCREENING: HCPCS

## 2025-05-19 PROCEDURE — 36415 COLL VENOUS BLD VENIPUNCTURE: CPT

## 2025-05-19 PROCEDURE — 84443 ASSAY THYROID STIM HORMONE: CPT

## 2025-05-19 PROCEDURE — 80053 COMPREHEN METABOLIC PANEL: CPT

## 2025-05-19 PROCEDURE — 82607 VITAMIN B-12: CPT

## 2025-05-19 PROCEDURE — 3017F COLORECTAL CA SCREEN DOC REV: CPT | Performed by: FAMILY MEDICINE

## 2025-05-19 PROCEDURE — G8427 DOCREV CUR MEDS BY ELIG CLIN: HCPCS | Performed by: FAMILY MEDICINE

## 2025-05-19 PROCEDURE — 83735 ASSAY OF MAGNESIUM: CPT

## 2025-05-19 PROCEDURE — 99214 OFFICE O/P EST MOD 30 MIN: CPT | Performed by: FAMILY MEDICINE

## 2025-05-19 PROCEDURE — 3074F SYST BP LT 130 MM HG: CPT | Performed by: FAMILY MEDICINE

## 2025-05-19 PROCEDURE — 3079F DIAST BP 80-89 MM HG: CPT | Performed by: FAMILY MEDICINE

## 2025-05-19 PROCEDURE — 86803 HEPATITIS C AB TEST: CPT

## 2025-05-19 PROCEDURE — 85025 COMPLETE CBC W/AUTO DIFF WBC: CPT

## 2025-05-19 PROCEDURE — 2022F DILAT RTA XM EVC RTNOPTHY: CPT | Performed by: FAMILY MEDICINE

## 2025-05-19 PROCEDURE — 83036 HEMOGLOBIN GLYCOSYLATED A1C: CPT | Performed by: FAMILY MEDICINE

## 2025-05-19 PROCEDURE — G8419 CALC BMI OUT NRM PARAM NOF/U: HCPCS | Performed by: FAMILY MEDICINE

## 2025-05-19 PROCEDURE — 3044F HG A1C LEVEL LT 7.0%: CPT | Performed by: FAMILY MEDICINE

## 2025-05-19 PROCEDURE — 87389 HIV-1 AG W/HIV-1&-2 AB AG IA: CPT

## 2025-05-19 RX ORDER — SILDENAFIL 100 MG/1
50 TABLET, FILM COATED ORAL PRN
Qty: 30 TABLET | Refills: 0 | Status: SHIPPED | OUTPATIENT
Start: 2025-05-19 | End: 2025-05-21 | Stop reason: SDUPTHER

## 2025-05-19 RX ORDER — ERGOCALCIFEROL 1.25 MG/1
50000 CAPSULE, LIQUID FILLED ORAL WEEKLY
Qty: 12 CAPSULE | Refills: 1 | Status: SHIPPED | OUTPATIENT
Start: 2025-05-19

## 2025-05-19 ASSESSMENT — PATIENT HEALTH QUESTIONNAIRE - PHQ9
10. IF YOU CHECKED OFF ANY PROBLEMS, HOW DIFFICULT HAVE THESE PROBLEMS MADE IT FOR YOU TO DO YOUR WORK, TAKE CARE OF THINGS AT HOME, OR GET ALONG WITH OTHER PEOPLE: NOT DIFFICULT AT ALL
3. TROUBLE FALLING OR STAYING ASLEEP: NOT AT ALL
8. MOVING OR SPEAKING SO SLOWLY THAT OTHER PEOPLE COULD HAVE NOTICED. OR THE OPPOSITE, BEING SO FIGETY OR RESTLESS THAT YOU HAVE BEEN MOVING AROUND A LOT MORE THAN USUAL: NOT AT ALL
4. FEELING TIRED OR HAVING LITTLE ENERGY: NOT AT ALL
SUM OF ALL RESPONSES TO PHQ QUESTIONS 1-9: 0
SUM OF ALL RESPONSES TO PHQ QUESTIONS 1-9: 0
2. FEELING DOWN, DEPRESSED OR HOPELESS: NOT AT ALL
SUM OF ALL RESPONSES TO PHQ QUESTIONS 1-9: 0
7. TROUBLE CONCENTRATING ON THINGS, SUCH AS READING THE NEWSPAPER OR WATCHING TELEVISION: NOT AT ALL
1. LITTLE INTEREST OR PLEASURE IN DOING THINGS: NOT AT ALL
SUM OF ALL RESPONSES TO PHQ QUESTIONS 1-9: 0
6. FEELING BAD ABOUT YOURSELF - OR THAT YOU ARE A FAILURE OR HAVE LET YOURSELF OR YOUR FAMILY DOWN: NOT AT ALL
5. POOR APPETITE OR OVEREATING: NOT AT ALL
9. THOUGHTS THAT YOU WOULD BE BETTER OFF DEAD, OR OF HURTING YOURSELF: NOT AT ALL

## 2025-05-19 ASSESSMENT — ENCOUNTER SYMPTOMS
COUGH: 0
CONSTIPATION: 0
ABDOMINAL PAIN: 0
NAUSEA: 0
SINUS PRESSURE: 0
SORE THROAT: 0
SHORTNESS OF BREATH: 0
WHEEZING: 0
VOMITING: 0
DIARRHEA: 0
BLOOD IN STOOL: 0
EYE REDNESS: 0
STRIDOR: 0
BACK PAIN: 0
RECTAL PAIN: 0
ABDOMINAL DISTENTION: 0
TROUBLE SWALLOWING: 0
CHEST TIGHTNESS: 0
RHINORRHEA: 0
COLOR CHANGE: 0

## 2025-05-19 NOTE — PROGRESS NOTES
Visit Information    Have you changed or started any medications since your last visit including any over-the-counter medicines, vitamins, or herbal medicines? no   Are you having any side effects from any of your medications? -  no  Have you stopped taking any of your medications? Is so, why? -  no    Have you seen any other physician or provider since your last visit? No  Have you had any other diagnostic tests since your last visit? No  Have you been seen in the emergency room and/or had an admission to a hospital since we last saw you? No  Have you had your routine dental cleaning in the past 6 months? no    Have you activated your [employer] account? If not, what are your barriers? Yes     Patient Care Team:  Kimberly Jessica MD as PCP - General (Family Medicine)  Kimberly Jessica MD as PCP - Empaneled Provider    Medical History Review  Past Medical, Family, and Social History reviewed and does contribute to the patient presenting condition    Health Maintenance   Topic Date Due    HIV screen  Never done    Hepatitis C screen  Never done    Colorectal Cancer Screen  Never done    Pneumococcal 50+ years Vaccine (2 of 2 - PCV) 03/13/2019    Shingles vaccine (1 of 2) Never done    Lipids  10/25/2022    COVID-19 Vaccine (1 - 2024-25 season) Never done    Flu vaccine (Season Ended) 08/01/2025    Diabetic foot exam  10/16/2025    Diabetic Alb to Cr ratio (uACR) test  10/16/2025    Diabetic retinal exam  01/23/2026    A1C test (Diabetic or Prediabetic)  02/17/2026    Depression Monitoring  02/17/2026    GFR test (Diabetes, CKD 3-4, OR last GFR 15-59)  05/19/2026    DTaP/Tdap/Td vaccine (3 - Td or Tdap) 01/10/2033    Hepatitis A vaccine  Aged Out    Hib vaccine  Aged Out    Polio vaccine  Aged Out    Meningococcal (ACWY) vaccine  Aged Out    Meningococcal B vaccine  Aged Out    Hepatitis B vaccine  Discontinued    Pneumococcal 0-49 years Vaccine  Discontinued     
kidney function, liver function, magnesium levels, uric acid levels, and thyroid function are all within normal limits.  - Advised to maintain A1c level below 7 and to undergo a cholesterol test and a urine test to monitor for any potential protein leakage, which could indicate kidney damage.  - Urine test should be conducted annually to ensure he is not developing chronic kidney disease.    2. Health maintenance.  - Blood counts, kidney function, liver function, magnesium levels, uric acid levels, and thyroid function are all within normal limits.  - Some tests are still pending, including prostate-specific antigen (PSA), HIV screening, hepatitis C screening, and cholesterol levels.  - Advised to complete the stool test for colon cancer screening and return the kit to the clinic.  - Provided a new stool test kit and instructed to drop it off at the clinic when completing blood work.    3. Medication management.  - Prescription for sildenafil 100 mg will be provided.  - Patient mentioned the need for a refill of sildenafil 100 mg, which was last refilled 4 months ago.      1. Type 2 diabetes mellitus with hyperglycemia, with long-term current use of insulin (HCC)  Controlled A1c 6.2 still under good control continue current treatment watch for hypoglycemic episodes  - POCT glycosylated hemoglobin (Hb A1C)  - Albumin/Creatinine Ratio, Urine; Future    2. Essential hypertension  Stable, continue lisinopril    3. Anxiety and depression  Stable, continue current treatment    4. Major depressive disorder with single episode, in partial remission  Improved off of medications    5. PAD (peripheral artery disease)  Stable, continue to have control on diabetes.    6. Colon cancer screening  Fit test again    7. Erectile dysfunction due to diseases classified elsewhere    - sildenafil (VIAGRA) 100 MG tablet; Take 0.5 tablets by mouth as needed for Erectile Dysfunction  Dispense: 30 tablet; Refill: 0      Follow-up  -

## 2025-05-20 ENCOUNTER — TELEPHONE (OUTPATIENT)
Dept: FAMILY MEDICINE CLINIC | Age: 55
End: 2025-05-20

## 2025-05-20 DIAGNOSIS — N52.1 ERECTILE DYSFUNCTION DUE TO DISEASES CLASSIFIED ELSEWHERE: ICD-10-CM

## 2025-05-20 NOTE — TELEPHONE ENCOUNTER
[facility] pharmacy called regarding the prescription sildenafil they need a frequency on it.     Thank you!

## 2025-05-21 RX ORDER — SILDENAFIL 100 MG/1
50 TABLET, FILM COATED ORAL DAILY PRN
Qty: 30 TABLET | Refills: 0 | Status: SHIPPED | OUTPATIENT
Start: 2025-05-21

## 2025-06-20 ENCOUNTER — TELEPHONE (OUTPATIENT)
Dept: FAMILY MEDICINE CLINIC | Age: 55
End: 2025-06-20

## 2025-06-20 NOTE — TELEPHONE ENCOUNTER
Patient called in and said he has poison ivy really bad and wanted to know if you would call him in a script. He was just seen last month.

## 2025-06-25 ENCOUNTER — TELEPHONE (OUTPATIENT)
Dept: FAMILY MEDICINE CLINIC | Age: 55
End: 2025-06-25

## 2025-06-25 DIAGNOSIS — L23.7 POISON IVY DERMATITIS: Primary | ICD-10-CM

## 2025-06-25 RX ORDER — HYDROCORTISONE 25 MG/G
OINTMENT TOPICAL
Qty: 200 G | Refills: 0 | Status: SHIPPED | OUTPATIENT
Start: 2025-06-25

## 2025-06-25 NOTE — TELEPHONE ENCOUNTER
Pt came in stating he has poison ivy and he needs some medication for it , I did tell pt to submit an E-visit he stated he didn't want to do that

## 2025-06-25 NOTE — TELEPHONE ENCOUNTER
Notify patient I sent the hydrocortisone ointment to the pharmacy, if rash did not improve he has to schedule appointment.

## (undated) DEVICE — SOLUTION SCRB 4OZ 10% POVIDONE IOD ANTIMIC BTL

## (undated) DEVICE — Device

## (undated) DEVICE — NEEDLE HYPO 25GA L1.5IN BLU POLYPR HUB S STL REG BVL STR

## (undated) DEVICE — DRESSING PETRO W3XL8IN OIL EMUL N ADH GZ KNIT IMPREG CELOS

## (undated) DEVICE — GLOVE SURG SZ 8 L12IN FNGR THK79MIL GRN LTX FREE

## (undated) DEVICE — STRIP WND PK W0.25INXL5YD IODO GZ TIGHTLY WVN CURAD

## (undated) DEVICE — GOWN,AURORA,NONREINFORCED,LARGE: Brand: MEDLINE

## (undated) DEVICE — 450 ML BOTTLE OF 0.05% CHLORHEXIDINE GLUCONATE IN 99.95% STERILE WATER FOR IRRIGATION, USP AND APPLICATOR.: Brand: IRRISEPT ANTIMICROBIAL WOUND LAVAGE

## (undated) DEVICE — DRESSING TRNSPAR W2XL2.75IN FLM SHT SEMIPERMEABLE WIND

## (undated) DEVICE — SYSTEM WND THER 14 DAY 150 CC CANSTR THER UNIT PREVENA + 125

## (undated) DEVICE — SOLUTION IRRIG 3000ML 0.9% SOD CHL USP UROMATIC PLAS CONT

## (undated) DEVICE — ZIMMER® STERILE DISPOSABLE TOURNIQUET CUFF WITH PLC, DUAL PORT, SINGLE BLADDER, 18 IN. (46 CM)

## (undated) DEVICE — SOLUTION PREP PAINT POV IOD FOR SKIN MUCOUS MEM

## (undated) DEVICE — SOLUTION IRRIG 1000ML 0.9% SOD CHL USP POUR PLAS BTL

## (undated) DEVICE — BANDAGE COMPR W4INXL5YD WHT BGE POLY COT M E WRP WV HK AND

## (undated) DEVICE — DRESSING,GAUZE,XEROFORM,CURAD,1"X8",ST: Brand: CURAD

## (undated) DEVICE — SOLUTION IRRIG 1000ML STRL H2O USP PLAS POUR BTL

## (undated) DEVICE — SWAB CULT CLR BLU PLAS RAYON LIQ AMIES AERB ANAERB FRIC CAP

## (undated) DEVICE — MERCY HEALTH ST CHARLES: Brand: MEDLINE INDUSTRIES, INC.

## (undated) DEVICE — SOLUTION PREP POVIDONE IOD FOR SKIN MUCOUS MEM PRIOR TO

## (undated) DEVICE — PAD,ABDOMINAL,8"X7.5",ST,LF,20/BX: Brand: MEDLINE INDUSTRIES, INC.

## (undated) DEVICE — SUTURE ETHLN SZ 4-0 L18IN NONABSORBABLE BLK L24MM FS-1 3/8 1629H

## (undated) DEVICE — SYRINGE 20ML LL S/C 50

## (undated) DEVICE — PREMIUM DRY TRAY LF: Brand: MEDLINE INDUSTRIES, INC.

## (undated) DEVICE — SINGLE PORT MANIFOLD: Brand: NEPTUNE 2

## (undated) DEVICE — BLANKET WRM W29.9XL79.1IN UP BODY FORC AIR MISTRAL-AIR

## (undated) DEVICE — SYRINGE IRRIG 60ML SFT PLIABLE BLB EZ TO GRP 1 HND USE W/

## (undated) DEVICE — SOLUTION SCRB 4OZ 7.5% POVIDONE IOD ANTIMIC BTL

## (undated) DEVICE — GLOVE ORANGE PI 8 1/2   MSG9085

## (undated) DEVICE — BANDAGE GZ W2XL75IN ST RAYON POLY CNFRM STRTCH LTWT

## (undated) DEVICE — GLOVE ORTHO 8   MSG9480

## (undated) DEVICE — GLOVE SURG SZ 85 L12IN FNGR ORTHO 126MIL CRM LTX FREE

## (undated) DEVICE — SUTURE ETHLN SZ 2-0 L18IN NONABSORBABLE BLK L26MM FS 3/8 664G

## (undated) DEVICE — CONTAINER,SPECIMEN,4OZ,OR STRL: Brand: MEDLINE

## (undated) DEVICE — ST CHARLES PODIATRY: Brand: MEDLINE INDUSTRIES, INC.

## (undated) DEVICE — 4-PORT MANIFOLD: Brand: NEPTUNE 2

## (undated) DEVICE — GAUZE,SPONGE,FLUFF,6"X6.75",STRL,5/TRAY: Brand: MEDLINE

## (undated) DEVICE — BANDAGE,ELASTIC,ESMARK,STERILE,4"X9',LF: Brand: MEDLINE

## (undated) DEVICE — PREVENA DUO PEEL & PLACE SYSTEM KIT- 13 CM: Brand: PREVENA DUO™ PEEL & PLACE™

## (undated) DEVICE — BLADE SAW W9XL25MM THK0.38MM CUT THK0.43MM REPL SAG OSC THN

## (undated) DEVICE — HANDPIECE SET WITH BONE CLEANING TIP AND SUCTION TUBE: Brand: INTERPULSE

## (undated) DEVICE — INTENDED FOR TISSUE SEPARATION, AND OTHER PROCEDURES THAT REQUIRE A SHARP SURGICAL BLADE TO PUNCTURE OR CUT.: Brand: BARD-PARKER ® CARBON RIB-BACK BLADES

## (undated) DEVICE — DRESSING TRNSPAR W5XL4.5IN FLM SHT SEMIPERMEABLE WIND